# Patient Record
Sex: MALE | Race: WHITE | NOT HISPANIC OR LATINO | Employment: OTHER | URBAN - METROPOLITAN AREA
[De-identification: names, ages, dates, MRNs, and addresses within clinical notes are randomized per-mention and may not be internally consistent; named-entity substitution may affect disease eponyms.]

---

## 2018-07-12 ENCOUNTER — APPOINTMENT (EMERGENCY)
Dept: RADIOLOGY | Facility: HOSPITAL | Age: 63
End: 2018-07-12
Payer: MEDICAID

## 2018-07-12 ENCOUNTER — HOSPITAL ENCOUNTER (EMERGENCY)
Facility: HOSPITAL | Age: 63
Discharge: HOME | End: 2018-07-12
Attending: EMERGENCY MEDICINE | Admitting: EMERGENCY MEDICINE
Payer: MEDICAID

## 2018-07-12 VITALS
HEIGHT: 67 IN | BODY MASS INDEX: 28.37 KG/M2 | DIASTOLIC BLOOD PRESSURE: 71 MMHG | WEIGHT: 180.78 LBS | HEART RATE: 123 BPM | TEMPERATURE: 98.4 F | SYSTOLIC BLOOD PRESSURE: 113 MMHG | OXYGEN SATURATION: 92 % | RESPIRATION RATE: 18 BRPM

## 2018-07-12 DIAGNOSIS — J44.9 COPD (CHRONIC OBSTRUCTIVE PULMONARY DISEASE) (HCC): ICD-10-CM

## 2018-07-12 DIAGNOSIS — J45.901 ASTHMA EXACERBATION: Primary | ICD-10-CM

## 2018-07-12 LAB
ALBUMIN SERPL BCP-MCNC: 3.3 G/DL (ref 3.5–5)
ALP SERPL-CCNC: 111 U/L (ref 46–116)
ALT SERPL W P-5'-P-CCNC: 26 U/L (ref 12–78)
ANION GAP SERPL CALCULATED.3IONS-SCNC: 6 MMOL/L (ref 4–13)
AST SERPL W P-5'-P-CCNC: 21 U/L (ref 5–45)
ATRIAL RATE: 94 BPM
BASOPHILS # BLD AUTO: 0.05 THOUSANDS/ΜL (ref 0–0.1)
BASOPHILS NFR BLD AUTO: 1 % (ref 0–1)
BILIRUB SERPL-MCNC: 0.4 MG/DL (ref 0.2–1)
BUN SERPL-MCNC: 23 MG/DL (ref 5–25)
CALCIUM SERPL-MCNC: 9 MG/DL (ref 8.3–10.1)
CHLORIDE SERPL-SCNC: 103 MMOL/L (ref 100–108)
CO2 SERPL-SCNC: 27 MMOL/L (ref 21–32)
CREAT SERPL-MCNC: 1.17 MG/DL (ref 0.6–1.3)
EOSINOPHIL # BLD AUTO: 0.43 THOUSAND/ΜL (ref 0–0.61)
EOSINOPHIL NFR BLD AUTO: 4 % (ref 0–6)
ERYTHROCYTE [DISTWIDTH] IN BLOOD BY AUTOMATED COUNT: 15 % (ref 11.6–15.1)
GFR SERPL CREATININE-BSD FRML MDRD: 67 ML/MIN/1.73SQ M
GLUCOSE SERPL-MCNC: 103 MG/DL (ref 65–140)
HCT VFR BLD AUTO: 42.1 % (ref 36.5–49.3)
HGB BLD-MCNC: 13.4 G/DL (ref 12–17)
IMM GRANULOCYTES # BLD AUTO: 0.03 THOUSAND/UL (ref 0–0.2)
IMM GRANULOCYTES NFR BLD AUTO: 0 % (ref 0–2)
LYMPHOCYTES # BLD AUTO: 1.62 THOUSANDS/ΜL (ref 0.6–4.47)
LYMPHOCYTES NFR BLD AUTO: 16 % (ref 14–44)
MCH RBC QN AUTO: 23.3 PG (ref 26.8–34.3)
MCHC RBC AUTO-ENTMCNC: 31.8 G/DL (ref 31.4–37.4)
MCV RBC AUTO: 73 FL (ref 82–98)
MONOCYTES # BLD AUTO: 0.89 THOUSAND/ΜL (ref 0.17–1.22)
MONOCYTES NFR BLD AUTO: 9 % (ref 4–12)
NEUTROPHILS # BLD AUTO: 7.01 THOUSANDS/ΜL (ref 1.85–7.62)
NEUTS SEG NFR BLD AUTO: 70 % (ref 43–75)
NRBC BLD AUTO-RTO: 0 /100 WBCS
P AXIS: 61 DEGREES
PLATELET # BLD AUTO: 354 THOUSANDS/UL (ref 149–390)
PMV BLD AUTO: 10.9 FL (ref 8.9–12.7)
POTASSIUM SERPL-SCNC: 3.9 MMOL/L (ref 3.5–5.3)
PR INTERVAL: 160 MS
PROT SERPL-MCNC: 7.9 G/DL (ref 6.4–8.2)
QRS AXIS: -40 DEGREES
QRSD INTERVAL: 90 MS
QT INTERVAL: 338 MS
QTC INTERVAL: 422 MS
RBC # BLD AUTO: 5.74 MILLION/UL (ref 3.88–5.62)
SODIUM SERPL-SCNC: 136 MMOL/L (ref 136–145)
T WAVE AXIS: 30 DEGREES
TROPONIN I SERPL-MCNC: <0.02 NG/ML
VENTRICULAR RATE: 94 BPM
WBC # BLD AUTO: 10.03 THOUSAND/UL (ref 4.31–10.16)

## 2018-07-12 PROCEDURE — 85025 COMPLETE CBC W/AUTO DIFF WBC: CPT | Performed by: EMERGENCY MEDICINE

## 2018-07-12 PROCEDURE — 80053 COMPREHEN METABOLIC PANEL: CPT | Performed by: EMERGENCY MEDICINE

## 2018-07-12 PROCEDURE — 99285 EMERGENCY DEPT VISIT HI MDM: CPT

## 2018-07-12 PROCEDURE — 96374 THER/PROPH/DIAG INJ IV PUSH: CPT

## 2018-07-12 PROCEDURE — 94760 N-INVAS EAR/PLS OXIMETRY 1: CPT

## 2018-07-12 PROCEDURE — 93010 ELECTROCARDIOGRAM REPORT: CPT | Performed by: INTERNAL MEDICINE

## 2018-07-12 PROCEDURE — 36415 COLL VENOUS BLD VENIPUNCTURE: CPT | Performed by: EMERGENCY MEDICINE

## 2018-07-12 PROCEDURE — 93005 ELECTROCARDIOGRAM TRACING: CPT

## 2018-07-12 PROCEDURE — 84484 ASSAY OF TROPONIN QUANT: CPT | Performed by: EMERGENCY MEDICINE

## 2018-07-12 PROCEDURE — 94644 CONT INHLJ TX 1ST HOUR: CPT

## 2018-07-12 PROCEDURE — 94640 AIRWAY INHALATION TREATMENT: CPT

## 2018-07-12 PROCEDURE — 71046 X-RAY EXAM CHEST 2 VIEWS: CPT

## 2018-07-12 RX ORDER — ALBUTEROL SULFATE 90 UG/1
2 AEROSOL, METERED RESPIRATORY (INHALATION) ONCE
Status: COMPLETED | OUTPATIENT
Start: 2018-07-12 | End: 2018-07-12

## 2018-07-12 RX ORDER — ALBUTEROL SULFATE 90 UG/1
2 AEROSOL, METERED RESPIRATORY (INHALATION) EVERY 4 HOURS PRN
Qty: 1 INHALER | Refills: 0 | Status: SHIPPED | OUTPATIENT
Start: 2018-07-12

## 2018-07-12 RX ORDER — OXYCODONE HYDROCHLORIDE AND ACETAMINOPHEN 5; 325 MG/1; MG/1
1 TABLET ORAL EVERY 8 HOURS PRN
COMMUNITY

## 2018-07-12 RX ORDER — IPRATROPIUM BROMIDE AND ALBUTEROL SULFATE 2.5; .5 MG/3ML; MG/3ML
3 SOLUTION RESPIRATORY (INHALATION)
Status: DISCONTINUED | OUTPATIENT
Start: 2018-07-12 | End: 2018-07-12 | Stop reason: HOSPADM

## 2018-07-12 RX ORDER — PREDNISONE 20 MG/1
60 TABLET ORAL DAILY
Qty: 15 TABLET | Refills: 0 | Status: SHIPPED | OUTPATIENT
Start: 2018-07-12 | End: 2018-07-17

## 2018-07-12 RX ORDER — ALBUTEROL SULFATE 2.5 MG/3ML
10 SOLUTION RESPIRATORY (INHALATION) ONCE
Status: COMPLETED | OUTPATIENT
Start: 2018-07-12 | End: 2018-07-12

## 2018-07-12 RX ORDER — BENZONATATE 100 MG/1
100 CAPSULE ORAL EVERY 8 HOURS
Qty: 21 CAPSULE | Refills: 0 | Status: SHIPPED | OUTPATIENT
Start: 2018-07-12

## 2018-07-12 RX ORDER — METHYLPREDNISOLONE SODIUM SUCCINATE 125 MG/2ML
125 INJECTION, POWDER, LYOPHILIZED, FOR SOLUTION INTRAMUSCULAR; INTRAVENOUS ONCE
Status: COMPLETED | OUTPATIENT
Start: 2018-07-12 | End: 2018-07-12

## 2018-07-12 RX ADMIN — IPRATROPIUM BROMIDE 0.5 MG: 0.5 SOLUTION RESPIRATORY (INHALATION) at 09:57

## 2018-07-12 RX ADMIN — ALBUTEROL SULFATE 2 PUFF: 90 AEROSOL, METERED RESPIRATORY (INHALATION) at 12:59

## 2018-07-12 RX ADMIN — IPRATROPIUM BROMIDE AND ALBUTEROL SULFATE 3 ML: .5; 3 SOLUTION RESPIRATORY (INHALATION) at 09:07

## 2018-07-12 RX ADMIN — ALBUTEROL SULFATE 10 MG: 2.5 SOLUTION RESPIRATORY (INHALATION) at 09:57

## 2018-07-12 RX ADMIN — METHYLPREDNISOLONE SODIUM SUCCINATE 125 MG: 125 INJECTION, POWDER, FOR SOLUTION INTRAMUSCULAR; INTRAVENOUS at 09:51

## 2018-07-12 NOTE — ED PROVIDER NOTES
History  Chief Complaint   Patient presents with    Shortness of Breath     onset yesterday afternoon progressively worse w cough n wheezing     37-year-old male patient presents emergency department exacerbation of the COPD and asthma  The patient states very clearly from the beginning of his time in the emergency department there be no way he would be willing to stay  Patient is a amenable to treatment in the emergency department  Examination patient is wheezing bilaterally, he is not moving a great deal of air in the peripheral aspect of his lungs  The patient be given an hour long nebulizer treatment reassessed  After a long nebulizer the patient's jittery but otherwise feeling better  Patient had good breath sounds in his lungs with some wheezing still  I did offer the patient further treatment, I offered the patient inpatient admission  The patient did not want this and wanted to be discharged home  The patient be discharged follow-up  The emergency department should he need to while here on vacation  History provided by:  Spouse   used: No    Shortness of Breath   Severity:  Mild  Onset quality:  Sudden  Timing:  Constant  Progression:  Worsening  Chronicity:  Recurrent  Context: not activity, not animal exposure, not emotional upset and not occupational exposure    Relieved by:  Nothing  Worsened by:  Nothing  Ineffective treatments:  None tried  Associated symptoms: no chest pain, no diaphoresis, no ear pain, no hemoptysis, no PND and no sputum production        Prior to Admission Medications   Prescriptions Last Dose Informant Patient Reported?  Taking?   oxyCODONE-acetaminophen (PERCOCET) 5-325 mg per tablet   Yes Yes   Sig: Take 1 tablet by mouth every 8 (eight) hours as needed for moderate pain (not daily use)      Facility-Administered Medications: None       Past Medical History:   Diagnosis Date    Asthma     Chronic pain     cervic and lumbar    Hyperlipidemia        Past Surgical History:   Procedure Laterality Date    APPENDECTOMY      HERNIA REPAIR      4 times    NEPHRECTOMY LIVING DONOR Left 10/2009       History reviewed  No pertinent family history  I have reviewed and agree with the history as documented  Social History   Substance Use Topics    Smoking status: Former Smoker    Smokeless tobacco: Never Used    Alcohol use No        Review of Systems   Constitutional: Negative for diaphoresis  HENT: Negative for ear pain  Respiratory: Positive for shortness of breath  Negative for hemoptysis and sputum production  Cardiovascular: Negative for chest pain and PND  All other systems reviewed and are negative  Physical Exam  Physical Exam   Constitutional: He is oriented to person, place, and time  He appears well-developed and well-nourished  No distress  HENT:   Head: Normocephalic and atraumatic  Right Ear: External ear normal    Left Ear: External ear normal    Eyes: Conjunctivae and EOM are normal  Right eye exhibits no discharge  Left eye exhibits no discharge  No scleral icterus  Neck: Normal range of motion  Neck supple  No JVD present  No tracheal deviation present  No thyromegaly present  Cardiovascular: Normal rate and regular rhythm  Pulmonary/Chest: No stridor  He is in respiratory distress  He has wheezes  Abdominal: Soft  Bowel sounds are normal  He exhibits no distension  There is no tenderness  Musculoskeletal: Normal range of motion  He exhibits no edema, tenderness or deformity  Neurological: He is alert and oriented to person, place, and time  No cranial nerve deficit  Coordination normal    Skin: Skin is warm and dry  He is not diaphoretic  Psychiatric: He has a normal mood and affect  His behavior is normal    Nursing note and vitals reviewed        Vital Signs  ED Triage Vitals [07/12/18 0845]   Temperature Pulse Respirations Blood Pressure SpO2   98 4 °F (36 9 °C) 99 22 131/83 92 % Temp Source Heart Rate Source Patient Position - Orthostatic VS BP Location FiO2 (%)   Oral Monitor Lying Right arm --      Pain Score       --           Vitals:    07/12/18 0900 07/12/18 1000 07/12/18 1030 07/12/18 1301   BP: 136/98 144/79 129/90 113/71   Pulse: 102 96 (!) 112 (!) 123   Patient Position - Orthostatic VS:    Lying       Visual Acuity      ED Medications  Medications   ipratropium-albuterol (DUO-NEB) 0 5-2 5 mg/3 mL inhalation solution 3 mL (3 mL Nebulization Given 7/12/18 0907)   methylPREDNISolone sodium succinate (Solu-MEDROL) injection 125 mg (125 mg Intravenous Given 7/12/18 0951)   albuterol inhalation solution 10 mg (10 mg Nebulization Given 7/12/18 0957)   ipratropium (ATROVENT) 0 02 % inhalation solution 0 5 mg (0 5 mg Nebulization Given 7/12/18 0957)   albuterol (PROVENTIL HFA,VENTOLIN HFA) inhaler 2 puff (2 puffs Inhalation Given 7/12/18 1259)       Diagnostic Studies  Results Reviewed     Procedure Component Value Units Date/Time    Comprehensive metabolic panel [[id number]]  (Abnormal) Collected:  07/12/18 0949    Lab Status:  Final result Specimen:  Blood from Arm, Left Updated:  07/12/18 1044     Sodium 136 mmol/L      Potassium 3 9 mmol/L      Chloride 103 mmol/L      CO2 27 mmol/L      Anion Gap 6 mmol/L      BUN 23 mg/dL      Creatinine 1 17 mg/dL      Glucose 103 mg/dL      Calcium 9 0 mg/dL      AST 21 U/L      ALT 26 U/L      Alkaline Phosphatase 111 U/L      Total Protein 7 9 g/dL      Albumin 3 3 (L) g/dL      Total Bilirubin 0 40 mg/dL      eGFR 67 ml/min/1 73sq m     Narrative:         National Kidney Disease Education Program recommendations are as follows:  GFR calculation is accurate only with a steady state creatinine  Chronic Kidney disease less than 60 ml/min/1 73 sq  meters  Kidney failure less than 15 ml/min/1 73 sq  meters      Troponin I [[id number]]  (Normal) Collected:  07/12/18 0949    Lab Status:  Final result Specimen:  Blood from Arm, Left Updated:  07/12/18 1043 Troponin I <0 02 ng/mL     CBC and differential [[id number]]  (Abnormal) Collected:  07/12/18 0949    Lab Status:  Final result Specimen:  Blood from Arm, Left Updated:  07/12/18 0958     WBC 10 03 Thousand/uL      RBC 5 74 (H) Million/uL      Hemoglobin 13 4 g/dL      Hematocrit 42 1 %      MCV 73 (L) fL      MCH 23 3 (L) pg      MCHC 31 8 g/dL      RDW 15 0 %      MPV 10 9 fL      Platelets 618 Thousands/uL      nRBC 0 /100 WBCs      Neutrophils Relative 70 %      Immat GRANS % 0 %      Lymphocytes Relative 16 %      Monocytes Relative 9 %      Eosinophils Relative 4 %      Basophils Relative 1 %      Neutrophils Absolute 7 01 Thousands/µL      Immature Grans Absolute 0 03 Thousand/uL      Lymphocytes Absolute 1 62 Thousands/µL      Monocytes Absolute 0 89 Thousand/µL      Eosinophils Absolute 0 43 Thousand/µL      Basophils Absolute 0 05 Thousands/µL                  X-ray chest 2 views   Final Result by Jenna Vieira MD (07/12 1103)      No active pulmonary disease on examination which is somewhat limited secondary to low lung volumes  Workstation performed: [id number]                    Procedures  Procedures       Phone Contacts  ED Phone Contact    ED Course                               MDM  Number of Diagnoses or Management Options  Asthma exacerbation: new and requires workup  COPD (chronic obstructive pulmonary disease) ([facility] [address] ): new and requires workup  Diagnosis management comments: Patient's EKG was done at 0954 hr, ventricular rate was 94    Patient had a normal sinus rhythm with some left axis deviation       Amount and/or Complexity of Data Reviewed  Clinical lab tests: ordered and reviewed  Tests in the radiology section of CPT®: reviewed and ordered  Decide to obtain previous medical records or to obtain history from someone other than the patient: yes  Review and summarize past medical records: yes  Independent visualization of images, tracings, or specimens: yes    Patient Progress  Patient progress: stable    CritCare Time    Disposition  Final diagnoses:   Asthma exacerbation   COPD (chronic obstructive pulmonary disease) ([address] )     Time reflects when diagnosis was documented in both MDM as applicable and the Disposition within this note     Time User Action Codes Description Comment    7/12/2018 12:32 PM April [facility] Add [G39 582] Asthma exacerbation     7/12/2018 12:32 PM April Lab Add [J44 9] COPD (chronic obstructive pulmonary disease) [facility])       ED Disposition     ED Disposition Condition Comment    Discharge  Waldo Tuttle discharge to home/self care  Condition at discharge: Good        Follow-up Information     Follow up With Specialties Details Why Contact Info Additional Information    [address] [facility] Emergency Department Emergency Medicine  As needed [address] [facility] [address] [phone] [place] ED, [address] [place] [address], [facility]          Discharge Medication List as of 7/12/2018 12:33 PM      START taking these medications    Details   albuterol (PROVENTIL HFA,VENTOLIN HFA) 90 mcg/act inhaler Inhale 2 puffs every 4 (four) hours as needed for wheezing, Starting Thu 7/12/2018, Print      predniSONE 20 mg tablet Take 3 tablets (60 mg total) by mouth daily for 5 days, Starting Thu 7/12/2018, Until Tue 7/17/2018, Print         CONTINUE these medications which have NOT CHANGED    Details   oxyCODONE-acetaminophen (PERCOCET) 5-325 mg per tablet Take 1 tablet by mouth every 8 (eight) hours as needed for moderate pain (not daily use), Historical Med           No discharge procedures on file      ED Provider  Electronically Signed by           Verner Breen, DO  07/12/18 6855

## 2018-07-12 NOTE — ED NOTES
Discharge instructions and medications reviewed with patient  Patient verbalized understanding with no further questions at this time       Nelida Rashid RN  07/12/18 3391

## 2018-07-12 NOTE — DISCHARGE INSTRUCTIONS
Asthma, Ambulatory Care   GENERAL INFORMATION:   Asthma  is a lung disease that makes breathing difficult  Chronic inflammation and reactions to triggers narrow the airways in your lungs  Asthma can become life-threatening if it is not managed  Common symptoms include the following:   · Coughing     · Wheezing     · Shortness of breath     · Chest tightness  Seek immediate care for the following symptoms:   · Severe shortness of breath    · Blue or gray lips or nails    · Skin around your neck and ribs pulls in with each breath    · Shortness of breath, even after you take your short-term medicine as directed     · Peak flow numbers in the red zone of your asthma action plan  Treatment for asthma  will depend on how severe it is  Medicine may decrease inflammation, open airways, and make it easier to breathe  Medicines may be inhaled, taken as a pill, or injected  Short-term medicines relieve your symptoms quickly  Long-term medicines are used to prevent future attacks  You may also need medicine to help control your allergies  Manage and prevent future asthma attacks:   · Follow your asthma action pan  This is a written plan that you and your healthcare provider create  It explains which medicine you need and when to change doses if necessary  It also explains how you can monitor symptoms and use a peak flow meter  The meter measures how well your lungs are working  · Manage other health conditions , such as allergies, acid reflux, and sleep apnea  · Identify and avoid triggers  These may include pets, dust mites, mold, and cockroaches  · Do not smoke and avoid others who smoke  If you smoke, it is never too late to quit  Ask your healthcare provider if you need help quitting  · Ask about a flu vaccine  The flu can make your asthma worse  You may need a yearly flu shot  Follow up with your healthcare provider as directed:   You will need to return to make sure your medicine is working and your symptoms are controlled  You may be referred to an asthma or allergy specialist  Beltran Cannon may be asked to keep a record of your peak flow values and bring it with you to your appointments  Write down your questions so you remember to ask them during your visits  CARE AGREEMENT:   You have the right to help plan your care  Learn about your health condition and how it may be treated  Discuss treatment options with your caregivers to decide what care you want to receive  You always have the right to refuse treatment  The above information is an  only  It is not intended as medical advice for individual conditions or treatments  Talk to your doctor, nurse or pharmacist before following any medical regimen to see if it is safe and effective for you  © 2014 [address] is for End User's use only and may not be sold, redistributed or otherwise used for commercial purposes  All illustrations and images included in CareNotes® are the copyrighted property of A D A M [employer]  or Jorge Luis Archer

## 2018-08-27 ENCOUNTER — TRANSCRIPTION ENCOUNTER (OUTPATIENT)
Age: 63
End: 2018-08-27

## 2019-11-08 ENCOUNTER — HEALTH MAINTENANCE LETTER (OUTPATIENT)
Age: 64
End: 2019-11-08

## 2020-02-01 ENCOUNTER — TRANSCRIPTION ENCOUNTER (OUTPATIENT)
Age: 65
End: 2020-02-01

## 2020-03-12 PROBLEM — Z00.00 ENCOUNTER FOR PREVENTIVE HEALTH EXAMINATION: Noted: 2020-03-12

## 2020-03-23 ENCOUNTER — APPOINTMENT (OUTPATIENT)
Dept: PULMONOLOGY | Facility: CLINIC | Age: 65
End: 2020-03-23
Payer: MEDICAID

## 2020-03-23 VITALS
DIASTOLIC BLOOD PRESSURE: 70 MMHG | OXYGEN SATURATION: 98 % | HEART RATE: 76 BPM | WEIGHT: 194 LBS | RESPIRATION RATE: 16 BRPM | HEIGHT: 67 IN | SYSTOLIC BLOOD PRESSURE: 120 MMHG | BODY MASS INDEX: 30.45 KG/M2

## 2020-03-23 PROCEDURE — 99203 OFFICE O/P NEW LOW 30 MIN: CPT

## 2020-03-23 NOTE — REVIEW OF SYSTEMS
[Cough] : cough [Wheezing] : wheezing [SOB on Exertion] : sob on exertion [Negative] : Endocrine [Sputum] : no sputum

## 2020-03-23 NOTE — DISCUSSION/SUMMARY
[FreeTextEntry1] : 64 yo male with complaints consistent with OAD in patient with significant past smoking history. Anoro once daily prescribed in place of trelegy. PFT will be performed in the future with adjustment in treatment as clinically needed. A low dose screening chest CT will be performed. He is to follow up with his PMD as before.

## 2020-03-23 NOTE — HISTORY OF PRESENT ILLNESS
[TextBox_4] : 64 yo male presents for evaluation of chronic dry cough with ALCOCER. He denies fever, chest pain or hemoptysis. He was given a sample of trelegy 10 days ago, with improvement in symptoms.The patient has a 43 pack year hx of smoking, having stopped 12 years ago.

## 2020-06-29 ENCOUNTER — APPOINTMENT (OUTPATIENT)
Dept: PULMONOLOGY | Facility: CLINIC | Age: 65
End: 2020-06-29
Payer: MEDICAID

## 2020-06-29 VITALS
WEIGHT: 195 LBS | RESPIRATION RATE: 16 BRPM | SYSTOLIC BLOOD PRESSURE: 100 MMHG | BODY MASS INDEX: 30.61 KG/M2 | HEART RATE: 90 BPM | OXYGEN SATURATION: 96 % | HEIGHT: 67 IN | DIASTOLIC BLOOD PRESSURE: 70 MMHG

## 2020-06-29 PROCEDURE — 99214 OFFICE O/P EST MOD 30 MIN: CPT

## 2020-06-29 RX ORDER — UMECLIDINIUM BROMIDE AND VILANTEROL TRIFENATATE 62.5; 25 UG/1; UG/1
62.5-25 POWDER RESPIRATORY (INHALATION)
Qty: 1 | Refills: 3 | Status: DISCONTINUED | COMMUNITY
Start: 2020-03-23 | End: 2020-06-29

## 2020-06-29 RX ORDER — ALBUTEROL SULFATE 90 UG/1
108 (90 BASE) AEROSOL, METERED RESPIRATORY (INHALATION)
Qty: 1 | Refills: 1 | Status: ACTIVE | COMMUNITY
Start: 2020-05-28 | End: 1900-01-01

## 2020-06-29 NOTE — HISTORY OF PRESENT ILLNESS
[>= 30 pack years] : >= 30 pack years [Former] : former [TextBox_11] : 1.5 [TextBox_4] : 64 yo male with hx of OAD presents for follow up. The patient complains of recent increase of ALCOCER with productive cough and wheezing. He denies fever, chest pain or hemoptysis. He uses incruse daily (insurance will not cover anoro) with albuterol MDI. He did not go for chest CT as ordered last visit. 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 [YearQuit] : 2007 [TextBox_13] : 30

## 2020-06-29 NOTE — REVIEW OF SYSTEMS
[Sputum] : sputum [Cough] : cough [SOB on Exertion] : sob on exertion [Wheezing] : wheezing [Negative] : Endocrine

## 2020-06-29 NOTE — PHYSICAL EXAM
[No Acute Distress] : no acute distress [Normal Oropharynx] : normal oropharynx [No Neck Mass] : no neck mass [Normal Appearance] : normal appearance [Normal Rate/Rhythm] : normal rate/rhythm [Normal S1, S2] : normal s1, s2 [No Resp Distress] : no resp distress [No Murmurs] : no murmurs [No Abnormalities] : no abnormalities [Wheeze] : wheeze [Normal Gait] : normal gait [Benign] : benign [No Cyanosis] : no cyanosis [No Clubbing] : no clubbing [FROM] : FROM [No Edema] : no edema [No Focal Deficits] : no focal deficits [Normal Color/ Pigmentation] : normal color/ pigmentation [Oriented x3] : oriented x3 [Normal Affect] : normal affect

## 2020-06-29 NOTE — DISCUSSION/SUMMARY
[FreeTextEntry1] : 66 yo male with OAD exacerbation> Prednisone 40 mg daily for five days prescribed. He was again given a sample of trelegy, which will be ordered, hoping the insurance company will pay. PRN albuterol MDI use recommended. PFT in the future. LDCT of the chest for screening again ordered ([facility]) as per neighbor's request. He is to follow up with his PMD as before.

## 2020-09-18 ENCOUNTER — TRANSCRIPTION ENCOUNTER (OUTPATIENT)
Age: 65
End: 2020-09-18

## 2020-09-22 ENCOUNTER — TRANSCRIPTION ENCOUNTER (OUTPATIENT)
Age: 65
End: 2020-09-22

## 2020-10-05 ENCOUNTER — APPOINTMENT (OUTPATIENT)
Dept: PULMONOLOGY | Facility: CLINIC | Age: 65
End: 2020-10-05
Payer: MEDICAID

## 2020-10-05 VITALS
HEART RATE: 82 BPM | WEIGHT: 195 LBS | HEIGHT: 65 IN | OXYGEN SATURATION: 97 % | BODY MASS INDEX: 32.49 KG/M2 | RESPIRATION RATE: 16 BRPM | SYSTOLIC BLOOD PRESSURE: 142 MMHG | DIASTOLIC BLOOD PRESSURE: 84 MMHG | TEMPERATURE: 97 F

## 2020-10-05 PROCEDURE — 94729 DIFFUSING CAPACITY: CPT

## 2020-10-05 PROCEDURE — 94060 EVALUATION OF WHEEZING: CPT

## 2020-10-05 PROCEDURE — 99214 OFFICE O/P EST MOD 30 MIN: CPT | Mod: 25

## 2020-10-05 PROCEDURE — 94727 GAS DIL/WSHOT DETER LNG VOL: CPT

## 2020-10-05 RX ORDER — FLUTICASONE FUROATE, UMECLIDINIUM BROMIDE AND VILANTEROL TRIFENATATE 100; 62.5; 25 UG/1; UG/1; UG/1
100-62.5-25 POWDER RESPIRATORY (INHALATION)
Qty: 3 | Refills: 3 | Status: COMPLETED | COMMUNITY
Start: 2020-06-29 | End: 2020-10-05

## 2020-10-05 RX ORDER — FLUTICASONE FUROATE, UMECLIDINIUM BROMIDE AND VILANTEROL TRIFENATATE 100; 62.5; 25 UG/1; UG/1; UG/1
100-62.5-25 POWDER RESPIRATORY (INHALATION)
Refills: 0 | Status: COMPLETED | COMMUNITY
Start: 2020-03-23 | End: 2020-10-05

## 2020-10-05 RX ORDER — PREDNISONE 20 MG/1
20 TABLET ORAL
Qty: 10 | Refills: 0 | Status: COMPLETED | COMMUNITY
Start: 2020-06-29 | End: 2020-10-05

## 2020-10-12 NOTE — HISTORY OF PRESENT ILLNESS
[Former] : former [Current] : current [TextBox_4] : 64 yo male with hx of OAD presents for follow up. The patient complains of PRN cough with SOB. He denies fever, chest pain or hemoptysis. He uses incruse daily with albuterol MDI PRN and continues to vape. He did not go for screening chest CT due to covid. He is antibody positive and recently received his influenza vaccine.He continues to vape. [TextBox_11] : 1.5 [TextBox_13] : 30 [YearQuit] : 2007 [TextBox_29] : Denies snoring, daytime somnolence, apneic episodes, AM headaches

## 2020-10-12 NOTE — REVIEW OF SYSTEMS
[Cough] : cough [Sputum] : sputum [Wheezing] : wheezing [SOB on Exertion] : sob on exertion [Negative] : Endocrine

## 2020-10-12 NOTE — DISCUSSION/SUMMARY
[FreeTextEntry1] : 64 yo male with OAD. He is to continue use of incruse daily with PRN albuterol MDI. He was warned against further vaping. He was again reminded to have the low dose screening chest CT done. He claims that he had the influenza vaccine. He is to follow up with his PMD as before.

## 2020-12-06 ENCOUNTER — HEALTH MAINTENANCE LETTER (OUTPATIENT)
Age: 65
End: 2020-12-06

## 2020-12-14 ENCOUNTER — APPOINTMENT (OUTPATIENT)
Dept: PULMONOLOGY | Facility: CLINIC | Age: 65
End: 2020-12-14
Payer: MEDICAID

## 2020-12-14 VITALS
DIASTOLIC BLOOD PRESSURE: 84 MMHG | HEIGHT: 65 IN | RESPIRATION RATE: 16 BRPM | OXYGEN SATURATION: 97 % | HEART RATE: 85 BPM | TEMPERATURE: 98 F | SYSTOLIC BLOOD PRESSURE: 140 MMHG

## 2020-12-14 PROCEDURE — 99072 ADDL SUPL MATRL&STAF TM PHE: CPT

## 2020-12-14 PROCEDURE — 99214 OFFICE O/P EST MOD 30 MIN: CPT

## 2020-12-27 NOTE — DISCUSSION/SUMMARY
[FreeTextEntry1] : 64 yo male with OAD exacerbation. Zpack and medrol pack prescribed. He is to continue with incruse and albuterol as before. Treatment adjustment will depend on symptomatic needs. PFT will be performed in the future.Low dose screening chest CT again recommended. He is to stop use of E cigarettes. He has received the influenza vaccine. Follow up with his PMD as before.

## 2020-12-27 NOTE — HISTORY OF PRESENT ILLNESS
[Former] : former [>= 30 pack years] : >= 30 pack years [Current] : current [TextBox_4] : 66 yo male with hx of OAD presents for follow up. The patient complains of productive cough for three weeks. He denies fever, chest pain or hemoptysis.He uses incruse daily with PRN albuterol MDI. The patient had covid 19 like symptoms in March, noted to have antibodies a few months later.He continues to vape. [TextBox_11] : 1.5 [TextBox_13] : 30 [YearQuit] : 2007 [TextBox_29] : Denies snoring, daytime somnolence, apneic episodes, AM headaches

## 2020-12-27 NOTE — REVIEW OF SYSTEMS
[Cough] : cough [Sputum] : sputum [Wheezing] : wheezing [Negative] : Endocrine [SOB on Exertion] : no sob on exertion

## 2021-04-05 ENCOUNTER — APPOINTMENT (OUTPATIENT)
Dept: PULMONOLOGY | Facility: CLINIC | Age: 66
End: 2021-04-05

## 2021-06-02 ENCOUNTER — APPOINTMENT (OUTPATIENT)
Dept: PULMONOLOGY | Facility: CLINIC | Age: 66
End: 2021-06-02
Payer: MEDICAID

## 2021-06-02 VITALS
BODY MASS INDEX: 32.49 KG/M2 | WEIGHT: 195 LBS | HEART RATE: 93 BPM | HEIGHT: 65 IN | SYSTOLIC BLOOD PRESSURE: 138 MMHG | OXYGEN SATURATION: 95 % | DIASTOLIC BLOOD PRESSURE: 79 MMHG

## 2021-06-02 PROCEDURE — 99213 OFFICE O/P EST LOW 20 MIN: CPT

## 2021-06-02 RX ORDER — UMECLIDINIUM 62.5 UG/1
62.5 AEROSOL, POWDER ORAL DAILY
Qty: 1 | Refills: 3 | Status: DISCONTINUED | COMMUNITY
Start: 2020-05-01 | End: 2021-06-02

## 2021-06-02 RX ORDER — UMECLIDINIUM 62.5 UG/1
62.5 AEROSOL, POWDER ORAL DAILY
Qty: 1 | Refills: 3 | Status: DISCONTINUED | COMMUNITY
Start: 2020-12-03 | End: 2021-06-02

## 2021-06-02 RX ORDER — AZITHROMYCIN 250 MG/1
250 TABLET, FILM COATED ORAL
Qty: 1 | Refills: 3 | Status: DISCONTINUED | COMMUNITY
Start: 2020-12-14 | End: 2021-06-02

## 2021-06-02 RX ORDER — METHYLPREDNISOLONE 4 MG/1
4 TABLET ORAL
Qty: 1 | Refills: 0 | Status: DISCONTINUED | COMMUNITY
Start: 2020-12-14 | End: 2021-06-02

## 2021-06-02 NOTE — DISCUSSION/SUMMARY
[FreeTextEntry1] : 65 yo male with OAD related complaints. He was given a two week sample of breztri after which he is to continue with symbicort 160/4.5 BID and PRN albuterol MDI. He again was warned against further vaping. Low dose screening chest CT will again be ordered. He is to follow up with his PMD as before.

## 2021-06-02 NOTE — HISTORY OF PRESENT ILLNESS
[Former] : former [>= 30 pack years] : >= 30 pack years [Current] : current [TextBox_4] : 67 yo male with hx of OAD presents for follow up. The patient complains of SOB with wheezing and productive cough at night. He denies fever, chest pain or hemoptysis. He uses albuterol MDI PRN and recently started using his mother in law's symbicort.He stopped use of incruse. He continues to vape but less. The patient did not go for screening chest CT as ordered last visit. [TextBox_11] : 1.5 [TextBox_13] : 30 [YearQuit] : 2007 [TextBox_29] : Denies snoring, daytime somnolence, apneic episodes, AM headaches

## 2021-06-02 NOTE — REVIEW OF SYSTEMS
[Cough] : cough [Sputum] : sputum [Dyspnea] : dyspnea [Wheezing] : wheezing [SOB on Exertion] : no sob on exertion [Negative] : Endocrine

## 2021-06-14 ENCOUNTER — TRANSCRIPTION ENCOUNTER (OUTPATIENT)
Age: 66
End: 2021-06-14

## 2021-07-07 ENCOUNTER — TRANSCRIPTION ENCOUNTER (OUTPATIENT)
Age: 66
End: 2021-07-07

## 2021-07-14 ENCOUNTER — HOSPITAL ENCOUNTER (EMERGENCY)
Facility: HOSPITAL | Age: 66
Discharge: HOME | End: 2021-07-14
Attending: EMERGENCY MEDICINE | Admitting: EMERGENCY MEDICINE
Payer: MEDICAID

## 2021-07-14 ENCOUNTER — APPOINTMENT (EMERGENCY)
Dept: RADIOLOGY | Facility: HOSPITAL | Age: 66
End: 2021-07-14
Payer: MEDICAID

## 2021-07-14 VITALS
WEIGHT: 203.2 LBS | TEMPERATURE: 98.5 F | BODY MASS INDEX: 31.83 KG/M2 | SYSTOLIC BLOOD PRESSURE: 122 MMHG | DIASTOLIC BLOOD PRESSURE: 79 MMHG | RESPIRATION RATE: 22 BRPM | OXYGEN SATURATION: 97 % | HEART RATE: 79 BPM

## 2021-07-14 DIAGNOSIS — R07.89 LEFT-SIDED CHEST WALL PAIN: Primary | ICD-10-CM

## 2021-07-14 DIAGNOSIS — J98.01 BRONCHOSPASM: ICD-10-CM

## 2021-07-14 LAB
ALBUMIN SERPL BCP-MCNC: 3.6 G/DL (ref 3.5–5)
ALP SERPL-CCNC: 128 U/L (ref 46–116)
ALT SERPL W P-5'-P-CCNC: 33 U/L (ref 12–78)
ANION GAP SERPL CALCULATED.3IONS-SCNC: 8 MMOL/L (ref 4–13)
AST SERPL W P-5'-P-CCNC: 17 U/L (ref 5–45)
BASOPHILS # BLD AUTO: 0.04 THOUSANDS/ΜL (ref 0–0.1)
BASOPHILS NFR BLD AUTO: 1 % (ref 0–1)
BILIRUB SERPL-MCNC: 0.26 MG/DL (ref 0.2–1)
BUN SERPL-MCNC: 23 MG/DL (ref 5–25)
CALCIUM SERPL-MCNC: 9.1 MG/DL (ref 8.3–10.1)
CHLORIDE SERPL-SCNC: 103 MMOL/L (ref 100–108)
CO2 SERPL-SCNC: 27 MMOL/L (ref 21–32)
CREAT SERPL-MCNC: 1.14 MG/DL (ref 0.6–1.3)
EOSINOPHIL # BLD AUTO: 0.36 THOUSAND/ΜL (ref 0–0.61)
EOSINOPHIL NFR BLD AUTO: 5 % (ref 0–6)
ERYTHROCYTE [DISTWIDTH] IN BLOOD BY AUTOMATED COUNT: 15.4 % (ref 11.6–15.1)
GFR SERPL CREATININE-BSD FRML MDRD: 68 ML/MIN/1.73SQ M
GLUCOSE SERPL-MCNC: 97 MG/DL (ref 65–140)
HCT VFR BLD AUTO: 44.8 % (ref 36.5–49.3)
HGB BLD-MCNC: 13.8 G/DL (ref 12–17)
IMM GRANULOCYTES # BLD AUTO: 0.02 THOUSAND/UL (ref 0–0.2)
IMM GRANULOCYTES NFR BLD AUTO: 0 % (ref 0–2)
LYMPHOCYTES # BLD AUTO: 1.91 THOUSANDS/ΜL (ref 0.6–4.47)
LYMPHOCYTES NFR BLD AUTO: 24 % (ref 14–44)
MCH RBC QN AUTO: 23.4 PG (ref 26.8–34.3)
MCHC RBC AUTO-ENTMCNC: 30.8 G/DL (ref 31.4–37.4)
MCV RBC AUTO: 76 FL (ref 82–98)
MONOCYTES # BLD AUTO: 0.64 THOUSAND/ΜL (ref 0.17–1.22)
MONOCYTES NFR BLD AUTO: 8 % (ref 4–12)
NEUTROPHILS # BLD AUTO: 4.88 THOUSANDS/ΜL (ref 1.85–7.62)
NEUTS SEG NFR BLD AUTO: 62 % (ref 43–75)
NRBC BLD AUTO-RTO: 0 /100 WBCS
PLATELET # BLD AUTO: 358 THOUSANDS/UL (ref 149–390)
PMV BLD AUTO: 10.4 FL (ref 8.9–12.7)
POTASSIUM SERPL-SCNC: 4.4 MMOL/L (ref 3.5–5.3)
PROT SERPL-MCNC: 8.1 G/DL (ref 6.4–8.2)
RBC # BLD AUTO: 5.91 MILLION/UL (ref 3.88–5.62)
SODIUM SERPL-SCNC: 138 MMOL/L (ref 136–145)
TROPONIN I SERPL-MCNC: <0.02 NG/ML
WBC # BLD AUTO: 7.85 THOUSAND/UL (ref 4.31–10.16)

## 2021-07-14 PROCEDURE — 36415 COLL VENOUS BLD VENIPUNCTURE: CPT

## 2021-07-14 PROCEDURE — 99285 EMERGENCY DEPT VISIT HI MDM: CPT

## 2021-07-14 PROCEDURE — 80053 COMPREHEN METABOLIC PANEL: CPT

## 2021-07-14 PROCEDURE — 84484 ASSAY OF TROPONIN QUANT: CPT

## 2021-07-14 PROCEDURE — 93005 ELECTROCARDIOGRAM TRACING: CPT

## 2021-07-14 PROCEDURE — 71101 X-RAY EXAM UNILAT RIBS/CHEST: CPT

## 2021-07-14 PROCEDURE — 94640 AIRWAY INHALATION TREATMENT: CPT

## 2021-07-14 PROCEDURE — 85025 COMPLETE CBC W/AUTO DIFF WBC: CPT

## 2021-07-14 PROCEDURE — 99285 EMERGENCY DEPT VISIT HI MDM: CPT | Performed by: PHYSICIAN ASSISTANT

## 2021-07-14 RX ORDER — PREDNISONE 50 MG/1
50 TABLET ORAL DAILY
Qty: 4 TABLET | Refills: 0 | Status: SHIPPED | OUTPATIENT
Start: 2021-07-15 | End: 2021-07-19

## 2021-07-14 RX ORDER — IPRATROPIUM BROMIDE AND ALBUTEROL SULFATE 2.5; .5 MG/3ML; MG/3ML
3 SOLUTION RESPIRATORY (INHALATION) ONCE
Status: COMPLETED | OUTPATIENT
Start: 2021-07-14 | End: 2021-07-14

## 2021-07-14 RX ORDER — ALBUTEROL SULFATE 2.5 MG/3ML
2.5 SOLUTION RESPIRATORY (INHALATION) EVERY 6 HOURS PRN
Qty: 75 ML | Refills: 0 | Status: SHIPPED | OUTPATIENT
Start: 2021-07-14

## 2021-07-14 RX ADMIN — PREDNISONE 50 MG: 20 TABLET ORAL at 14:00

## 2021-07-14 RX ADMIN — IPRATROPIUM BROMIDE AND ALBUTEROL SULFATE 3 ML: 2.5; .5 SOLUTION RESPIRATORY (INHALATION) at 12:56

## 2021-07-14 NOTE — ED PROVIDER NOTES
History  Chief Complaint   Patient presents with    Chest Pain     x2 weeks, pt has hx of copd and asthma, pt points to L sided chest c/o aching pain     60yo male with a history of COPD and chronic back pain presenting for evaluation of left-sided rib pain x 2 weeks  Patient reports having a coughing/sneezing fit two weeks ago and felt a sharp pain in his left lower rib cage at that time  Pain initially subsided but again has been worsening over the past several days  His pain is worse with coughing, sneezing, and movement  The pain is non-radiating  Patient denies any exertional component to his pain  He is presenting today to "make sure everything is okay " Patient is otherwise asymptomatic and denies any fevers, chills, shortness of breath, dizziness, syncope, diaphoresis, nausea, vomiting, abdominal pain  No prior history of heart disease or VTE  History provided by:  Patient   used: No    Chest Pain  Pain location:  L chest (L lower ribcage)  Pain quality: sharp    Pain radiates to:  Does not radiate  Pain radiates to the back: no    Pain severity:  Moderate  Onset quality:  Gradual  Duration:  2 weeks  Timing:  Constant  Progression:  Unchanged  Chronicity:  New  Relieved by:  Rest  Worsened by:  Coughing, certain positions and movement  Associated symptoms: back pain (Chronic) and cough    Associated symptoms: no abdominal pain, no altered mental status, no anorexia, no anxiety, no diaphoresis, no dizziness, no fatigue, no fever, no headache, no heartburn, no lower extremity edema, no nausea, no near-syncope, no numbness, no palpitations, no shortness of breath, no syncope, not vomiting and no weakness    Risk factors: obesity        Prior to Admission Medications   Prescriptions Last Dose Informant Patient Reported? Taking?    albuterol (PROVENTIL HFA,VENTOLIN HFA) 90 mcg/act inhaler   No No   Sig: Inhale 2 puffs every 4 (four) hours as needed for wheezing   benzonatate (TESSALON PERLES) 100 mg capsule   No No   Sig: Take 1 capsule (100 mg total) by mouth every 8 (eight) hours   oxyCODONE-acetaminophen (PERCOCET) 5-325 mg per tablet   Yes No   Sig: Take 1 tablet by mouth every 8 (eight) hours as needed for moderate pain (not daily use)      Facility-Administered Medications: None       Past Medical History:   Diagnosis Date    Asthma     Chronic pain     cervic and lumbar    Hyperlipidemia        Past Surgical History:   Procedure Laterality Date    APPENDECTOMY      HERNIA REPAIR      4 times    NEPHRECTOMY LIVING DONOR Left 10/2009       History reviewed  No pertinent family history  I have reviewed and agree with the history as documented  E-Cigarette/Vaping     E-Cigarette/Vaping Substances     Social History     Tobacco Use    Smoking status: Former Smoker    Smokeless tobacco: Never Used   Substance Use Topics    Alcohol use: No    Drug use: No       Review of Systems   Constitutional: Negative for chills, diaphoresis, fatigue and fever  HENT: Negative for drooling and voice change  Eyes: Negative for discharge and redness  Respiratory: Positive for cough and wheezing  Negative for shortness of breath and stridor  Cardiovascular: Positive for chest pain  Negative for palpitations, leg swelling, syncope and near-syncope  Gastrointestinal: Negative for abdominal pain, anorexia, heartburn, nausea and vomiting  Musculoskeletal: Positive for back pain (Chronic)  Negative for neck pain and neck stiffness  Skin: Negative for color change and rash  Neurological: Negative for dizziness, seizures, syncope, weakness, numbness and headaches  Psychiatric/Behavioral: Negative for confusion  The patient is not nervous/anxious  All other systems reviewed and are negative  Physical Exam  Physical Exam  Vitals and nursing note reviewed  Constitutional:       General: He is not in acute distress  Appearance: He is well-developed  He is not diaphoretic  Comments: Non-toxic appearing   HENT:      Head: Normocephalic and atraumatic  Right Ear: External ear normal       Left Ear: External ear normal    Eyes:      General: No scleral icterus  Right eye: No discharge  Left eye: No discharge  Conjunctiva/sclera: Conjunctivae normal    Cardiovascular:      Rate and Rhythm: Normal rate and regular rhythm  Heart sounds: Normal heart sounds  No murmur heard  Pulmonary:      Effort: Pulmonary effort is normal  No respiratory distress  Breath sounds: No stridor  Wheezing present  No rales  Chest:      Chest wall: Tenderness present  Comments: +Left chest wall tenderness  No step-offs or crepitus  Abdominal:      General: Bowel sounds are normal  There is no distension  Palpations: Abdomen is soft  Tenderness: There is no abdominal tenderness  There is no guarding  Musculoskeletal:         General: No deformity  Normal range of motion  Cervical back: Normal range of motion and neck supple  Skin:     General: Skin is warm and dry  Neurological:      Mental Status: He is alert  He is not disoriented  GCS: GCS eye subscore is 4  GCS verbal subscore is 5  GCS motor subscore is 6     Psychiatric:         Behavior: Behavior normal          Vital Signs  ED Triage Vitals [07/14/21 1124]   Temperature Pulse Respirations Blood Pressure SpO2   98 5 °F (36 9 °C) 71 18 119/78 97 %      Temp Source Heart Rate Source Patient Position - Orthostatic VS BP Location FiO2 (%)   Oral Monitor Sitting Left arm --      Pain Score       --           Vitals:    07/14/21 1124 07/14/21 1215 07/14/21 1230 07/14/21 1400   BP: 119/78 123/87 127/82 122/79   Pulse: 71 62 68 79   Patient Position - Orthostatic VS: Sitting Lying           Visual Acuity      ED Medications  Medications   ipratropium-albuterol (DUO-NEB) 0 5-2 5 mg/3 mL inhalation solution 3 mL (3 mL Nebulization Given 7/14/21 1256)   predniSONE tablet 50 mg (50 mg Oral Given 7/14/21 1400)       Diagnostic Studies  Results Reviewed     Procedure Component Value Units Date/Time    Troponin I [[id number]]  (Normal) Collected: 07/14/21 1227    Lab Status: Final result Specimen: Blood from Arm, Left Updated: 07/14/21 1300     Troponin I <0 02 ng/mL     Comprehensive metabolic panel [[id number]]  (Abnormal) Collected: 07/14/21 1227    Lab Status: Final result Specimen: Blood from Arm, Left Updated: 07/14/21 1259     Sodium 138 mmol/L      Potassium 4 4 mmol/L      Chloride 103 mmol/L      CO2 27 mmol/L      ANION GAP 8 mmol/L      BUN 23 mg/dL      Creatinine 1 14 mg/dL      Glucose 97 mg/dL      Calcium 9 1 mg/dL      AST 17 U/L      ALT 33 U/L      Alkaline Phosphatase 128 U/L      Total Protein 8 1 g/dL      Albumin 3 6 g/dL      Total Bilirubin 0 26 mg/dL      eGFR 68 ml/min/1 73sq m     Narrative:      [facility] guidelines for Chronic Kidney Disease (CKD):     Stage 1 with normal or high GFR (GFR > 90 mL/min/1 73 square meters)    Stage 2 Mild CKD (GFR = 60-89 mL/min/1 73 square meters)    Stage 3A Moderate CKD (GFR = 45-59 mL/min/1 73 square meters)    Stage 3B Moderate CKD (GFR = 30-44 mL/min/1 73 square meters)    Stage 4 Severe CKD (GFR = 15-29 mL/min/1 73 square meters)    Stage 5 End Stage CKD (GFR <15 mL/min/1 73 square meters)  Note: GFR calculation is accurate only with a steady state creatinine    CBC and differential [[id number]]  (Abnormal) Collected: 07/14/21 1227    Lab Status: Final result Specimen: Blood from Arm, Left Updated: 07/14/21 1242     WBC 7 85 Thousand/uL      RBC 5 91 Million/uL      Hemoglobin 13 8 g/dL      Hematocrit 44 8 %      MCV 76 fL      MCH 23 4 pg      MCHC 30 8 g/dL      RDW 15 4 %      MPV 10 4 fL      Platelets 672 Thousands/uL      nRBC 0 /100 WBCs      Neutrophils Relative 62 %      Immat GRANS % 0 %      Lymphocytes Relative 24 %      Monocytes Relative 8 %      Eosinophils Relative 5 %      Basophils Relative 1 %      Neutrophils Absolute 4 88 Thousands/µL      Immature Grans Absolute 0 02 Thousand/uL      Lymphocytes Absolute 1 91 Thousands/µL      Monocytes Absolute 0 64 Thousand/µL      Eosinophils Absolute 0 36 Thousand/µL      Basophils Absolute 0 04 Thousands/µL                  XR ribs with pa chest min 3 views LEFT   Final Result by Willy Soto MD (07/14 1313)      No active cardiopulmonary disease  No evidence of rib fractures  Workstation performed: [id number]                    Procedures  ECG 12 Lead Documentation Only    Date/Time: 7/14/2021 6:04 PM  Performed by: Óscar Hernandez PA-C  Authorized by: Óscar Hernandez PA-C     Indications / Diagnosis:  CP  ECG reviewed by me, the ED Provider: yes    Patient location:  ED  Previous ECG:     Previous ECG:  Compared to current    Comparison ECG info:  7/12/18    Similarity:  No change  Interpretation:     Interpretation: abnormal    Rate:     ECG rate:  63    ECG rate assessment: normal    Rhythm:     Rhythm: sinus rhythm    Ectopy:     Ectopy: none    QRS:     QRS axis:  Normal  Conduction:     Conduction: abnormal      Abnormal conduction: incomplete RBBB    ST segments:     ST segments:  Normal  T waves:     T waves: normal               ED Course                         MDM  Number of Diagnoses or Management Options  Bronchospasm: new and requires workup  Left-sided chest wall pain: new and requires workup  Diagnosis management comments: 58yo male presenting for evaluation of left sided rib pain x 2 weeks  Pain initially started after a coughing/sneezing fit  No diaphoresis, n/v, syncope  Not related to exertion  Patient is afebrile and hemodynamically stable  Pain is reproducible on exam  He also has some wheezing   Differential diagnosis includes but is not limited to: rib contusion, rib fracture, ACS, pneumonia, pneumothorax, pericarditis, GERD, gastritis, musculoskeletal, anxiety, PE, aortic dissection, pleuritis, esophagitis, referred pain, nonspecific chest pain  Initial ED plan: Check cardiac labs and EKG  Patient reports having a CT chest 2 weeks ago for lung cancer screening  Will check rib series x-ray  DuoNeb for wheezing  Final assessment: Labs unremarkable  Blood counts, renal function, LFTs normal  Troponin normal and EKG reveals NSR without ischemic changes  CXR is normal  No rib fractures or pneumothorax seen  HEART score is 2  No indication for admission at this time  Advised Tylenol, heat, and lidocaine patches for pain  He declines script for muscle relaxer at this time  Will start on a course of prednisone given wheezing on exam  He was also provided with a nebulizer machine and a script for albuterol solution  Advised close PCP follow-up  Strict ED return precautions discussed  Patient expressed understanding and is agreeable to plan  Patient discharged in stable condition             Amount and/or Complexity of Data Reviewed  Clinical lab tests: ordered and reviewed  Tests in the radiology section of CPT®: ordered and reviewed  Tests in the medicine section of CPT®: ordered and reviewed  Review and summarize past medical records: yes  Independent visualization of images, tracings, or specimens: yes    Risk of Complications, Morbidity, and/or Mortality  Presenting problems: moderate  Diagnostic procedures: moderate  Management options: moderate    Patient Progress  Patient progress: stable      Disposition  Final diagnoses:   Left-sided chest wall pain   Bronchospasm     Time reflects when diagnosis was documented in both MDM as applicable and the Disposition within this note     Time User Action Codes Description Comment    7/14/2021  1:48  [place] [facility] [place] [[id number]] Left-sided chest wall pain     7/14/2021  1:48 PM [address] [place], [place] [address] [[id number]] Bronchospasm       ED Disposition     ED Disposition Condition Date/Time Comment    Discharge Stable Wed Jul 14, 2021  1:48 PM Petra Harris discharge to home/self care             Follow-up Information     Follow up With Specialties Details Why Contact Info Additional [address], [address] [facility], Nurse Practitioner  Call to establish a family doctor for follow-up [address] [facility] [address], [address] Trevor Huggins 16 [facility] 130 Emergency Department Emergency Medicine  If symptoms worsen 34 [facility] 109 [facility] Emergency Department, [address] [place] [address], [place], [place], [address]          Discharge Medication List as of 7/14/2021  1:50 PM      START taking these medications    Details   albuterol (2 5 mg/3 mL) 0 083 % nebulizer solution Take 3 mL (2 5 mg total) by nebulization every 6 (six) hours as needed for wheezing or shortness of breath, Starting Wed 7/14/2021, Normal      predniSONE 50 mg tablet Take 1 tablet (50 mg total) by mouth daily for 4 days, Starting Thu 7/15/2021, Until Mon 7/19/2021, Normal         CONTINUE these medications which have NOT CHANGED    Details   albuterol (PROVENTIL HFA,VENTOLIN HFA) 90 mcg/act inhaler Inhale 2 puffs every 4 (four) hours as needed for wheezing, Starting Thu 7/12/2018, Print      benzonatate (TESSALON PERLES) 100 mg capsule Take 1 capsule (100 mg total) by mouth every 8 (eight) hours, Starting Thu 7/12/2018, Print      oxyCODONE-acetaminophen (PERCOCET) 5-325 mg per tablet Take 1 tablet by mouth every 8 (eight) hours as needed for moderate pain (not daily use), Historical Med           No discharge procedures on file      PDMP Review     None          ED Provider  Electronically Signed by           Kai Johnston PA-C  07/14/21 1005

## 2021-07-14 NOTE — DISCHARGE INSTRUCTIONS
Take prednisone as prescribed  Take Tylenol 650mg every 6 hours as needed for pain  Use lidocaine patches daily (12 hours on, 12 hours off)  Use nebulizer if wheezing not improved with your inhaler  Please follow-up with your family doctor in 2-3 days  Return to the ER with any worsening symptoms

## 2021-07-14 NOTE — ED NOTES
ECG delayed due to incorrect registration complaint being entered        Sofya Leone RN  07/14/21 7555

## 2021-07-15 LAB
ATRIAL RATE: 63 BPM
P AXIS: 57 DEGREES
PR INTERVAL: 152 MS
QRS AXIS: -13 DEGREES
QRSD INTERVAL: 92 MS
QT INTERVAL: 398 MS
QTC INTERVAL: 407 MS
T WAVE AXIS: 24 DEGREES
VENTRICULAR RATE: 63 BPM

## 2021-07-15 PROCEDURE — 93010 ELECTROCARDIOGRAM REPORT: CPT | Performed by: INTERNAL MEDICINE

## 2021-09-25 ENCOUNTER — HEALTH MAINTENANCE LETTER (OUTPATIENT)
Age: 66
End: 2021-09-25

## 2021-12-15 ENCOUNTER — APPOINTMENT (OUTPATIENT)
Dept: PULMONOLOGY | Facility: CLINIC | Age: 66
End: 2021-12-15
Payer: MEDICAID

## 2021-12-15 VITALS
HEIGHT: 65 IN | BODY MASS INDEX: 33.32 KG/M2 | TEMPERATURE: 97.8 F | DIASTOLIC BLOOD PRESSURE: 77 MMHG | SYSTOLIC BLOOD PRESSURE: 115 MMHG | WEIGHT: 200 LBS | OXYGEN SATURATION: 97 % | HEART RATE: 90 BPM

## 2021-12-15 DIAGNOSIS — Z92.29 PERSONAL HISTORY OF OTHER DRUG THERAPY: ICD-10-CM

## 2021-12-15 PROCEDURE — 99213 OFFICE O/P EST LOW 20 MIN: CPT

## 2021-12-15 NOTE — DISCUSSION/SUMMARY
[FreeTextEntry1] : 65 yo male with OAD related complaints in part. Prednisone 40 mg daily for five days prescribed. He is to continue ICS/LABA/LAMA.Repeat screening chest CT next year. Diet, weight loss and exercise stressed as well as cardiology follow up. He is to follow up with his PMD as before. His wife was present.

## 2021-12-15 NOTE — HISTORY OF PRESENT ILLNESS
[Former] : former [>= 30 pack years] : >= 30 pack years [TextBox_4] : 65 yo male with hx of OAD presents for follow up. The patient complains of increasing ALCOCER and PRN productive cough for three months. He denies fever, chest pain or hemoptysis. He uses incruse, symbicort and albuterol, but "likes trelegy". He is covid 19 and influenza vaccinated. [TextBox_11] : 1.5 [TextBox_13] : 30 [YearQuit] : 45 [TextBox_29] : Denies snoring, daytime somnolence, apneic episodes, AM headaches

## 2021-12-27 RX ORDER — PREDNISONE 20 MG/1
20 TABLET ORAL
Qty: 10 | Refills: 0 | Status: ACTIVE | COMMUNITY
Start: 2021-12-27 | End: 1900-01-01

## 2022-01-15 ENCOUNTER — HEALTH MAINTENANCE LETTER (OUTPATIENT)
Age: 67
End: 2022-01-15

## 2022-01-31 RX ORDER — ALBUTEROL SULFATE 90 UG/1
108 (90 BASE) INHALANT RESPIRATORY (INHALATION)
Qty: 3 | Refills: 3 | Status: ACTIVE | COMMUNITY
Start: 2022-01-31 | End: 1900-01-01

## 2022-02-14 ENCOUNTER — HOSPITAL ENCOUNTER (EMERGENCY)
Facility: HOSPITAL | Age: 67
Discharge: HOME | End: 2022-02-14
Attending: EMERGENCY MEDICINE | Admitting: EMERGENCY MEDICINE
Payer: MEDICAID

## 2022-02-14 ENCOUNTER — APPOINTMENT (EMERGENCY)
Dept: CT IMAGING | Facility: HOSPITAL | Age: 67
End: 2022-02-14
Payer: MEDICAID

## 2022-02-14 ENCOUNTER — APPOINTMENT (EMERGENCY)
Dept: RADIOLOGY | Facility: HOSPITAL | Age: 67
End: 2022-02-14
Payer: MEDICAID

## 2022-02-14 VITALS
TEMPERATURE: 98.2 F | DIASTOLIC BLOOD PRESSURE: 91 MMHG | RESPIRATION RATE: 18 BRPM | SYSTOLIC BLOOD PRESSURE: 140 MMHG | OXYGEN SATURATION: 99 % | HEART RATE: 76 BPM

## 2022-02-14 DIAGNOSIS — R07.0 THROAT PAIN: ICD-10-CM

## 2022-02-14 DIAGNOSIS — M79.645 THUMB PAIN, LEFT: Primary | ICD-10-CM

## 2022-02-14 LAB
ALBUMIN SERPL BCP-MCNC: 3.3 G/DL (ref 3.5–5)
ALP SERPL-CCNC: 122 U/L (ref 46–116)
ALT SERPL W P-5'-P-CCNC: 26 U/L (ref 12–78)
ANION GAP SERPL CALCULATED.3IONS-SCNC: 7 MMOL/L (ref 4–13)
AST SERPL W P-5'-P-CCNC: 14 U/L (ref 5–45)
BASOPHILS # BLD AUTO: 0.05 THOUSANDS/ΜL (ref 0–0.1)
BASOPHILS NFR BLD AUTO: 1 % (ref 0–1)
BILIRUB SERPL-MCNC: 0.34 MG/DL (ref 0.2–1)
BUN SERPL-MCNC: 20 MG/DL (ref 5–25)
CALCIUM ALBUM COR SERPL-MCNC: 9.6 MG/DL (ref 8.3–10.1)
CALCIUM SERPL-MCNC: 9 MG/DL (ref 8.3–10.1)
CHLORIDE SERPL-SCNC: 103 MMOL/L (ref 100–108)
CO2 SERPL-SCNC: 27 MMOL/L (ref 21–32)
CREAT SERPL-MCNC: 0.98 MG/DL (ref 0.6–1.3)
EOSINOPHIL # BLD AUTO: 0.46 THOUSAND/ΜL (ref 0–0.61)
EOSINOPHIL NFR BLD AUTO: 6 % (ref 0–6)
ERYTHROCYTE [DISTWIDTH] IN BLOOD BY AUTOMATED COUNT: 15.3 % (ref 11.6–15.1)
GFR SERPL CREATININE-BSD FRML MDRD: 81 ML/MIN/1.73SQ M
GLUCOSE SERPL-MCNC: 111 MG/DL (ref 65–140)
HCT VFR BLD AUTO: 46.5 % (ref 36.5–49.3)
HGB BLD-MCNC: 14 G/DL (ref 12–17)
IMM GRANULOCYTES # BLD AUTO: 0.02 THOUSAND/UL (ref 0–0.2)
IMM GRANULOCYTES NFR BLD AUTO: 0 % (ref 0–2)
LYMPHOCYTES # BLD AUTO: 1.6 THOUSANDS/ΜL (ref 0.6–4.47)
LYMPHOCYTES NFR BLD AUTO: 20 % (ref 14–44)
MCH RBC QN AUTO: 22.8 PG (ref 26.8–34.3)
MCHC RBC AUTO-ENTMCNC: 30.1 G/DL (ref 31.4–37.4)
MCV RBC AUTO: 76 FL (ref 82–98)
MONOCYTES # BLD AUTO: 0.65 THOUSAND/ΜL (ref 0.17–1.22)
MONOCYTES NFR BLD AUTO: 8 % (ref 4–12)
NEUTROPHILS # BLD AUTO: 5.2 THOUSANDS/ΜL (ref 1.85–7.62)
NEUTS SEG NFR BLD AUTO: 65 % (ref 43–75)
NRBC BLD AUTO-RTO: 0 /100 WBCS
PLATELET # BLD AUTO: 353 THOUSANDS/UL (ref 149–390)
PMV BLD AUTO: 10.2 FL (ref 8.9–12.7)
POTASSIUM SERPL-SCNC: 4.4 MMOL/L (ref 3.5–5.3)
PROT SERPL-MCNC: 7.7 G/DL (ref 6.4–8.2)
RBC # BLD AUTO: 6.13 MILLION/UL (ref 3.88–5.62)
SODIUM SERPL-SCNC: 137 MMOL/L (ref 136–145)
WBC # BLD AUTO: 7.98 THOUSAND/UL (ref 4.31–10.16)

## 2022-02-14 PROCEDURE — 80053 COMPREHEN METABOLIC PANEL: CPT

## 2022-02-14 PROCEDURE — 99284 EMERGENCY DEPT VISIT MOD MDM: CPT

## 2022-02-14 PROCEDURE — 96361 HYDRATE IV INFUSION ADD-ON: CPT

## 2022-02-14 PROCEDURE — 85025 COMPLETE CBC W/AUTO DIFF WBC: CPT

## 2022-02-14 PROCEDURE — 36415 COLL VENOUS BLD VENIPUNCTURE: CPT

## 2022-02-14 PROCEDURE — 96360 HYDRATION IV INFUSION INIT: CPT

## 2022-02-14 PROCEDURE — G1004 CDSM NDSC: HCPCS

## 2022-02-14 PROCEDURE — 70491 CT SOFT TISSUE NECK W/DYE: CPT

## 2022-02-14 PROCEDURE — 73140 X-RAY EXAM OF FINGER(S): CPT

## 2022-02-14 RX ADMIN — SODIUM CHLORIDE 1000 ML: 0.9 INJECTION, SOLUTION INTRAVENOUS at 08:37

## 2022-02-14 RX ADMIN — IOHEXOL 85 ML: 350 INJECTION, SOLUTION INTRAVENOUS at 09:13

## 2022-02-14 NOTE — ED PROVIDER NOTES
History  Chief Complaint   Patient presents with    Thumb Pain     pt c/o intermittant L thumb pain x 1 month  pt states pain returned yesterday worse than ever  pt also c/o L sided dental pain after cracked tooth dentist shaved down  pt states L side neck and throat pain after 2 days abx    Dental Pain     Pt is a 60 yo male with COPD coming from home for dental pain and left thumb pain  Pt reports one week ago he bit into a bread stick breaking a "decaying tooth " Pt states that after the tooth broke, the point was poking into the side of his gum and striking his tongue  Pt states he went to the dentist on 2/10/22 and had xrays and "filed the point down " Pt states that pain increased after procedure at dentist, and called dentist on Saturday morning, who called in an prescription for amoxicillin  Pt states no relief to pain since the initiation of antibiotics  Pt states he feels as though the left side of his neck is swollen and is painful to swallowing  Pt denies fever/chills  Pt managing own secretions at this time, and reports still able to eat and drink  Pt moving tongue without pain  No changes to voice  No stridor  Patient reports that due to poor dentition he plans on having his teeth pulled and implants placed in the future as soon as he has access to insurance that approves procedure  Pt reports left thumb pain started a month ago  Pt states pain is worse in the morning  Pt denies known injury  Pt has no pain with ulnar deviation  Pt report pain with movement in [facility] joint and "cracks" at [facility] joint  Pt has no thenar swelling  No wound/redenss appreciated  Pt reports he is a /  Pt reports right hand dominant  Prior to Admission Medications   Prescriptions Last Dose Informant Patient Reported? Taking?    albuterol (2 5 mg/3 mL) 0 083 % nebulizer solution   No No   Sig: Take 3 mL (2 5 mg total) by nebulization every 6 (six) hours as needed for wheezing or shortness of breath albuterol (PROVENTIL HFA,VENTOLIN HFA) 90 mcg/act inhaler   No No   Sig: Inhale 2 puffs every 4 (four) hours as needed for wheezing   benzonatate (TESSALON PERLES) 100 mg capsule   No No   Sig: Take 1 capsule (100 mg total) by mouth every 8 (eight) hours   oxyCODONE-acetaminophen (PERCOCET) 5-325 mg per tablet   Yes No   Sig: Take 1 tablet by mouth every 8 (eight) hours as needed for moderate pain (not daily use)      Facility-Administered Medications: None       Past Medical History:   Diagnosis Date    Asthma     Chronic pain     cervic and lumbar    Hyperlipidemia        Past Surgical History:   Procedure Laterality Date    APPENDECTOMY      HERNIA REPAIR      4 times    NEPHRECTOMY LIVING DONOR Left 10/2009       History reviewed  No pertinent family history  I have reviewed and agree with the history as documented  E-Cigarette/Vaping     E-Cigarette/Vaping Substances     Social History     Tobacco Use    Smoking status: Former Smoker    Smokeless tobacco: Never Used   Substance Use Topics    Alcohol use: No    Drug use: No       Review of Systems   Constitutional: Negative for activity change, appetite change, chills, fatigue and fever  HENT: Positive for dental problem, facial swelling and sore throat  Negative for drooling, hearing loss, mouth sores, rhinorrhea, sinus pain, tinnitus, trouble swallowing and voice change  No swelling appreciated on exam, however, pt states feels swollen to him  Eyes: Negative for discharge  Respiratory: Negative for cough and shortness of breath  Cardiovascular: Negative for chest pain  Gastrointestinal: Negative for diarrhea, nausea and vomiting  Genitourinary: Negative  Musculoskeletal: Negative  Skin: Negative  Allergic/Immunologic: Negative  Neurological: Negative  Negative for dizziness, syncope, facial asymmetry, speech difficulty, light-headedness and headaches  Hematological: Negative      Psychiatric/Behavioral: Negative  Physical Exam  Physical Exam  Constitutional:       General: He is not in acute distress  Appearance: Normal appearance  He is normal weight  He is not ill-appearing or toxic-appearing  HENT:      Head: Normocephalic and atraumatic  Jaw: Tenderness present  No trismus, swelling or pain on movement  Salivary Glands: Right salivary gland is not diffusely enlarged  Left salivary gland is not diffusely enlarged  Right Ear: External ear normal       Left Ear: External ear normal       Mouth/Throat:      Lips: Pink  Mouth: Mucous membranes are moist  No injury  Dentition: Abnormal dentition  Dental caries present  No dental abscesses  Palate: No mass  Pharynx: Oropharynx is clear  Uvula midline  No pharyngeal swelling, oropharyngeal exudate or uvula swelling  Tonsils: No tonsillar exudate or tonsillar abscesses  0 on the right  0 on the left  Comments: Grossly poor dentition throughout entire mouth  Eyes:      General:         Right eye: No discharge  Left eye: No discharge  Neck:      Trachea: Trachea and phonation normal  No tracheal tenderness  Comments: Left anterior neck tenderness  Cardiovascular:      Rate and Rhythm: Normal rate and regular rhythm  Pulses: Normal pulses  Heart sounds: Normal heart sounds  Pulmonary:      Effort: Pulmonary effort is normal  No respiratory distress  Breath sounds: Normal breath sounds  Abdominal:      General: Abdomen is flat  Palpations: Abdomen is soft  Musculoskeletal:         General: Tenderness present  Cervical back: Normal range of motion and neck supple  Tenderness present  No rigidity or crepitus  No pain with movement  Skin:     General: Skin is warm and dry  Capillary Refill: Capillary refill takes less than 2 seconds  Neurological:      General: No focal deficit present  Mental Status: He is alert and oriented to person, place, and time  Psychiatric:         Mood and Affect: Mood normal          Vital Signs  ED Triage Vitals [02/14/22 0746]   Temperature Pulse Respirations Blood Pressure SpO2   98 2 °F (36 8 °C) 67 16 136/80 98 %      Temp Source Heart Rate Source Patient Position - Orthostatic VS BP Location FiO2 (%)   Oral Monitor Sitting Right arm --      Pain Score       8           Vitals:    02/14/22 0746 02/14/22 0845 02/14/22 0900 02/14/22 0930   BP: 136/80 119/75 126/77 140/91   Pulse: 67 66 68 76   Patient Position - Orthostatic VS: Sitting            Visual Acuity      ED Medications  Medications   sodium chloride 0 9 % bolus 1,000 mL (0 mL Intravenous Stopped 2/14/22 1043)   iohexol (OMNIPAQUE) 350 MG/ML injection (SINGLE-DOSE) 85 mL (85 mL Intravenous Given 2/14/22 0913)       Diagnostic Studies  Results Reviewed     Procedure Component Value Units Date/Time    Comprehensive metabolic panel [[id number]]  (Abnormal) Collected: 02/14/22 0836    Lab Status: Final result Specimen: Blood from Arm, Left Updated: 02/14/22 0857     Sodium 137 mmol/L      Potassium 4 4 mmol/L      Chloride 103 mmol/L      CO2 27 mmol/L      ANION GAP 7 mmol/L      BUN 20 mg/dL      Creatinine 0 98 mg/dL      Glucose 111 mg/dL      Calcium 9 0 mg/dL      Corrected Calcium 9 6 mg/dL      AST 14 U/L      ALT 26 U/L      Alkaline Phosphatase 122 U/L      Total Protein 7 7 g/dL      Albumin 3 3 g/dL      Total Bilirubin 0 34 mg/dL      eGFR 81 ml/min/1 73sq m     Narrative:      Sully guidelines for Chronic Kidney Disease (CKD):     Stage 1 with normal or high GFR (GFR > 90 mL/min/1 73 square meters)    Stage 2 Mild CKD (GFR = 60-89 mL/min/1 73 square meters)    Stage 3A Moderate CKD (GFR = 45-59 mL/min/1 73 square meters)    Stage 3B Moderate CKD (GFR = 30-44 mL/min/1 73 square meters)    Stage 4 Severe CKD (GFR = 15-29 mL/min/1 73 square meters)    Stage 5 End Stage CKD (GFR <15 mL/min/1 73 square meters)  Note: GFR calculation is accurate only with a steady state creatinine    CBC and differential [[id number]]  (Abnormal) Collected: 02/14/22 0836    Lab Status: Final result Specimen: Blood from Arm, Left Updated: 02/14/22 0842     WBC 7 98 Thousand/uL      RBC 6 13 Million/uL      Hemoglobin 14 0 g/dL      Hematocrit 46 5 %      MCV 76 fL      MCH 22 8 pg      MCHC 30 1 g/dL      RDW 15 3 %      MPV 10 2 fL      Platelets 302 Thousands/uL      nRBC 0 /100 WBCs      Neutrophils Relative 65 %      Immat GRANS % 0 %      Lymphocytes Relative 20 %      Monocytes Relative 8 %      Eosinophils Relative 6 %      Basophils Relative 1 %      Neutrophils Absolute 5 20 Thousands/µL      Immature Grans Absolute 0 02 Thousand/uL      Lymphocytes Absolute 1 60 Thousands/µL      Monocytes Absolute 0 65 Thousand/µL      Eosinophils Absolute 0 46 Thousand/µL      Basophils Absolute 0 05 Thousands/µL                  CT soft tissue neck   Final Result by Benjamin Scruggs MD (02/14 9497)      Poor dentition  No abscess formation identified  Consider dental consultation for further evaluation  Abnormal appearance of laryngeal vestibule with mucosal apposition of aryepiglottic folds, effacement of bilateral piriform sinuses, and slight irregularity of bilateral true vocal cords  Consider direct visualization by ENT for further evaluation  Question partially imaged small right frontotemporal dural-based enhancing lesion  Consider MRI of the brain with and without contrast for further evaluation  1 8 cm anterior nasal septal perforation  Differential includes postsurgical change, prior trauma, cocaine abuse, vasculitis, among other differentials  Asymmetric inward buckling of left thyroid cartilage, suggestive of remote laryngeal injury  The study was marked in [facility] for immediate notification              Workstation performed: [id number]         XR thumb first digit-min 2 views LEFT   Final Result by Jamie Berger MD (02/14 0910)      No acute osseous abnormality  Workstation performed: [id number]                    Procedures  Procedures         ED Course                               SBIRT 22yo+      Most Recent Value   SBIRT (24 yo +)    In order to provide better care to our patients, we are screening all of our patients for alcohol and drug use  Would it be okay to ask you these screening questions? Yes Filed at: 02/14/2022 0750   Initial Alcohol Screen: US AUDIT-C     1  How often do you have a drink containing alcohol? 0 Filed at: 02/14/2022 0750   2  How many drinks containing alcohol do you have on a typical day you are drinking? 0 Filed at: 02/14/2022 0750   3a  Male UNDER 65: How often do you have five or more drinks on one occasion? 0 Filed at: 02/14/2022 0750   3b  FEMALE Any Age, or MALE 65+: How often do you have 4 or more drinks on one occassion? 0 Filed at: 02/14/2022 0750   Audit-C Score 0 Filed at: 02/14/2022 9027   JARON: How many times in the past year have you    Used an illegal drug or used a prescription medication for non-medical reasons? Never Filed at: 02/14/2022 0750                    MDM  Number of Diagnoses or Management Options  Throat pain  Thumb pain, left  Diagnosis management comments: DDx: Dental abscess, neck abscess, CMC arthritis, thumb fracture  Due to patient's recent manipulation and he feels lack of response to abx,  will check CBC and CMP in addition to CT scan of neck soft tissue  Thumb to be evaluated with thumb xray  Amount and/or Complexity of Data Reviewed  Clinical lab tests: ordered and reviewed  Tests in the radiology section of CPT®: ordered and reviewed    Risk of Complications, Morbidity, and/or Mortality  General comments: Discussed the follow findings and necessary follow up with according specialist with pt  Copy of CT scan results provided to pt to bring with him to follow up  Patient reports that he primarily resides in [place]     CT soft tissue neck:   Dentist/Oral surgeon FOLLOW UP: Poor dentition   No abscess formation identified   Consider dental consultation for further evaluation  Pt made aware to continue taking prescribed antibiotics as directed by dentist   Oral surgeon phone number provided as patient has poor dentition and may need more specialized treatment  Patient reports that he intends on having his teeth pulled and implants placed in the future  ENT FOLLOW UP: Abnormal appearance of laryngeal vestibule with mucosal apposition of aryepiglottic folds, effacement of bilateral piriform sinuses, and slight irregularity of bilateral true vocal cords   Consider direct visualization by ENT for further evaluation  ,      1 8 cm anterior nasal septal perforation   Differential includes postsurgical change, prior trauma, cocaine abuse, vasculitis, among other differentials  ,    Asymmetric inward buckling of left thyroid cartilage, suggestive of remote laryngeal injury  PCP Follow up: Question partially imaged small right frontotemporal dural-based enhancing lesion   Consider MRI of the brain with and without contrast for further evaluation  Patient aware that for more definitive diagnosis an MRI is required  Aware that CT cannot exclude diagnosis including cancer  Discussed no fractures were seen on some x-ray  Patient aware follow-up with sports medicine  Provided with sports medicine follow-up in this area  Patient states he will follow up in [place]  Premade splint provided for comfort  Reviewed reasons to return to ed  Patient verbalized understanding of diagnosis and agreement with discharge plan of care as well as understanding of reasons to return to ed       Patient Progress  Patient progress: stable      Disposition  Final diagnoses:   Thumb pain, left   Throat pain     Time reflects when diagnosis was documented in both MDM as applicable and the Disposition within this note     Time User Action Codes Description Comment 2/14/2022 10:30 AM Yolanda Godfreys Add [M79 645] Thumb pain, left     2/14/2022 10:31 AM Yolanda Nickels Add [R07 0] Throat pain       ED Disposition     ED Disposition Condition Date/Time Comment    Discharge Stable Mon Feb 14, 2022 10:30 AM Klever Chavez discharge to home/self care  Follow-up Information     Follow up With Specialties Details Why Contact Info Additional Information    Maribel Mendosa  Schedule an appointment as soon as possible for a visit  For further evaluation of symptoms, abnormal CT findings--Question partially imaged small right frontotemporal dural-based enhancing lesion  Consider MRI of the brain with and without contrast for further evaluation   [address] [facility] [address] Emergency Department Emergency Medicine Schedule an appointment as soon as possible for a visit  If symptoms worsen 34 [facility] [address] [facility], [address] [place], [place], [address] [place] [address], 53 [facility] Medicine Schedule an appointment as soon as possible for a visit in 2 days For further evaluation of symptoms [address] [facility]  [address]  [facility] [phone]         [facility]-- ENT   For further evaluation of symptoms, abnormal CT findings 65-36-5     Dentist  In 2 days For further evaluation of symptoms      [facility]  Call  For Continued Evaluation [phone]    [address] [place] [address], [address]     [address]  [place], [address] [facility]           Discharge Medication List as of 2/14/2022 10:44 AM      CONTINUE these medications which have NOT CHANGED    Details   albuterol (2 5 mg/3 mL) 0 083 % nebulizer solution Take 3 mL (2 5 mg total) by nebulization every 6 (six) hours as needed for wheezing or shortness of breath, Starting Wed 7/14/2021, Normal      albuterol (PROVENTIL HFA,VENTOLIN HFA) 90 mcg/act inhaler Inhale 2 puffs every 4 (four) hours as needed for wheezing, Starting u 7/12/2018, Print      benzonatate (TESSALON PERLES) 100 mg capsule Take 1 capsule (100 mg total) by mouth every 8 (eight) hours, Starting u 7/12/2018, Print      oxyCODONE-acetaminophen (PERCOCET) 5-325 mg per tablet Take 1 tablet by mouth every 8 (eight) hours as needed for moderate pain (not daily use), Historical Med             No discharge procedures on file      PDMP Review     None          ED Provider  Electronically Signed by           DEEPTI Flores  02/14/22 5992

## 2022-02-14 NOTE — DISCHARGE INSTRUCTIONS
CT soft tissue neck:   Dentist/Oral surgeon FOLLOW UP: Poor dentition  No abscess formation identified  Consider dental consultation for further evaluation  Continue taking prescribed antibiotics as directed by dentist      ENT FOLLOW UP: Abnormal appearance of laryngeal vestibule with mucosal apposition of aryepiglottic folds, effacement of bilateral piriform sinuses, and slight irregularity of bilateral true vocal cords  Consider direct visualization by ENT for further evaluation  ,     1 8 cm anterior nasal septal perforation  Differential includes postsurgical change, prior trauma, cocaine abuse, vasculitis, among other differentials  ,   Asymmetric inward buckling of left thyroid cartilage, suggestive of remote laryngeal injury  PCP Follow up: Question partially imaged small right frontotemporal dural-based enhancing lesion  Consider MRI of the brain with and without contrast for further evaluation

## 2022-05-02 ENCOUNTER — APPOINTMENT (OUTPATIENT)
Dept: PULMONOLOGY | Facility: CLINIC | Age: 67
End: 2022-05-02
Payer: MEDICARE

## 2022-05-02 VITALS
HEART RATE: 92 BPM | BODY MASS INDEX: 33.15 KG/M2 | OXYGEN SATURATION: 96 % | SYSTOLIC BLOOD PRESSURE: 125 MMHG | RESPIRATION RATE: 16 BRPM | WEIGHT: 199 LBS | HEIGHT: 65 IN | DIASTOLIC BLOOD PRESSURE: 76 MMHG

## 2022-05-02 DIAGNOSIS — J20.9 ACUTE BRONCHITIS, UNSPECIFIED: ICD-10-CM

## 2022-05-02 PROCEDURE — 99214 OFFICE O/P EST MOD 30 MIN: CPT

## 2022-05-02 NOTE — DISCUSSION/SUMMARY
[FreeTextEntry1] : 66 yo male with OAD exacerbation. He was prescribed medrol pack. He is to complete zpack as prescribed in walk in clinic. Continued ICS/LABA/LAMA use as before (trelegy prescribed given new insurance). OTC cough suppressant also recommended. He is to sop all types of smoking. Follow up with his PMD as before.

## 2022-05-02 NOTE — REVIEW OF SYSTEMS
[Cough] : cough [Sputum] : no sputum [Dyspnea] : dyspnea [Wheezing] : wheezing [SOB on Exertion] : no sob on exertion [Negative] : Endocrine

## 2022-05-02 NOTE — HISTORY OF PRESENT ILLNESS
[Former] : former [>= 20 pack years] : >= 20 pack years [Current] : current [TextBox_4] : 66 yo male with hx of OAD presents complaining of five day history of dry cough with wheezing and SOB. He denies fever, chest pain or hemoptysis. He was seen in a walk in clinic three days ago, started on zpack. He uses symbicort BID, daily incruse with PRN albuterol MDI and nebs. [TextBox_11] : 1.5 [TextBox_13] : 30 [YearQuit] : 2007 [TextBox_29] : Denies snoring, daytime somnolence, apneic episodes, AM headaches

## 2022-05-07 ENCOUNTER — HEALTH MAINTENANCE LETTER (OUTPATIENT)
Age: 67
End: 2022-05-07

## 2022-09-26 ENCOUNTER — APPOINTMENT (OUTPATIENT)
Dept: PULMONOLOGY | Facility: CLINIC | Age: 67
End: 2022-09-26

## 2022-09-26 VITALS
HEIGHT: 65 IN | DIASTOLIC BLOOD PRESSURE: 83 MMHG | SYSTOLIC BLOOD PRESSURE: 148 MMHG | RESPIRATION RATE: 24 BRPM | HEART RATE: 102 BPM | BODY MASS INDEX: 33.66 KG/M2 | WEIGHT: 202 LBS | OXYGEN SATURATION: 96 %

## 2022-09-26 DIAGNOSIS — K21.9 GASTRO-ESOPHAGEAL REFLUX DISEASE W/OUT ESOPHAGITIS: ICD-10-CM

## 2022-09-26 PROCEDURE — 99214 OFFICE O/P EST MOD 30 MIN: CPT

## 2022-09-26 RX ORDER — UMECLIDINIUM 62.5 UG/1
62.5 AEROSOL, POWDER ORAL DAILY
Qty: 3 | Refills: 3 | Status: COMPLETED | COMMUNITY
Start: 2022-01-31 | End: 2022-09-26

## 2022-09-26 RX ORDER — OMEPRAZOLE 40 MG/1
40 CAPSULE, DELAYED RELEASE ORAL TWICE DAILY
Qty: 60 | Refills: 3 | Status: ACTIVE | COMMUNITY
Start: 2022-09-26 | End: 1900-01-01

## 2022-09-26 RX ORDER — BUDESONIDE AND FORMOTEROL FUMARATE DIHYDRATE 160; 4.5 UG/1; UG/1
160-4.5 AEROSOL RESPIRATORY (INHALATION) TWICE DAILY
Qty: 3 | Refills: 3 | Status: COMPLETED | COMMUNITY
Start: 2021-06-02 | End: 2022-09-26

## 2022-09-26 RX ORDER — PREDNISONE 20 MG/1
20 TABLET ORAL
Refills: 0 | Status: ACTIVE | COMMUNITY

## 2022-09-26 RX ORDER — FLUTICASONE FUROATE, UMECLIDINIUM BROMIDE AND VILANTEROL TRIFENATATE 100; 62.5; 25 UG/1; UG/1; UG/1
100-62.5-25 POWDER RESPIRATORY (INHALATION)
Qty: 3 | Refills: 3 | Status: ACTIVE | COMMUNITY
Start: 2022-05-02 | End: 1900-01-01

## 2022-09-26 RX ORDER — METHYLPREDNISOLONE 4 MG/1
4 TABLET ORAL
Qty: 1 | Refills: 0 | Status: COMPLETED | COMMUNITY
Start: 2022-05-02 | End: 2022-09-26

## 2022-11-19 PROBLEM — K21.9 GERD (GASTROESOPHAGEAL REFLUX DISEASE): Status: ACTIVE | Noted: 2022-09-26

## 2022-11-19 NOTE — HISTORY OF PRESENT ILLNESS
[Former] : former [>= 20 pack years] : >= 20 pack years [Never] : never [TextBox_4] : 68 yo male with hx of OAD presents for follow up. The patient complains of one week history of dry cough with SOB, chest and left arm pain, subsequently evaluated in the ER at [facility] yesterday. He was discharged on oral steroids. Chest xray was negative. Presently he continues to cough without fever or chest pain. He has GERD like symptoms. [TextBox_11] : 1.5 [TextBox_13] : 30 [YearQuit] : 2007 [TextBox_29] : Denies snoring, daytime somnolence, apneic episodes, AM headaches

## 2022-11-19 NOTE — DISCUSSION/SUMMARY
[FreeTextEntry1] : 68 yo male with OAD related complaints. He is to complete oral steroids as prescribed with trelegy daily and PRN albuterol MDI. He is to stop vaping. . GERD treatment also discussed. He is to follow up with his PMD as before and for the influenza vaccine.

## 2022-11-19 NOTE — REVIEW OF SYSTEMS
[Cough] : cough [Chest Tightness] : chest tightness [Sputum] : no sputum [Dyspnea] : dyspnea [Wheezing] : wheezing [SOB on Exertion] : no sob on exertion [Negative] : Endocrine

## 2023-01-30 ENCOUNTER — APPOINTMENT (OUTPATIENT)
Dept: PULMONOLOGY | Facility: CLINIC | Age: 68
End: 2023-01-30
Payer: MEDICARE

## 2023-01-30 VITALS
BODY MASS INDEX: 34.16 KG/M2 | HEIGHT: 65 IN | SYSTOLIC BLOOD PRESSURE: 139 MMHG | RESPIRATION RATE: 18 BRPM | TEMPERATURE: 98 F | HEART RATE: 90 BPM | DIASTOLIC BLOOD PRESSURE: 89 MMHG | OXYGEN SATURATION: 96 % | WEIGHT: 205 LBS

## 2023-01-30 DIAGNOSIS — R05.9 COUGH, UNSPECIFIED: ICD-10-CM

## 2023-01-30 PROCEDURE — 99214 OFFICE O/P EST MOD 30 MIN: CPT

## 2023-01-30 RX ORDER — PREDNISONE 5 MG/1
5 TABLET, DELAYED RELEASE ORAL
Qty: 60 | Refills: 0 | Status: ACTIVE | COMMUNITY
Start: 2023-01-30 | End: 1900-01-01

## 2023-02-01 ENCOUNTER — APPOINTMENT (OUTPATIENT)
Dept: PULMONOLOGY | Facility: CLINIC | Age: 68
End: 2023-02-01
Payer: MEDICARE

## 2023-02-01 PROBLEM — R05.9 COUGH: Status: ACTIVE | Noted: 2020-03-23

## 2023-02-01 PROCEDURE — 94729 DIFFUSING CAPACITY: CPT

## 2023-02-01 PROCEDURE — 94727 GAS DIL/WSHOT DETER LNG VOL: CPT

## 2023-02-01 PROCEDURE — 94060 EVALUATION OF WHEEZING: CPT

## 2023-02-01 RX ORDER — PREDNISONE 20 MG/1
20 TABLET ORAL
Qty: 10 | Refills: 0 | Status: ACTIVE | COMMUNITY
Start: 2023-02-01 | End: 1900-01-01

## 2023-02-01 NOTE — HISTORY OF PRESENT ILLNESS
[TextBox_4] : 68 yo male with hx of OAD presents for follow up. The patient was treated in an ER two weeks ago for SOB with oral steroids (Flavio). He continues to complain of ALCOCER without chest pain or cough. He uses trelegy 100 daily with PRN albuterol MDI. He denies fever, chest pain or hemoptysis. He stopped vaping six months ago. The patient is "concerned" over asbestos exposure.

## 2023-02-01 NOTE — DISCUSSION/SUMMARY
[FreeTextEntry1] : 68 yo male with OAD related complaints. He is to continue trelegy daily with PRN albuterol MDI> Treatment adjustment will depend on symptomatic needs. PFT will be performed. He is to follow up with his PMD as before.

## 2023-03-10 RX ORDER — ALBUTEROL SULFATE 2.5 MG/3ML
(2.5 MG/3ML) SOLUTION RESPIRATORY (INHALATION)
Qty: 120 | Refills: 3 | Status: ACTIVE | COMMUNITY
Start: 2022-05-02 | End: 1900-01-01

## 2023-03-29 ENCOUNTER — APPOINTMENT (EMERGENCY)
Dept: RADIOLOGY | Facility: HOSPITAL | Age: 68
End: 2023-03-29

## 2023-03-29 ENCOUNTER — HOSPITAL ENCOUNTER (EMERGENCY)
Facility: HOSPITAL | Age: 68
Discharge: HOME | End: 2023-03-29
Attending: EMERGENCY MEDICINE

## 2023-03-29 VITALS
SYSTOLIC BLOOD PRESSURE: 126 MMHG | DIASTOLIC BLOOD PRESSURE: 70 MMHG | HEART RATE: 88 BPM | RESPIRATION RATE: 18 BRPM | TEMPERATURE: 97.8 F | OXYGEN SATURATION: 93 %

## 2023-03-29 DIAGNOSIS — J44.1 COPD EXACERBATION (HCC): Primary | ICD-10-CM

## 2023-03-29 LAB
2HR DELTA HS TROPONIN: 0 NG/L
ALBUMIN SERPL BCP-MCNC: 4.1 G/DL (ref 3.5–5)
ALP SERPL-CCNC: 128 U/L (ref 34–104)
ALT SERPL W P-5'-P-CCNC: 21 U/L (ref 7–52)
ANION GAP SERPL CALCULATED.3IONS-SCNC: 6 MMOL/L (ref 4–13)
APTT PPP: 25 SECONDS (ref 23–37)
AST SERPL W P-5'-P-CCNC: 15 U/L (ref 13–39)
ATRIAL RATE: 88 BPM
BASOPHILS # BLD AUTO: 0.05 THOUSANDS/ÂΜL (ref 0–0.1)
BASOPHILS NFR BLD AUTO: 1 % (ref 0–1)
BILIRUB SERPL-MCNC: 0.33 MG/DL (ref 0.2–1)
BNP SERPL-MCNC: 23 PG/ML (ref 0–100)
BUN SERPL-MCNC: 20 MG/DL (ref 5–25)
CALCIUM SERPL-MCNC: 9.1 MG/DL (ref 8.4–10.2)
CARDIAC TROPONIN I PNL SERPL HS: 2 NG/L
CARDIAC TROPONIN I PNL SERPL HS: 2 NG/L
CHLORIDE SERPL-SCNC: 107 MMOL/L (ref 96–108)
CO2 SERPL-SCNC: 27 MMOL/L (ref 21–32)
CREAT SERPL-MCNC: 0.91 MG/DL (ref 0.6–1.3)
EOSINOPHIL # BLD AUTO: 0.3 THOUSAND/ÂΜL (ref 0–0.61)
EOSINOPHIL NFR BLD AUTO: 5 % (ref 0–6)
ERYTHROCYTE [DISTWIDTH] IN BLOOD BY AUTOMATED COUNT: 14.9 % (ref 11.6–15.1)
FLUAV RNA RESP QL NAA+PROBE: NEGATIVE
FLUBV RNA RESP QL NAA+PROBE: NEGATIVE
GFR SERPL CREATININE-BSD FRML MDRD: 87 ML/MIN/1.73SQ M
GLUCOSE SERPL-MCNC: 125 MG/DL (ref 65–140)
HCT VFR BLD AUTO: 47.2 % (ref 36.5–49.3)
HGB BLD-MCNC: 14.5 G/DL (ref 12–17)
IMM GRANULOCYTES # BLD AUTO: 0.01 THOUSAND/UL (ref 0–0.2)
IMM GRANULOCYTES NFR BLD AUTO: 0 % (ref 0–2)
INR PPP: 1.01 (ref 0.84–1.19)
LYMPHOCYTES # BLD AUTO: 1.59 THOUSANDS/ÂΜL (ref 0.6–4.47)
LYMPHOCYTES NFR BLD AUTO: 24 % (ref 14–44)
MCH RBC QN AUTO: 23.2 PG (ref 26.8–34.3)
MCHC RBC AUTO-ENTMCNC: 30.7 G/DL (ref 31.4–37.4)
MCV RBC AUTO: 75 FL (ref 82–98)
MONOCYTES # BLD AUTO: 0.61 THOUSAND/ÂΜL (ref 0.17–1.22)
MONOCYTES NFR BLD AUTO: 9 % (ref 4–12)
NEUTROPHILS # BLD AUTO: 4.12 THOUSANDS/ÂΜL (ref 1.85–7.62)
NEUTS SEG NFR BLD AUTO: 61 % (ref 43–75)
NRBC BLD AUTO-RTO: 0 /100 WBCS
P AXIS: 62 DEGREES
PLATELET # BLD AUTO: 319 THOUSANDS/UL (ref 149–390)
PMV BLD AUTO: 9.9 FL (ref 8.9–12.7)
POTASSIUM SERPL-SCNC: 4 MMOL/L (ref 3.5–5.3)
PR INTERVAL: 166 MS
PROT SERPL-MCNC: 7.4 G/DL (ref 6.4–8.4)
PROTHROMBIN TIME: 13.1 SECONDS (ref 11.6–14.5)
QRS AXIS: -51 DEGREES
QRSD INTERVAL: 94 MS
QT INTERVAL: 376 MS
QTC INTERVAL: 454 MS
RBC # BLD AUTO: 6.26 MILLION/UL (ref 3.88–5.62)
RSV RNA RESP QL NAA+PROBE: NEGATIVE
SARS-COV-2 RNA RESP QL NAA+PROBE: NEGATIVE
SODIUM SERPL-SCNC: 140 MMOL/L (ref 135–147)
T WAVE AXIS: 41 DEGREES
VENTRICULAR RATE: 88 BPM
WBC # BLD AUTO: 6.68 THOUSAND/UL (ref 4.31–10.16)

## 2023-03-29 RX ORDER — PREDNISONE 20 MG/1
40 TABLET ORAL DAILY
Qty: 10 TABLET | Refills: 0 | Status: SHIPPED | OUTPATIENT
Start: 2023-03-29

## 2023-03-29 RX ORDER — MAGNESIUM SULFATE 1 G/100ML
1 INJECTION INTRAVENOUS ONCE
Status: COMPLETED | OUTPATIENT
Start: 2023-03-29 | End: 2023-03-29

## 2023-03-29 RX ORDER — METHYLPREDNISOLONE SODIUM SUCCINATE 125 MG/2ML
80 INJECTION, POWDER, LYOPHILIZED, FOR SOLUTION INTRAMUSCULAR; INTRAVENOUS ONCE
Status: COMPLETED | OUTPATIENT
Start: 2023-03-29 | End: 2023-03-29

## 2023-03-29 RX ORDER — ALBUTEROL SULFATE 90 UG/1
2 AEROSOL, METERED RESPIRATORY (INHALATION) EVERY 6 HOURS PRN
Qty: 8.5 G | Refills: 0 | Status: SHIPPED | OUTPATIENT
Start: 2023-03-29

## 2023-03-29 RX ORDER — ALBUTEROL SULFATE 2.5 MG/3ML
2.5 SOLUTION RESPIRATORY (INHALATION) EVERY 6 HOURS PRN
Qty: 75 ML | Refills: 0 | Status: SHIPPED | OUTPATIENT
Start: 2023-03-29

## 2023-03-29 RX ORDER — SODIUM CHLORIDE FOR INHALATION 0.9 %
3 VIAL, NEBULIZER (ML) INHALATION ONCE
Status: COMPLETED | OUTPATIENT
Start: 2023-03-29 | End: 2023-03-29

## 2023-03-29 RX ADMIN — ISODIUM CHLORIDE 3 ML: 0.03 SOLUTION RESPIRATORY (INHALATION) at 08:26

## 2023-03-29 RX ADMIN — MAGNESIUM SULFATE HEPTAHYDRATE 1 G: 1 INJECTION, SOLUTION INTRAVENOUS at 08:40

## 2023-03-29 RX ADMIN — METHYLPREDNISOLONE SODIUM SUCCINATE 80 MG: 125 INJECTION, POWDER, FOR SOLUTION INTRAMUSCULAR; INTRAVENOUS at 08:38

## 2023-03-29 RX ADMIN — IPRATROPIUM BROMIDE 1 MG: 0.5 SOLUTION RESPIRATORY (INHALATION) at 08:27

## 2023-03-29 RX ADMIN — ALBUTEROL SULFATE 10 MG: 2.5 SOLUTION RESPIRATORY (INHALATION) at 08:26

## 2023-03-29 NOTE — ED PROVIDER NOTES
History  Chief Complaint   Patient presents with   • Shortness of Breath     Pt states he has had SOB over the last few days  Hx of COPD      Stanford Soto is a 71-year-old male with past medical history including hyperlipidemia and copd arriving to the emergency department for evaluation with chief complaint of chest tightness, wheezing, and shortness of breath  Patient reports that symptoms have been present for a couple of weeks though started to gradually worsen over the past couple of days  He reports dry cough as well  He denies any known sick contact  He denies fevers, chills, sweats  He notes his chest tightness is not worsened by deep inhalation or exertion  He denies orthopnea, calf pain or leg swelling, recent travel or recent surgical procedures  He states that his nebulizer is broken and he has not been able to administer breathing treatments  He denies having been previously hospitalized for COPD exacerbations  He is a former tobacco user  He offers no other complaints or concerns at this time  Prior to Admission Medications   Prescriptions Last Dose Informant Patient Reported? Taking?    albuterol (2 5 mg/3 mL) 0 083 % nebulizer solution   No No   Sig: Take 3 mL (2 5 mg total) by nebulization every 6 (six) hours as needed for wheezing or shortness of breath   albuterol (PROVENTIL HFA,VENTOLIN HFA) 90 mcg/act inhaler   No No   Sig: Inhale 2 puffs every 4 (four) hours as needed for wheezing   benzonatate (TESSALON PERLES) 100 mg capsule   No No   Sig: Take 1 capsule (100 mg total) by mouth every 8 (eight) hours   oxyCODONE-acetaminophen (PERCOCET) 5-325 mg per tablet   Yes No   Sig: Take 1 tablet by mouth every 8 (eight) hours as needed for moderate pain (not daily use)      Facility-Administered Medications: None       Past Medical History:   Diagnosis Date   • Asthma    • Chronic pain     cervic and lumbar   • Hyperlipidemia        Past Surgical History:   Procedure Laterality Date   • APPENDECTOMY     • HERNIA REPAIR      4 times   • NEPHRECTOMY LIVING DONOR Left 10/2009       History reviewed  No pertinent family history  I have reviewed and agree with the history as documented  E-Cigarette/Vaping     E-Cigarette/Vaping Substances     Social History     Tobacco Use   • Smoking status: Former   • Smokeless tobacco: Never   Substance Use Topics   • Alcohol use: No   • Drug use: No       Review of Systems   Constitutional: Negative for chills and fever  Respiratory: Positive for cough, chest tightness, shortness of breath and wheezing  Cardiovascular: Negative for chest pain  All other systems reviewed and are negative  Physical Exam  Physical Exam  Vitals and nursing note reviewed  Constitutional:       General: He is not in acute distress  Appearance: Normal appearance  He is well-developed  He is obese  He is not ill-appearing, toxic-appearing or diaphoretic  HENT:      Head: Normocephalic and atraumatic  Right Ear: External ear normal       Left Ear: External ear normal    Eyes:      Conjunctiva/sclera: Conjunctivae normal    Cardiovascular:      Rate and Rhythm: Normal rate and regular rhythm  Pulses: Normal pulses  Pulmonary:      Effort: Pulmonary effort is normal  No tachypnea or respiratory distress  Breath sounds: Wheezing present  No rhonchi or rales  Comments: Patient speaking in full sentences without conversational dyspnea  Dry cough with deep inhalation  Diffuse expiratory wheeze on auscultation of the posterior lung fields  O2 saturations stable on room air  Chest:      Chest wall: No tenderness  Abdominal:      General: There is no distension  Palpations: Abdomen is soft  Tenderness: There is no abdominal tenderness  Musculoskeletal:         General: Normal range of motion  Cervical back: Normal range of motion and neck supple  Right lower leg: No edema  Left lower leg: No edema     Skin:     General: Skin is warm and dry  Capillary Refill: Capillary refill takes less than 2 seconds  Neurological:      Mental Status: He is alert  Motor: Motor function is intact  No weakness  Gait: Gait is intact     Psychiatric:         Mood and Affect: Mood normal          Vital Signs  ED Triage Vitals   Temperature Pulse Respirations Blood Pressure SpO2   03/29/23 1110 03/29/23 0813 03/29/23 0813 03/29/23 0813 03/29/23 0813   97 8 °F (36 6 °C) 91 18 160/85 97 %      Temp Source Heart Rate Source Patient Position - Orthostatic VS BP Location FiO2 (%)   03/29/23 1110 03/29/23 0813 03/29/23 0813 03/29/23 0813 --   Oral Monitor Lying Left arm       Pain Score       --                  Vitals:    03/29/23 1000 03/29/23 1039 03/29/23 1110 03/29/23 1130   BP: 129/80 134/71 129/69 126/70   Pulse: 84 95 83 88   Patient Position - Orthostatic VS: Sitting  Sitting Sitting         Visual Acuity      ED Medications  Medications   albuterol inhalation solution 10 mg (10 mg Nebulization Given 3/29/23 0826)     And   ipratropium (ATROVENT) 0 02 % inhalation solution 1 mg (1 mg Nebulization Given 3/29/23 0827)     And   sodium chloride 0 9 % inhalation solution 3 mL (3 mL Nebulization Given 3/29/23 0826)   methylPREDNISolone sodium succinate (Solu-MEDROL) injection 80 mg (80 mg Intravenous Given 3/29/23 0838)   magnesium sulfate IVPB (premix) SOLN 1 g (0 g Intravenous Stopped 3/29/23 0947)       Diagnostic Studies  Results Reviewed     Procedure Component Value Units Date/Time    HS Troponin I 2hr [[id number]]  (Normal) Collected: 03/29/23 1024    Lab Status: Final result Specimen: Blood from Arm, Left Updated: 03/29/23 1100     hs TnI 2hr 2 ng/L      Delta 2hr hsTnI 0 ng/L     Comprehensive metabolic panel [[id number]]  (Abnormal) Collected: 03/29/23 0829    Lab Status: Final result Specimen: Blood from Arm, Left Updated: 03/29/23 1002     Sodium 140 mmol/L      Potassium 4 0 mmol/L      Chloride 107 mmol/L      CO2 27 mmol/L ANION GAP 6 mmol/L      BUN 20 mg/dL      Creatinine 0 91 mg/dL      Glucose 125 mg/dL      Calcium 9 1 mg/dL      AST 15 U/L      ALT 21 U/L      Alkaline Phosphatase 128 U/L      Total Protein 7 4 g/dL      Albumin 4 1 g/dL      Total Bilirubin 0 33 mg/dL      eGFR 87 ml/min/1 73sq m     Narrative:      [place] guidelines for Chronic Kidney Disease (CKD):   •  Stage 1 with normal or high GFR (GFR > 90 mL/min/1 73 square meters)  •  Stage 2 Mild CKD (GFR = 60-89 mL/min/1 73 square meters)  •  Stage 3A Moderate CKD (GFR = 45-59 mL/min/1 73 square meters)  •  Stage 3B Moderate CKD (GFR = 30-44 mL/min/1 73 square meters)  •  Stage 4 Severe CKD (GFR = 15-29 mL/min/1 73 square meters)  •  Stage 5 End Stage CKD (GFR <15 mL/min/1 73 square meters)  Note: GFR calculation is accurate only with a steady state creatinine    FLU/RSV/COVID - if FLU/RSV clinically relevant [[id number]]  (Normal) Collected: 03/29/23 0829    Lab Status: Final result Specimen: Nares from Nose Updated: 03/29/23 0912     SARS-CoV-2 Negative     INFLUENZA A PCR Negative     INFLUENZA B PCR Negative     RSV PCR Negative    Narrative:      FOR PEDIATRIC PATIENTS - copy/paste COVID Guidelines URL to browser: [email or web address]    SARS-CoV-2 assay is a Nucleic Acid Amplification assay intended for the  qualitative detection of nucleic acid from SARS-CoV-2 in nasopharyngeal  swabs  Results are for the presumptive identification of SARS-CoV-2 RNA  Positive results are indicative of infection with SARS-CoV-2, the virus  causing COVID-19, but do not rule out bacterial infection or co-infection  with other viruses  Laboratories within the [place] and its  territories are required to report all positive results to the appropriate  public health authorities   Negative results do not preclude SARS-CoV-2  infection and should not be used as the sole basis for treatment or other  patient management decisions  Negative results must be combined with  clinical observations, patient history, and epidemiological information  This test has not been FDA cleared or approved  This test has been authorized by FDA under an Emergency Use Authorization  (EUA)  This test is only authorized for the duration of time the  declaration that circumstances exist justifying the authorization of the  emergency use of an in vitro diagnostic tests for detection of SARS-CoV-2  virus and/or diagnosis of COVID-19 infection under section 564(b)(1) of  the Act, 21 U  S C  395JHT-3(J)(7), unless the authorization is terminated  or revoked sooner  The test has been validated but independent review by FDA  and CLIA is pending  Test performed using mLED GeneXpert: This RT-PCR assay targets N2,  a region unique to SARS-CoV-2  A conserved region in the E-gene was chosen  for pan-Sarbecovirus detection which includes SARS-CoV-2  According to CMS-2020-01-R, this platform meets the definition of high-throughput technology      HS Troponin 0hr (reflex protocol) [[id number]]  (Normal) Collected: 03/29/23 0829    Lab Status: Final result Specimen: Blood from Arm, Left Updated: 03/29/23 0907     hs TnI 0hr 2 ng/L     B-Type Natriuretic Peptide(BNP) [[id number]]  (Normal) Collected: 03/29/23 0829    Lab Status: Final result Specimen: Blood from Arm, Left Updated: 03/29/23 0905     BNP 23 pg/mL     Protime-INR [[id number]]  (Normal) Collected: 03/29/23 0829    Lab Status: Final result Specimen: Blood from Arm, Left Updated: 03/29/23 0847     Protime 13 1 seconds      INR 1 01    APTT [[id number]]  (Normal) Collected: 03/29/23 0829    Lab Status: Final result Specimen: Blood from Arm, Left Updated: 03/29/23 0847     PTT 25 seconds     CBC and differential [[id number]]  (Abnormal) Collected: 03/29/23 0829    Lab Status: Final result Specimen: Blood from Arm, Left Updated: 03/29/23 0835     WBC 6 68 Thousand/uL      RBC 6 26 Million/uL      Hemoglobin 14 5 g/dL      Hematocrit 47 2 %      MCV 75 fL      MCH 23 2 pg      MCHC 30 7 g/dL      RDW 14 9 %      MPV 9 9 fL      Platelets 132 Thousands/uL      nRBC 0 /100 WBCs      Neutrophils Relative 61 %      Immat GRANS % 0 %      Lymphocytes Relative 24 %      Monocytes Relative 9 %      Eosinophils Relative 5 %      Basophils Relative 1 %      Neutrophils Absolute 4 12 Thousands/µL      Immature Grans Absolute 0 01 Thousand/uL      Lymphocytes Absolute 1 59 Thousands/µL      Monocytes Absolute 0 61 Thousand/µL      Eosinophils Absolute 0 30 Thousand/µL      Basophils Absolute 0 05 Thousands/µL                  XR chest pa & lateral   Final Result by Reina Spears MD (03/29 1256)      No acute cardiopulmonary disease  Findings are stable            Workstation performed: [id number]                    Procedures  ECG 12 Lead Documentation Only    Date/Time: 3/29/2023 8:15 AM  Performed by: Heather Troncoso PA-C  Authorized by: Heather Troncoso PA-C     Indications / Diagnosis:  Sob  ECG reviewed by me, the ED Provider: yes    Patient location:  ED  Rate:     ECG rate:  88    ECG rate assessment: normal    Rhythm:     Rhythm: sinus rhythm    Ectopy:     Ectopy: none    QRS:     QRS axis:  Left  Conduction:     Conduction: normal    Comments:      No ischemic ST/T wave changes             ED Course                               SBIRT 22yo+    Flowsheet Row Most Recent Value   SBIRT (25 yo +)    In order to provide better care to our patients, we are screening all of our patients for alcohol and drug use  Would it be okay to ask you these screening questions? Yes Filed at: 03/29/2023 0255   Initial Alcohol Screen: US AUDIT-C     1  How often do you have a drink containing alcohol? 0 Filed at: 03/29/2023 0822   2  How many drinks containing alcohol do you have on a typical day you are drinking? 0 Filed at: 03/29/2023 0822   3a  Male UNDER 65:  How often do you have five or more drinks on one occasion? 0 Filed at: 03/29/2023 0822   3b  FEMALE Any Age, or MALE 65+: How often do you have 4 or more drinks on one occassion? 0 Filed at: 03/29/2023 4308   Audit-C Score 0 Filed at: 03/29/2023 1211   JARON: How many times in the past year have you    Used an illegal drug or used a prescription medication for non-medical reasons? Never Filed at: 03/29/2023 4651                    Medical Decision Making  This is a 73yo male presenting with chest tightness and shortness of breath  Symptoms had started a few weeks ago, worsened over the past few days  Notes that his nebulizer machine is broken  Differential diagnosis includes but is not limited to: copd exacerbation, bronchospasm, uri, bronchitis, pneumonia, acs/anginal equivalent    Initial ED plan: ECG, labs, CXR; breathing treatments and reassessment    Final ED Assessment: Vital signs reviewed on ED presentation, examination as above  All labs and imaging independently reviewed with imaging interpreted by the Radiologist   ECG without ischemic changes, troponin within normal limits  Chest x-ray reports no acute cardiopulmonary disease, with stable findings  Patient had reported significant improvement of symptoms in response to treatments given here in the emergency department  On repeat auscultation, the patient continued with expiratory wheeze though improved  His ambulatory pulse ox was 95% on room air and well-tolerated  Due to persistent wheezing I did offer the patient admission for observation and continued management which he had declined, stating that he would prefer discharge with medications and a new nebulizer machine  This is reasonable in setting of stable vital signs and absence of acute respiratory distress in the patient   Patient given a nebulizer machine and prescriptions for albuterol solution, rescue inhaler, and prednisone burst   The patient states that he has recently moved from [place], and will need outpatient PCP follow-up which was included in his discharge paperwork  Patient encouraged to return immediately to the emergency department with any new or worsening symptoms  He had verbalized understanding and was comfortable and agreeable with discharge plan as above  He was discharged in stable condition and ambulated independently from the emergency department today without issue  COPD exacerbation ([facility] [address] ): acute illness or injury  Amount and/or Complexity of Data Reviewed  Labs: ordered  Radiology: ordered  Risk  Prescription drug management  Disposition  Final diagnoses:   COPD exacerbation ([facility] [address] )     Time reflects when diagnosis was documented in both MDM as applicable and the Disposition within this note     Time User Action Codes Description Comment    3/29/2023 11:10 AM Jamar Jones [J44 1] COPD exacerbation [facility])       ED Disposition     ED Disposition   Discharge    Condition   Stable    Date/Time   Wed Mar 29, 2023 11:08 AM    Comment   Dinah Hopkins discharge to home/self care                 Follow-up Information     Follow up With Specialties Details Why Contact Info Additional Information    [facility] Internal Medicine [facility] Internal Medicine   [address] [facility] Internal Medicine [place] [address], [address]    [place] [address] Pulmonology   [facility] [id number]  [address] [facility] Pulmonary [address], [facility] [address], [place] [address], [place], [address] [facility] Emergency Department Emergency Medicine  If symptoms worsen [address] [facility] [id number] [facility] Emergency Department, [address] [facility], [place], [place], [address]          Discharge Medication List as of 3/29/2023 11:12 AM      START taking these medications    Details   !! albuterol (2 5 mg/3 mL) 0 083 % nebulizer solution Take 3 mL (2 5 mg total) by nebulization every 6 (six) hours as needed for wheezing or shortness of breath, Starting Wed 3/29/2023, Normal      !! albuterol (ProAir HFA) 90 mcg/act inhaler Inhale 2 puffs every 6 (six) hours as needed for wheezing, Starting Wed 3/29/2023, Normal      predniSONE 20 mg tablet Take 2 tablets (40 mg total) by mouth daily, Starting Wed 3/29/2023, Normal       !! - Potential duplicate medications found  Please discuss with provider  CONTINUE these medications which have NOT CHANGED    Details   !! albuterol (2 5 mg/3 mL) 0 083 % nebulizer solution Take 3 mL (2 5 mg total) by nebulization every 6 (six) hours as needed for wheezing or shortness of breath, Starting Wed 7/14/2021, Normal      !! albuterol (PROVENTIL HFA,VENTOLIN HFA) 90 mcg/act inhaler Inhale 2 puffs every 4 (four) hours as needed for wheezing, Starting Thu 7/12/2018, Print      benzonatate (TESSALON PERLES) 100 mg capsule Take 1 capsule (100 mg total) by mouth every 8 (eight) hours, Starting Thu 7/12/2018, Print      oxyCODONE-acetaminophen (PERCOCET) 5-325 mg per tablet Take 1 tablet by mouth every 8 (eight) hours as needed for moderate pain (not daily use), Historical Med       !! - Potential duplicate medications found  Please discuss with provider  No discharge procedures on file      PDMP Review     None          ED Provider  Electronically Signed by           Fabio Kothari PA-C  03/31/23 8478

## 2023-03-29 NOTE — DISCHARGE INSTRUCTIONS
Begin oral prednisone course tomorrow  Use albuterol nebulizer treatment every 4-6 hours as needed for the next 48 hours, then every 6 hours as needed  Outpatient internal medicine and pulmonology follow-up as above  Please return immediately to the emergency department if you experience any new or worsening symptoms including but not limited to worsened chest tightness or wheezing, increased shortness of breath or exertional dyspnea, or any other new or worrisome symptoms as discussed

## 2023-03-29 NOTE — ED NOTES
Ambulatory pulse ox completed  Patient maintained O2 sat 96-98 % during ambulation         Myrna Marr  03/29/23 1101

## 2023-04-12 ENCOUNTER — APPOINTMENT (OUTPATIENT)
Dept: PULMONOLOGY | Facility: CLINIC | Age: 68
End: 2023-04-12
Payer: MEDICARE

## 2023-04-12 VITALS
HEART RATE: 76 BPM | RESPIRATION RATE: 16 BRPM | BODY MASS INDEX: 34.82 KG/M2 | HEIGHT: 65 IN | SYSTOLIC BLOOD PRESSURE: 120 MMHG | OXYGEN SATURATION: 97 % | WEIGHT: 209 LBS | DIASTOLIC BLOOD PRESSURE: 70 MMHG | TEMPERATURE: 98 F

## 2023-04-12 DIAGNOSIS — R93.89 ABNORMAL FINDINGS ON DIAGNOSTIC IMAGING OF OTHER SPECIFIED BODY STRUCTURES: ICD-10-CM

## 2023-04-12 DIAGNOSIS — J44.9 CHRONIC OBSTRUCTIVE PULMONARY DISEASE, UNSPECIFIED: ICD-10-CM

## 2023-04-12 DIAGNOSIS — R06.09 OTHER FORMS OF DYSPNEA: ICD-10-CM

## 2023-04-12 PROCEDURE — 99214 OFFICE O/P EST MOD 30 MIN: CPT

## 2023-04-12 RX ORDER — PREDNISONE 1 MG/1
1 TABLET, DELAYED RELEASE ORAL ONCE
Qty: 90 | Refills: 3 | Status: ACTIVE | COMMUNITY
Start: 2023-04-12 | End: 1900-01-01

## 2023-04-12 RX ORDER — ALBUTEROL SULFATE 90 UG/1
108 (90 BASE) INHALANT RESPIRATORY (INHALATION)
Qty: 3 | Refills: 3 | Status: ACTIVE | COMMUNITY
Start: 2023-04-12 | End: 1900-01-01

## 2023-04-12 RX ORDER — FLUTICASONE FUROATE, UMECLIDINIUM BROMIDE AND VILANTEROL TRIFENATATE 100; 62.5; 25 UG/1; UG/1; UG/1
100-62.5-25 POWDER RESPIRATORY (INHALATION)
Qty: 3 | Refills: 3 | Status: ACTIVE | COMMUNITY
Start: 2023-04-12 | End: 1900-01-01

## 2023-04-12 NOTE — REVIEW OF SYSTEMS
[Chest Tightness] : chest tightness [Dyspnea] : dyspnea [Negative] : Endocrine [Cough] : no cough [Sputum] : no sputum [Wheezing] : no wheezing [SOB on Exertion] : no sob on exertion

## 2023-04-12 NOTE — CONSULT LETTER
[Dear  ___] : Dear  [unfilled], [Courtesy Letter:] : I had the pleasure of seeing your patient, [unfilled], in my office today. [Please see my note below.] : Please see my note below. [Consult Closing:] : Thank you very much for allowing me to participate in the care of this patient.  If you have any questions, please do not hesitate to contact me. [Sincerely,] : Sincerely, [Follow-Up] : a follow-up visit [FreeTextEntry1] : chronic kidney disease, hypertension

## 2023-04-12 NOTE — HISTORY OF PRESENT ILLNESS
[Former] : former [>= 20 pack years] : >= 20 pack years [TextBox_4] : 67 yo male with hx of OAD presents for follow up. The patient complains of ALCOCER without cough or chest pain. He is concerned over asbestos exposure. He uses trelegy 100 daily with PRN albuterol and oral long acting steroid. [TextBox_11] : 1.5 [TextBox_13] : 30 [YearQuit] : 2008 [TextBox_29] : Denies snoring, daytime somnolence, apneic episodes, AM headaches

## 2023-04-12 NOTE — DISCUSSION/SUMMARY
[FreeTextEntry1] : 67 yo male with hx of OAD at baseline. He is to continue trelegy daily with PRN albuterol MDI. Use of daily oral steroid was discouraged. I reviewed the chest xray results with his and his wife who was present. He was given a copy of the results at his request. The meaning of asbestos exposure and related diseases was discussed. Follow up screening chest CT will be performed. Diet, weight loss and exercise also stressed. He is to follow up with his PMD as before.

## 2023-04-22 ENCOUNTER — HEALTH MAINTENANCE LETTER (OUTPATIENT)
Age: 68
End: 2023-04-22

## 2023-05-02 ENCOUNTER — APPOINTMENT (EMERGENCY)
Dept: CT IMAGING | Facility: HOSPITAL | Age: 68
End: 2023-05-02

## 2023-05-02 ENCOUNTER — HOSPITAL ENCOUNTER (EMERGENCY)
Facility: HOSPITAL | Age: 68
Discharge: HOME | End: 2023-05-02
Attending: EMERGENCY MEDICINE

## 2023-05-02 VITALS
HEART RATE: 85 BPM | OXYGEN SATURATION: 95 % | TEMPERATURE: 97.8 F | RESPIRATION RATE: 18 BRPM | DIASTOLIC BLOOD PRESSURE: 68 MMHG | SYSTOLIC BLOOD PRESSURE: 128 MMHG

## 2023-05-02 DIAGNOSIS — R10.9 FLANK PAIN: Primary | ICD-10-CM

## 2023-05-02 LAB
ALBUMIN SERPL BCP-MCNC: 4.2 G/DL (ref 3.5–5)
ALP SERPL-CCNC: 116 U/L (ref 34–104)
ALT SERPL W P-5'-P-CCNC: 20 U/L (ref 7–52)
ANION GAP SERPL CALCULATED.3IONS-SCNC: 4 MMOL/L (ref 4–13)
AST SERPL W P-5'-P-CCNC: 16 U/L (ref 13–39)
ATRIAL RATE: 70 BPM
BASOPHILS # BLD AUTO: 0.07 THOUSANDS/ÂΜL (ref 0–0.1)
BASOPHILS NFR BLD AUTO: 1 % (ref 0–1)
BILIRUB SERPL-MCNC: 0.38 MG/DL (ref 0.2–1)
BILIRUB UR QL STRIP: NEGATIVE
BNP SERPL-MCNC: 20 PG/ML (ref 0–100)
BUN SERPL-MCNC: 17 MG/DL (ref 5–25)
CALCIUM SERPL-MCNC: 9.8 MG/DL (ref 8.4–10.2)
CARDIAC TROPONIN I PNL SERPL HS: 2 NG/L
CHLORIDE SERPL-SCNC: 104 MMOL/L (ref 96–108)
CLARITY UR: CLEAR
CO2 SERPL-SCNC: 29 MMOL/L (ref 21–32)
COLOR UR: NORMAL
CREAT SERPL-MCNC: 0.94 MG/DL (ref 0.6–1.3)
EOSINOPHIL # BLD AUTO: 0.44 THOUSAND/ÂΜL (ref 0–0.61)
EOSINOPHIL NFR BLD AUTO: 6 % (ref 0–6)
ERYTHROCYTE [DISTWIDTH] IN BLOOD BY AUTOMATED COUNT: 14.9 % (ref 11.6–15.1)
GFR SERPL CREATININE-BSD FRML MDRD: 84 ML/MIN/1.73SQ M
GLUCOSE SERPL-MCNC: 103 MG/DL (ref 65–140)
GLUCOSE UR STRIP-MCNC: NEGATIVE MG/DL
HCT VFR BLD AUTO: 46.3 % (ref 36.5–49.3)
HGB BLD-MCNC: 14.3 G/DL (ref 12–17)
HGB UR QL STRIP.AUTO: NEGATIVE
IMM GRANULOCYTES # BLD AUTO: 0.03 THOUSAND/UL (ref 0–0.2)
IMM GRANULOCYTES NFR BLD AUTO: 0 % (ref 0–2)
KETONES UR STRIP-MCNC: NEGATIVE MG/DL
LEUKOCYTE ESTERASE UR QL STRIP: NEGATIVE
LYMPHOCYTES # BLD AUTO: 1.79 THOUSANDS/ÂΜL (ref 0.6–4.47)
LYMPHOCYTES NFR BLD AUTO: 23 % (ref 14–44)
MCH RBC QN AUTO: 23.2 PG (ref 26.8–34.3)
MCHC RBC AUTO-ENTMCNC: 30.9 G/DL (ref 31.4–37.4)
MCV RBC AUTO: 75 FL (ref 82–98)
MONOCYTES # BLD AUTO: 0.8 THOUSAND/ÂΜL (ref 0.17–1.22)
MONOCYTES NFR BLD AUTO: 10 % (ref 4–12)
NEUTROPHILS # BLD AUTO: 4.6 THOUSANDS/ÂΜL (ref 1.85–7.62)
NEUTS SEG NFR BLD AUTO: 60 % (ref 43–75)
NITRITE UR QL STRIP: NEGATIVE
NRBC BLD AUTO-RTO: 0 /100 WBCS
P AXIS: 63 DEGREES
PH UR STRIP.AUTO: 6 [PH]
PLATELET # BLD AUTO: 357 THOUSANDS/UL (ref 149–390)
PMV BLD AUTO: 10.2 FL (ref 8.9–12.7)
POTASSIUM SERPL-SCNC: 4.6 MMOL/L (ref 3.5–5.3)
PR INTERVAL: 174 MS
PROT SERPL-MCNC: 7.7 G/DL (ref 6.4–8.4)
PROT UR STRIP-MCNC: NEGATIVE MG/DL
QRS AXIS: -37 DEGREES
QRSD INTERVAL: 92 MS
QT INTERVAL: 402 MS
QTC INTERVAL: 434 MS
RBC # BLD AUTO: 6.16 MILLION/UL (ref 3.88–5.62)
SODIUM SERPL-SCNC: 137 MMOL/L (ref 135–147)
SP GR UR STRIP.AUTO: 1.02 (ref 1–1.03)
T WAVE AXIS: 39 DEGREES
UROBILINOGEN UR STRIP-ACNC: <2 MG/DL
VENTRICULAR RATE: 70 BPM
WBC # BLD AUTO: 7.73 THOUSAND/UL (ref 4.31–10.16)

## 2023-05-02 RX ORDER — KETOROLAC TROMETHAMINE 10 MG/1
10 TABLET, FILM COATED ORAL EVERY 6 HOURS PRN
Qty: 12 TABLET | Refills: 0 | Status: SHIPPED | OUTPATIENT
Start: 2023-05-02

## 2023-05-02 RX ORDER — LIDOCAINE 50 MG/G
1 PATCH TOPICAL ONCE
Status: DISCONTINUED | OUTPATIENT
Start: 2023-05-02 | End: 2023-05-02 | Stop reason: HOSPADM

## 2023-05-02 RX ORDER — CYCLOBENZAPRINE HCL 10 MG
10 TABLET ORAL ONCE
Status: COMPLETED | OUTPATIENT
Start: 2023-05-02 | End: 2023-05-02

## 2023-05-02 RX ORDER — LIDOCAINE 50 MG/G
1 PATCH TOPICAL DAILY
Qty: 12 PATCH | Refills: 0 | Status: SHIPPED | OUTPATIENT
Start: 2023-05-02

## 2023-05-02 RX ORDER — KETOROLAC TROMETHAMINE 30 MG/ML
15 INJECTION, SOLUTION INTRAMUSCULAR; INTRAVENOUS ONCE
Status: COMPLETED | OUTPATIENT
Start: 2023-05-02 | End: 2023-05-02

## 2023-05-02 RX ORDER — CYCLOBENZAPRINE HCL 10 MG
10 TABLET ORAL 2 TIMES DAILY PRN
Qty: 20 TABLET | Refills: 0 | Status: SHIPPED | OUTPATIENT
Start: 2023-05-02

## 2023-05-02 RX ADMIN — KETOROLAC TROMETHAMINE 15 MG: 30 INJECTION, SOLUTION INTRAMUSCULAR; INTRAVENOUS at 15:28

## 2023-05-02 RX ADMIN — CYCLOBENZAPRINE HYDROCHLORIDE 10 MG: 10 TABLET, FILM COATED ORAL at 15:28

## 2023-05-02 RX ADMIN — LIDOCAINE 1 PATCH: 50 PATCH TOPICAL at 15:34

## 2023-05-02 NOTE — DISCHARGE INSTRUCTIONS
As we discussed the radiologist is recommending a 3-month follow-up CT scan for the pulmonary nodule seen on your CAT scan

## 2023-05-02 NOTE — ED PROVIDER NOTES
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, he has 1 single kidney and was concerned and came in for evaluation  Other than tenderness there is no physical exam abnormalities  The patient does have a history of issues with his central adiposity which he states is getting worse, he is looking into gastric bypass as an option for weight loss  He has issues with exercising because of multiple herniated disks  We did discuss at length some lifestyle alterations that he might be able to make that can assist him with these issues as well      History provided by:  Patient   used: No    Flank Pain  Pain location:  R flank  Pain quality: aching    Pain radiates to:  Does not radiate  Pain severity:  Mild  Onset quality:  Gradual  Timing:  Constant  Progression:  Worsening  Chronicity:  New  Context: not alcohol use, not awakening from sleep, not laxative use, not recent sexual activity and not suspicious food intake    Relieved by:  Nothing  Worsened by:  Nothing  Ineffective treatments:  None tried  Associated symptoms: chest pain (Pain is costophrenic on right side  Concerned for rib fracture )    Associated symptoms: no constipation and no sore throat    Risk factors: no alcohol abuse        Prior to Admission Medications   Prescriptions Last Dose Informant Patient Reported? Taking?    albuterol (2 5 mg/3 mL) 0 083 % nebulizer solution   No No   Sig: Take 3 mL (2 5 mg total) by nebulization every 6 (six) hours as needed for wheezing or shortness of breath   albuterol (2 5 mg/3 mL) 0 083 % nebulizer solution   No No   Sig: Take 3 mL (2 5 mg total) by nebulization every 6 (six) hours as needed for wheezing or shortness of breath   albuterol (PROVENTIL HFA,VENTOLIN HFA) 90 mcg/act inhaler   No No   Sig: Inhale 2 puffs every 4 (four) hours as needed for wheezing   albuterol (ProAir HFA) 90 mcg/act inhaler   No No   Sig: Inhale 2 puffs every 6 (six) hours as needed for wheezing   benzonatate (TESSALON PERLES) 100 mg capsule   No No   Sig: Take 1 capsule (100 mg total) by mouth every 8 (eight) hours   oxyCODONE-acetaminophen (PERCOCET) 5-325 mg per tablet   Yes No   Sig: Take 1 tablet by mouth every 8 (eight) hours as needed for moderate pain (not daily use)   predniSONE 20 mg tablet   No No   Sig: Take 2 tablets (40 mg total) by mouth daily      Facility-Administered Medications: None       Past Medical History:   Diagnosis Date    Asthma     Chronic pain     cervic and lumbar    Hyperlipidemia        Past Surgical History:   Procedure Laterality Date    APPENDECTOMY      HERNIA REPAIR      4 times    NEPHRECTOMY LIVING DONOR Left 10/2009       History reviewed  No pertinent family history  I have reviewed and agree with the history as documented  E-Cigarette/Vaping     E-Cigarette/Vaping Substances     Social History     Tobacco Use    Smoking status: Former    Smokeless tobacco: Never   Substance Use Topics    Alcohol use: No    Drug use: No       Review of Systems   HENT: Negative for sore throat  Cardiovascular: Positive for chest pain (Pain is costophrenic on right side  Concerned for rib fracture  )  Gastrointestinal: Negative for constipation  Genitourinary: Positive for flank pain  All other systems reviewed and are negative  Physical Exam  Physical Exam  Vitals and nursing note reviewed  Constitutional:       General: He is not in acute distress  Appearance: He is well-developed  He is not diaphoretic  HENT:      Head: Normocephalic and atraumatic        Right Ear: External ear normal       Left Ear: External ear normal    Eyes:      General: No scleral icterus  Right eye: No discharge  Left eye: No discharge  Conjunctiva/sclera: Conjunctivae normal    Neck:      Thyroid: No thyromegaly  Vascular: No JVD  Trachea: No tracheal deviation  Cardiovascular:      Rate and Rhythm: Normal rate and regular rhythm  Pulmonary:      Effort: Pulmonary effort is normal  No respiratory distress  Breath sounds: Normal breath sounds  No stridor  No wheezing or rales  Abdominal:      General: Bowel sounds are normal  There is no distension  Palpations: Abdomen is soft  Tenderness: There is no abdominal tenderness  Musculoskeletal:         General: No tenderness or deformity  Normal range of motion  Cervical back: Normal range of motion and neck supple  Skin:     General: Skin is warm and dry  Neurological:      Mental Status: He is alert and oriented to person, place, and time  Cranial Nerves: No cranial nerve deficit        Coordination: Coordination normal    Psychiatric:         Behavior: Behavior normal          Vital Signs  ED Triage Vitals   Temperature Pulse Respirations Blood Pressure SpO2   05/02/23 1249 05/02/23 1249 05/02/23 1249 05/02/23 1250 05/02/23 1249   97 8 °F (36 6 °C) 85 18 128/68 95 %      Temp src Heart Rate Source Patient Position - Orthostatic VS BP Location FiO2 (%)   -- 05/02/23 1249 -- -- --    Monitor         Pain Score       --                  Vitals:    05/02/23 1249 05/02/23 1250   BP:  128/68   Pulse: 85          Visual Acuity      ED Medications  Medications - No data to display    Diagnostic Studies  Results Reviewed     None                 No orders to display              Procedures  Procedures         ED Course               Identification of Seniors at [address] [place] [address] Most Recent Value   (ISAR) Identification of Seniors at Risk    Before the illness or injury that brought you to the Emergency, did you need someone to help you on a regular basis? 0 Filed at: 05/02/2023 1250   In the last 24 hours, have you needed more help than usual? 0 Filed at: 05/02/2023 1250   Have you been hospitalized for one or more nights during the past 6 months? 0 Filed at: 05/02/2023 1250   In general, do you see well? 0 Filed at: 05/02/2023 1250   In general, do you have serious problems with your memory? 0 Filed at: 05/02/2023 1250   Do you take more than three different medications every day? 1 Filed at: 05/02/2023 1250   ISAR Score 1 Filed at: 05/02/2023 1250                      SBIRT 22yo+    Flowsheet Row Most Recent Value   Initial Alcohol Screen: US AUDIT-C     1  How often do you have a drink containing alcohol? 0 Filed at: 05/02/2023 1250   2  How many drinks containing alcohol do you have on a typical day you are drinking? 0 Filed at: 05/02/2023 1250   3a  Male UNDER 65: How often do you have five or more drinks on one occasion? 0 Filed at: 05/02/2023 1250   3b  FEMALE Any Age, or MALE 65+: How often do you have 4 or more drinks on one occassion? 0 Filed at: 05/02/2023 1250   Audit-C Score 0 Filed at: 05/02/2023 1250   JARON: How many times in the past year have you    Used an illegal drug or used a prescription medication for non-medical reasons? Never Filed at: 05/02/2023 1250                    MDM    Disposition  Final diagnoses:   None     ED Disposition     None      Follow-up Information    None         Patient's Medications   Discharge Prescriptions    No medications on file       No discharge procedures on file      PDMP Review     None          ED Provider  Electronically Signed by Blood from Arm, Left Updated: 05/02/23 1354     WBC 7 73 Thousand/uL      RBC 6 16 Million/uL      Hemoglobin 14 3 g/dL      Hematocrit 46 3 %      MCV 75 fL      MCH 23 2 pg      MCHC 30 9 g/dL      RDW 14 9 %      MPV 10 2 fL      Platelets 358 Thousands/uL      nRBC 0 /100 WBCs      Neutrophils Relative 60 %      Immat GRANS % 0 %      Lymphocytes Relative 23 %      Monocytes Relative 10 %      Eosinophils Relative 6 %      Basophils Relative 1 %      Neutrophils Absolute 4 60 Thousands/µL      Immature Grans Absolute 0 03 Thousand/uL      Lymphocytes Absolute 1 79 Thousands/µL      Monocytes Absolute 0 80 Thousand/µL      Eosinophils Absolute 0 44 Thousand/µL      Basophils Absolute 0 07 Thousands/µL                  CT chest abdomen pelvis wo contrast   Final Result by Asher Guerrero MD (05/02 1349)      There is a new 7 mm heterogeneous nodular density right upper lobe, indeterminate may be inflammatory/ infectious or neoplastic   Short interval follow-up at 3 months is suggested  Emphysema   No acute inflammatory stranding   No bowel obstruction   No hydronephrosis   Right inguinal hernia containing a small segment of the right lateral bladder wall   S/p left nephrectomy with no mass in the nephrectomy bed         I personally discussed this study with Steven Valverde on 5/2/2023 3:07 PM       A follow-up notification has been created in the epic  Workstation performed: [id number]                    Procedures  Procedures         ED Course               [facility] Most Recent Value   (ISAR) Identification of Seniors at Risk    Before the illness or injury that brought you to the Emergency, did you need someone to help you on a regular basis? 0 Filed at: 05/02/2023 1250   In the last 24 hours, have you needed more help than usual? 0 Filed at: 05/02/2023 1250   Have you been hospitalized for one or more nights during the past 6 months?  0 Filed at: 05/02/2023 1250   In general, do you see well? 0 Filed at: 05/02/2023 1250   In general, do you have serious problems with your memory? 0 Filed at: 05/02/2023 1250   Do you take more than three different medications every day? 1 Filed at: 05/02/2023 1250   ISAR Score 1 Filed at: 05/02/2023 1250                      SBIRT 22yo+    Flowsheet Row Most Recent Value   Initial Alcohol Screen: US AUDIT-C     1  How often do you have a drink containing alcohol? 0 Filed at: 05/02/2023 1250   2  How many drinks containing alcohol do you have on a typical day you are drinking? 0 Filed at: 05/02/2023 1250   3a  Male UNDER 65: How often do you have five or more drinks on one occasion? 0 Filed at: 05/02/2023 1250   3b  FEMALE Any Age, or MALE 65+: How often do you have 4 or more drinks on one occassion? 0 Filed at: 05/02/2023 1250   Audit-C Score 0 Filed at: 05/02/2023 1250   JARON: How many times in the past year have you    Used an illegal drug or used a prescription medication for non-medical reasons? Never Filed at: 05/02/2023 1250                    MDM    Disposition  Final diagnoses:   Flank pain     Time reflects when diagnosis was documented in both MDM as applicable and the Disposition within this note     Time User Action Codes Description Comment    5/2/2023  3:28 PM Jonathan Morrison Add [R10 9] Flank pain       ED Disposition     ED Disposition   Discharge    Condition   Stable    Date/Time   Tue May 2, 2023  3:30 PM    Comment   Attila Arevalo discharge to home/self care                 Follow-up Information    None         Discharge Medication List as of 5/2/2023  3:30 PM      START taking these medications    Details   cyclobenzaprine (FLEXERIL) 10 mg tablet Take 1 tablet (10 mg total) by mouth 2 (two) times a day as needed for muscle spasms, Starting Tue 5/2/2023, Normal      ketorolac (TORADOL) 10 mg tablet Take 1 tablet (10 mg total) by mouth every 6 (six) hours as needed for moderate pain, Starting Tue 5/2/2023, Normal      lidocaine (Lidoderm) 5 % Apply 1 patch topically over 12 hours daily Remove & Discard patch within 12 hours or as directed by MD, Starting Tue 5/2/2023, Normal         CONTINUE these medications which have NOT CHANGED    Details   !! albuterol (2 5 mg/3 mL) 0 083 % nebulizer solution Take 3 mL (2 5 mg total) by nebulization every 6 (six) hours as needed for wheezing or shortness of breath, Starting Wed 7/14/2021, Normal      !! albuterol (2 5 mg/3 mL) 0 083 % nebulizer solution Take 3 mL (2 5 mg total) by nebulization every 6 (six) hours as needed for wheezing or shortness of breath, Starting Wed 3/29/2023, Normal      !! albuterol (ProAir HFA) 90 mcg/act inhaler Inhale 2 puffs every 6 (six) hours as needed for wheezing, Starting Wed 3/29/2023, Normal      !! albuterol (PROVENTIL HFA,VENTOLIN HFA) 90 mcg/act inhaler Inhale 2 puffs every 4 (four) hours as needed for wheezing, Starting Thu 7/12/2018, Print      benzonatate (TESSALON PERLES) 100 mg capsule Take 1 capsule (100 mg total) by mouth every 8 (eight) hours, Starting Thu 7/12/2018, Print      oxyCODONE-acetaminophen (PERCOCET) 5-325 mg per tablet Take 1 tablet by mouth every 8 (eight) hours as needed for moderate pain (not daily use), Historical Med      predniSONE 20 mg tablet Take 2 tablets (40 mg total) by mouth daily, Starting Wed 3/29/2023, Normal       !! - Potential duplicate medications found  Please discuss with provider  No discharge procedures on file      PDMP Review     None          ED Provider  Electronically Signed by           Ashley Negron DO  05/09/23 1936

## 2023-05-08 DIAGNOSIS — R93.89 ABNORMAL FINDINGS ON DIAGNOSTIC IMAGING OF OTHER SPECIFIED BODY STRUCTURES: ICD-10-CM

## 2023-06-02 ENCOUNTER — OFFICE VISIT (OUTPATIENT)
Age: 68
End: 2023-06-02

## 2023-06-02 ENCOUNTER — HEALTH MAINTENANCE LETTER (OUTPATIENT)
Age: 68
End: 2023-06-02

## 2023-06-02 VITALS
HEART RATE: 74 BPM | SYSTOLIC BLOOD PRESSURE: 133 MMHG | TEMPERATURE: 97.2 F | RESPIRATION RATE: 20 BRPM | OXYGEN SATURATION: 96 % | DIASTOLIC BLOOD PRESSURE: 89 MMHG

## 2023-06-02 DIAGNOSIS — J44.1 COPD EXACERBATION (HCC): Primary | ICD-10-CM

## 2023-06-02 RX ORDER — ALENDRONATE SODIUM 70 MG/1
TABLET ORAL
COMMUNITY
Start: 2023-04-27

## 2023-06-02 RX ORDER — IPRATROPIUM BROMIDE AND ALBUTEROL SULFATE 2.5; .5 MG/3ML; MG/3ML
3 SOLUTION RESPIRATORY (INHALATION) ONCE
Status: COMPLETED | OUTPATIENT
Start: 2023-06-02 | End: 2023-06-02

## 2023-06-02 RX ORDER — UBROGEPANT 100 MG/1
TABLET ORAL
COMMUNITY
Start: 2023-05-15

## 2023-06-02 RX ORDER — METHYLPREDNISOLONE SODIUM SUCCINATE 125 MG/2ML
125 INJECTION, POWDER, LYOPHILIZED, FOR SOLUTION INTRAMUSCULAR; INTRAVENOUS ONCE
Status: COMPLETED | OUTPATIENT
Start: 2023-06-02 | End: 2023-06-02

## 2023-06-02 RX ORDER — ONDANSETRON 4 MG/1
4 TABLET, ORALLY DISINTEGRATING ORAL ONCE
Status: COMPLETED | OUTPATIENT
Start: 2023-06-02 | End: 2023-06-02

## 2023-06-02 RX ORDER — FLUTICASONE FUROATE, UMECLIDINIUM BROMIDE AND VILANTEROL TRIFENATATE 100; 62.5; 25 UG/1; UG/1; UG/1
POWDER RESPIRATORY (INHALATION)
COMMUNITY
Start: 2023-05-10

## 2023-06-02 RX ORDER — IBUPROFEN 600 MG/1
TABLET ORAL
COMMUNITY
Start: 2023-03-10

## 2023-06-02 RX ORDER — BENZONATATE 100 MG/1
100 CAPSULE ORAL 3 TIMES DAILY PRN
Qty: 20 CAPSULE | Refills: 0 | Status: SHIPPED | OUTPATIENT
Start: 2023-06-02

## 2023-06-02 RX ORDER — KETOCONAZOLE 20 MG/G
CREAM TOPICAL
COMMUNITY
Start: 2023-02-28

## 2023-06-02 RX ORDER — PREDNISONE 20 MG/1
40 TABLET ORAL DAILY
Qty: 10 TABLET | Refills: 0 | Status: SHIPPED | OUTPATIENT
Start: 2023-06-02 | End: 2023-06-07

## 2023-06-02 RX ADMIN — ONDANSETRON 4 MG: 4 TABLET, ORALLY DISINTEGRATING ORAL at 10:00

## 2023-06-02 RX ADMIN — IPRATROPIUM BROMIDE AND ALBUTEROL SULFATE 3 ML: 2.5; .5 SOLUTION RESPIRATORY (INHALATION) at 09:11

## 2023-06-02 RX ADMIN — METHYLPREDNISOLONE SODIUM SUCCINATE 125 MG: 125 INJECTION, POWDER, LYOPHILIZED, FOR SOLUTION INTRAMUSCULAR; INTRAVENOUS at 09:35

## 2023-06-02 NOTE — PROGRESS NOTES
"  [facility]        NAME: Peter Guerrero is a 79 y o  male  : 1955    MRN: [id number]  DATE: 2023  TIME: 9:45 AM    Assessment and Plan   [facility] [J44 1]  1  COPD exacerbation (HCC)  ipratropium-albuterol (DUO-NEB) 0 5-2 5 mg/3 mL inhalation solution 3 mL    methylPREDNISolone sodium succinate (Solu-MEDROL) injection 125 mg    predniSONE 20 mg tablet    benzonatate (TESSALON PERLES) 100 mg capsule    ondansetron (ZOFRAN-ODT) dispersible tablet 4 mg        One-time duoneb and solumedrol injection given in clinic  Patient reports breathing improved but became nauseous after receiving solumedrol  One-time dose of Zofran given for nausea with improvement of symptoms  VSS on RA  Ambulatory pulse 96% on RA  Patient feels \"symptoms are no different from baseline  \" Discussed with patient if no improvement or worsening of symptoms within the next 24 hours to report to the ER promptly  Wife at bedside and patient verbalize understanding and agree with plan  Patient Instructions     Take medications as directed for next 5 days  May start prednisone tomorrow  Follow-up with PCP in 3-5 days for further management of symptoms  Report to the ER sooner if symptoms worsen  Chief Complaint     Chief Complaint   Patient presents with   • Shortness of Breath     Pt  States he has been having shortness of breath for about 5 days  Pt  Is using nebulizer's and oxygen concentrators  Pt  States 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 and COPD  Pt  Is light headed when he coughs and feels like he is going to pass out  History of Present Illness       79year old male presents with wife for evaluation of cough and shortness of breath for the past 5 days  Patient denies any known sick contacts but reports chronic COPD and history of smoking  Last exacerbation was in March when he was prescribed prednisone \"with significant improvement of symptoms  \" Patient denies associated fevers, " chills, or productive cough  He reports the chest tightness and cough is worsened with exertion and lying down  He has been taking nebulizers and inhalers as prescribed with minimal improvement of symptoms  Last treatment was 10 minutes prior to arrival      Shortness of Breath  The current episode started in the past 7 days  The problem occurs constantly  The problem is unchanged  The problem is moderate  Associated symptoms include coughing, dizziness, fatigue, orthopnea and wheezing  Pertinent negatives include no chest pain, chest pressure, hoarseness of voice, leg swelling, palpitations, rhinorrhea, sore throat, stridor or sweats  The symptoms are aggravated by activity and a supine position  There was no intake of a foreign body  He is currently using steroids  Past treatments include beta-agonist inhalers, rest and cold air  The treatment provided no relief  His past medical history is significant for asthma and tobacco use (former)  There is no history of allergies, anxiety/panic attacks, bronchiolitis, congenital heart disease, DVT, GERD, PE or spontaneous pneumothorax  He has been behaving normally  Urine output has been normal  The last void occurred less than 6 hours ago  Review of Systems   Review of Systems   Constitutional: Positive for activity change and fatigue  Negative for appetite change, chills and fever  HENT: Negative for congestion, hoarse voice, postnasal drip, rhinorrhea, sore throat and trouble swallowing  Eyes: Negative for visual disturbance  Respiratory: Positive for cough, chest tightness, shortness of breath and wheezing  Negative for stridor  Cardiovascular: Positive for orthopnea  Negative for chest pain, palpitations and leg swelling  Gastrointestinal: Negative for abdominal pain, constipation, diarrhea, nausea and vomiting  Allergic/Immunologic: Negative for environmental allergies and food allergies     Neurological: Positive for dizziness and light-headedness  Negative for headaches           Current Medications       Current Outpatient Medications:   •  albuterol (2 5 mg/3 mL) 0 083 % nebulizer solution, Take 3 mL (2 5 mg total) by nebulization every 6 (six) hours as needed for wheezing or shortness of breath, Disp: 75 mL, Rfl: 0  •  albuterol (2 5 mg/3 mL) 0 083 % nebulizer solution, Take 3 mL (2 5 mg total) by nebulization every 6 (six) hours as needed for wheezing or shortness of breath, Disp: 75 mL, Rfl: 0  •  albuterol (ProAir HFA) 90 mcg/act inhaler, Inhale 2 puffs every 6 (six) hours as needed for wheezing, Disp: 8 5 g, Rfl: 0  •  albuterol (PROVENTIL HFA,VENTOLIN HFA) 90 mcg/act inhaler, Inhale 2 puffs every 4 (four) hours as needed for wheezing, Disp: 1 Inhaler, Rfl: 0  •  benzonatate (TESSALON PERLES) 100 mg capsule, Take 1 capsule (100 mg total) by mouth 3 (three) times a day as needed for cough, Disp: 20 capsule, Rfl: 0  •  ketoconazole (NIZORAL) 2 % cream, , Disp: , Rfl:   •  predniSONE 20 mg tablet, Take 2 tablets (40 mg total) by mouth daily for 5 days, Disp: 10 tablet, Rfl: 0  •  Trelegy Ellipta 100-62 5-25 MCG/ACT inhaler, , Disp: , Rfl:   •  triamcinolone (KENALOG) 0 1 % ointment, , Disp: , Rfl:   •  alendronate (FOSAMAX) 70 mg tablet, , Disp: , Rfl:   •  cyclobenzaprine (FLEXERIL) 10 mg tablet, Take 1 tablet (10 mg total) by mouth 2 (two) times a day as needed for muscle spasms (Patient not taking: Reported on 6/2/2023), Disp: 20 tablet, Rfl: 0  •  hydrocortisone 2 5 % ointment, , Disp: , Rfl:   •   MG tablet, , Disp: , Rfl:   •  ketorolac (TORADOL) 10 mg tablet, Take 1 tablet (10 mg total) by mouth every 6 (six) hours as needed for moderate pain (Patient not taking: Reported on 6/2/2023), Disp: 12 tablet, Rfl: 0  •  lidocaine (Lidoderm) 5 %, Apply 1 patch topically over 12 hours daily Remove & Discard patch within 12 hours or as directed by MD (Patient not taking: Reported on 6/2/2023), Disp: 12 patch, Rfl: 0  • oxyCODONE-acetaminophen (PERCOCET) 5-325 mg per tablet, Take 1 tablet by mouth every 8 (eight) hours as needed for moderate pain (not daily use) (Patient not taking: Reported on 6/2/2023), Disp: , Rfl:   •  Ubrelvy 100 MG tablet, , Disp: , Rfl:     Current Facility-Administered Medications:   •  ondansetron (ZOFRAN-ODT) dispersible tablet 4 mg, 4 mg, Oral, Once, DEEPTI Latif    Current Allergies     Allergies as of 06/02/2023   • (No Known Allergies)            The following portions of the patient's history were reviewed and updated as appropriate: allergies, current medications, past family history, past medical history, past social history, past surgical history and problem list      Past Medical History:   Diagnosis Date   • Asthma    • Chronic pain     cervic and lumbar   • Hyperlipidemia        Past Surgical History:   Procedure Laterality Date   • APPENDECTOMY     • HERNIA REPAIR      4 times   • NEPHRECTOMY LIVING DONOR Left 10/2009       History reviewed  No pertinent family history  Medications have been verified  Objective   /89   Pulse 74   Temp (!) 97 2 °F (36 2 °C)   Resp 20   SpO2 96%        Physical Exam     Physical Exam  Vitals and nursing note reviewed  Constitutional:       General: He is awake  He is in acute distress (resolved with breathing treatment)  Appearance: Normal appearance  He is well-developed and overweight  HENT:      Head: Normocephalic and atraumatic  Right Ear: Hearing normal       Left Ear: Hearing normal       Nose: No congestion or rhinorrhea  Right Turbinates: Not enlarged, swollen or pale  Left Turbinates: Not enlarged, swollen or pale  Mouth/Throat:      Lips: Pink  Mouth: Mucous membranes are moist       Pharynx: Oropharynx is clear  Uvula midline  Tonsils: No tonsillar exudate or tonsillar abscesses  2+ on the right  2+ on the left  Eyes:      Extraocular Movements: Extraocular movements intact  Conjunctiva/sclera: Conjunctivae normal       Pupils: Pupils are equal, round, and reactive to light  Cardiovascular:      Rate and Rhythm: Normal rate and regular rhythm  Pulses: Normal pulses  Heart sounds: Normal heart sounds  Pulmonary:      Effort: No tachypnea or respiratory distress  Breath sounds: Examination of the right-upper field reveals wheezing  Examination of the left-upper field reveals wheezing  Examination of the right-middle field reveals wheezing  Examination of the left-middle field reveals wheezing  Examination of the right-lower field reveals wheezing  Examination of the left-lower field reveals wheezing  Wheezing present  Musculoskeletal:      Cervical back: Full passive range of motion without pain and normal range of motion  Right lower leg: No tenderness  No edema  Left lower leg: No tenderness  No edema  Lymphadenopathy:      Cervical: No cervical adenopathy  Skin:     General: Skin is warm and dry  Neurological:      General: No focal deficit present  Mental Status: He is alert and oriented to person, place, and time  GCS: GCS eye subscore is 4  GCS verbal subscore is 5  GCS motor subscore is 6  Psychiatric:         Mood and Affect: Mood normal          Behavior: Behavior normal  Behavior is cooperative  Thought Content:  Thought content normal          Judgment: Judgment normal

## 2023-06-02 NOTE — PATIENT INSTRUCTIONS
Take medications as directed for next 5 days  May start prednisone tomorrow  Follow-up with PCP in 3-5 days for further management of symptoms  Report to the ER sooner if symptoms worsen

## 2023-07-19 ENCOUNTER — APPOINTMENT (OUTPATIENT)
Dept: PULMONOLOGY | Facility: CLINIC | Age: 68
End: 2023-07-19
Payer: MEDICARE

## 2023-07-19 VITALS
WEIGHT: 208 LBS | TEMPERATURE: 98 F | OXYGEN SATURATION: 97 % | SYSTOLIC BLOOD PRESSURE: 131 MMHG | HEART RATE: 90 BPM | HEIGHT: 65 IN | BODY MASS INDEX: 34.66 KG/M2 | DIASTOLIC BLOOD PRESSURE: 84 MMHG

## 2023-07-19 PROCEDURE — 99214 OFFICE O/P EST MOD 30 MIN: CPT

## 2023-07-19 NOTE — HISTORY OF PRESENT ILLNESS
[Former] : former [>= 20 pack years] : >= 20 pack years [TextBox_4] : 68-year-old male with history of OAD presents for follow-up.  Patient continues to do well on daily Trelegy with oral steroids.  Complains of occasional dyspnea on exertion without cough chest pain or hemoptysis.  He believes symptoms is due to increasing weight. [TextBox_11] : 1.5 [TextBox_13] : 30 [YearQuit] : 2008 [TextBox_29] : Denies snoring, daytime somnolence, apneic episodes, AM headaches

## 2023-08-07 RX ORDER — ALBUTEROL SULFATE 90 UG/1
108 (90 BASE) INHALANT RESPIRATORY (INHALATION)
Qty: 1 | Refills: 0 | Status: ACTIVE | COMMUNITY
Start: 2020-12-03 | End: 1900-01-01

## 2023-08-15 RX ORDER — ALBUTEROL SULFATE 2.5 MG/3ML
(2.5 MG/3ML) SOLUTION RESPIRATORY (INHALATION)
Qty: 3 | Refills: 3 | Status: ACTIVE | COMMUNITY
Start: 2023-08-15 | End: 1900-01-01

## 2023-08-24 ENCOUNTER — OFFICE VISIT (OUTPATIENT)
Dept: FAMILY MEDICINE CLINIC | Facility: CLINIC | Age: 68
End: 2023-08-24
Payer: COMMERCIAL

## 2023-08-24 VITALS
TEMPERATURE: 98.1 F | WEIGHT: 211 LBS | HEART RATE: 75 BPM | DIASTOLIC BLOOD PRESSURE: 78 MMHG | HEIGHT: 66 IN | BODY MASS INDEX: 33.91 KG/M2 | SYSTOLIC BLOOD PRESSURE: 128 MMHG | OXYGEN SATURATION: 97 %

## 2023-08-24 DIAGNOSIS — L40.9 PSORIASIS: ICD-10-CM

## 2023-08-24 DIAGNOSIS — Z13.6 SCREENING FOR CARDIOVASCULAR CONDITION: ICD-10-CM

## 2023-08-24 DIAGNOSIS — J44.9 CHRONIC OBSTRUCTIVE PULMONARY DISEASE, UNSPECIFIED COPD TYPE (HCC): ICD-10-CM

## 2023-08-24 DIAGNOSIS — Z00.00 MEDICARE ANNUAL WELLNESS VISIT, SUBSEQUENT: ICD-10-CM

## 2023-08-24 DIAGNOSIS — K63.5 POLYP OF COLON, UNSPECIFIED PART OF COLON, UNSPECIFIED TYPE: ICD-10-CM

## 2023-08-24 DIAGNOSIS — Z52.4 KIDNEY DONOR: ICD-10-CM

## 2023-08-24 DIAGNOSIS — Z13.1 SCREENING FOR DIABETES MELLITUS: ICD-10-CM

## 2023-08-24 DIAGNOSIS — N50.89 TESTICULAR SWELLING, LEFT: ICD-10-CM

## 2023-08-24 DIAGNOSIS — Z12.5 SCREENING FOR MALIGNANT NEOPLASM OF PROSTATE: ICD-10-CM

## 2023-08-24 DIAGNOSIS — Z12.11 COLON CANCER SCREENING: Primary | ICD-10-CM

## 2023-08-24 DIAGNOSIS — M51.9 LUMBAR DISC DISEASE: ICD-10-CM

## 2023-08-24 DIAGNOSIS — M81.6 LOCALIZED OSTEOPOROSIS WITHOUT CURRENT PATHOLOGICAL FRACTURE: ICD-10-CM

## 2023-08-24 DIAGNOSIS — G43.919 INTRACTABLE MIGRAINE WITHOUT STATUS MIGRAINOSUS, UNSPECIFIED MIGRAINE TYPE: ICD-10-CM

## 2023-08-24 PROCEDURE — 99204 OFFICE O/P NEW MOD 45 MIN: CPT | Performed by: STUDENT IN AN ORGANIZED HEALTH CARE EDUCATION/TRAINING PROGRAM

## 2023-08-24 PROCEDURE — G0439 PPPS, SUBSEQ VISIT: HCPCS | Performed by: STUDENT IN AN ORGANIZED HEALTH CARE EDUCATION/TRAINING PROGRAM

## 2023-08-24 RX ORDER — PREDNISONE 1 MG/1
TABLET, DELAYED RELEASE ORAL
COMMUNITY
Start: 2023-08-12

## 2023-08-24 RX ORDER — UBROGEPANT 100 MG/1
100 TABLET ORAL DAILY
Qty: 90 TABLET | Refills: 1 | Status: SHIPPED | OUTPATIENT
Start: 2023-08-24 | End: 2023-08-24 | Stop reason: SDUPTHER

## 2023-08-24 RX ORDER — UBROGEPANT 100 MG/1
100 TABLET ORAL DAILY PRN
Qty: 15 TABLET | Refills: 5 | Status: SHIPPED | OUTPATIENT
Start: 2023-08-24

## 2023-08-24 NOTE — ASSESSMENT & PLAN NOTE
Last colonoscopy was 14 years ago while being worked up for kidney donation. Reports several polyps.   We will get him establish with local GI

## 2023-08-24 NOTE — PATIENT INSTRUCTIONS
Medicare Preventive Visit Patient Instructions  Thank you for completing your Welcome to Medicare Visit or Medicare Annual Wellness Visit today. Your next wellness visit will be due in one year (8/24/2024). The screening/preventive services that you may require over the next 5-10 years are detailed below. Some tests may not apply to you based off risk factors and/or age. Screening tests ordered at today's visit but not completed yet may show as past due. Also, please note that scanned in results may not display below. Preventive Screenings:  Service Recommendations Previous Testing/Comments   Colorectal Cancer Screening  · Colonoscopy    · Fecal Occult Blood Test (FOBT)/Fecal Immunochemical Test (FIT)  · Fecal DNA/Cologuard Test  · Flexible Sigmoidoscopy Age: 43-73 years old   Colonoscopy: every 10 years (May be performed more frequently if at higher risk)  OR  FOBT/FIT: every 1 year  OR  Cologuard: every 3 years  OR  Sigmoidoscopy: every 5 years  Screening may be recommended earlier than age 39 if at higher risk for colorectal cancer. Also, an individualized decision between you and your healthcare provider will decide whether screening between the ages of 77-80 would be appropriate.  Colonoscopy: Not on file  FOBT/FIT: Not on file  Cologuard: Not on file  Sigmoidoscopy: Not on file          Prostate Cancer Screening Individualized decision between patient and health care provider in men between ages of 53-66   Medicare will cover every 12 months beginning on the day after your 50th birthday PSA: No results in last 5 years           Hepatitis C Screening Once for adults born between [address] [place] [address]  More frequently in patients at high risk for Hepatitis C Hep C Antibody: Not on file        Diabetes Screening 1-2 times per year if you're at risk for diabetes or have pre-diabetes Fasting glucose: No results in last 5 years (No results in last 5 years)  A1C: No results in last 5 years (No results in last 5 years)  Screening Current   Cholesterol Screening Once every 5 years if you don't have a lipid disorder. May order more often based on risk factors. Lipid panel: Not on file         Other Preventive Screenings Covered by Medicare:  1. Abdominal Aortic Aneurysm (AAA) Screening: covered once if your at risk. You're considered to be at risk if you have a family history of AAA or a male between the age of 70-76 who smoking at least 100 cigarettes in your lifetime. 2. Lung Cancer Screening: covers low dose CT scan once per year if you meet all of the following conditions: (1) Age 48-67; (2) No signs or symptoms of lung cancer; (3) Current smoker or have quit smoking within the last 15 years; (4) You have a tobacco smoking history of at least 20 pack years (packs per day x number of years you smoked); (5) You get a written order from a healthcare provider. 3. Glaucoma Screening: covered annually if you're considered high risk: (1) You have diabetes OR (2) Family history of glaucoma OR (3)  aged 48 and older OR (3)  American aged 72 and older  3. Osteoporosis Screening: covered every 2 years if you meet one of the following conditions: (1) Have a vertebral abnormality; (2) On glucocorticoid therapy for more than 3 months; (3) Have primary hyperparathyroidism; (4) On osteoporosis medications and need to assess response to drug therapy. 5. HIV Screening: covered annually if you're between the age of 14-79. Also covered annually if you are younger than 13 and older than 72 with risk factors for HIV infection. For pregnant patients, it is covered up to 3 times per pregnancy.     Immunizations:  Immunization Recommendations   Influenza Vaccine Annual influenza vaccination during flu season is recommended for all persons aged >= 6 months who do not have contraindications   Pneumococcal Vaccine   * Pneumococcal conjugate vaccine = PCV13 (Prevnar 13), PCV15 (Vaxneuvance), PCV20 (Prevnar 20)  * Pneumococcal polysaccharide vaccine = PPSV23 (Pneumovax) Adults 2364 years old: 1-3 doses may be recommended based on certain risk factors  Adults 72 years old: 1-2 doses may be recommended based off what pneumonia vaccine you previously received   Hepatitis B Vaccine 3 dose series if at intermediate or high risk (ex: diabetes, end stage renal disease, liver disease)   Tetanus (Td) Vaccine - COST NOT COVERED BY MEDICARE PART B Following completion of primary series, a booster dose should be given every 10 years to maintain immunity against tetanus. Td may also be given as tetanus wound prophylaxis. Tdap Vaccine - COST NOT COVERED BY MEDICARE PART B Recommended at least once for all adults. For pregnant patients, recommended with each pregnancy. Shingles Vaccine (Shingrix) - COST NOT COVERED BY MEDICARE PART B  2 shot series recommended in those aged 48 and above     Health Maintenance Due:      Topic Date Due   • Hepatitis C Screening  Never done   • Colorectal Cancer Screening  Never done     Immunizations Due:      Topic Date Due   • COVID-19 Vaccine (1) Never done   • Pneumococcal Vaccine: 65+ Years (1 - PCV) Never done   • Influenza Vaccine (1) 09/01/2023     Advance Directives   What are advance directives? Advance directives are legal documents that state your wishes and plans for medical care. These plans are made ahead of time in case you lose your ability to make decisions for yourself. Advance directives can apply to any medical decision, such as the treatments you want, and if you want to donate organs. What are the types of advance directives? There are many types of advance directives, and each state has rules about how to use them. You may choose a combination of any of the following:  · Living will: This is a written record of the treatment you want. You can also choose which treatments you do not want, which to limit, and which to stop at a certain time.  This includes surgery, medicine, IV fluid, and tube feedings. · Durable power of  for healthcare [facility]): This is a written record that states who you want to make healthcare choices for you when you are unable to make them for yourself. This person, called a proxy, is usually a family member or a friend. You may choose more than 1 proxy. · Do not resuscitate (DNR) order:  A DNR order is used in case your heart stops beating or you stop breathing. It is a request not to have certain forms of treatment, such as CPR. A DNR order may be included in other types of advance directives. · Medical directive: This covers the care that you want if you are in a coma, near death, or unable to make decisions for yourself. You can list the treatments you want for each condition. Treatment may include pain medicine, surgery, blood transfusions, dialysis, IV or tube feedings, and a ventilator (breathing machine). · Values history: This document has questions about your views, beliefs, and how you feel and think about life. This information can help others choose the care that you would choose. Why are advance directives important? An advance directive helps you control your care. Although spoken wishes may be used, it is better to have your wishes written down. Spoken wishes can be misunderstood, or not followed. Treatments may be given even if you do not want them. An advance directive may make it easier for your family to make difficult choices about your care. Weight Management   Why it is important to manage your weight:  Being overweight increases your risk of health conditions such as heart disease, high blood pressure, type 2 diabetes, and certain types of cancer. It can also increase your risk for osteoarthritis, sleep apnea, and other respiratory problems. Aim for a slow, steady weight loss. Even a small amount of weight loss can lower your risk of health problems.   How to lose weight safely:  A safe and healthy way to lose weight is to eat fewer calories and get regular exercise. You can lose up about 1 pound a week by decreasing the number of calories you eat by 500 calories each day. Healthy meal plan for weight management:  A healthy meal plan includes a variety of foods, contains fewer calories, and helps you stay healthy. A healthy meal plan includes the following:  · Eat whole-grain foods more often. A healthy meal plan should contain fiber. Fiber is the part of grains, fruits, and vegetables that is not broken down by your body. Whole-grain foods are healthy and provide extra fiber in your diet. Some examples of whole-grain foods are whole-wheat breads and pastas, oatmeal, brown rice, and bulgur. · Eat a variety of vegetables every day. Include dark, leafy greens such as spinach, kale, anh greens, and mustard greens. Eat yellow and orange vegetables such as carrots, sweet potatoes, and winter squash. · Eat a variety of fruits every day. Choose fresh or canned fruit (canned in its own juice or light syrup) instead of juice. Fruit juice has very little or no fiber. · Eat low-fat dairy foods. Drink fat-free (skim) milk or 1% milk. Eat fat-free yogurt and low-fat cottage cheese. Try low-fat cheeses such as mozzarella and other reduced-fat cheeses. · Choose meat and other protein foods that are low in fat. Choose beans or other legumes such as split peas or lentils. Choose fish, skinless poultry (chicken or turkey), or lean cuts of red meat (beef or pork). Before you cook meat or poultry, cut off any visible fat. · Use less fat and oil. Try baking foods instead of frying them. Add less fat, such as margarine, sour cream, regular salad dressing and mayonnaise to foods. Eat fewer high-fat foods. Some examples of high-fat foods include french fries, doughnuts, ice cream, and cakes. · Eat fewer sweets. Limit foods and drinks that are high in sugar. This includes candy, cookies, regular soda, and sweetened drinks.   Exercise: Exercise at least 30 minutes per day on most days of the week. Some examples of exercise include walking, biking, dancing, and swimming. You can also fit in more physical activity by taking the stairs instead of the elevator or parking farther away from stores. Ask your healthcare provider about the best exercise plan for you. © Copyright [employer] 2018 Information is for End User's use only and may not be sold, redistributed or otherwise used for commercial purposes.  All illustrations and images included in CareNotes® are the copyrighted property of [employer]., Inc. or  [employer]

## 2023-08-24 NOTE — ASSESSMENT & PLAN NOTE
May be related to engorgement from venous stasis given the left nephrectomy.   We will follow-up ultrasound

## 2023-08-24 NOTE — ASSESSMENT & PLAN NOTE
On Trelegy and Georgina. We will have him establish with local pulmonology as he is still quite symptomatic but not needing oxygen quite yet.   May benefit from additional pulmonary rehab versus new or pulmonology procedures

## 2023-08-24 NOTE — PROGRESS NOTES
Assessment and Plan:     Problem List Items Addressed This Visit        Digestive    Polyp of colon     Last colonoscopy was 14 years ago while being worked up for kidney donation. Reports several polyps. We will get him establish with local GI            Respiratory    Chronic obstructive pulmonary disease ([address])     On Trelegy and Georgina. We will have him establish with local pulmonology as he is still quite symptomatic but not needing oxygen quite yet. May benefit from additional pulmonary rehab versus new or pulmonology procedures         Relevant Medications    Georgina 1 MG TBEC    Other Relevant Orders    Ambulatory Referral to Pulmonology       Cardiovascular and Mediastinum    Intractable migraine without status migrainosus     ubrelvy as needd          Relevant Medications    Ubrelvy 100 MG tablet       Musculoskeletal and Integument    Localized osteoporosis without current pathological fracture     Was on Fosamax for years follow-up DEXA scan         Relevant Orders    Comprehensive metabolic panel    DXA bone density spine hip and pelvis    Lumbar disc disease    Psoriasis     tapinarof sent in         Relevant Medications    Georgina 1 MG TBEC    Tapinarof 1 % CREA       Other    Testicular swelling, left     May be related to engorgement from venous stasis given the left nephrectomy.   We will follow-up ultrasound         Relevant Orders    US scrotum and groin area    Kidney donor    Relevant Orders    Comprehensive metabolic panel    Albumin / creatinine urine ratio   Other Visit Diagnoses     Colon cancer screening    -  Primary    Relevant Orders    Ambulatory Referral to Gastroenterology    Medicare annual wellness visit, subsequent        Screening for malignant neoplasm of prostate        Relevant Orders    PSA, Total Screen    Screening for diabetes mellitus        Relevant Orders    HEMOGLOBIN A1C W/ EAG ESTIMATION    Comprehensive metabolic panel    Screening for cardiovascular condition Relevant Orders    CBC and differential    Comprehensive metabolic panel    BMI 05.1-40.9,EVMPD        Relevant Orders    TSH, 3rd generation with Free T4 reflex    Ambulatory Referral to Bariatric Surgery        BMI Counseling: Body mass index is 34.06 kg/m². The BMI is above normal. Nutrition recommendations include decreasing portion sizes, encouraging healthy choices of fruits and vegetables, decreasing fast food intake, consuming healthier snacks, limiting drinks that contain sugar and moderation in carbohydrate intake. Exercise recommendations include moderate physical activity 150 minutes/week, exercising 3-5 times per week and strength training exercises. No pharmacotherapy was ordered. Rationale for BMI follow-up plan is due to patient being overweight or obese. Depression Screening and Follow-up Plan: Patient was screened for depression during today's encounter. They screened negative with a PHQ-2 score of 0. Preventive health issues were discussed with patient, and age appropriate screening tests were ordered as noted in patient's After Visit Summary. Personalized health advice and appropriate referrals for health education or preventive services given if needed, as noted in patient's After Visit Summary. History of Present Illness:     Patient presents for a Medicare Wellness Visit    Patient coming to establish care and discuss his chronic issues. History of COPD not on oxygen but can be better controlled. History of osteoporosis from steroid use and injections from chronic back pain and lumbar disc disease  Has been having left testicular swelling the last several months. Has had multiple hernia surgeries and has had his left kidney donated     Patient Care Team:  Clary Medina MD as PCP - General (Family Medicine)     Review of Systems:     Review of Systems   Constitutional: Negative for chills, fatigue and fever. HENT: Negative for rhinorrhea and sore throat.     Eyes: Negative for visual disturbance. Respiratory: Positive for chest tightness and wheezing. Negative for cough and shortness of breath. Cardiovascular: Negative for chest pain and palpitations. Gastrointestinal: Negative for abdominal pain, constipation, diarrhea, nausea and vomiting. Genitourinary: Positive for scrotal swelling. Negative for difficulty urinating, dysuria and frequency. Musculoskeletal: Positive for back pain. Negative for arthralgias and myalgias. Skin: Negative for color change and rash. Neurological: Negative for weakness and headaches. Problem List:     Patient Active Problem List   Diagnosis   • Chronic obstructive pulmonary disease ([address])   • Testicular swelling, left   • Localized osteoporosis without current pathological fracture   • Lumbar disc disease   • Intractable migraine without status migrainosus   • Kidney donor   • Psoriasis   • Polyp of colon      Past Medical and Surgical History:     Past Medical History:   Diagnosis Date   • Asthma    • Chronic pain     cervic and lumbar   • Hyperlipidemia      Past Surgical History:   Procedure Laterality Date   • APPENDECTOMY     • HERNIA REPAIR      4 times   • NEPHRECTOMY LIVING DONOR Left 10/2009      Family History:     History reviewed. No pertinent family history.    Social History:     Social History     Socioeconomic History   • Marital status: /Civil Union     Spouse name: None   • Number of children: None   • Years of education: None   • Highest education level: None   Occupational History   • None   Tobacco Use   • Smoking status: Former   • Smokeless tobacco: Never   Substance and Sexual Activity   • Alcohol use: No   • Drug use: No   • Sexual activity: Yes   Other Topics Concern   • None   Social History Narrative   • None     Social Determinants of Health     Financial Resource Strain: Low Risk  (8/24/2023)    Overall Financial Resource Strain (CARDIA)    • Difficulty of Paying Living Expenses: Not hard at all Food Insecurity: Not on file   Transportation Needs: No Transportation Needs (8/24/2023)    PRAPARE - Transportation    • Lack of Transportation (Medical): No    • Lack of Transportation (Non-Medical): No   Physical Activity: Not on file   Stress: Not on file   Social Connections: Not on file   Intimate Partner Violence: Not on file   Housing Stability: Not on file      Medications and Allergies:     Current Outpatient Medications   Medication Sig Dispense Refill   • albuterol (2.5 mg/3 mL) 0.083 % nebulizer solution Take 3 mL (2.5 mg total) by nebulization every 6 (six) hours as needed for wheezing or shortness of breath 75 mL 0   • albuterol (ProAir HFA) 90 mcg/act inhaler Inhale 2 puffs every 6 (six) hours as needed for wheezing 8.5 g 0   • Georgina 1 MG TBEC      • Tapinarof 1 % CREA Apply 1 Application topically in the morning 60 g 0   • Trelegy Ellipta 100-62.5-25 MCG/ACT inhaler      • Ubrelvy 100 MG tablet Take 1 tablet (100 mg total) by mouth daily as needed (migraine) 15 tablet 5   • albuterol (PROVENTIL HFA,VENTOLIN HFA) 90 mcg/act inhaler Inhale 2 puffs every 4 (four) hours as needed for wheezing 1 Inhaler 0     No current facility-administered medications for this visit. No Known Allergies   Immunizations: There is no immunization history on file for this patient. Health Maintenance:         Topic Date Due   • Hepatitis C Screening  Never done   • Colorectal Cancer Screening  Never done         Topic Date Due   • COVID-19 Vaccine (1) Never done   • Pneumococcal Vaccine: 65+ Years (1 - PCV) Never done   • Influenza Vaccine (1) 09/01/2023      Medicare Screening Tests and Risk Assessments:     Jewell Jeong is here for his Subsequent Wellness visit. Health Risk Assessment:   Patient rates overall health as fair. Patient feels that their physical health rating is same. Patient is very satisfied with their life. Eyesight was rated as same. Hearing was rated as same.  Patient feels that their emotional and mental health rating is much better. Patients states they are never, rarely angry. Patient states they are always unusually tired/fatigued. Pain experienced in the last 7 days has been some. Patient's pain rating has been 7/10. Patient states that he has experienced no weight loss or gain in last 6 months. Depression Screening:   PHQ-2 Score: 0      Fall Risk Screening: In the past year, patient has experienced: no history of falling in past year      Home Safety:  Patient does not have trouble with stairs inside or outside of their home. Patient has working smoke alarms and has working carbon monoxide detector. Home safety hazards include: none. Nutrition:   Current diet is Regular. Medications:   Patient is currently taking over-the-counter supplements. OTC medications include: see medication list. Patient is able to manage medications. Activities of Daily Living (ADLs)/Instrumental Activities of Daily Living (IADLs):   Walk and transfer into and out of bed and chair?: Yes  Dress and groom yourself?: Yes    Bathe or shower yourself?: Yes    Feed yourself?  Yes  Do your laundry/housekeeping?: Yes  Manage your money, pay your bills and track your expenses?: Yes  Make your own meals?: Yes    Do your own shopping?: Yes    Previous Hospitalizations:   Any hospitalizations or ED visits within the last 12 months?: Yes    How many hospitalizations have you had in the last year?: 3-4    Advance Care Planning:   Living will: No    Durable POA for healthcare: No      PREVENTIVE SCREENINGS        Diabetes Screening:     General: Screening Current      Abdominal Aortic Aneurysm (AAA) Screening:    Risk factors include: age between 70-75 yo and tobacco use        Lung Cancer Screening:     General: Screening Not Indicated    Screening, Brief Intervention, and Referral to Treatment (SBIRT)    Screening  Typical number of drinks in a day: 0  Typical number of drinks in a week: 0  Interpretation: Low risk drinking behavior. Single Item Drug Screening:  How often have you used an illegal drug (including marijuana) or a prescription medication for non-medical reasons in the past year? never    Single Item Drug Screen Score: 0  Interpretation: Negative screen for possible drug use disorder    Brief Intervention  Alcohol & drug use screenings were reviewed. No concerns regarding substance use disorder identified. Other Counseling Topics:   Car/seat belt/driving safety and sunscreen. No results found. Physical Exam:     /78 (BP Location: Left arm, Patient Position: Sitting, Cuff Size: Adult)   Pulse 75   Temp 98.1 °F (36.7 °C) (Tympanic)   Ht 5' 6" (1.676 m)   Wt 95.7 kg (211 lb)   SpO2 97%   BMI 34.06 kg/m²     Physical Exam  Constitutional:       General: He is not in acute distress. Appearance: Normal appearance. He is not ill-appearing. HENT:      Head: Normocephalic and atraumatic. Right Ear: Tympanic membrane, ear canal and external ear normal.      Left Ear: Tympanic membrane, ear canal and external ear normal.      Nose: Nose normal.      Mouth/Throat:      Mouth: Mucous membranes are moist.      Pharynx: Oropharynx is clear. No oropharyngeal exudate or posterior oropharyngeal erythema. Eyes:      General: No scleral icterus. Right eye: No discharge. Left eye: No discharge. Extraocular Movements: Extraocular movements intact. Conjunctiva/sclera: Conjunctivae normal.      Pupils: Pupils are equal, round, and reactive to light. Cardiovascular:      Rate and Rhythm: Normal rate and regular rhythm. Pulses: Normal pulses. Heart sounds: Normal heart sounds. No murmur heard. Pulmonary:      Effort: Pulmonary effort is normal. No respiratory distress. Breath sounds: Examination of the right-lower field reveals decreased breath sounds. Examination of the left-lower field reveals decreased breath sounds. Decreased breath sounds present. Abdominal:      General: A surgical scar is present. Bowel sounds are normal.      Palpations: Abdomen is soft. Tenderness: There is no abdominal tenderness. Hernia: There is no hernia in the left inguinal area or right inguinal area. Genitourinary:     Epididymis:      Left: Enlarged. No mass or tenderness. Musculoskeletal:         General: Normal range of motion. Cervical back: Normal range of motion and neck supple. Lymphadenopathy:      Cervical: No cervical adenopathy. Lower Body: No right inguinal adenopathy. No left inguinal adenopathy. Skin:     General: Skin is warm and dry. Capillary Refill: Capillary refill takes less than 2 seconds. Neurological:      General: No focal deficit present. Mental Status: He is alert and oriented to person, place, and time. Mental status is at baseline. Cranial Nerves: No cranial nerve deficit.    Psychiatric:         Mood and Affect: Mood normal.          Riccardo Villar MD

## 2023-09-07 ENCOUNTER — CONSULT (OUTPATIENT)
Dept: PULMONOLOGY | Facility: CLINIC | Age: 68
End: 2023-09-07
Payer: COMMERCIAL

## 2023-09-07 VITALS
BODY MASS INDEX: 34.23 KG/M2 | WEIGHT: 213 LBS | HEIGHT: 66 IN | SYSTOLIC BLOOD PRESSURE: 124 MMHG | HEART RATE: 80 BPM | OXYGEN SATURATION: 97 % | DIASTOLIC BLOOD PRESSURE: 80 MMHG

## 2023-09-07 DIAGNOSIS — J44.9 CHRONIC OBSTRUCTIVE PULMONARY DISEASE, UNSPECIFIED COPD TYPE (HCC): Primary | ICD-10-CM

## 2023-09-07 DIAGNOSIS — R06.1 STRIDOR: ICD-10-CM

## 2023-09-07 DIAGNOSIS — R91.1 PULMONARY NODULE: ICD-10-CM

## 2023-09-07 PROCEDURE — 99205 OFFICE O/P NEW HI 60 MIN: CPT | Performed by: INTERNAL MEDICINE

## 2023-09-07 RX ORDER — FLUTICASONE PROPIONATE 110 UG/1
AEROSOL, METERED RESPIRATORY (INHALATION)
COMMUNITY

## 2023-09-07 NOTE — PROGRESS NOTES
Consultation - Pulmonary Medicine   Lul Woods 79 y.o. male MRN: [id number]    Physician Requesting Consult: Dr Paul Cruz  Reason for Consult: COPD    Lul Woods is a 79 y.o. male who presents for evaluation of COPD. COPD  Patient has a chart diagnosis of COPD due to former smoker + dyspnea. Only PFTs in chart from 2020 with no clear obstruction. CT with mild upper lobe emphysema  Highest eos 460, benefit from ICS included in his triple therapy inhaler  Has very frequent exacerbations requiring steroids 5+ times a year when lived in [facility]. Was started on Georgina (prednisone) 1mg by other pulmonologist and reports he has decreased his exacerbations. - continue Trelegy  - albuterol prn  - PFTs w BD  - continue for now Georgina 1mg (brand name prednisone)   - will re-evaluate inhalers and chronic medications after PFts completed    Heterogeneous Pulm Nodule  RUL 7mm nodule/area of GGO/thickening around a cystic air space. In correlated from 2022 CT neck appears to have more thickening, possibly evolving scar but cannot rule out malignancy  - 3 month CT follow up due now    Stridor  Pt with diffuse wheezing that appears to be coming from upper airway. Ddx includes EDAC (excessive dynamic airway collapse) vs TBM (tracheobronchomalacis) vs vocal cord disease. Pt has had many steroid courses putting him at risk for TBM and EDAC CT neck does show "abnormal appearance of laryngeal vestibule"   - ENT referral  - if normal exam, may need awake bronch to evaluate for EDAC    Pleural plaque  Asbestos Exposure  Pt with prior asbestos exposure as . Was told by [facility] pulmonologist he has asbestos in his lung. On review of CT with thin pleural calcification on right? Left, consistent with very thin pleural plaque from asbestos exposure.    No pleural thickening, no evidence of cancer  - reviewed CT with radiologist  - asbestos plaques do not need routine follow up    Vaccines  - Flu due this season  - PNA due now, not available in office and pt preferred ot delay until next visit      There is no immunization history on file for this patient. I have spent a total time of 55 minutes on 09/07/23 in caring for this patient including Diagnostic results, Risks and benefits of tx options, Instructions for management, Risk factor reductions, Impressions, Counseling / Coordination of care, Documenting in the medical record, Reviewing / ordering tests, medicine, procedures  , Obtaining or reviewing history   and Communicating with other healthcare professionals . ______________________________________________________________________    HPI:    Bennie Barton is a 79 y.o. male who presents for evaluation of COPD. 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     Current Medications:    Current Outpatient Medications:   •  albuterol (2.5 mg/3 mL) 0.083 % nebulizer solution, Take 3 mL (2.5 mg total) by nebulization every 6 (six) hours as needed for wheezing or shortness of breath, Disp: 75 mL, Rfl: 0  •  albuterol (ProAir HFA) 90 mcg/act inhaler, Inhale 2 puffs every 6 (six) hours as needed for wheezing, Disp: 8.5 g, Rfl: 0  •  albuterol (PROVENTIL HFA,VENTOLIN HFA) 90 mcg/act inhaler, Inhale 2 puffs every 4 (four) hours as needed for wheezing, Disp: 1 Inhaler, Rfl: 0  •  fluticasone (FLOVENT HFA) 110 MCG/ACT inhaler, , Disp: , Rfl:   •  Georgina 1 MG TBEC, , Disp: , Rfl:   •  Tapinarof 1 % CREA, Apply 1 Application topically in the morning, Disp: 60 g, Rfl: 0  •  Trelegy Ellipta 100-62.5-25 MCG/ACT inhaler, , Disp: , Rfl:   •  Ubrelvy 100 MG tablet, Take 1 tablet (100 mg total) by mouth daily as needed (migraine), Disp: 15 tablet, Rfl: 5    Historical Information   Past Medical History:   Diagnosis Date   • Asthma    • Chronic pain     cervic and lumbar   • Hyperlipidemia      Past Surgical History:   Procedure Laterality Date   • APPENDECTOMY     • HERNIA REPAIR      4 times   • NEPHRECTOMY LIVING DONOR Left 10/2009     Social History   Social History     Tobacco Use   Smoking Status Former   Smokeless Tobacco Never       Family History:   History reviewed. No pertinent family history. PhysicalExamination:  Vitals:   /80   Pulse 80   Ht 5' 6" (1.676 m)   Wt 96.6 kg (213 lb)   SpO2 97%   BMI 34.38 kg/m²     Appearance -- NAD, speaking full sentences  HEENT -- anicteric sclera, clear OP, MMM  Neck -- no JVD, stridor  Heart -- RRR, no murmurs  Lungs -- diffuse end expiratory wheezing that appears to radiate from the neck  Abdomen -- soft, NTND, +bs  Extremities -- WWP, no LE edema  Skin -- no rash  Neuro -- A&Ox3, wnl  Psych -- no obvious depression or hallucination        Diagnostic Data:  Labs: I personally reviewed the most recent laboratory data pertinent to today's visit    Lab Results   Component Value Date    WBC 7.73 05/02/2023    HGB 14.3 05/02/2023    HCT 46.3 05/02/2023    MCV 75 (L) 05/02/2023     05/02/2023     Lab Results   Component Value Date    CALCIUM 9.8 05/02/2023    K 4.6 05/02/2023    CO2 29 05/02/2023     05/02/2023    BUN 17 05/02/2023    CREATININE 0.94 05/02/2023     No results found for: "IGE"  Lab Results   Component Value Date    ALT 20 05/02/2023    AST 16 05/02/2023    ALKPHOS 116 (H) 05/02/2023       PFT results: The most recent pulmonary function tests were reviewed.   [facility] PFTs 2020: No obstruction (FEV1/FVC 71%, FEV1 72%) no BD change, normal DLCO    Imaging:  I personally reviewed the images on the [facility] system pertinent to today's visit  CT Chest 5/2023  There is a new 7 mm heterogeneous nodular density right upper lobe, indeterminate may be inflammatory/ infectious or neoplastic  Short interval follow-up at 3 months is suggested. Emphysema  No acute inflammatory stranding  No bowel obstruction  No hydronephrosis  Right inguinal hernia containing a small segment of the right lateral bladder wall  S/p left nephrectomy with no mass in the nephrectomy bed      CT Neck 2022: (my read) RUL w ill defined GGO around a possible emphesematous cystic area. "Abnormal appearance of laryngeal vestibule with mucosal apposition of aryepiglottic folds, effacement of bilateral piriform sinuses, and slight irregularity of bilateral true vocal cords.   Consider direct visualization by ENT for further evaluation."        Brendon Tijerina MD  [facility]

## 2023-09-13 ENCOUNTER — APPOINTMENT (OUTPATIENT)
Dept: PULMONOLOGY | Facility: CLINIC | Age: 68
End: 2023-09-13
Payer: MEDICARE

## 2023-09-13 VITALS
TEMPERATURE: 98 F | RESPIRATION RATE: 16 BRPM | SYSTOLIC BLOOD PRESSURE: 130 MMHG | OXYGEN SATURATION: 97 % | DIASTOLIC BLOOD PRESSURE: 85 MMHG | WEIGHT: 214 LBS | HEART RATE: 84 BPM | BODY MASS INDEX: 35.65 KG/M2 | HEIGHT: 65 IN

## 2023-09-13 PROCEDURE — 99214 OFFICE O/P EST MOD 30 MIN: CPT

## 2023-09-21 ENCOUNTER — TELEPHONE (OUTPATIENT)
Dept: BARIATRICS | Facility: CLINIC | Age: 68
End: 2023-09-21

## 2023-09-21 NOTE — TELEPHONE ENCOUNTER
Patient received a referral. Patient interested in surgery but due to BMI being under 35, patient did not qualify.  Patient was upset about this and was referred to Practice Admin

## 2023-09-21 NOTE — TELEPHONE ENCOUNTER
Attempted to contact patient via mobile/ home phone, received busy dial tone 3:10PM 9/21/2023 will make another attempt for outreach. 9/22/2023. Advised staff to contact Admin if patient calls [facility] again.

## 2023-09-22 ENCOUNTER — TELEPHONE (OUTPATIENT)
Dept: BARIATRICS | Facility: CLINIC | Age: 68
End: 2023-09-22

## 2023-09-22 NOTE — TELEPHONE ENCOUNTER
Admin contacted patient after being notified by [facility] [address] [facility] office staff of patient concern pertaining to eligibility for surgical pathway. Patient informed by admin that due to BMI (Below 35) he does not qualify for bariatric process, offered patient medical pathway and patient declined. Patient expressed displeasure with process and decision tree. Admin advised patient that we will schedule for surgical eval but if his BMI is below 35 that he would not be eligible due to program requirements and due to his ins requirements. Patient did wish to be scheduled for Eval with understanding that if BMI below 35 medical pathway would be option.

## 2023-09-23 ENCOUNTER — APPOINTMENT (OUTPATIENT)
Age: 68
End: 2023-09-23
Payer: COMMERCIAL

## 2023-09-23 DIAGNOSIS — Z13.6 SCREENING FOR CARDIOVASCULAR CONDITION: ICD-10-CM

## 2023-09-23 DIAGNOSIS — Z13.1 SCREENING FOR DIABETES MELLITUS: ICD-10-CM

## 2023-09-23 DIAGNOSIS — Z12.5 SCREENING FOR MALIGNANT NEOPLASM OF PROSTATE: ICD-10-CM

## 2023-09-23 DIAGNOSIS — Z52.4 KIDNEY DONOR: ICD-10-CM

## 2023-09-23 DIAGNOSIS — M81.6 LOCALIZED OSTEOPOROSIS WITHOUT CURRENT PATHOLOGICAL FRACTURE: ICD-10-CM

## 2023-09-23 LAB
ALBUMIN SERPL BCP-MCNC: 3.9 G/DL (ref 3.5–5)
ALP SERPL-CCNC: 113 U/L (ref 34–104)
ALT SERPL W P-5'-P-CCNC: 25 U/L (ref 7–52)
ANION GAP SERPL CALCULATED.3IONS-SCNC: 7 MMOL/L
AST SERPL W P-5'-P-CCNC: 17 U/L (ref 13–39)
BASOPHILS # BLD AUTO: 0.06 THOUSANDS/ÂΜL (ref 0–0.1)
BASOPHILS NFR BLD AUTO: 1 % (ref 0–1)
BILIRUB SERPL-MCNC: 0.37 MG/DL (ref 0.2–1)
BUN SERPL-MCNC: 14 MG/DL (ref 5–25)
CALCIUM SERPL-MCNC: 9.3 MG/DL (ref 8.4–10.2)
CHLORIDE SERPL-SCNC: 104 MMOL/L (ref 96–108)
CO2 SERPL-SCNC: 27 MMOL/L (ref 21–32)
CREAT SERPL-MCNC: 0.87 MG/DL (ref 0.6–1.3)
CREAT UR-MCNC: 138.8 MG/DL
EOSINOPHIL # BLD AUTO: 0.52 THOUSAND/ÂΜL (ref 0–0.61)
EOSINOPHIL NFR BLD AUTO: 7 % (ref 0–6)
ERYTHROCYTE [DISTWIDTH] IN BLOOD BY AUTOMATED COUNT: 15.6 % (ref 11.6–15.1)
EST. AVERAGE GLUCOSE BLD GHB EST-MCNC: 126 MG/DL
GFR SERPL CREATININE-BSD FRML MDRD: 89 ML/MIN/1.73SQ M
GLUCOSE P FAST SERPL-MCNC: 93 MG/DL (ref 65–99)
HBA1C MFR BLD: 6 %
HCT VFR BLD AUTO: 46 % (ref 36.5–49.3)
HGB BLD-MCNC: 13.7 G/DL (ref 12–17)
IMM GRANULOCYTES # BLD AUTO: 0.03 THOUSAND/UL (ref 0–0.2)
IMM GRANULOCYTES NFR BLD AUTO: 0 % (ref 0–2)
LYMPHOCYTES # BLD AUTO: 1.61 THOUSANDS/ÂΜL (ref 0.6–4.47)
LYMPHOCYTES NFR BLD AUTO: 23 % (ref 14–44)
MCH RBC QN AUTO: 22.7 PG (ref 26.8–34.3)
MCHC RBC AUTO-ENTMCNC: 29.8 G/DL (ref 31.4–37.4)
MCV RBC AUTO: 76 FL (ref 82–98)
MICROALBUMIN UR-MCNC: 10 MG/L
MICROALBUMIN/CREAT 24H UR: 7 MG/G CREATININE (ref 0–30)
MONOCYTES # BLD AUTO: 0.72 THOUSAND/ÂΜL (ref 0.17–1.22)
MONOCYTES NFR BLD AUTO: 10 % (ref 4–12)
NEUTROPHILS # BLD AUTO: 4.19 THOUSANDS/ÂΜL (ref 1.85–7.62)
NEUTS SEG NFR BLD AUTO: 59 % (ref 43–75)
NRBC BLD AUTO-RTO: 0 /100 WBCS
PLATELET # BLD AUTO: 355 THOUSANDS/UL (ref 149–390)
PMV BLD AUTO: 11.2 FL (ref 8.9–12.7)
POTASSIUM SERPL-SCNC: 4.5 MMOL/L (ref 3.5–5.3)
PROT SERPL-MCNC: 7.3 G/DL (ref 6.4–8.4)
PSA SERPL-MCNC: 1.5 NG/ML (ref 0–4)
RBC # BLD AUTO: 6.04 MILLION/UL (ref 3.88–5.62)
SODIUM SERPL-SCNC: 138 MMOL/L (ref 135–147)
TSH SERPL DL<=0.05 MIU/L-ACNC: 1.54 UIU/ML (ref 0.45–4.5)
WBC # BLD AUTO: 7.13 THOUSAND/UL (ref 4.31–10.16)

## 2023-09-23 PROCEDURE — 82570 ASSAY OF URINE CREATININE: CPT

## 2023-09-23 PROCEDURE — 85025 COMPLETE CBC W/AUTO DIFF WBC: CPT

## 2023-09-23 PROCEDURE — 82043 UR ALBUMIN QUANTITATIVE: CPT

## 2023-09-23 PROCEDURE — 80053 COMPREHEN METABOLIC PANEL: CPT

## 2023-09-23 PROCEDURE — 83036 HEMOGLOBIN GLYCOSYLATED A1C: CPT

## 2023-09-23 PROCEDURE — 84443 ASSAY THYROID STIM HORMONE: CPT

## 2023-09-23 PROCEDURE — G0103 PSA SCREENING: HCPCS

## 2023-09-23 PROCEDURE — 36415 COLL VENOUS BLD VENIPUNCTURE: CPT

## 2023-09-25 ENCOUNTER — TELEPHONE (OUTPATIENT)
Dept: FAMILY MEDICINE CLINIC | Facility: CLINIC | Age: 68
End: 2023-09-25

## 2023-09-25 NOTE — TELEPHONE ENCOUNTER
Pt called and left message on Clinical line stating that he was returning phone call from [address] [place] [address] on lab work results.

## 2023-09-26 ENCOUNTER — APPOINTMENT (EMERGENCY)
Dept: RADIOLOGY | Facility: HOSPITAL | Age: 68
End: 2023-09-26
Payer: COMMERCIAL

## 2023-09-26 ENCOUNTER — HOSPITAL ENCOUNTER (EMERGENCY)
Facility: HOSPITAL | Age: 68
Discharge: HOME | End: 2023-09-26
Attending: EMERGENCY MEDICINE | Admitting: EMERGENCY MEDICINE
Payer: COMMERCIAL

## 2023-09-26 VITALS
SYSTOLIC BLOOD PRESSURE: 124 MMHG | HEART RATE: 81 BPM | OXYGEN SATURATION: 93 % | RESPIRATION RATE: 19 BRPM | DIASTOLIC BLOOD PRESSURE: 86 MMHG | TEMPERATURE: 98 F

## 2023-09-26 DIAGNOSIS — J44.1 COPD EXACERBATION (HCC): Primary | ICD-10-CM

## 2023-09-26 LAB
ALBUMIN SERPL BCP-MCNC: 4 G/DL (ref 3.5–5)
ALP SERPL-CCNC: 103 U/L (ref 34–104)
ALT SERPL W P-5'-P-CCNC: 27 U/L (ref 7–52)
ANION GAP SERPL CALCULATED.3IONS-SCNC: 5 MMOL/L
AST SERPL W P-5'-P-CCNC: 20 U/L (ref 13–39)
BASOPHILS # BLD AUTO: 0.04 THOUSANDS/ÂΜL (ref 0–0.1)
BASOPHILS NFR BLD AUTO: 1 % (ref 0–1)
BILIRUB SERPL-MCNC: 0.38 MG/DL (ref 0.2–1)
BUN SERPL-MCNC: 13 MG/DL (ref 5–25)
CALCIUM SERPL-MCNC: 9.2 MG/DL (ref 8.4–10.2)
CARDIAC TROPONIN I PNL SERPL HS: 2 NG/L
CHLORIDE SERPL-SCNC: 105 MMOL/L (ref 96–108)
CO2 SERPL-SCNC: 26 MMOL/L (ref 21–32)
CREAT SERPL-MCNC: 0.91 MG/DL (ref 0.6–1.3)
D DIMER PPP FEU-MCNC: 0.5 UG/ML FEU
EOSINOPHIL # BLD AUTO: 0.25 THOUSAND/ÂΜL (ref 0–0.61)
EOSINOPHIL NFR BLD AUTO: 5 % (ref 0–6)
ERYTHROCYTE [DISTWIDTH] IN BLOOD BY AUTOMATED COUNT: 15.4 % (ref 11.6–15.1)
GFR SERPL CREATININE-BSD FRML MDRD: 87 ML/MIN/1.73SQ M
GLUCOSE SERPL-MCNC: 100 MG/DL (ref 65–140)
HCT VFR BLD AUTO: 42.5 % (ref 36.5–49.3)
HGB BLD-MCNC: 13.3 G/DL (ref 12–17)
IMM GRANULOCYTES # BLD AUTO: 0.02 THOUSAND/UL (ref 0–0.2)
IMM GRANULOCYTES NFR BLD AUTO: 0 % (ref 0–2)
LYMPHOCYTES # BLD AUTO: 1.2 THOUSANDS/ÂΜL (ref 0.6–4.47)
LYMPHOCYTES NFR BLD AUTO: 23 % (ref 14–44)
MCH RBC QN AUTO: 23 PG (ref 26.8–34.3)
MCHC RBC AUTO-ENTMCNC: 31.3 G/DL (ref 31.4–37.4)
MCV RBC AUTO: 74 FL (ref 82–98)
MONOCYTES # BLD AUTO: 0.86 THOUSAND/ÂΜL (ref 0.17–1.22)
MONOCYTES NFR BLD AUTO: 17 % (ref 4–12)
NEUTROPHILS # BLD AUTO: 2.75 THOUSANDS/ÂΜL (ref 1.85–7.62)
NEUTS SEG NFR BLD AUTO: 54 % (ref 43–75)
NRBC BLD AUTO-RTO: 0 /100 WBCS
PLATELET # BLD AUTO: 294 THOUSANDS/UL (ref 149–390)
PMV BLD AUTO: 10.3 FL (ref 8.9–12.7)
POTASSIUM SERPL-SCNC: 4.2 MMOL/L (ref 3.5–5.3)
PROT SERPL-MCNC: 7.7 G/DL (ref 6.4–8.4)
RBC # BLD AUTO: 5.78 MILLION/UL (ref 3.88–5.62)
SODIUM SERPL-SCNC: 136 MMOL/L (ref 135–147)
WBC # BLD AUTO: 5.12 THOUSAND/UL (ref 4.31–10.16)

## 2023-09-26 PROCEDURE — 71046 X-RAY EXAM CHEST 2 VIEWS: CPT

## 2023-09-26 PROCEDURE — 94640 AIRWAY INHALATION TREATMENT: CPT

## 2023-09-26 PROCEDURE — 99285 EMERGENCY DEPT VISIT HI MDM: CPT

## 2023-09-26 PROCEDURE — 96374 THER/PROPH/DIAG INJ IV PUSH: CPT

## 2023-09-26 PROCEDURE — 85025 COMPLETE CBC W/AUTO DIFF WBC: CPT

## 2023-09-26 PROCEDURE — 80053 COMPREHEN METABOLIC PANEL: CPT

## 2023-09-26 PROCEDURE — 36415 COLL VENOUS BLD VENIPUNCTURE: CPT

## 2023-09-26 PROCEDURE — 85379 FIBRIN DEGRADATION QUANT: CPT

## 2023-09-26 PROCEDURE — 93005 ELECTROCARDIOGRAM TRACING: CPT

## 2023-09-26 PROCEDURE — 84484 ASSAY OF TROPONIN QUANT: CPT

## 2023-09-26 RX ORDER — AZITHROMYCIN 250 MG/1
TABLET, FILM COATED ORAL
Qty: 6 TABLET | Refills: 0 | Status: SHIPPED | OUTPATIENT
Start: 2023-09-26 | End: 2023-09-30

## 2023-09-26 RX ORDER — ALBUTEROL SULFATE 2.5 MG/3ML
5 SOLUTION RESPIRATORY (INHALATION) ONCE
Status: COMPLETED | OUTPATIENT
Start: 2023-09-26 | End: 2023-09-26

## 2023-09-26 RX ORDER — METHYLPREDNISOLONE SODIUM SUCCINATE 125 MG/2ML
80 INJECTION, POWDER, LYOPHILIZED, FOR SOLUTION INTRAMUSCULAR; INTRAVENOUS ONCE
Status: COMPLETED | OUTPATIENT
Start: 2023-09-26 | End: 2023-09-26

## 2023-09-26 RX ORDER — PREDNISONE 20 MG/1
60 TABLET ORAL DAILY
Qty: 15 TABLET | Refills: 0 | Status: SHIPPED | OUTPATIENT
Start: 2023-09-26 | End: 2023-10-01

## 2023-09-26 RX ADMIN — IPRATROPIUM BROMIDE 0.5 MG: 0.5 SOLUTION RESPIRATORY (INHALATION) at 09:11

## 2023-09-26 RX ADMIN — METHYLPREDNISOLONE SODIUM SUCCINATE 80 MG: 125 INJECTION, POWDER, FOR SOLUTION INTRAMUSCULAR; INTRAVENOUS at 09:11

## 2023-09-26 RX ADMIN — ALBUTEROL SULFATE 5 MG: 0.83 SOLUTION RESPIRATORY (INHALATION) at 09:11

## 2023-09-26 NOTE — ED PROVIDER NOTES
History  Chief Complaint   Patient presents with   • Shortness of Breath     Exertional sob, states that hes had worsening sob and aches and pains since getting the flu and covid shot      Patient is a 60-year-old male with a past medical history of asthma, COPD, hyperlipidemia and chronic pain of his cervical and lumbar spine presented emergency department for evaluation of shortness of breath. Patient states on Sunday he got his COVID and flu vaccine and has noticed shortness of breath since. Patient states he has been coughing but states it is a dry cough nonproductive. Patient states he has a harder time exhaling than inhaling. Patient states he does have slight chest pain but states it is likely from the coughing. Patient denies any radiation of the pain. Patient states he has been using his albuterol, Trelegy and nebulizer at home without much alleviation. Patient denies any pain with inspiration. Denies fevers, chills, rash, headache, weakness, dizziness, visual changes, abdominal pain, nausea, vomiting, diarrhea, constipation or difficulty breathing. Does not offer any other concerns or complaints. Prior to Admission Medications   Prescriptions Last Dose Informant Patient Reported? Taking?    Georgina 1 MG TBEC   Yes No   Tapinarof 1 % CREA   No No   Sig: Apply 1 Application topically in the morning   Trelegy Ellipta 100-62.5-25 MCG/ACT inhaler   Yes No   Ubrelvy 100 MG tablet   No No   Sig: Take 1 tablet (100 mg total) by mouth daily as needed (migraine)   albuterol (2.5 mg/3 mL) 0.083 % nebulizer solution   No No   Sig: Take 3 mL (2.5 mg total) by nebulization every 6 (six) hours as needed for wheezing or shortness of breath   albuterol (PROVENTIL HFA,VENTOLIN HFA) 90 mcg/act inhaler   No No   Sig: Inhale 2 puffs every 4 (four) hours as needed for wheezing   albuterol (ProAir HFA) 90 mcg/act inhaler   No No   Sig: Inhale 2 puffs every 6 (six) hours as needed for wheezing   fluticasone (FLOVENT HFA) 110 MCG/ACT inhaler   Yes No      Facility-Administered Medications: None       Past Medical History:   Diagnosis Date   • Asthma    • Chronic pain     cervic and lumbar   • COPD (chronic obstructive pulmonary disease) ([address])    • Hyperlipidemia        Past Surgical History:   Procedure Laterality Date   • APPENDECTOMY     • HERNIA REPAIR      4 times   • NEPHRECTOMY LIVING DONOR Left 10/2009       No family history on file. I have reviewed and agree with the history as documented. E-Cigarette/Vaping     E-Cigarette/Vaping Substances     Social History     Tobacco Use   • Smoking status: Former   • Smokeless tobacco: Never   Substance Use Topics   • Alcohol use: No   • Drug use: No       Review of Systems   Constitutional: Negative for chills and fever. HENT: Negative for ear pain and sore throat. Eyes: Negative for pain and visual disturbance. Respiratory: Positive for cough, shortness of breath and wheezing. Cardiovascular: Positive for chest pain. Negative for palpitations. Gastrointestinal: Negative for abdominal pain and vomiting. Genitourinary: Negative for dysuria and hematuria. Musculoskeletal: Negative for arthralgias and back pain. Skin: Negative for color change and rash. Neurological: Negative for seizures and syncope. All other systems reviewed and are negative. Physical Exam  Physical Exam  Vitals and nursing note reviewed. Constitutional:       General: He is not in acute distress. Appearance: Normal appearance. He is well-developed. He is not toxic-appearing or diaphoretic. HENT:      Head: Normocephalic and atraumatic. Right Ear: External ear normal.      Left Ear: External ear normal.      Nose: Nose normal.      Mouth/Throat:      Mouth: Mucous membranes are moist.   Eyes:      General: No scleral icterus. Right eye: No discharge. Left eye: No discharge.       Conjunctiva/sclera: Conjunctivae normal.   Cardiovascular:      Rate and Rhythm: Normal rate and regular rhythm. Heart sounds: No murmur heard. Pulmonary:      Effort: Pulmonary effort is normal. No respiratory distress. Breath sounds: Wheezing (expiratory) present. Chest:      Chest wall: No tenderness. Abdominal:      Palpations: Abdomen is soft. Tenderness: There is no abdominal tenderness. Musculoskeletal:         General: No swelling, deformity or signs of injury. Normal range of motion. Cervical back: Normal range of motion and neck supple. No rigidity. Skin:     General: Skin is warm and dry. Capillary Refill: Capillary refill takes less than 2 seconds. Coloration: Skin is not jaundiced. Findings: No erythema or rash. Neurological:      General: No focal deficit present. Mental Status: He is alert and oriented to person, place, and time. Mental status is at baseline. Cranial Nerves: No cranial nerve deficit. Gait: Gait normal.   Psychiatric:         Mood and Affect: Mood normal.         Behavior: Behavior normal.         Thought Content:  Thought content normal.         Judgment: Judgment normal.         Vital Signs  ED Triage Vitals [09/26/23 0814]   Temperature Pulse Respirations Blood Pressure SpO2   97.9 °F (36.6 °C) 82 (!) 24 137/70 96 %      Temp Source Heart Rate Source Patient Position - Orthostatic VS BP Location FiO2 (%)   Temporal Monitor Sitting Left arm --      Pain Score       --           Vitals:    09/26/23 0814 09/26/23 0815 09/26/23 1015 09/26/23 1030   BP: 137/70 137/70 151/74 124/86   Pulse: 82 99 85 81   Patient Position - Orthostatic VS: Sitting            Visual Acuity      ED Medications  Medications   albuterol inhalation solution 5 mg (5 mg Nebulization Given 9/26/23 0911)   ipratropium (ATROVENT) 0.02 % inhalation solution 0.5 mg (0.5 mg Nebulization Given 9/26/23 0911)   methylPREDNISolone sodium succinate (Solu-MEDROL) injection 80 mg (80 mg Intravenous Given 9/26/23 0911)       Diagnostic Studies  Results Reviewed     Procedure Component Value Units Date/Time    HS Troponin 0hr (reflex protocol) [[id number]]  (Normal) Collected: 09/26/23 0910    Lab Status: Final result Specimen: Blood from Arm, Right Updated: 09/26/23 0950     hs TnI 0hr 2 ng/L     Comprehensive metabolic panel [[id number]] Collected: 09/26/23 0910    Lab Status: Final result Specimen: Blood from Arm, Right Updated: 09/26/23 0947     Sodium 136 mmol/L      Potassium 4.2 mmol/L      Chloride 105 mmol/L      CO2 26 mmol/L      ANION GAP 5 mmol/L      BUN 13 mg/dL      Creatinine 0.91 mg/dL      Glucose 100 mg/dL      Calcium 9.2 mg/dL      AST 20 U/L      ALT 27 U/L      Alkaline Phosphatase 103 U/L      Total Protein 7.7 g/dL      Albumin 4.0 g/dL      Total Bilirubin 0.38 mg/dL      eGFR 87 ml/min/1.73sq m     Narrative:      [facility] guidelines for Chronic Kidney Disease (CKD):   •  Stage 1 with normal or high GFR (GFR > 90 mL/min/1.73 square meters)  •  Stage 2 Mild CKD (GFR = 60-89 mL/min/1.73 square meters)  •  Stage 3A Moderate CKD (GFR = 45-59 mL/min/1.73 square meters)  •  Stage 3B Moderate CKD (GFR = 30-44 mL/min/1.73 square meters)  •  Stage 4 Severe CKD (GFR = 15-29 mL/min/1.73 square meters)  •  Stage 5 End Stage CKD (GFR <15 mL/min/1.73 square meters)  Note: GFR calculation is accurate only with a steady state creatinine    D-Dimer [[id number]]  (Abnormal) Collected: 09/26/23 0910    Lab Status: Final result Specimen: Blood from Arm, Right Updated: 09/26/23 0945     D-Dimer, Quant 0.50 ug/ml FEU     Narrative: In the evaluation for possible pulmonary embolism, in the appropriate (Well's Score of 4 or less) patient, the age adjusted d-dimer cutoff for this patient can be calculated as:    Age x 0.01 (in ug/mL) for Age-adjusted D-dimer exclusion threshold for a patient over 50 years.     CBC and differential [[id number]]  (Abnormal) Collected: 09/26/23 0910    Lab Status: Final result Specimen: Blood from Arm, Right Updated: 09/26/23 0925     WBC 5.12 Thousand/uL      RBC 5.78 Million/uL      Hemoglobin 13.3 g/dL      Hematocrit 42.5 %      MCV 74 fL      MCH 23.0 pg      MCHC 31.3 g/dL      RDW 15.4 %      MPV 10.3 fL      Platelets 295 Thousands/uL      nRBC 0 /100 WBCs      Neutrophils Relative 54 %      Immat GRANS % 0 %      Lymphocytes Relative 23 %      Monocytes Relative 17 %      Eosinophils Relative 5 %      Basophils Relative 1 %      Neutrophils Absolute 2.75 Thousands/µL      Immature Grans Absolute 0.02 Thousand/uL      Lymphocytes Absolute 1.20 Thousands/µL      Monocytes Absolute 0.86 Thousand/µL      Eosinophils Absolute 0.25 Thousand/µL      Basophils Absolute 0.04 Thousands/µL                  XR chest 2 views   Final Result by Omer Simmons MD (09/26 1008)      Stable pulmonary emphysema and mild chronic interstitial thickening. Resident: Oren Salcedo, the attending radiologist, have reviewed the images and agree with the final report above.       Workstation performed: [id number]                    Procedures  ECG 12 Lead Documentation Only    Date/Time: 9/26/2023 8:30 AM    Performed by: Mc Qiu PA-C  Authorized by: Mc Qiu PA-C    Indications / Diagnosis:  Chest Pain, SOB  ECG reviewed by me, the ED Provider: yes    Patient location:  ED  Interpretation:     Interpretation: abnormal    Rate:     ECG rate:  93    ECG rate assessment: normal    Rhythm:     Rhythm: sinus rhythm    Ectopy:     Ectopy: none    QRS:     QRS axis:  Left    QRS intervals:  Normal  Conduction:     Conduction: normal    ST segments:     ST segments:  Normal  T waves:     T waves: normal    Comments:      Incomplete RBBB             ED Course             HEART Risk Score    Flowsheet Row Most Recent Value   Heart Score Risk Calculator    History 1 Filed at: 09/26/2023 1427   ECG 1 Filed at: 09/26/2023 1427   Age 2 Filed at: 09/26/2023 1427   Risk Factors 1 Filed at: 09/26/2023 1427   Troponin 0 Filed at: 09/26/2023 1427   HEART Score 5 Filed at: 09/26/2023 1427                        SBIRT 20yo+    Flowsheet Row Most Recent Value   Initial Alcohol Screen: US AUDIT-C     1. How often do you have a drink containing alcohol? 0 Filed at: 09/26/2023 0814   2. How many drinks containing alcohol do you have on a typical day you are drinking? 0 Filed at: 09/26/2023 0814   3a. Male UNDER 65: How often do you have five or more drinks on one occasion? 0 Filed at: 09/26/2023 0814   3b. FEMALE Any Age, or MALE 65+: How often do you have 4 or more drinks on one occassion? 0 Filed at: 09/26/2023 0814   Audit-C Score 0 Filed at: 09/26/2023 5812   JARON: How many times in the past year have you. .. Used an illegal drug or used a prescription medication for non-medical reasons? Never Filed at: 09/26/2023 9076                    Medical Decision Making    This is a 61-year-old male present to the emergency department for evaluation of shortness of breath. Patient states he got his COVID and flu shot on Sunday and has had shortness of breath since. Patient does state he has asthma and COPD. Patient states he has been using his at home inhalers and nebulizers without much alleviation. Patient states he has a harder time exhaling than inhaling. Patient states he feels as though he cannot fully exhale. Patient does have audible expiratory wheezing. Patient is in no acute distress, sitting comfortably on initial examination, stable vital signs. Differential diagnosis to include but is not limited to: COPD, asthma, ACS, STEMI, pneumonia, PE    Initial ED Plan: imaging, labs, ekg    ED results:  Stable pulmonary emphysema and mild chronic interstitial thickening. Patient reports improvement of symptoms after albuterol treatment.  0 hour troponin: 2  Heart score: 5    Final ED assessment: Patient is stable and well appearing.  Discussed radiologic studies and laboratory results. Discussed follow-up with PCP. discussed prednisone and azithromycin course. Discussed use of albuterol and treatments at home as needed. I did offer the patient admission for continued expiratory wheezing but patient declined admission stating he would like to go home and continue treatments as needed at home. Patient states he does have great improvement from prior symptoms on presentation. Strict return precautions were discussed including but not limited to chest pain, shortness of breath, difficulty breathing, wheezing. Patient verbalized understanding and is agreeable with the plan for discharge. Amount and/or Complexity of Data Reviewed  Labs: ordered. Radiology: ordered. Risk  Prescription drug management. Disposition  Final diagnoses:   COPD exacerbation ([address])     Time reflects when diagnosis was documented in both MDM as applicable and the Disposition within this note     Time User Action Codes Description Comment    9/26/2023 11:18 AM Chet Moy Add [J44.1] COPD exacerbation [facility])       ED Disposition     ED Disposition   Discharge    Condition   Stable    Date/Time   Tue Sep 26, 2023 11:18 AM    Comment   Gregorio Nava discharge to home/self care.                Follow-up Information     Follow up With Specialties Details Why Contact Info Additional Information    Andrews Edmondson MD Family Medicine Call in 3 days For follow up [address]       [address] [place] [address] Emergency Department Emergency Medicine Go to  If symptoms worsen [address] [facility] Emergency Department, [place], [place], [address]          Discharge Medication List as of 9/26/2023 11:19 AM      START taking these medications    Details   azithromycin (Zithromax Z-Leonard) 250 mg tablet Take 2 tablets today then 1 tablet daily x 4 days, Normal predniSONE 20 mg tablet Take 3 tablets (60 mg total) by mouth daily for 5 days, Starting Tue 9/26/2023, Until Sun 10/1/2023, Normal         CONTINUE these medications which have NOT CHANGED    Details   albuterol (2.5 mg/3 mL) 0.083 % nebulizer solution Take 3 mL (2.5 mg total) by nebulization every 6 (six) hours as needed for wheezing or shortness of breath, Starting Wed 3/29/2023, Normal      !! albuterol (ProAir HFA) 90 mcg/act inhaler Inhale 2 puffs every 6 (six) hours as needed for wheezing, Starting Wed 3/29/2023, Normal      !! albuterol (PROVENTIL HFA,VENTOLIN HFA) 90 mcg/act inhaler Inhale 2 puffs every 4 (four) hours as needed for wheezing, Starting Thu 7/12/2018, Print      fluticasone (FLOVENT HFA) 110 MCG/ACT inhaler Historical Med      Georgina 1 MG TBEC Starting Sat 8/12/2023, Historical Med      Tapinarof 1 % CREA Apply 1 Application topically in the morning, Starting Thu 8/24/2023, Normal      Trelegy Ellipta 100-62.5-25 MCG/ACT inhaler Starting Wed 5/10/2023, Historical Med      Ubrelvy 100 MG tablet Take 1 tablet (100 mg total) by mouth daily as needed (migraine), Starting Thu 8/24/2023, Normal       !! - Potential duplicate medications found. Please discuss with provider. No discharge procedures on file.     PDMP Review       Value Time User    PDMP Reviewed  Yes 8/24/2023  9:28 AM Daria Marquez MD          ED Provider  Electronically Signed by           Pablo Davidson PA-C  09/26/23 7231

## 2023-09-26 NOTE — DISCHARGE INSTRUCTIONS
Follow up with PCP  Antibiotic course   Prednisone course  Return to the ED with new or worsening symptoms including but not limited to chest pain, shortness of breath, difficulty breathing none

## 2023-09-27 LAB
ATRIAL RATE: 93 BPM
P AXIS: 65 DEGREES
PR INTERVAL: 160 MS
QRS AXIS: -30 DEGREES
QRSD INTERVAL: 96 MS
QT INTERVAL: 354 MS
QTC INTERVAL: 440 MS
T WAVE AXIS: 48 DEGREES
VENTRICULAR RATE: 93 BPM

## 2023-09-27 PROCEDURE — 93010 ELECTROCARDIOGRAM REPORT: CPT | Performed by: INTERNAL MEDICINE

## 2023-09-28 ENCOUNTER — HOSPITAL ENCOUNTER (OUTPATIENT)
Dept: ULTRASOUND IMAGING | Facility: CLINIC | Age: 68
Discharge: HOME | End: 2023-09-28
Payer: COMMERCIAL

## 2023-09-28 DIAGNOSIS — N50.89 TESTICULAR SWELLING, LEFT: ICD-10-CM

## 2023-09-28 DIAGNOSIS — L40.9 PSORIASIS: ICD-10-CM

## 2023-09-28 PROCEDURE — 76870 US EXAM SCROTUM: CPT

## 2023-09-29 ENCOUNTER — HOSPITAL ENCOUNTER (OUTPATIENT)
Dept: CT IMAGING | Facility: HOSPITAL | Age: 68
End: 2023-09-29
Attending: INTERNAL MEDICINE
Payer: COMMERCIAL

## 2023-09-29 ENCOUNTER — HOSPITAL ENCOUNTER (OUTPATIENT)
Dept: PULMONOLOGY | Facility: HOSPITAL | Age: 68
End: 2023-09-29
Attending: INTERNAL MEDICINE
Payer: COMMERCIAL

## 2023-09-29 DIAGNOSIS — R91.1 PULMONARY NODULE: ICD-10-CM

## 2023-09-29 DIAGNOSIS — J44.9 CHRONIC OBSTRUCTIVE PULMONARY DISEASE, UNSPECIFIED COPD TYPE (HCC): ICD-10-CM

## 2023-09-29 PROCEDURE — 94060 EVALUATION OF WHEEZING: CPT | Performed by: INTERNAL MEDICINE

## 2023-09-29 PROCEDURE — 94760 N-INVAS EAR/PLS OXIMETRY 1: CPT

## 2023-09-29 PROCEDURE — 94060 EVALUATION OF WHEEZING: CPT

## 2023-09-29 PROCEDURE — 94729 DIFFUSING CAPACITY: CPT | Performed by: INTERNAL MEDICINE

## 2023-09-29 PROCEDURE — 94726 PLETHYSMOGRAPHY LUNG VOLUMES: CPT | Performed by: INTERNAL MEDICINE

## 2023-09-29 PROCEDURE — 71250 CT THORAX DX C-: CPT

## 2023-09-29 PROCEDURE — G1004 CDSM NDSC: HCPCS

## 2023-09-29 PROCEDURE — 94726 PLETHYSMOGRAPHY LUNG VOLUMES: CPT

## 2023-09-29 PROCEDURE — 94729 DIFFUSING CAPACITY: CPT

## 2023-09-29 RX ORDER — ALBUTEROL SULFATE 2.5 MG/3ML
2.5 SOLUTION RESPIRATORY (INHALATION) ONCE
Status: COMPLETED | OUTPATIENT
Start: 2023-09-29 | End: 2023-09-29

## 2023-09-29 RX ORDER — TAPINAROF 10 MG/1000MG
CREAM TOPICAL
Qty: 60 G | Refills: 0 | Status: SHIPPED | OUTPATIENT
Start: 2023-09-29

## 2023-09-29 RX ADMIN — ALBUTEROL SULFATE 2.5 MG: 2.5 SOLUTION RESPIRATORY (INHALATION) at 07:50

## 2023-10-03 ENCOUNTER — OFFICE VISIT (OUTPATIENT)
Age: 68
End: 2023-10-03
Payer: COMMERCIAL

## 2023-10-03 VITALS
SYSTOLIC BLOOD PRESSURE: 128 MMHG | OXYGEN SATURATION: 95 % | HEIGHT: 66 IN | WEIGHT: 213 LBS | HEART RATE: 88 BPM | BODY MASS INDEX: 34.23 KG/M2 | DIASTOLIC BLOOD PRESSURE: 70 MMHG

## 2023-10-03 DIAGNOSIS — Z86.010 HISTORY OF COLON POLYPS: Primary | ICD-10-CM

## 2023-10-03 DIAGNOSIS — Z12.11 COLON CANCER SCREENING: ICD-10-CM

## 2023-10-03 PROCEDURE — 99203 OFFICE O/P NEW LOW 30 MIN: CPT | Performed by: INTERNAL MEDICINE

## 2023-10-03 RX ORDER — ALENDRONATE SODIUM 70 MG/1
TABLET ORAL
COMMUNITY
Start: 2023-09-28

## 2023-10-03 NOTE — PATIENT INSTRUCTIONS
Scheduled date of colonoscopy (as of today): 11/2/23  Physician performing colonoscopy: Tavon  Location of colonoscopy: [facility]  Bowel prep reviewed with patient: Miralax  Instructions reviewed with patient by: Nigel BELL  Clearances:

## 2023-10-03 NOTE — PROGRESS NOTES
[facility]    Dear Dr. Angela Rios,    I had the pleasure of seeing your patient Carlos Bah in the office today and I thank you for this kind referral.       Chief Complaint: History of polyps      HPI:  Carlos Bah is a 79 y.o. male who presents with history of colon polyps with his last colonoscopy 15 years ago prior to his donating a kidney. He has not had follow-up since then. He denies any abdominal pain, change in bowel habits, change in stool caliber, melena, hematochezia, rectal bleeding, tenesmus, or weight loss. He has no chest pain but has chronic shortness of breath from COPD. Dayan[facility] Review of Systems:   Constitutional: No fever or chills, feels well, no tiredness, no recent weight gain or weight loss. HENT: No complaints of earache, no hearing loss, no nosebleeds, no nasal discharge, no sore throat, no hoarseness. Eyes: No complaints of eye pain, no red eyes, no discharge from eyes, no itchy eyes. Cardiovascular: No complaints of slow heart rate, no fast heart rate, no chest pain, no palpitations, no leg claudication, no lower extremity edema. Respiratory: As per HPI  Gastrointestinal: As noted in HPI  Genitourinary: No complaints of dysuria, no incontinence, no hesitancy, no nocturia. Musculoskeletal: Chronic back pain  Neurological: No complaints of headache, no confusion, no convulsions, no numbness or tingling, no dizziness or fainting, no limb weakness, no difficulty walking. Skin: No complaints of skin rash or skin lesions, no itching, no skin wound, no dry skin. Hematological/Lymphatic: No complaints of swollen glands, does not bleed easy. Allergic/Immunologic: No immunocompromised state. Endocrine:  No complaints of polyuria, no polydipsia. Psychiatric/Behavioral: is not suicidal, no sleep disturbances, no anxiety or depression, no change in personality, no emotional problems.        Historical Information   Past Medical History: Diagnosis Date   • Asthma    • Chronic pain     cervic and lumbar   • COPD (chronic obstructive pulmonary disease) (HCC)    • Hyperlipidemia      Past Surgical History:   Procedure Laterality Date   • APPENDECTOMY     • HERNIA REPAIR      4 times   • NEPHRECTOMY LIVING DONOR Left 10/2009     Social History   Social History     Substance and Sexual Activity   Alcohol Use Yes    Comment: Occasional     Social History     Substance and Sexual Activity   Drug Use No     Social History     Tobacco Use   Smoking Status Former   Smokeless Tobacco Never     Family History   Problem Relation Age of Onset   • Breast cancer Mother    • Heart disease Father    • Breast cancer Maternal Grandmother    • Heart disease Paternal Grandmother          Current Medications: has a current medication list which includes the following prescription(s): albuterol, albuterol, albuterol, alendronate, fluticasone, jackson, trelegy ellipta, ubrelvy, and vtama. Vital Signs: /70   Pulse 88   Ht 5' 6" (1.676 m)   Wt 96.6 kg (213 lb)   SpO2 95%   BMI 34.38 kg/m²     Physical Exam:   Constitutional  General Appearance: No acute distress, well appearing and well nourished  Head  Normocephalic  Eyes  Conjunctivae and lids: No swelling, erythema, or discharge. Pupils and irises: Equal, round and reactive to light. Ears, Nose, Mouth, and Throat  External inspection of ears and nose: Normal  Nasal mucosa, septum and turbinates: Normal without edema or erythema/   Oropharynx: Normal with no erythema, edema, exudate or lesions. Neck  Normal range of motion. Neck supple. Cardiovascular  Auscultation of the heart: Normal rate and rhythm, normal S1 and S2 without murmurs. Examination of the extremities for edema and/or varicosities: Normal  Pulmonary/Chest  Respiratory effort: No increased work of breathing or signs of respiratory distress.    Auscultation of lungs: Clear to auscultation, equal breath sounds bilaterally, no wheezes, rales, no rhonchi. Abdomen  Abdomen: Non-tender, no masses. Liver and spleen: No hepatomegaly or splenomegaly. Musculoskeletal  Gait and station: normal.  Digits and Nails: normal without clubbing or cyanosis. Inspection/palpation of joints, bones, and muscles: Normal  Neurological  No nystagmus or asterixis. Skin  Skin and subcutaneous tissue: Normal without rashes or lesions. Lymphatic  Palpation of the lymph nodes in neck: No lymphadenopathy. Psychiatric  Orientation to person, place and time: Normal.  Mood and affect: Normal.         Labs:   Lab Results   Component Value Date    ALT 27 09/26/2023    AST 20 09/26/2023    BUN 13 09/26/2023    CALCIUM 9.2 09/26/2023     09/26/2023    CO2 26 09/26/2023    CREATININE 0.91 09/26/2023    HCT 42.5 09/26/2023    HGB 13.3 09/26/2023    HGBA1C 6.0 (H) 09/23/2023     09/26/2023    K 4.2 09/26/2023    PSA 1.50 09/23/2023    WBC 5.12 09/26/2023         X-Rays & Procedures:   No orders to display         ______________________________________________________________________      Assessment & Plan:      Diagnoses and all orders for this visit:    History of colon polyps  -     Colonoscopy; Future    Colon cancer screening  -     Ambulatory Referral to Gastroenterology          I have taken the liberty of scheduling the patient for colonoscopy. I will be happy to inform you of his results and further recommendations. I would like to thank you for allowing me to participate in his care.           With warmest regards,    Aris Peraza MD, [facility]

## 2023-10-03 NOTE — LETTER
October 3, 2023     [place], [id number] [facility]    Patient: Sabrina Roberto   YOB: 1955   Date of Visit: 10/3/2023       Dear Dr. Elijah Morris: Thank you for referring Tawanna Morris to me for evaluation. Below are my notes for this consultation. If you have questions, please do not hesitate to call me. I look forward to following your patient along with you. Sincerely,        Champ Pham MD        CC: No Recipients    Champ Pham MD  10/3/2023  3:10 PM  Incomplete  [facility]    Dear Dr. Elijah Morris,    I had the pleasure of seeing your patient Sabrina Roberto in the office today and I thank you for this kind referral.       Chief Complaint: History of polyps      HPI:  Sabrina Roberto is a 79 y.o. male who presents with history of colon polyps with his last colonoscopy 15 years ago prior to his donating a kidney. He has not had follow-up since then. He denies any abdominal pain, change in bowel habits, change in stool caliber, melena, hematochezia, rectal bleeding, tenesmus, or weight loss. He has no chest pain but has chronic shortness of breath from COPD. [facility] Review [facility] Systems:   Constitutional: No fever or chills, feels well, no tiredness, no recent weight gain or weight loss. HENT: No complaints of earache, no hearing loss, no nosebleeds, no nasal discharge, no sore throat, no hoarseness. Eyes: No complaints of eye pain, no red eyes, no discharge from eyes, no itchy eyes. Cardiovascular: No complaints of slow heart rate, no fast heart rate, no chest pain, no palpitations, no leg claudication, no lower extremity edema. Respiratory: As per HPI  Gastrointestinal: As noted in HPI  Genitourinary: No complaints of dysuria, no incontinence, no hesitancy, no nocturia.    Musculoskeletal: Chronic back pain  Neurological: No complaints of headache, no confusion, no convulsions, no numbness or tingling, no dizziness or fainting, no limb weakness, no difficulty walking. Skin: No complaints of skin rash or skin lesions, no itching, no skin wound, no dry skin. Hematological/Lymphatic: No complaints of swollen glands, does not bleed easy. Allergic/Immunologic: No immunocompromised state. Endocrine:  No complaints of polyuria, no polydipsia. Psychiatric/Behavioral: is not suicidal, no sleep disturbances, no anxiety or depression, no change in personality, no emotional problems. Historical Information   Past Medical History:   Diagnosis Date   • Asthma    • Chronic pain     cervic and lumbar   • COPD (chronic obstructive pulmonary disease) (HCC)    • Hyperlipidemia      Past Surgical History:   Procedure Laterality Date   • APPENDECTOMY     • HERNIA REPAIR      4 times   • NEPHRECTOMY LIVING DONOR Left 10/2009     Social History   Social History     Substance and Sexual Activity   Alcohol Use Yes    Comment: Occasional     Social History     Substance and Sexual Activity   Drug Use No     Social History     Tobacco Use   Smoking Status Former   Smokeless Tobacco Never     Family History   Problem Relation Age of Onset   • Breast cancer Mother    • Heart disease Father    • Breast cancer Maternal Grandmother    • Heart disease Paternal Grandmother          Current Medications: has a current medication list which includes the following prescription(s): albuterol, albuterol, albuterol, alendronate, fluticasone, jackson, trelegy ellipta, ubrelvy, and vtama. Vital Signs: /70   Pulse 88   Ht 5' 6" (1.676 m)   Wt 96.6 kg (213 lb)   SpO2 95%   BMI 34.38 kg/m²     Physical Exam:   Constitutional  General Appearance: No acute distress, well appearing and well nourished  Head  Normocephalic  Eyes  Conjunctivae and lids: No swelling, erythema, or discharge. Pupils and irises: Equal, round and reactive to light.    Ears, Nose, Mouth, and Throat  External inspection of ears and nose: Normal  Nasal mucosa, septum and turbinates: Normal without edema or erythema/   Oropharynx: Normal with no erythema, edema, exudate or lesions. Neck  Normal range of motion. Neck supple. Cardiovascular  Auscultation of the heart: Normal rate and rhythm, normal S1 and S2 without murmurs. Examination of the extremities for edema and/or varicosities: Normal  Pulmonary/Chest  Respiratory effort: No increased work of breathing or signs of respiratory distress. Auscultation of lungs: Clear to auscultation, equal breath sounds bilaterally, no wheezes, rales, no rhonchi. Abdomen  Abdomen: Non-tender, no masses. Liver and spleen: No hepatomegaly or splenomegaly. Musculoskeletal  Gait and station: normal.  Digits and Nails: normal without clubbing or cyanosis. Inspection/palpation of joints, bones, and muscles: Normal  Neurological  No nystagmus or asterixis. Skin  Skin and subcutaneous tissue: Normal without rashes or lesions. Lymphatic  Palpation of the lymph nodes in neck: No lymphadenopathy. Psychiatric  Orientation to person, place and time: Normal.  Mood and affect: Normal.         Labs:   Lab Results   Component Value Date    ALT 27 09/26/2023    AST 20 09/26/2023    BUN 13 09/26/2023    CALCIUM 9.2 09/26/2023     09/26/2023    CO2 26 09/26/2023    CREATININE 0.91 09/26/2023    HCT 42.5 09/26/2023    HGB 13.3 09/26/2023    HGBA1C 6.0 (H) 09/23/2023     09/26/2023    K 4.2 09/26/2023    PSA 1.50 09/23/2023    WBC 5.12 09/26/2023         X-Rays & Procedures:   No orders to display         ______________________________________________________________________      Assessment & Plan:      Diagnoses and all orders for this visit:    History of colon polyps  -     Colonoscopy; Future    Colon cancer screening  -     Ambulatory Referral to Gastroenterology          I have taken the liberty of scheduling the patient for colonoscopy. I will be happy to inform you of his results and further recommendations. I would like to thank you for allowing me to participate in his care.           With warmest regards,    Ravinder Alvarez MD, [facility]

## 2023-10-10 ENCOUNTER — HOSPITAL ENCOUNTER (OUTPATIENT)
Age: 68
Discharge: HOME | End: 2023-10-10
Payer: COMMERCIAL

## 2023-10-10 VITALS — HEIGHT: 64 IN | BODY MASS INDEX: 36.56 KG/M2

## 2023-10-10 DIAGNOSIS — M81.6 LOCALIZED OSTEOPOROSIS WITHOUT CURRENT PATHOLOGICAL FRACTURE: ICD-10-CM

## 2023-10-10 PROCEDURE — 77080 DXA BONE DENSITY AXIAL: CPT

## 2023-10-18 ENCOUNTER — OFFICE VISIT (OUTPATIENT)
Dept: FAMILY MEDICINE CLINIC | Facility: CLINIC | Age: 68
End: 2023-10-18
Payer: COMMERCIAL

## 2023-10-18 VITALS
BODY MASS INDEX: 36.32 KG/M2 | OXYGEN SATURATION: 98 % | TEMPERATURE: 98.6 F | SYSTOLIC BLOOD PRESSURE: 142 MMHG | HEART RATE: 85 BPM | DIASTOLIC BLOOD PRESSURE: 80 MMHG | HEIGHT: 65 IN | WEIGHT: 218 LBS

## 2023-10-18 DIAGNOSIS — J44.9 CHRONIC OBSTRUCTIVE PULMONARY DISEASE, UNSPECIFIED COPD TYPE (HCC): ICD-10-CM

## 2023-10-18 DIAGNOSIS — N50.89 TESTICULAR SWELLING, LEFT: ICD-10-CM

## 2023-10-18 DIAGNOSIS — N43.3 HYDROCELE IN ADULT: Primary | ICD-10-CM

## 2023-10-18 PROCEDURE — 99214 OFFICE O/P EST MOD 30 MIN: CPT | Performed by: STUDENT IN AN ORGANIZED HEALTH CARE EDUCATION/TRAINING PROGRAM

## 2023-10-18 NOTE — PROGRESS NOTES
Assessment/Plan:         Problem List Items Addressed This Visit        Respiratory    Chronic obstructive pulmonary disease ([address])     Will need to be need             Other    Testicular swelling, left    Relevant Orders    Ambulatory Referral to Urology   Other Visit Diagnoses     Hydrocele in adult    -  Primary    Relevant Orders    Ambulatory Referral to Urology        Reviewed US with patient, consistent with hydrocele. Subjective:      Patient ID: Leonila Borges is a 79 y.o. male. HPI    Still with L testicle pain and swelling, wearing supportive underwear but not healful.wants to pursue other options    The following portions of the patient's history were reviewed and updated as appropriate:   Past Medical History:  He has a past medical history of Asthma, Chronic pain, COPD (chronic obstructive pulmonary disease) ([address]), and Hyperlipidemia.,  _______________________________________________________________________  Medical Problems:  does not have any pertinent problems on file.,  _______________________________________________________________________  Past Surgical History:   has a past surgical history that includes Appendectomy; Nephrectomy living donor (Left, 10/2009); and Hernia repair. ,  _______________________________________________________________________  Family History:  family history includes Breast cancer in his maternal grandmother and mother; Heart disease in his father and paternal grandmother.,  _______________________________________________________________________  Social History:   reports that he has quit smoking. He has never used smokeless tobacco. He reports current alcohol use. He reports that he does not use drugs. ,  _______________________________________________________________________  Allergies:  has No Known Allergies. .  _______________________________________________________________________  Current Outpatient Medications   Medication Sig Dispense Refill   • albuterol (2.5 mg/3 mL) 0.083 % nebulizer solution Take 3 mL (2.5 mg total) by nebulization every 6 (six) hours as needed for wheezing or shortness of breath 75 mL 0   • albuterol (ProAir HFA) 90 mcg/act inhaler Inhale 2 puffs every 6 (six) hours as needed for wheezing 8.5 g 0   • albuterol (PROVENTIL HFA,VENTOLIN HFA) 90 mcg/act inhaler Inhale 2 puffs every 4 (four) hours as needed for wheezing 1 Inhaler 0   • Georgina 1 MG TBEC      • Trelegy Ellipta 100-62.5-25 MCG/ACT inhaler      • Ubrelvy 100 MG tablet Take 1 tablet (100 mg total) by mouth daily as needed (migraine) 15 tablet 5   • Vtama 1 % CREA APPLY 1 APPLICATION TOPICALLY IN THE MORNING 60 g 0     No current facility-administered medications for this visit.     _______________________________________________________________________  Review of Systems   Constitutional:  Negative for chills, fatigue and fever. HENT:  Negative for rhinorrhea and sore throat. Eyes:  Negative for visual disturbance. Respiratory:  Negative for cough, chest tightness, shortness of breath and wheezing. Cardiovascular:  Negative for chest pain and palpitations. Gastrointestinal:  Negative for abdominal pain, constipation, diarrhea, nausea and vomiting. Genitourinary:  Positive for scrotal swelling. Negative for difficulty urinating, dysuria and frequency. Musculoskeletal:  Positive for back pain. Negative for arthralgias and myalgias. Skin:  Negative for color change and rash. Neurological:  Negative for weakness and headaches. Objective:  Vitals:    10/18/23 0908   BP: 142/80   BP Location: Left arm   Patient Position: Sitting   Cuff Size: Standard   Pulse: 85   Temp: 98.6 °F (37 °C)   SpO2: 98%   Weight: 98.9 kg (218 lb)   Height: 5' 5" (1.651 m)     Body mass index is 36.28 kg/m². Physical Exam  Constitutional:       General: He is not in acute distress. Appearance: Normal appearance. HENT:      Head: Normocephalic and atraumatic.    Pulmonary: Effort: Pulmonary effort is normal. No respiratory distress. Neurological:      Mental Status: He is alert and oriented to person, place, and time. Mental status is at baseline.    Psychiatric:         Mood and Affect: Mood normal.         Behavior: Behavior normal.

## 2023-10-19 NOTE — PROGRESS NOTES
Bariatric Nutrition Assessment Note- Evaluation    Insurance: 6 required monthly weight checks    Type of surgery    Interested in sleeve  Surgery Date: TBD  Surgeon: Consult with Dr. Danii Martinez  pending       Nutrition Kelton Mosque  79 y.o.  male     /80 (BP Location: Left arm, Patient Position: Sitting, Cuff Size: Large)   Pulse 82   Resp 12   Ht 5' 4.5" (1.638 m)   Wt 98.9 kg (218 lb)   BMI 36.84 kg/m²     Height: 5'4.5"  Eval Weight: 218#   BMI: 36.8  Wt with BMI of 25: 148#  Pre-Op Excess Wt: 70#  BMI to Qualify at 40 = 236.5#  BMI to Qualify at 35 = 207#  PMH includes: SB= 6/8   Asthma (meds) COPD., Migraines,  One kidney - donor    Pt advised not to gain weight during preop process. Pt encouraged to lose weight via healthy eating and exercise. Pt may follow Liver Shrinking diet 2 weeks prior to DOS depending on BMI at time. This diet will promote weight loss.     [address] [facility], [place], operated by [facility] Equation:  Estimated calories to maintain weightt: 2025   Estimated calories for weight loss 1836-1978 ( 1-2# per wk wt loss - sedentary )  Estimated protein needs  Grams (1.0-1.5 gms/kg BMI at 25 )   Estimated fluid needs 70-82 oz (30-35 ml/kg BMI at 25)      NAFLD Fibrosis Score is: -1.499    NAFLD Score Correlated Fibrosis Severity   <0.12 F0-F2   0.12-0.676 Indeterminate Score   >0.676 F3-F4   **Fibrosis Severity Scale: F0 = no fibrosis; F1= mild fibrosis; F2 = moderate fibrosis; F3 = severe fibrosis; F4 = cirrhosis    NAFLD Score Component Values:  Component Value Date   Age: 79 y.o.     BMI: 36.84 kg/m²    IFG or DM: No    AST: 20 U/L 9/26/2023   ALT: 27 U/L 9/26/2023   Platelet: 388 Thousands/uL 9/26/2023   Albumin: 4.0 g/dL 9/26/2023       Weight History Reason for WLS: COPD and trouble breathing - can't lose on his own  Onset of Obesity: Adult - past 6 years  Family history of obesity: Yes  Wt Loss Attempts: Commercial Programs ( Nutrisystem.)  High Protein/Low CHO diets (Keto, Ellie, [place] Jose Allen, etc.)  Meal Replacements (Medifast, Slim Fast, etc.)  Self Created Diets (Portion Control, Healthy Food Choices, etc.)  Patient has tried the above for 6 months or more with insufficient weight loss or weight regain, which is why patient has requested to be evaluated for weight loss surgery today  Maximum Wt Lost: 5#None       Review of History and Medications   OTC: None  Past Medical History:   Diagnosis Date    Asthma     Chronic pain     cervic and lumbar    COPD (chronic obstructive pulmonary disease) ([address])     Hyperlipidemia      Past Surgical History:   Procedure Laterality Date    APPENDECTOMY      HERNIA REPAIR      4 times    NEPHRECTOMY LIVING DONOR Left 10/2009     Social History     Socioeconomic History    Marital status: /Civil Union     Spouse name: None    Number of children: None    Years of education: None    Highest education level: None   Occupational History    None   Tobacco Use    Smoking status: Former    Smokeless tobacco: Never   Vaping Use    Vaping Use: Never used   Substance and Sexual Activity    Alcohol use: Yes     Comment: Occasional    Drug use: No    Sexual activity: Yes   Other Topics Concern    None   Social History Narrative    None     Social Determinants of Health     Financial Resource Strain: Low Risk  (8/24/2023)    Overall Financial Resource Strain (CARDIA)     Difficulty of Paying Living Expenses: Not hard at all   Food Insecurity: Not on file   Transportation Needs: No Transportation Needs (8/24/2023)    PRAPARE - Transportation     Lack of Transportation (Medical): No     Lack of Transportation (Non-Medical):  No   Physical Activity: Not on file   Stress: Not on file   Social Connections: Not on file   Intimate Partner Violence: Not on file   Housing Stability: Not on file       Current Outpatient Medications:     albuterol (2.5 mg/3 mL) 0.083 % nebulizer solution, Take 3 mL (2.5 mg total) by nebulization every 6 (six) hours as needed for wheezing or shortness of breath, Disp: 75 mL, Rfl: 0    albuterol (ProAir HFA) 90 mcg/act inhaler, Inhale 2 puffs every 6 (six) hours as needed for wheezing, Disp: 8.5 g, Rfl: 0    albuterol (PROVENTIL HFA,VENTOLIN HFA) 90 mcg/act inhaler, Inhale 2 puffs every 4 (four) hours as needed for wheezing, Disp: 1 Inhaler, Rfl: 0    Georgina 1 MG TBEC, , Disp: , Rfl:     Trelegy Ellipta 100-62.5-25 MCG/ACT inhaler, , Disp: , Rfl:     Ubrelvy 100 MG tablet, Take 1 tablet (100 mg total) by mouth daily as needed (migraine), Disp: 15 tablet, Rfl: 5    Vtama 1 % CREA, APPLY 1 APPLICATION TOPICALLY IN THE MORNING, Disp: 60 g, Rfl: 0  Food Intake and Lifestyle Assessment   Food Intake Assessment completed via usual diet recall  Breakfast: 4 pancakes, butter, Syrup - Real  or oatmeal 0 or Eng Muffin   Lunch: Frozen meal - Michaelina  Snack: Chocolate, cupcakes  Dinner: 1/2 crab Dungeoness -  Snack: Wakes - Corn muffin - half  8 oz Chocolate milk  Beverage intake: water, Chocolate whole milk, juice, coffee 1 cup , and (alcohol and soda - very rare)  Portions:  6 oz Protein  1 c Starch   1/2 c Vegetable  or less   Protein supplement: None   Estimated protein intake per day: 75-80  Estimated fluid intake per day: 2 bottles water 16 oz juice, 1 cup milk  Meals eaten away from home: 2X month  Typical meal pattern: 3 meals per day and 1-2 snacks per day  Eating Behaviors: Consumption of high calorie/ high fat foods, Consumption of high calorie beverages, Large portion sizes, and Craves sweet foods  Food allergies or intolerances: No Known Allergies - intolerances to peppers  Cultural or Latter-day considerations: none    Physical Assessment  Physical Activity Retired - household jobs -  and   Types of exercise: None  Current physical limitations: Breathing    Psychosocial Assessment   Support systems: spouse  Friends who had surgery  Socioeconomic factors: Work as  and  -  - 3 grown children     Nutrition Diagnosis  Diagnosis: Overweight / Obesity (NC-3.3)  Related to: Physical inactivity and Excessive energy intake  As Evidenced by: BMI >25     Nutrition Prescription: Recommend the following diet  Regular    Interventions and Teaching   Discussed pre-op and post-op nutrition guidelines. Patient educated and handouts provided. Surgical changes to stomach / GI  Capacity of post-surgery stomach  Diet progression  Adequate hydration  Sugar and fat restriction to decrease "dumping syndrome"  Fat restriction to decrease steatorrhea  Expected weight loss  Weight loss plateaus/ possibility of weight regain  Exercise  Suggestions for pre-op diet  Nutrition considerations after surgery  Protein supplements  Meal planning and preparation  Appropriate carbohydrate, protein, and fat intake, and food/fluid choices to maximize safe weight loss, nutrient intake, and tolerance   Dietary and lifestyle changes  Possible problems with poor eating habits  Intuitive eating  Techniques for self monitoring and keeping daily food journal  Potential for food intolerance after surgery, and ways to deal with them including: lactose intolerance, nausea, reflux, vomiting, diarrhea, food intolerance, appetite changes, gas  Vitamin / Mineral supplementation of Multivitamin with minerals, Calcium, Vitamin B12, Iron, Fat Soluble vitamins, and Vitamin D    Patient is not currently pregnant and doesn't desire to become pregnant a minimum of one year post-op    Education provided to: patient and wife    Barriers to learning: No barriers identified    Readiness to change: preparation    Prior research on procedure: internet and friends or family    Comprehension: verbalizes understanding     Expected Compliance: good  Recommendations  Pt is an appropriate candidate for surgery.  Yes    Evaluation / Monitoring  Dietitian to Monitor: Eating pattern as discussed Tolerance of nutrition prescription Body weight Lab values Physical activity Bowel pattern    Goals  Eliminate sugar sweetened beverages, Food journal, Exercise 30 minutes 5 times per week, Complete lession plans 1-6, Eat 3 meals per day, and Eliminate mindless snacking  Follow Pre-Surgery guidelines  > Trial Baritastic for food logging  > Maintain  regular meal pattern - include fruits, vegetables and whole grains  > Avoid skipping meals- Can use protein drink as meal replacement  > Decrease portions  > Focus on protein - include lean protein at each meal and snack - Learn to eat protein first  > Limit processed foods, fast foods and dining away from home  > Include meal prepping  > Limit snacks - healthier choices and portion; avoid grazing  > Slow pace of eating and sip fluids  - practice 30/60 minute rule  > Reduce caffeine/ eliminate by day of surgery  > Reduce/eliminate carbonation by pre-op diet  > Increase water intake - 64 oz  > Increase physical activity/establish exercise regimen as able  > Start multi vitamin and additional Vitamin D 2000IU  Work on skills to cope with emotional eating/mindfull eating  Pre-op weight loss not required but advised not to gain weight  F/U next month with bariatric provider      Time Spent:   1 Hour 15 Minutes

## 2023-10-23 ENCOUNTER — CLINICAL SUPPORT (OUTPATIENT)
Dept: BARIATRICS | Facility: CLINIC | Age: 68
End: 2023-10-23

## 2023-10-23 ENCOUNTER — PREP FOR PROCEDURE (OUTPATIENT)
Dept: BARIATRICS | Facility: CLINIC | Age: 68
End: 2023-10-23

## 2023-10-23 VITALS
BODY MASS INDEX: 36.32 KG/M2 | RESPIRATION RATE: 12 BRPM | SYSTOLIC BLOOD PRESSURE: 120 MMHG | HEIGHT: 65 IN | WEIGHT: 218 LBS | DIASTOLIC BLOOD PRESSURE: 80 MMHG | HEART RATE: 82 BPM

## 2023-10-23 DIAGNOSIS — E66.9 OBESITY, CLASS II, BMI 35-39.9: Primary | ICD-10-CM

## 2023-10-23 DIAGNOSIS — Z98.84 BARIATRIC SURGERY STATUS: Primary | ICD-10-CM

## 2023-10-23 DIAGNOSIS — Z71.89 ENCOUNTER FOR PRE-BARIATRIC SURGERY COUNSELING AND EDUCATION: ICD-10-CM

## 2023-10-23 DIAGNOSIS — Z01.818 PREOP TESTING: ICD-10-CM

## 2023-10-23 DIAGNOSIS — E66.01 MORBID OBESITY (HCC): Primary | ICD-10-CM

## 2023-10-23 PROCEDURE — RECHECK

## 2023-10-23 NOTE — PROGRESS NOTES
Bariatric Behavioral Health Evaluation    Presenting Problem: 79year old male ( 1955) here for behavioral health evaluation. Patient is needing to schedule an initial consult with Dr. Guicho Chambers. Patient is wanting to improve his health and be able to improve his breathing. Is the patient seeking Bariatric Surgery Eval? Yes  If yes how long have you researched this surgery option. Patient recently started looking into bariatric surgery, has talked to his niece and 2 friends that have had bariatric surgery about their experience. Realizes Post- Op Requirements? Yes, but would benefit from more education. Pre-morbid level of function and history of present illness: Diagnosis of migraines, COPD; patient reports struggling with his weight since he had a very case of COVID 3 years ago. Psychiatric/Psychological Treatment Diagnosis: Patient denies any current mental health diagnosis or treatment. Patient educated on the benefits of outpatient therapy to develop positive coping skills and habits for success long term, patient will reach out for support if needed in the future. Outpatient Counselor No     Psychiatrist No     Have you had Inpatient Treatment? No    Family Constellation: Patient currently lives with his wife. Has 1 adult daughter and 3 step children. Trauma/Abuse History:  adult trauma loss of dad    Additional comments/stressors related to family/relationships/peer support: Patient identifies his wife and his mom as his support. Patient identifies his weight and pain as current stressors.     Physical/Psychological Assessment:     Appearance: appropriate  Sociability: friendly  Affect: appropriate  Mood: calm  Thought Process: coherent  Speech: normal  Content: no impairment  Orientation: person  Yes   Insight: emotional  good    Risk Assessment:     none    Recommendations: Recommended for surgery  yes    Risk of Harm to Self or Others: Patient denies SI or HI     Observation: Interviews: This interview only. Based on the previous information, the client presents the following risk of harm to self or others: low     Note: Patient here for behavioral health evaluation. Patient denies any current mental health diagnosis or treatment. Patient educated on the benefits of outpatient therapy to develop positive coping skills and habits for success long term, patient will reach out for support if needed in the future. Patient denies any current substance abuse. Former smoker, quit 14 years ago. Denies current tobacco use. Alcohol use 1x per month. Patient educated on the impact of nicotine and alcohol on the post bariatric patient. Patient agrees to abstain from all substances prior to as well as after surgery. Patient meets criteria for surgery at this program and will follow up with Dr. Malik Coreas. Patient has 6 months of weight checks required by insurance. Patient will also need to schedule an EGD following the visit today. Patient currently lives with his wife. Has 1 adult daughter and 3 step children. Patient is   Goals discussed:   Sleep consult (SB 6/8)- needs LEATHA dx to qualify  2. Increase water/decrease juice  3.  Increase activity  Linda lewis LCSW

## 2023-10-24 ENCOUNTER — TELEPHONE (OUTPATIENT)
Dept: FAMILY MEDICINE CLINIC | Facility: CLINIC | Age: 68
End: 2023-10-24

## 2023-10-24 DIAGNOSIS — Z98.84 BARIATRIC SURGERY STATUS: Primary | ICD-10-CM

## 2023-10-24 NOTE — TELEPHONE ENCOUNTER
T/c from [facility] at [address] -- pt has appt on 10/26 and needs ambulatory referral using dx code Z98.84.     Please advise

## 2023-10-26 ENCOUNTER — OFFICE VISIT (OUTPATIENT)
Dept: NEUROLOGY | Facility: CLINIC | Age: 68
End: 2023-10-26
Payer: COMMERCIAL

## 2023-10-26 ENCOUNTER — CONSULT (OUTPATIENT)
Dept: BARIATRICS | Facility: CLINIC | Age: 68
End: 2023-10-26
Payer: COMMERCIAL

## 2023-10-26 VITALS
WEIGHT: 221.6 LBS | BODY MASS INDEX: 36.92 KG/M2 | HEIGHT: 65 IN | SYSTOLIC BLOOD PRESSURE: 120 MMHG | HEART RATE: 88 BPM | DIASTOLIC BLOOD PRESSURE: 70 MMHG

## 2023-10-26 VITALS
BODY MASS INDEX: 36.15 KG/M2 | HEIGHT: 65 IN | SYSTOLIC BLOOD PRESSURE: 120 MMHG | HEART RATE: 72 BPM | DIASTOLIC BLOOD PRESSURE: 78 MMHG | WEIGHT: 217 LBS | RESPIRATION RATE: 16 BRPM

## 2023-10-26 DIAGNOSIS — Z01.818 ENCOUNTER FOR OTHER PREPROCEDURAL EXAMINATION: Primary | ICD-10-CM

## 2023-10-26 DIAGNOSIS — J44.9 STAGE 3 SEVERE COPD BY GOLD CLASSIFICATION (HCC): ICD-10-CM

## 2023-10-26 DIAGNOSIS — E66.9 OBESITY, CLASS II, BMI 35-39.9: ICD-10-CM

## 2023-10-26 DIAGNOSIS — R06.1 STRIDOR: Primary | ICD-10-CM

## 2023-10-26 DIAGNOSIS — R73.03 PREDIABETES: ICD-10-CM

## 2023-10-26 DIAGNOSIS — E66.09 CLASS 2 OBESITY DUE TO EXCESS CALORIES WITHOUT SERIOUS COMORBIDITY WITH BODY MASS INDEX (BMI) OF 36.0 TO 36.9 IN ADULT: ICD-10-CM

## 2023-10-26 DIAGNOSIS — G47.19 EXCESSIVE DAYTIME SLEEPINESS: ICD-10-CM

## 2023-10-26 DIAGNOSIS — R06.83 SNORING: ICD-10-CM

## 2023-10-26 DIAGNOSIS — J44.9 CHRONIC OBSTRUCTIVE PULMONARY DISEASE, UNSPECIFIED COPD TYPE (HCC): ICD-10-CM

## 2023-10-26 DIAGNOSIS — E66.01 MORBID (SEVERE) OBESITY DUE TO EXCESS CALORIES (HCC): ICD-10-CM

## 2023-10-26 DIAGNOSIS — Z98.84 BARIATRIC SURGERY STATUS: ICD-10-CM

## 2023-10-26 PROBLEM — E66.812 OBESITY, CLASS II, BMI 35-39.9: Status: ACTIVE | Noted: 2023-10-26

## 2023-10-26 PROCEDURE — 99204 OFFICE O/P NEW MOD 45 MIN: CPT | Performed by: SURGERY

## 2023-10-26 PROCEDURE — 99204 OFFICE O/P NEW MOD 45 MIN: CPT | Performed by: INTERNAL MEDICINE

## 2023-10-26 NOTE — PROGRESS NOTES
BARIATRIC INITIAL CONSULT - BARIATRIC SURGERY    Matheus Rosales 79 y.o. male MRN: [id number]  Unit/Bed#:  Encounter: [id number]      HPI:  Matheus Rosales is a 79 y.o. male who presents with a longstanding history of morbid obesity and inability to sustain a meaningful weight loss. He is present with his wife. He is a  and  (retired). He desires to pursue metabolic and bariatric surgery to improve his health and improve his breathing. Denies GERD. Denies DVT/PE. Quit tobacco 14 years ago. Here today to discuss bariatric options. *Left donor nephrectomy, appy, Inguinal hernia repairs*    Visit type: initial visit    Symptoms: dysnea and back pain    Associated Symptoms: none    Associated Conditions: glucose intolerance  Disease Complications:  Pre-diabetes  Weight Loss Interest: high    Exercise Frequency:infrequency  Types of Exercise: walking    Review of Systems   Respiratory:  Positive for shortness of breath ([place]). Musculoskeletal:  Positive for back pain. All other systems reviewed and are negative.       Historical Information   Past Medical History:   Diagnosis Date    Asthma     Chronic pain     cervic and lumbar    COPD (chronic obstructive pulmonary disease) ([facility])     Hyperlipidemia      Past Surgical History:   Procedure Laterality Date    APPENDECTOMY      HERNIA REPAIR      4 times    NEPHRECTOMY LIVING DONOR Left 10/2009     Social History   Social History     Substance and Sexual Activity   Alcohol Use Yes    Comment: Occasional     Social History     Substance and Sexual Activity   Drug Use No     Social History     Tobacco Use   Smoking Status Former   Smokeless Tobacco Never     Family History: non-contributory    Meds/Allergies   all medications and allergies reviewed  No Known Allergies    Objective     Current Vitals:   /78 (BP Location: Left arm, Patient Position: Sitting, Cuff Size: Large)   Pulse 72   Resp 16   Ht 5' 4.5" (1.638 m)   Wt 98.4 kg (217 lb)   BMI 36.67 kg/m²     Invasive Devices       None                   Physical Exam  Constitutional:       Appearance: Normal appearance. HENT:      Head: Atraumatic. Nose: No rhinorrhea. Eyes:      Extraocular Movements: Extraocular movements intact. Cardiovascular:      Rate and Rhythm: Normal rate. Pulmonary:      Effort: Pulmonary effort is normal. No respiratory distress. Abdominal:      General: Abdomen is flat. There is no distension. Musculoskeletal:         General: Normal range of motion. Cervical back: Normal range of motion. Skin:     General: Skin is warm and dry. Neurological:      General: No focal deficit present. Mental Status: He is alert and oriented to person, place, and time. Psychiatric:         Mood and Affect: Mood normal.         Behavior: Behavior normal.         Lab Results: I have personally reviewed pertinent lab results. Imaging: I have personally reviewed pertinent reports. EKG, Pathology, and Other Studies: I have personally reviewed pertinent reports. Assessment/PLAN:    79 y.o. yo male with a long standing h/o of obesity and inability to sustain any meaningful weight loss on his own despite several attempts. He is interested in the Laparoscopic sleeve gastrectomy. Patient has been counseled about the risk of developing gastroesophageal reflux disease (GERD), worsening of current GERD and/or silent reflux. Patient has also been counseled on the risk of developing Khan's esophagus (18%). As a result the patient may require treatment with medications, further interventions and possibly additional surgery. Patient will require routine endoscopic surveillance to monitor for these possible complications. As a part of his pre op evaluation, he will be referred to a cardiologist and for a sleep evaluation.  He successfully completed an evaluation with our pre-certification/, registered dietician and licensed clinical . He needs an EGD to evaluate the anatomy of his GI tract. I have spent over 45 minutes with him face to face in the office today discussing his options and details of the surgery. Over 50% of this was coordinating care. I have discussed and educated the patient with regards to the components of our multidisciplinary program and the importance of compliance and follow up in the post operative period. He was given the opportunity to ask questions and I have answered all of them. The patient was also instructed with regards to the importance of behavior modification, nutritional counseling, support meeting attendance and lifestyle changes that are important to ensure success. Although there is a great statistical chance of improvement or even resolution of most of his associated comorbidities, the results vary from patient to patient and they largely depend on his commitment and compliance.        Hue Plata MD  10/26/2023  9:09 AM

## 2023-10-26 NOTE — LETTER
October 26, 2023     [place], [address] [facility]    Patient: Milla Cleary   YOB: 1955   Date of Visit: 10/26/2023       Dear Dr. Hailey Pendleton: Thank you for referring Marysol Carbone to me for evaluation for metabolic and bariatric surgery. Below are my notes for this consultation. If you have questions, please do not hesitate to call me. I look forward to following your patient along with you. Sincerely,        Jarvis Castleman, MD        CC: No Recipients    Jarvis Castleman, MD  10/26/2023  9:13 AM  Sign when Signing Visit      [address] [place] [address] 79 y.o. male MRN: [id number]  Unit/Bed#:  Encounter: [id number]      HPI:  Milla Cleary is a 79 y.o. male who presents with a longstanding history of morbid obesity and inability to sustain a meaningful weight loss. He is present with his wife. He is a  and  (retired). He desires to pursue metabolic and bariatric surgery to improve his health and improve his breathing. Denies GERD. Denies DVT/PE. Quit tobacco 14 years ago. Here today to discuss bariatric options. *Left donor nephrectomy, appy, Inguinal hernia repairs*    Visit type: initial visit    Symptoms: dysnea and back pain    Associated Symptoms: none    Associated Conditions: glucose intolerance  Disease Complications:  Pre-diabetes  Weight Loss Interest: high    Exercise Frequency:infrequency  Types of Exercise: walking    Review of Systems   Respiratory:  Positive for shortness of breath ([place]). Musculoskeletal:  Positive for back pain. All other systems reviewed and are negative.       Historical Information  Past Medical History:   Diagnosis Date   • Asthma    • Chronic pain     cervic and lumbar   • COPD (chronic obstructive pulmonary disease) ([address])    • Hyperlipidemia      Past Surgical History:   Procedure Laterality Date   • APPENDECTOMY     • HERNIA REPAIR 4 times   • NEPHRECTOMY LIVING DONOR Left 10/2009     Social History  Social History     Substance and Sexual Activity   Alcohol Use Yes    Comment: Occasional     Social History     Substance and Sexual Activity   Drug Use No     Social History     Tobacco Use   Smoking Status Former   Smokeless Tobacco Never     Family History: non-contributory    Meds/Allergies  all medications and allergies reviewed  No Known Allergies    Objective    Current Vitals:   /78 (BP Location: Left arm, Patient Position: Sitting, Cuff Size: Large)   Pulse 72   Resp 16   Ht 5' 4.5" (1.638 m)   Wt 98.4 kg (217 lb)   BMI 36.67 kg/m²     Invasive Devices       None                   Physical Exam  Constitutional:       Appearance: Normal appearance. HENT:      Head: Atraumatic. Nose: No rhinorrhea. Eyes:      Extraocular Movements: Extraocular movements intact. Cardiovascular:      Rate and Rhythm: Normal rate. Pulmonary:      Effort: Pulmonary effort is normal. No respiratory distress. Abdominal:      General: Abdomen is flat. There is no distension. Musculoskeletal:         General: Normal range of motion. Cervical back: Normal range of motion. Skin:     General: Skin is warm and dry. Neurological:      General: No focal deficit present. Mental Status: He is alert and oriented to person, place, and time. Psychiatric:         Mood and Affect: Mood normal.         Behavior: Behavior normal.         Lab Results: I have personally reviewed pertinent lab results. Imaging: I have personally reviewed pertinent reports. EKG, Pathology, and Other Studies: I have personally reviewed pertinent reports. Assessment/PLAN:    79 y.o. yo male with a long standing h/o of obesity and inability to sustain any meaningful weight loss on his own despite several attempts. He is interested in the Laparoscopic sleeve gastrectomy.     Patient has been counseled about the risk of developing gastroesophageal reflux disease (GERD), worsening of current GERD and/or silent reflux. Patient has also been counseled on the risk of developing Khan's esophagus (18%). As a result the patient may require treatment with medications, further interventions and possibly additional surgery. Patient will require routine endoscopic surveillance to monitor for these possible complications. As a part of his pre op evaluation, he will be referred to a cardiologist and for a sleep evaluation. He successfully completed an evaluation with our pre-certification/, registered dietician and licensed clinical . He needs an EGD to evaluate the anatomy of his GI tract. I have spent over 45 minutes with him face to face in the office today discussing his options and details of the surgery. Over 50% of this was coordinating care. I have discussed and educated the patient with regards to the components of our multidisciplinary program and the importance of compliance and follow up in the post operative period. He was given the opportunity to ask questions and I have answered all of them. The patient was also instructed with regards to the importance of behavior modification, nutritional counseling, support meeting attendance and lifestyle changes that are important to ensure success. Although there is a great statistical chance of improvement or even resolution of most of his associated comorbidities, the results vary from patient to patient and they largely depend on his commitment and compliance.        Elizabeth Armas MD  10/26/2023  9:09 AM

## 2023-10-26 NOTE — PROGRESS NOTES
Sleep Consultation   Sameera Hernandez 79 y.o. male MRN: [id number]      Reason for consultation: Suspected sleep apnea    Requesting physician: Thalia Rubin MD    Assessment/Plan    Suspected sleep apnea  Mallampati class 4, Body mass index is 36.88 kg/m².,  . He/she is at risk for obstructive sleep apnea based on STOP BANG survey based on snoring, tiredness, elevated BMI, male gender, age  S/s: Snoring, choking, gagging, snorting, gasping, and witnessed apneas  Orick score: 8  I discussed in depth the diagnostic studies and treatment options involved with obstructive sleep apnea  I also discussed in depth the risk of leaving sleep apnea untreated including hypertension, heart failure, arrhythmia, MI and stroke. The patient is agreeable to undergo testing and treatment of obstructive sleep apnea. He/she understands the pitfalls he/she may encounter along the way and is willing to attempt CPAP treatment. Plan  Ordered in lab diagnostic polysomnogram as patient has severe COPD  Follow-up after results    2. Snoring  As above    3. Excessive daytime sleepiness  As above    4. Obesity  Counseled patient on lifestyle modifications including diet and exercise    5. Severe COPD  He has COPD with frequent exacerbations  PFTs on 9/29/23 showed FEV1 37% of predicted indicating severe COPD Gold classification 3  He will likely need PAP titration study after in lab study  I will order transcutaneous CO2 monitoring as well due to concern for hypoventilation    6. Possible stridor  Provided referral to ENT      History of Present Illness   HPI:  Sameera Hernandez is a 79 y.o. male with PMHx of severe COPD, lumbar disc disease, morbid obesity, migraines who presents for evaluation of obstructive sleep apnea. He was referred by bariatrics as he is considering bariatric surgery. He reports snoring, choking, gagging, snorting, gasping and witnessed apneas. He presents with his wife today.   He has an Orick score of 8.  He reports frequent headaches. He frequently moves his legs at night. He quit smoking 14 years ago. He goes to bed at 8 PM and wakes up at 4 AM.  He is averaging 3 to 5 hours of sleep. He frequently wakes up because of his breathing and back pain. He occasionally takes melatonin 10 mg. He is being seen by pulmonology. He has difficulty breathing with some evidence of possible stridor. He is being referred to ENT.         Review of Systems      Genitourinary none   Cardiology none   Gastrointestinal none   Neurology none   Constitutional none   Integumentary none   Psychiatry none   Musculoskeletal none   Pulmonary none   ENT none   Endocrine none   Hematological none         Historical Information   Past Medical History:   Diagnosis Date    Asthma     Chronic pain     cervic and lumbar    COPD (chronic obstructive pulmonary disease) (HCC)     Hyperlipidemia      Past Surgical History:   Procedure Laterality Date    APPENDECTOMY      HERNIA REPAIR      4 times    NEPHRECTOMY LIVING DONOR Left 10/2009     Family History   Problem Relation Age of Onset    Breast cancer Mother     Heart disease Father     Breast cancer Maternal Grandmother     Heart disease Paternal Grandmother      Social History     Socioeconomic History    Marital status: /Civil Union     Spouse name: Not on file    Number of children: Not on file    Years of education: Not on file    Highest education level: Not on file   Occupational History    Not on file   Tobacco Use    Smoking status: Former    Smokeless tobacco: Never   Vaping Use    Vaping Use: Never used   Substance and Sexual Activity    Alcohol use: Yes     Comment: Occasional    Drug use: No    Sexual activity: Yes   Other Topics Concern    Not on file   Social History Narrative    Not on file     Social Determinants of Health     Financial Resource Strain: Low Risk  (8/24/2023)    Overall Financial Resource Strain (CARDI)     Difficulty of Paying Living Expenses: Not hard at all   Food Insecurity: Not on file   Transportation Needs: No Transportation Needs (8/24/2023)    PRAPARE - Transportation     Lack of Transportation (Medical): No     Lack of Transportation (Non-Medical): No   Physical Activity: Not on file   Stress: Not on file   Social Connections: Not on file   Intimate Partner Violence: Not on file   Housing Stability: Not on file       Occupational History: NA    Meds/Allergies   No Known Allergies    Home medications:  Prior to Admission medications    Medication Sig Start Date End Date Taking? Authorizing Provider   albuterol (2.5 mg/3 mL) 0.083 % nebulizer solution Take 3 mL (2.5 mg total) by nebulization every 6 (six) hours as needed for wheezing or shortness of breath 3/29/23  Yes Rhonda Godoy PA-C   albuterol (ProAir HFA) 90 mcg/act inhaler Inhale 2 puffs every 6 (six) hours as needed for wheezing 3/29/23  Yes Rhonda Godoy PA-C   albuterol (PROVENTIL HFA,VENTOLIN HFA) 90 mcg/act inhaler Inhale 2 puffs every 4 (four) hours as needed for wheezing 7/12/18  Yes Carin Qiu DO   Georgina 1 MG TBEC  8/12/23  Yes Historical Provider, MD Yesy Turner 930-34.8-84 MCG/ACT inhaler  5/10/23  Yes Historical Provider, MD Braga Peasant 100 MG tablet Take 1 tablet (100 mg total) by mouth daily as needed (migraine) 8/24/23  Yes Yarely Taveras MD   Vtama 1 % CREA APPLY 1 APPLICATION TOPICALLY IN THE MORNING 9/29/23  Yes Yarely Taveras MD       Vitals:   Blood pressure 120/70, pulse 88, height 5' 5" (1.651 m), weight 101 kg (221 lb 9.6 oz). ,  Body mass index is 36.88 kg/m².        Physical Exam  General: Awake alert and oriented x 3, conversant without conversational dyspnea, NAD, normal affect  HEENT:  PERRL, Sclera noninjected, nonicteric OU, Nares patent,  no craniofacial abnormalities, Mucous membranes, moist, no oral lesions, normal dentition  NECK:  Trachea midline, no accessory muscle use, no stridor, no cervical or supraclavicular adenopathy, JVP not elevated  CARDIAC: Reg, single s1/S2, no m/r/g  PULM: Bilateral wheezing, possible stridor  EXT: No cyanosis, no clubbing, no edema, normal capillary refill  NEURO: no focal neurologic deficits, AAOx3, moving all extremities appropriately    Labs: I have personally reviewed pertinent lab results.   Lab Results   Component Value Date    WBC 5.12 09/26/2023    HGB 13.3 09/26/2023    HCT 42.5 09/26/2023    MCV 74 (L) 09/26/2023     09/26/2023      Lab Results   Component Value Date    CALCIUM 9.2 09/26/2023    K 4.2 09/26/2023    CO2 26 09/26/2023     09/26/2023    BUN 13 09/26/2023    CREATININE 0.91 09/26/2023     No results found for: "IRON", "TIBC", "FERRITIN"  No results found for: "[id number]"  No results found for: "FOLATE"      Arterial Blood Gas result:  NA    Sleep studies:  NA    Compliance Data:                                     MD Eneida [facility]

## 2023-10-30 ENCOUNTER — OFFICE VISIT (OUTPATIENT)
Age: 68
End: 2023-10-30
Payer: COMMERCIAL

## 2023-10-30 ENCOUNTER — TELEPHONE (OUTPATIENT)
Dept: PULMONOLOGY | Facility: CLINIC | Age: 68
End: 2023-10-30

## 2023-10-30 ENCOUNTER — TELEPHONE (OUTPATIENT)
Age: 68
End: 2023-10-30

## 2023-10-30 VITALS
SYSTOLIC BLOOD PRESSURE: 116 MMHG | BODY MASS INDEX: 36.65 KG/M2 | TEMPERATURE: 98.4 F | DIASTOLIC BLOOD PRESSURE: 80 MMHG | OXYGEN SATURATION: 95 % | HEIGHT: 65 IN | HEART RATE: 95 BPM | WEIGHT: 220 LBS

## 2023-10-30 DIAGNOSIS — J44.9 CHRONIC OBSTRUCTIVE PULMONARY DISEASE, UNSPECIFIED COPD TYPE (HCC): ICD-10-CM

## 2023-10-30 DIAGNOSIS — E66.9 OBESITY, CLASS II, BMI 35-39.9: ICD-10-CM

## 2023-10-30 DIAGNOSIS — R06.1 STRIDOR: Primary | ICD-10-CM

## 2023-10-30 PROCEDURE — 99213 OFFICE O/P EST LOW 20 MIN: CPT | Performed by: PHYSICIAN ASSISTANT

## 2023-10-30 RX ORDER — PREDNISONE 20 MG/1
40 TABLET ORAL DAILY
Qty: 10 TABLET | Refills: 0 | Status: SHIPPED | OUTPATIENT
Start: 2023-10-30 | End: 2023-11-04

## 2023-10-30 NOTE — TELEPHONE ENCOUNTER
Patient coming in today 10/30 for sick visit with [facility]

## 2023-10-30 NOTE — PROGRESS NOTES
Assessment/Plan:   Diagnoses and all orders for this visit:    Stridor  -     predniSONE 20 mg tablet; Take 2 tablets (40 mg total) by mouth daily for 5 days  -     Ambulatory Referral to Otolaryngology; Future    Chronic obstructive pulmonary disease, unspecified COPD type ([address])    Obesity, Class II, BMI 35-39.9    Patient is here today for a sick visit. Again has stridor similar to his previous office visit. He is using his Trelegy daily, albuterol several times per day. He does take 1 mg of prednisone at baseline. He does report improvement on courses of steroids although symptoms do return shortly after stopping. Do think he needs an evaluation with ENT which was recommended to him at his office visit in September. We will again refer him to ENT, he will call today to see if he is able to get in within the next several days for evaluation. I did send 5 days of prednisone as he reports improvement with courses of steroids. He denies any significant reflux, could consider adding PPI. Also discussed with him at prior visit undergoing an awake bronchoscopy if ENT evaluation is normal.  He is scheduled for a colonoscopy on Thursday which I advised he reschedule given the stridor. No follow-ups on file. All questions are answered to the patient's satisfaction and understanding. He verbalizes understanding. He is encouraged to call with any further questions or concerns. Portions of the record may have been created with voice recognition software. Occasional wrong word or "sound a like" substitutions may have occurred due to the inherent limitations of voice recognition software. Read the chart carefully and recognize, using context, where substitutions have occurred.     Electronically Signed by Edgar Johnson PA-C    ______________________________________________________________________    Chief Complaint:   Chief Complaint   Patient presents with    Cough    Wheezing    Shortness of Breath Patient ID: Francisco Javier Gonzalez is a 79 y.o. y.o. male has a past medical history of Asthma, Chronic pain, COPD (chronic obstructive pulmonary disease) ([address]), and Hyperlipidemia. 10/30/2023  Patient presents today for follow-up visit. Patient is a 72-year-old male former smoker with past medical history of "COPD". He is here today for sick visit. He is complaining of again worsened wheezing and feeling that he cannot take air in. He has not yet made an appointment with the ENT. He continues with the Trelegy daily, has been using the nebulizer several times per day. He takes 1 mg of prednisone at baseline prescribed by his physician in [place]. Symptoms today are similar to prior episodes. Review of Systems   Constitutional: Negative. Respiratory:  Positive for wheezing and stridor. Cardiovascular: Negative. Gastrointestinal: Negative. Genitourinary: Negative. Musculoskeletal: Negative. Skin: Negative. Allergic/Immunologic: Negative. Neurological: Negative. Psychiatric/Behavioral: Negative. Smoking history: He reports that he quit smoking about 14 years ago. His smoking use included cigarettes. He started smoking about 53 years ago. He has a 78.00 pack-year smoking history. He has never used smokeless tobacco.    The following portions of the patient's history were reviewed and updated as appropriate: allergies, current medications, past family history, past medical history, past social history, past surgical history, and problem list.      There is no immunization history on file for this patient.   Current Outpatient Medications   Medication Sig Dispense Refill    albuterol (2.5 mg/3 mL) 0.083 % nebulizer solution Take 3 mL (2.5 mg total) by nebulization every 6 (six) hours as needed for wheezing or shortness of breath 75 mL 0    albuterol (ProAir HFA) 90 mcg/act inhaler Inhale 2 puffs every 6 (six) hours as needed for wheezing 8.5 g 0    albuterol (PROVENTIL HFA,VENTOLIN HFA) 90 mcg/act inhaler Inhale 2 puffs every 4 (four) hours as needed for wheezing 1 Inhaler 0    predniSONE 20 mg tablet Take 2 tablets (40 mg total) by mouth daily for 5 days 10 tablet 0    Georgina 1 MG TBEC       Trelegy Ellipta 100-62.5-25 MCG/ACT inhaler       Ubrelvy 100 MG tablet Take 1 tablet (100 mg total) by mouth daily as needed (migraine) 15 tablet 5    Vtama 1 % CREA APPLY 1 APPLICATION TOPICALLY IN THE MORNING 60 g 0     No current facility-administered medications for this visit. Allergies: Patient has no known allergies. Objective:  Vitals:    10/30/23 1311   BP: 116/80   Pulse: 95   Temp: 98.4 °F (36.9 °C)   SpO2: 95%   Weight: 99.8 kg (220 lb)   Height: 5' 5" (1.651 m)   Oxygen Therapy  SpO2: 95 %  . Wt Readings from Last 3 Encounters:   10/30/23 99.8 kg (220 lb)   10/26/23 101 kg (221 lb 9.6 oz)   10/26/23 98.4 kg (217 lb)     Body mass index is 36.61 kg/m². Physical Exam  Constitutional:       General: He is not in acute distress. Appearance: Normal appearance. He is well-developed. He is obese. He is not ill-appearing. HENT:      Head: Normocephalic and atraumatic. Mouth/Throat:      Pharynx: Oropharynx is clear. Eyes:      Pupils: Pupils are equal, round, and reactive to light. Cardiovascular:      Rate and Rhythm: Normal rate and regular rhythm. Pulmonary:      Effort: Pulmonary effort is normal. No respiratory distress. Breath sounds: Stridor present. No decreased breath sounds, wheezing, rhonchi or rales. Comments: Transmitted wheezing from neck  Abdominal:      General: Abdomen is flat. There is no distension. Musculoskeletal:         General: Normal range of motion. Cervical back: Normal range of motion. Right lower leg: No edema. Left lower leg: No edema. Skin:     General: Skin is warm and dry. Findings: No rash. Neurological:      Mental Status: He is alert and oriented to person, place, and time.    Psychiatric: Mood and Affect: Mood normal.         Behavior: Behavior normal.         Lab Review:   Lab Results   Component Value Date    K 4.2 09/26/2023     09/26/2023    CO2 26 09/26/2023    BUN 13 09/26/2023    CREATININE 0.91 09/26/2023    CALCIUM 9.2 09/26/2023     Lab Results   Component Value Date    WBC 5.12 09/26/2023    HGB 13.3 09/26/2023    HCT 42.5 09/26/2023    MCV 74 (L) 09/26/2023     09/26/2023       Diagnostics:  I have personally reviewed pertinent reports. Reviewed CT chest  Office Spirometry Results:     ESS:    DXA bone density spine hip and pelvis    Result Date: 10/12/2023  Narrative: DXA SCAN CLINICAL HISTORY: 80-year-old male. OTHER RISK FACTORS: COPD. PHARMACOLOGIC THERAPY FOR OSTEOPOROSIS: None currently. TECHNIQUE: Bone densitometry was performed using a [employer]'s W bone densitometer. Regions of interest appear properly placed. COMPARISON: There are no prior DXA studies performed on this unit for comparison. RESULTS: LUMBAR SPINE Level: L1-L4 : BMD: 0.728 gm/cm2 T-score: -3.3 LEFT  TOTAL HIP: BMD: 0.919 gm/cm2 T-score: -0.8 LEFT  FEMORAL NECK: BMD: 0.705 gm/cm2 T score: -1.7     Impression: 1. Osteoporosis. Based on the lumbar spine 2. The 10 year risk of hip fracture is 1.0% with the 10 year risk of major osteoporotic fracture being 6.0% as calculated by the [facility] fracture risk assessment tool (FRAX, which is based on data generated by the [facility] Bone Diseases). 3.  The current NOF guidelines recommend treating patients with a T-score of -2.5 or less in the lumbar spine or hips, or in post-menopausal women and men over the age of 48 with low bone mass (osteopenia) and a FRAX 10 year risk score of >3% for hip fracture and/or >20% for major osteoporotic fracture. 4.  The NOF recommends follow-up DXA in 1-2 years after initiating therapy for osteoporosis and every 2 years thereafter.  More frequent evaluation is appropriate for patients with conditions associated with rapid bone loss, such as glucocorticoid therapy. The interval between DXA screenings may be longer for individuals without major risk factors and initial T-score in the normal or upper low bone mass range. The FRAX algorithm has certain limitations: -FRAX has not been validated in patients currently or previously treated with pharmacotherapy for osteoporosis. In such patients, clinical judgment must be exercised in interpreting FRAX scores. -Prior hip, vertebral and humeral fragility fractures appear to confer greater risk of subsequent fracture than fractures at other sites (this is especially true for individuals with severe vertebral fractures), but quantification of this incremental risk is not possible with FRAX. -FRAX underestimates fracture risk in patients with history of multiple fragility fractures. -FRAX may underestimate fracture risk in patients with history of frequent falls. -It is not appropriate to use FRAX to monitor treatment response.  WHO CLASSIFICATION: Normal (a T-score of -1.0 or higher) Low bone mineral density (a T-score of less than -1.0 but higher than -2.5) Osteoporosis (a T-score of -2.5 or less) Severe osteoporosis (a T-score of -2.5 or less with a fragility fracture) Workstation performed: [id number]

## 2023-10-30 NOTE — TELEPHONE ENCOUNTER
Pt cancelled colon on 11/2/23, will reschedule after seeing ENT doctor, dealing with another health issue at this time

## 2023-10-30 NOTE — TELEPHONE ENCOUNTER
Patient called, he is having a very difficult time. He is struggling with his breathing, coughing a lot, wheezing, when he lays down to try and sleep he can't breathe. He is going through his Albuterol neb treatments about 6-8 times a day and still struggling. He doesn't know what to do. He also has procedures coming up this week and feels he should really be seen.  Please advise

## 2023-11-02 NOTE — PROGRESS NOTES
11/3/2023      Chief Complaint   Patient presents with    New Patient Visit     hydrocele         Assessment and Plan    79 y.o. male     1. Left Hydrocele  - Exam today showing large left hydrocele  - Discussed conservative measures with scrotal support, elevation, ibuprofen, warm soaks  - Discussed hydrocele aspiration vs hydrocelectomy. Risks and benefits of both discussed  - Case request placed for hydrocelectomy, return for surgery    2. Prostate cancer screening  - PSA 1.5 (9/23/23)  - DUY at next visit  - Call with any questions or concerns in the meantime  - All questions answered; patient understands and agrees with plan       History of Present Illness  Mart Barrera is a 79 y.o. male new patient here for evaluation of hydrocele. Patient had US scrotum and groin area on 9/28/23 showing large left sided hydrocele. Patient is quite bothered by this as it is uncomfortable and in the way. Denies redness, warmth to the area. Denies right sided swelling. Denies precipitating event. Patient has one kidney, donate the other. Review of Systems   Constitutional:  Negative for activity change, appetite change, chills and fever. HENT:  Negative for congestion and trouble swallowing. Respiratory:  Negative for cough and shortness of breath. Cardiovascular:  Negative for chest pain, palpitations and leg swelling. Gastrointestinal:  Negative for abdominal pain, constipation, diarrhea, nausea and vomiting. Genitourinary:  Positive for scrotal swelling and testicular pain. Negative for difficulty urinating, dysuria, flank pain, frequency, hematuria and urgency. Musculoskeletal:  Negative for back pain and gait problem. Skin:  Negative for wound. Allergic/Immunologic: Negative for immunocompromised state. Neurological:  Negative for dizziness and syncope. Hematological:  Does not bruise/bleed easily. Psychiatric/Behavioral:  Negative for confusion.     All other systems reviewed and are negative. AUA SYMPTOM SCORE      Flowsheet Row Most Recent Value   AUA SYMPTOM SCORE    How often have you had a sensation of not emptying your bladder completely after you finished urinating? 1 (P)     How often have you had to urinate again less than two hours after you finished urinating? 0 (P)     How often have you found you stopped and started again several times when you urinate? 1 (P)     How often have you found it difficult to postpone urination? 0 (P)     How often have you had a weak urinary stream? 2 (P)     How often have you had to push or strain to begin urination? 3 (P)     How many times did you most typically get up to urinate from the time you went to bed at night until the time you got up in the morning? 3 (P)     Quality of Life: If you were to spend the rest of your life with your urinary condition just the way it is now, how would you feel about that? 3 (P)     AUA SYMPTOM SCORE 10 (P)              Vitals  Vitals:    11/03/23 0752   BP: 122/70   Pulse: 84   SpO2: 99%   Weight: 99.3 kg (219 lb)   Height: 5' 5" (1.651 m)       Physical Exam  Constitutional:       General: He is not in acute distress. Appearance: Normal appearance. He is not ill-appearing, toxic-appearing or diaphoretic. HENT:      Head: Normocephalic. Nose: No congestion. Eyes:      General: No scleral icterus. Right eye: No discharge. Left eye: No discharge. Conjunctiva/sclera: Conjunctivae normal.      Pupils: Pupils are equal, round, and reactive to light. Cardiovascular:      Rate and Rhythm: Normal rate and regular rhythm. Pulses: Normal pulses. Heart sounds: Normal heart sounds. Pulmonary:      Effort: Pulmonary effort is normal.      Breath sounds: Wheezing (COPD) present. Genitourinary:     Comments: Large left hydrocele. No crepitus, edema, erythema. Musculoskeletal:      Cervical back: Normal range of motion. Skin:     General: Skin is warm and dry. Coloration: Skin is not jaundiced or pale. Findings: No bruising, erythema, lesion or rash. Neurological:      General: No focal deficit present. Mental Status: He is alert and oriented to person, place, and time. Mental status is at baseline. Gait: Gait normal.   Psychiatric:         Mood and Affect: Mood normal.         Behavior: Behavior normal.         Thought Content: Thought content normal.         Judgment: Judgment normal.           Past History  Past Medical History:   Diagnosis Date    Asthma     Chronic pain     cervic and lumbar    COPD (chronic obstructive pulmonary disease) (HCC)     Hyperlipidemia     Migraine      Social History     Socioeconomic History    Marital status: /Civil Union     Spouse name: None    Number of children: None    Years of education: None    Highest education level: None   Occupational History    None   Tobacco Use    Smoking status: Former     Packs/day: 2.00     Years: 39.00     Total pack years: 78.00     Types: Cigarettes     Start date:      Quit date:      Years since quittin.8    Smokeless tobacco: Never   Vaping Use    Vaping Use: Never used   Substance and Sexual Activity    Alcohol use: Yes     Comment: Occasional    Drug use: No    Sexual activity: Yes   Other Topics Concern    None   Social History Narrative    None     Social Determinants of Health     Financial Resource Strain: Low Risk  (2023)    Overall Financial Resource Strain (CARDIA)     Difficulty of Paying Living Expenses: Not hard at all   Food Insecurity: Not on file   Transportation Needs: No Transportation Needs (2023)    PRAPARE - Transportation     Lack of Transportation (Medical): No     Lack of Transportation (Non-Medical):  No   Physical Activity: Not on file   Stress: Not on file   Social Connections: Not on file   Intimate Partner Violence: Not on file   Housing Stability: Not on file     Social History     Tobacco Use   Smoking Status Former Packs/day: 2.00    Years: 39.00    Total pack years: 78.00    Types: Cigarettes    Start date: 5    Quit date:     Years since quittin.8   Smokeless Tobacco Never     Family History   Problem Relation Age of Onset    Breast cancer Mother     Heart disease Father     Breast cancer Maternal Grandmother     Heart disease Paternal Grandmother        The following portions of the patient's history were reviewed and updated as appropriate: allergies, current medications, past medical history, past social history, past surgical history and problem list.    Results  No results found for this or any previous visit (from the past 1 hour(s)).]  Lab Results   Component Value Date    PSA 1.50 2023     Lab Results   Component Value Date    CALCIUM 9.2 2023    K 4.2 2023    CO2 26 2023     2023    BUN 13 2023    CREATININE 0.91 2023     Lab Results   Component Value Date    WBC 5.12 2023    HGB 13.3 2023    HCT 42.5 2023    MCV 74 (L) 2023     2023       Jaun C Tenorio PA-C

## 2023-11-03 ENCOUNTER — OFFICE VISIT (OUTPATIENT)
Dept: UROLOGY | Facility: CLINIC | Age: 68
End: 2023-11-03
Payer: COMMERCIAL

## 2023-11-03 VITALS
HEIGHT: 65 IN | BODY MASS INDEX: 36.49 KG/M2 | DIASTOLIC BLOOD PRESSURE: 70 MMHG | SYSTOLIC BLOOD PRESSURE: 122 MMHG | HEART RATE: 84 BPM | OXYGEN SATURATION: 99 % | WEIGHT: 219 LBS

## 2023-11-03 DIAGNOSIS — N50.89 TESTICULAR SWELLING, LEFT: ICD-10-CM

## 2023-11-03 DIAGNOSIS — N43.3 HYDROCELE IN ADULT: ICD-10-CM

## 2023-11-03 PROCEDURE — 99204 OFFICE O/P NEW MOD 45 MIN: CPT | Performed by: PHYSICIAN ASSISTANT

## 2023-11-04 DIAGNOSIS — L40.9 PSORIASIS: ICD-10-CM

## 2023-11-05 ENCOUNTER — HOSPITAL ENCOUNTER (EMERGENCY)
Facility: HOSPITAL | Age: 68
Discharge: HOME | End: 2023-11-05
Attending: EMERGENCY MEDICINE | Admitting: EMERGENCY MEDICINE
Payer: COMMERCIAL

## 2023-11-05 VITALS
DIASTOLIC BLOOD PRESSURE: 76 MMHG | RESPIRATION RATE: 19 BRPM | SYSTOLIC BLOOD PRESSURE: 139 MMHG | TEMPERATURE: 97.9 F | OXYGEN SATURATION: 98 % | HEART RATE: 99 BPM

## 2023-11-05 DIAGNOSIS — Z76.0 MEDICATION REFILL: Primary | ICD-10-CM

## 2023-11-05 DIAGNOSIS — J45.909 ASTHMA: ICD-10-CM

## 2023-11-05 DIAGNOSIS — J44.1 COPD EXACERBATION (HCC): ICD-10-CM

## 2023-11-05 PROCEDURE — 94640 AIRWAY INHALATION TREATMENT: CPT

## 2023-11-05 PROCEDURE — 99281 EMR DPT VST MAYX REQ PHY/QHP: CPT

## 2023-11-05 PROCEDURE — 99284 EMERGENCY DEPT VISIT MOD MDM: CPT | Performed by: EMERGENCY MEDICINE

## 2023-11-05 RX ORDER — ALBUTEROL SULFATE 2.5 MG/3ML
2.5 SOLUTION RESPIRATORY (INHALATION) EVERY 6 HOURS PRN
Qty: 75 ML | Refills: 2 | Status: SHIPPED | OUTPATIENT
Start: 2023-11-05 | End: 2023-11-05 | Stop reason: SDUPTHER

## 2023-11-05 RX ORDER — ALBUTEROL SULFATE 2.5 MG/3ML
2.5 SOLUTION RESPIRATORY (INHALATION) EVERY 6 HOURS PRN
Qty: 75 ML | Refills: 0 | Status: SHIPPED | OUTPATIENT
Start: 2023-11-05 | End: 2023-11-10 | Stop reason: SDUPTHER

## 2023-11-05 RX ORDER — IPRATROPIUM BROMIDE AND ALBUTEROL SULFATE 2.5; .5 MG/3ML; MG/3ML
3 SOLUTION RESPIRATORY (INHALATION) ONCE
Status: COMPLETED | OUTPATIENT
Start: 2023-11-05 | End: 2023-11-05

## 2023-11-05 RX ORDER — ALBUTEROL SULFATE 90 UG/1
2 AEROSOL, METERED RESPIRATORY (INHALATION) EVERY 6 HOURS PRN
Qty: 8.5 G | Refills: 2 | Status: SHIPPED | OUTPATIENT
Start: 2023-11-05 | End: 2023-11-14 | Stop reason: SDUPTHER

## 2023-11-05 RX ADMIN — IPRATROPIUM BROMIDE AND ALBUTEROL SULFATE 3 ML: .5; 3 SOLUTION RESPIRATORY (INHALATION) at 12:12

## 2023-11-06 RX ORDER — TAPINAROF 10 MG/1000MG
CREAM TOPICAL
Qty: 60 G | Refills: 0 | Status: SHIPPED | OUTPATIENT
Start: 2023-11-06

## 2023-11-08 ENCOUNTER — TELEPHONE (OUTPATIENT)
Dept: UROLOGY | Facility: CLINIC | Age: 68
End: 2023-11-08

## 2023-11-08 NOTE — TELEPHONE ENCOUNTER
Spoke w/ pt/scheduled pt for 1/18/2024 w/ Dr Caitlyn Bosch @ [place] op labs/urine cult ordered for 12/28/2023/Surgical Packet emailed to pt

## 2023-11-08 NOTE — TELEPHONE ENCOUNTER
Spoke w/ pt/had to reschedule pt from 1/18/2024 to 1/30/2024 due to OR availability @ [facility] w/ Dr Morena Aguayo

## 2023-11-09 NOTE — ED PROVIDER NOTES
History  Chief Complaint   Patient presents with    Medication Refill     Patient requesting albuterol script for neb. Pt states " I don't need a whole work up I'm just looking for a script quick and a quick treatment. "      59-year-old male with asthma presents because he ran out of his albuterol, he is looking for albuterol nebulization solution. In addition patient is currently having some wheezing and would like a breathing treatment here. He has mildly tight chest, no shortness of breath, no chest pain, no fevers or chills, no other signs of upper respiratory infection. Prior to Admission Medications   Prescriptions Last Dose Informant Patient Reported? Taking?    Georgina 1 MG TBEC  Self Yes No   Trelegy Ellipta 100-62.5-25 MCG/ACT inhaler  Self Yes No   Ubrelvy 100 MG tablet  Self No No   Sig: Take 1 tablet (100 mg total) by mouth daily as needed (migraine)   albuterol (2.5 mg/3 mL) 0.083 % nebulizer solution  Self No No   Sig: Take 3 mL (2.5 mg total) by nebulization every 6 (six) hours as needed for wheezing or shortness of breath   albuterol (PROVENTIL HFA,VENTOLIN HFA) 90 mcg/act inhaler  Self No No   Sig: Inhale 2 puffs every 4 (four) hours as needed for wheezing   Patient not taking: Reported on 11/6/2023   albuterol (ProAir HFA) 90 mcg/act inhaler  Self No No   Sig: Inhale 2 puffs every 6 (six) hours as needed for wheezing   albuterol (ProAir HFA) 90 mcg/act inhaler   No Yes   Sig: Inhale 2 puffs every 6 (six) hours as needed for wheezing   predniSONE 20 mg tablet  Self No No   Sig: Take 2 tablets (40 mg total) by mouth daily for 5 days      Facility-Administered Medications: None       Past Medical History:   Diagnosis Date    Asthma     Chronic pain     cervic and lumbar    COPD (chronic obstructive pulmonary disease) (HCC)     Hyperlipidemia     Migraine        Past Surgical History:   Procedure Laterality Date    APPENDECTOMY      HERNIA REPAIR      4 times    NEPHRECTOMY LIVING DONOR Left 10/2009       Family History   Problem Relation Age of Onset    Breast cancer Mother     Heart disease Father     Breast cancer Maternal Grandmother     Heart disease Paternal Grandmother      I have reviewed and agree with the history as documented. E-Cigarette/Vaping    E-Cigarette Use Never User      E-Cigarette/Vaping Substances    Nicotine No     THC No     CBD No     Flavoring No     Other No     Unknown No      Social History     Tobacco Use    Smoking status: Former     Packs/day: 2.00     Years: 39.00     Total pack years: 78.00     Types: Cigarettes     Start date: 5     Quit date:      Years since quittin.8    Smokeless tobacco: Never   Vaping Use    Vaping Use: Never used   Substance Use Topics    Alcohol use: Yes     Comment: Occasional    Drug use: No       Review of Systems   Constitutional:  Negative for chills and fever. HENT:  Negative for congestion and rhinorrhea. Respiratory:  Positive for chest tightness and wheezing. Negative for cough and shortness of breath. Cardiovascular:  Negative for chest pain and leg swelling. Gastrointestinal:  Negative for abdominal pain, nausea and vomiting. Musculoskeletal:  Negative for back pain and neck pain. Neurological:  Negative for dizziness and headaches. Physical Exam  Physical Exam  Vitals reviewed. Constitutional:       General: He is not in acute distress. Appearance: He is well-developed. He is not ill-appearing, toxic-appearing or diaphoretic. HENT:      Head: Normocephalic and atraumatic. Eyes:      Conjunctiva/sclera: Conjunctivae normal.   Pulmonary:      Effort: Pulmonary effort is normal. No respiratory distress. Breath sounds: Wheezing present. No rhonchi. Comments: Wheezing is resolved after breathing treatment  Chest:      Chest wall: No tenderness. Musculoskeletal:         General: Normal range of motion. Cervical back: Normal range of motion and neck supple.       Right lower leg: No edema. Left lower leg: No edema. Skin:     General: Skin is warm and dry. Coloration: Skin is not pale. Neurological:      Mental Status: He is alert and oriented to person, place, and time. Cranial Nerves: No cranial nerve deficit. Psychiatric:         Behavior: Behavior normal.         Vital Signs  ED Triage Vitals [11/05/23 1152]   Temperature Pulse Respirations Blood Pressure SpO2   97.9 °F (36.6 °C) 99 19 139/76 98 %      Temp Source Heart Rate Source Patient Position - Orthostatic VS BP Location FiO2 (%)   Temporal Monitor -- Left arm --      Pain Score       --           Vitals:    11/05/23 1152   BP: 139/76   Pulse: 99         Visual Acuity      ED Medications  Medications   ipratropium-albuterol (DUO-NEB) 0.5-2.5 mg/3 mL inhalation solution 3 mL (3 mL Nebulization Given 11/5/23 1212)       Diagnostic Studies  Results Reviewed       None                   No orders to display              Procedures  Procedures         ED Course                               SBIRT 22yo+      Flowsheet Row Most Recent Value   Initial Alcohol Screen: US AUDIT-C     1. How often do you have a drink containing alcohol? 0 Filed at: 11/05/2023 1215   2. How many drinks containing alcohol do you have on a typical day you are drinking? 0 Filed at: 11/05/2023 1215   3b. FEMALE Any Age, or MALE 65+: How often do you have 4 or more drinks on one occassion? 0 Filed at: 11/05/2023 1215   Audit-C Score 0 Filed at: 11/05/2023 1215   JARON: How many times in the past year have you. .. Used an illegal drug or used a prescription medication for non-medical reasons? Never Filed at: 11/05/2023 1215                      Medical Decision Making  Asthma exacerbation, improved after breathing treatment provided here  Refilled albuterol for outpatient use. Risk  Prescription drug management.              Disposition  Final diagnoses:   Medication refill   Asthma     Time reflects when diagnosis was documented in both MDM as applicable and the Disposition within this note       Time User Action Codes Description Comment    11/5/2023 11:54 AM Payal Barrera Add [Z76.0] Medication refill     11/5/2023 11:54 AM Payal Barrera Add [J44.1] COPD exacerbation ([address])     11/5/2023 11:56 AM Rhonda Reddy Add [Z25.803] Asthma           ED Disposition       ED Disposition   Discharge    Condition   Stable    Date/Time   [place] Nov 5, 2023 [address] discharge to home/self care. Follow-up Information       Follow up With Specialties Details Why Contact Info Additional Information    Tasha Watt MD Family Medicine Schedule an appointment as soon as possible for a visit  For follow up to ensure improvement, and for further testing and treatment as needed [address] [place] [address]  [address]  [address] Emergency Department Emergency Medicine  If symptoms worsen [address] [facility] Emergency Department, [place], [place], [address]            Discharge Medication List as of 11/5/2023 11:56 AM        CONTINUE these medications which have CHANGED    Details   !! albuterol (ProAir HFA) 90 mcg/act inhaler Inhale 2 puffs every 6 (six) hours as needed for wheezing, Starting Sun 11/5/2023, Normal      albuterol (2.5 mg/3 mL) 0.083 % nebulizer solution Take 3 mL (2.5 mg total) by nebulization every 6 (six) hours as needed for wheezing or shortness of breath, Starting Sun 11/5/2023, Normal       !! - Potential duplicate medications found. Please discuss with provider.         CONTINUE these medications which have NOT CHANGED    Details   !! albuterol (PROVENTIL HFA,VENTOLIN HFA) 90 mcg/act inhaler Inhale 2 puffs every 4 (four) hours as needed for wheezing, Starting Thu 7/12/2018, Print      Georgina 1 MG TBEC Starting Sat 8/12/2023, Historical Med      Trelegy Ellipta 548-89.0-32 MCG/ACT inhaler Starting Wed 5/10/2023, Historical Med      Ubrelvy 100 MG tablet Take 1 tablet (100 mg total) by mouth daily as needed (migraine), Starting Thu 8/24/2023, Normal      Vtama 1 % CREA APPLY 1 APPLICATION TOPICALLY IN THE MORNING, Normal       !! - Potential duplicate medications found. Please discuss with provider. STOP taking these medications       predniSONE 20 mg tablet Comments:   Reason for Stopping:               No discharge procedures on file.     PDMP Review         Value Time User    PDMP Reviewed  Yes 8/24/2023  9:28 AM Geraldine Pagan MD            ED Provider  Electronically Signed by             Harpreet Morel DO  11/09/23 9749

## 2023-11-10 ENCOUNTER — ANESTHESIA (OUTPATIENT)
Dept: GASTROENTEROLOGY | Facility: HOSPITAL | Age: 68
End: 2023-11-10

## 2023-11-10 ENCOUNTER — HOSPITAL ENCOUNTER (OUTPATIENT)
Dept: GASTROENTEROLOGY | Facility: HOSPITAL | Age: 68
Setting detail: OUTPATIENT SURGERY
End: 2023-11-10
Attending: SURGERY
Payer: COMMERCIAL

## 2023-11-10 ENCOUNTER — ANESTHESIA EVENT (OUTPATIENT)
Dept: GASTROENTEROLOGY | Facility: HOSPITAL | Age: 68
End: 2023-11-10

## 2023-11-10 VITALS
DIASTOLIC BLOOD PRESSURE: 58 MMHG | HEART RATE: 77 BPM | RESPIRATION RATE: 13 BRPM | BODY MASS INDEX: 37.6 KG/M2 | SYSTOLIC BLOOD PRESSURE: 131 MMHG | HEIGHT: 64 IN | OXYGEN SATURATION: 99 % | TEMPERATURE: 97.3 F | WEIGHT: 220.24 LBS

## 2023-11-10 DIAGNOSIS — E66.01 MORBID OBESITY (HCC): ICD-10-CM

## 2023-11-10 DIAGNOSIS — J44.1 COPD EXACERBATION (HCC): ICD-10-CM

## 2023-11-10 PROCEDURE — 88342 IMHCHEM/IMCYTCHM 1ST ANTB: CPT | Performed by: PATHOLOGY

## 2023-11-10 PROCEDURE — 88305 TISSUE EXAM BY PATHOLOGIST: CPT | Performed by: PATHOLOGY

## 2023-11-10 PROCEDURE — 43239 EGD BIOPSY SINGLE/MULTIPLE: CPT | Performed by: SURGERY

## 2023-11-10 RX ORDER — SODIUM CHLORIDE, SODIUM LACTATE, POTASSIUM CHLORIDE, CALCIUM CHLORIDE 600; 310; 30; 20 MG/100ML; MG/100ML; MG/100ML; MG/100ML
INJECTION, SOLUTION INTRAVENOUS CONTINUOUS PRN
Status: DISCONTINUED | OUTPATIENT
Start: 2023-11-10 | End: 2023-11-10

## 2023-11-10 RX ORDER — PROPOFOL 10 MG/ML
INJECTION, EMULSION INTRAVENOUS AS NEEDED
Status: DISCONTINUED | OUTPATIENT
Start: 2023-11-10 | End: 2023-11-10

## 2023-11-10 RX ORDER — LIDOCAINE HYDROCHLORIDE 10 MG/ML
INJECTION, SOLUTION EPIDURAL; INFILTRATION; INTRACAUDAL; PERINEURAL AS NEEDED
Status: DISCONTINUED | OUTPATIENT
Start: 2023-11-10 | End: 2023-11-10

## 2023-11-10 RX ORDER — DEXAMETHASONE SODIUM PHOSPHATE 10 MG/ML
INJECTION, SOLUTION INTRAMUSCULAR; INTRAVENOUS AS NEEDED
Status: DISCONTINUED | OUTPATIENT
Start: 2023-11-10 | End: 2023-11-10

## 2023-11-10 RX ORDER — IPRATROPIUM BROMIDE AND ALBUTEROL SULFATE 2.5; .5 MG/3ML; MG/3ML
3 SOLUTION RESPIRATORY (INHALATION) ONCE
Status: DISCONTINUED | OUTPATIENT
Start: 2023-11-10 | End: 2023-11-10

## 2023-11-10 RX ORDER — ALBUTEROL SULFATE 2.5 MG/3ML
2.5 SOLUTION RESPIRATORY (INHALATION) EVERY 6 HOURS PRN
Qty: 75 ML | Refills: 0 | Status: SHIPPED | OUTPATIENT
Start: 2023-11-10 | End: 2023-12-10

## 2023-11-10 RX ADMIN — SODIUM CHLORIDE, SODIUM LACTATE, POTASSIUM CHLORIDE, AND CALCIUM CHLORIDE: .6; .31; .03; .02 INJECTION, SOLUTION INTRAVENOUS at 08:41

## 2023-11-10 RX ADMIN — PROPOFOL 50 MG: 10 INJECTION, EMULSION INTRAVENOUS at 08:59

## 2023-11-10 RX ADMIN — PROPOFOL 100 MG: 10 INJECTION, EMULSION INTRAVENOUS at 08:56

## 2023-11-10 RX ADMIN — PROPOFOL 50 MG: 10 INJECTION, EMULSION INTRAVENOUS at 09:01

## 2023-11-10 RX ADMIN — DEXAMETHASONE SODIUM PHOSPHATE 10 MG: 10 INJECTION, SOLUTION INTRAMUSCULAR; INTRAVENOUS at 08:56

## 2023-11-10 RX ADMIN — IPRATROPIUM BROMIDE 0.5 MG: 0.5 SOLUTION RESPIRATORY (INHALATION) at 09:18

## 2023-11-10 RX ADMIN — LIDOCAINE HYDROCHLORIDE 50 MG: 10 INJECTION, SOLUTION EPIDURAL; INFILTRATION; INTRACAUDAL; PERINEURAL at 08:56

## 2023-11-10 RX ADMIN — PROPOFOL 50 MG: 10 INJECTION, EMULSION INTRAVENOUS at 08:58

## 2023-11-10 NOTE — ANESTHESIA POSTPROCEDURE EVALUATION
Post-Op Assessment Note    CV Status:  Stable  Pain Score: 0    Pain management: adequate     Mental Status:  Sleepy and arousable   Hydration Status:  Euvolemic   PONV Controlled:  Controlled   Airway Patency:  Patent      Post Op Vitals Reviewed: Yes      Staff: CRNA         There were no known notable events for this encounter.     /84 (11/10/23 0909)    Temp (!) 97.3 °F (36.3 °C) (11/10/23 0909)    Pulse 88 (11/10/23 0909)   Resp 13 (11/10/23 0909)    SpO2 94 % (11/10/23 0909)

## 2023-11-10 NOTE — ANESTHESIA PREPROCEDURE EVALUATION
Procedure:  EGD    Increasing hoarseness over last several weeks prompting ENT evaluation - improved with daily PO prednisone. Currently on 1 mg daily. Denies dysphagia, choking sensations. Relevant Problems   ANESTHESIA (within normal limits)      CARDIO (within normal limits)   (+) Intractable migraine without status migrainosus      ENDO (within normal limits)      GI/HEPATIC (within normal limits)      /RENAL (within normal limits)      HEMATOLOGY (within normal limits)      MUSCULOSKELETAL (within normal limits)      NEURO/PSYCH   (+) Intractable migraine without status migrainosus      PULMONARY   (+) Chronic obstructive pulmonary disease (HCC) (Uses Trelegy and albuterol daily. 2 puffs in holding. Ran out of nebulizer therapy)      Other   (+) Obesity, Class II, BMI 35-39.9      ENT 11/6/23:   Congenitally asymmetrical larynx, but both vocal folds are fully mobile and there are no visible lesions per se. His left false vocal fold is particularly prominent, and obscures the view of the left true vocal fold. His CT of the chest shows some of the larynx, but not enough for a complete view. Glottic Closure:    complete  Vocal Process Height: normal  Post-Cricoid Edema: severe    Physical Exam    Airway    Mallampati score: III  TM Distance: >3 FB  Neck ROM: full     Dental    upper dentures and lower dentures,     Cardiovascular  Rhythm: regular, Rate: normal, Cardiovascular exam normal    Pulmonary   Decreased breath sounds    Other Findings        Anesthesia Plan  ASA Score- 3     Anesthesia Type- IV sedation with anesthesia with ASA Monitors. Additional Monitors:     Airway Plan:     Comment: Discussed increased risk of inflammation with laryngeal manipulation, will give IV Decadron and nebulizer therapy. .       Plan Factors-Exercise tolerance (METS): <4 METS. Chart reviewed. EKG reviewed. Imaging results reviewed. Existing labs reviewed. Patient summary reviewed.     Patient is not a current smoker. Obstructive sleep apnea risk education given perioperatively. Induction- intravenous. Postoperative Plan-     Informed Consent- Anesthetic plan and risks discussed with patient. I personally reviewed this patient with the CRNA. Discussed and agreed on the Anesthesia Plan with the CRNA. Norberto Lu

## 2023-11-10 NOTE — H&P
This is a 79 y.o. male with a history of morbid obesity and Body mass index is 37.8 kg/m². Here for an EGD to evaluate the anatomy of the GI tract and to rule out the presence of H. pylori. Physical Exam    /77   Pulse 71   Temp (!) 96.8 °F (36 °C) (Temporal)   Resp 16   Ht 5' 4" (1.626 m)   Wt 99.9 kg (220 lb 3.8 oz)   SpO2 94%   BMI 37.80 kg/m²    AAOx3  RRR  CTA B  Abdomen obese. Benign. A/P:    This is a 79 y.o. male with a history of morbid obesity and Body mass index is 37.8 kg/m². .    Will proceed with the EGD and biopsies.       Kathy Ordonez MD  11/10/2023  8:46 AM

## 2023-11-14 ENCOUNTER — OFFICE VISIT (OUTPATIENT)
Age: 68
End: 2023-11-14
Payer: COMMERCIAL

## 2023-11-14 VITALS
HEIGHT: 64 IN | DIASTOLIC BLOOD PRESSURE: 79 MMHG | WEIGHT: 221 LBS | SYSTOLIC BLOOD PRESSURE: 132 MMHG | BODY MASS INDEX: 37.73 KG/M2 | OXYGEN SATURATION: 95 % | HEART RATE: 89 BPM | RESPIRATION RATE: 18 BRPM | TEMPERATURE: 99 F

## 2023-11-14 DIAGNOSIS — J44.1 COPD EXACERBATION (HCC): ICD-10-CM

## 2023-11-14 DIAGNOSIS — J44.9 COPD (CHRONIC OBSTRUCTIVE PULMONARY DISEASE) (HCC): Primary | ICD-10-CM

## 2023-11-14 PROCEDURE — 99215 OFFICE O/P EST HI 40 MIN: CPT | Performed by: INTERNAL MEDICINE

## 2023-11-14 PROCEDURE — 88305 TISSUE EXAM BY PATHOLOGIST: CPT | Performed by: PATHOLOGY

## 2023-11-14 PROCEDURE — 88342 IMHCHEM/IMCYTCHM 1ST ANTB: CPT | Performed by: PATHOLOGY

## 2023-11-14 RX ORDER — ALBUTEROL SULFATE 90 UG/1
2 AEROSOL, METERED RESPIRATORY (INHALATION) EVERY 6 HOURS PRN
Qty: 8.5 G | Refills: 2 | Status: SHIPPED | OUTPATIENT
Start: 2023-11-14

## 2023-11-14 RX ORDER — FLUTICASONE FUROATE, UMECLIDINIUM BROMIDE AND VILANTEROL TRIFENATATE 100; 62.5; 25 UG/1; UG/1; UG/1
1 POWDER RESPIRATORY (INHALATION) DAILY
Qty: 60 BLISTER | Refills: 5 | Status: SHIPPED | OUTPATIENT
Start: 2023-11-14

## 2023-11-14 RX ORDER — PREDNISONE 1 MG/1
1 TABLET, DELAYED RELEASE ORAL DAILY
Qty: 90 TABLET | Refills: 2 | Status: SHIPPED | OUTPATIENT
Start: 2023-11-14

## 2023-11-14 NOTE — PROGRESS NOTES
Consultation - Pulmonary Medicine   Nona Gustafson 79 y.o. male MRN: [id number]    Physician Requesting Consult: Dr Omar Steiner  Reason for Consult: COPD    Nona Gustafson is a 79 y.o. male who presents for evaluation of COPD. COPD, severe  Mod Emphysema  Patient has a chart diagnosis of COPD due to former smoker + dyspnea. PFTs 2023 with severe obstruction. CT with mod upper lobe emphysema  Highest eos 460, benefit from ICS included in his triple therapy inhaler  Has very frequent exacerbations requiring steroids 5+ times a year when lived in [facility]. Was started on Georgina (prednisone) 1mg by other pulmonologist and reports he has decreased his exacerbations. Had one exacerbation since last visit but this was likely more upper airway disease rather than COPD    - continue Trelegy  - albuterol prn  - continue for now Georgina 1mg (brand name prednisone)   - follow up in 3 months    Heterogeneous Pulm Nodule  RUL 7mm nodule/area of GGO/thickening around a cystic air space. In correlated from 2022 CT neck appears to have more thickening, possibly evolving scar but cannot rule out malignancy. Not seen on repeat CT chest in Sept 2023. Stridor, abnormal vocal cord  ZENA/LPR  Pt with diffuse wheezing that appears to be coming from upper airway. Ddx includes EDAC (excessive dynamic airway collapse) vs TBM (tracheobronchomalacis) vs vocal cord disease. Pt has had many steroid courses putting him at risk for TBM and EDAC CT neck does show "abnormal appearance of laryngeal vestibule."   - ENT following: abnormal false vocal cord  - continue PPI per ENT  - CT neck pending    LEATHA  Does have nighttime symptoms, daytime fatigue, snoring, obesity  - Sleep study scheduled    Pleural plaque  Asbestos Exposure  Pt with prior asbestos exposure as . Was told by [facility] pulmonologist he has asbestos in his lung. On review of CT with thin pleural calcification on right?  Left, consistent with very thin pleural plaque from asbestos exposure. No pleural thickening, no evidence of cancer  - reviewed CT with radiologist  - asbestos plaques do not need routine follow up      Vaccines  - Flu due this season  - had covid booster  - PNA due now , pt declined for now    Immunization History   Administered Date(s) Administered    COVID-19 Moderna mRNA Vaccine 12 Yr+ 50 mcg/0.5 mL (Spikevax) 09/24/2023    INFLUENZA 09/24/2023        I have spent a total time of 45 minutes on 11/14/23 in caring for this patient including Diagnostic results, Risks and benefits of tx options, Instructions for management, Risk factor reductions, Impressions, Counseling / Coordination of care, Documenting in the medical record, Reviewing / ordering tests, medicine, procedures  , Obtaining or reviewing history   and Communicating with other healthcare professionals . ______________________________________________________________________    HPI:    Nona Gustafson is a 79 y.o. male who presents for evaluation of COPD. Dyspnea for a few years  Has a pulmonologist in [facility] in 2020 and since then breathing was worse  Trelegy inhaler for years  Previously on Breztri but didn't like it  Uses neb 2-3x a day  Had frequent exacerbations, 5+ exacerbations a year  Georgina (prednisone) 1mg  Former smoker, quit years ago but still was vaping until earlier this year    Interval Hx:  Pt was seen 2 weeks ago for a sick visit. Had some upper airway wheezing but gien improvement with steroids, was given more prednisone. 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    HENT:  Positive for rhinorrhea, sneezing, sore throat and trouble swallowing. Negative for ear pain and postnasal drip. Respiratory:  Positive for cough, shortness of breath and wheezing. Cardiovascular:  Negative for chest pain. Musculoskeletal:  Positive for myalgias. Neurological:  Positive for headaches. Aside from what is mentioned in the HPI, the review of systems otherwise negative.     Current Medications:    Current Outpatient Medications:     albuterol (2.5 mg/3 mL) 0.083 % nebulizer solution, Take 3 mL (2.5 mg total) by nebulization every 6 (six) hours as needed for wheezing or shortness of breath, Disp: 75 mL, Rfl: 0    albuterol (ProAir HFA) 90 mcg/act inhaler, Inhale 2 puffs every 6 (six) hours as needed for wheezing, Disp: 8.5 g, Rfl: 2    famotidine (PEPCID) 20 mg tablet, Take 1 tablet (20 mg total) by mouth daily at bedtime, Disp: 90 tablet, Rfl: 3    omeprazole (PriLOSEC) 20 mg delayed release capsule, Take 1 capsule (20 mg total) by mouth daily, Disp: 90 capsule, Rfl: 3    Georgina 1 MG TBEC, , Disp: , Rfl:     Trelegy Ellipta 100-62.5-25 MCG/ACT inhaler, , Disp: , Rfl:     Ubrelvy 100 MG tablet, Take 1 tablet (100 mg total) by mouth daily as needed (migraine), Disp: 15 tablet, Rfl: 5    Vtama 1 % CREA, APPLY 1 APPLICATION TOPICALLY IN THE MORNING, Disp: 60 g, Rfl: 0    albuterol (PROVENTIL HFA,VENTOLIN HFA) 90 mcg/act inhaler, Inhale 2 puffs every 4 (four) hours as needed for wheezing (Patient not taking: Reported on 11/6/2023), Disp: 1 Inhaler, Rfl: 0    Historical Information   Past Medical History:   Diagnosis Date    Asthma     Chronic pain     cervic and lumbar    COPD (chronic obstructive pulmonary disease) (HCC)     Emphysema of lung (HCC)     Hyperlipidemia     Migraine      Past Surgical History:   Procedure Laterality Date    APPENDECTOMY      HERNIA REPAIR      4 times    NEPHRECTOMY LIVING DONOR Left 10/2009     Social History   Social History     Tobacco Use   Smoking Status Former    Packs/day: 2.00    Years: 39.00 Total pack years: 78.00    Types: Cigarettes    Start date: 1970    Quit date: 2009    Years since quittin.8   Smokeless Tobacco Never       Family History:   Family History   Problem Relation Age of Onset    Breast cancer Mother     Heart disease Father     Breast cancer Maternal Grandmother     Heart disease Paternal Grandmother          PhysicalExamination:  Vitals:   /79 (BP Location: Right arm, Patient Position: Sitting, Cuff Size: Large)   Pulse 89   Temp 99 °F (37.2 °C)   Resp 18   Ht 5' 4" (1.626 m)   Wt 100 kg (221 lb)   SpO2 95%   BMI 37.93 kg/m²     Appearance -- NAD, speaking full sentences  HEENT -- anicteric sclera, clear OP, MMM  Neck -- no JVD, stridor  Heart -- RRR, no murmurs  Lungs -- diffuse end expiratory wheezing that appears to radiate from the neck  Abdomen -- soft, NTND, +bs  Extremities -- WWP, no LE edema  Skin -- no rash  Neuro -- A&Ox3, wnl  Psych -- no obvious depression or hallucination        Diagnostic Data:  Labs: I personally reviewed the most recent laboratory data pertinent to today's visit    Lab Results   Component Value Date    WBC 5.12 2023    HGB 13.3 2023    HCT 42.5 2023    MCV 74 (L) 2023     2023     Lab Results   Component Value Date    CALCIUM 9.2 2023    K 4.2 2023    CO2 26 2023     2023    BUN 13 2023    CREATININE 0.91 2023     No results found for: "IGE"  Lab Results   Component Value Date    ALT 27 2023    AST 20 2023    ALKPHOS 103 2023       PFT results: The most recent pulmonary function tests were reviewed.   [facility] PFTs 2020: No obstruction (FEV1/FVC 71%, FEV1 72%) no BD change, normal DLCO  2023: Severe obstruction + BD response, some air trapping (NO hyperinflation as per report), mild restriction and mod decrease in DLCO    Imaging:  I personally reviewed the images on the [facility] system pertinent to today's visit  CT Chest 2023  There is a new 7 mm heterogeneous nodular density right upper lobe, indeterminate may be inflammatory/ infectious or neoplastic  Short interval follow-up at 3 months is suggested. Emphysema  No acute inflammatory stranding  No bowel obstruction  No hydronephrosis  Right inguinal hernia containing a small segment of the right lateral bladder wall  S/p left nephrectomy with no mass in the nephrectomy bed    CT Chest 9/2023:  1. The previously described nodule in the right upper lobe is not seen on today's exam however there are few ill-defined opacities in this region which may be due to scarring. 2. Moderate centrilobular emphysema. CT Neck 2022: (my read) RUL w ill defined GGO around a possible emphesematous cystic area. "Abnormal appearance of laryngeal vestibule with mucosal apposition of aryepiglottic folds, effacement of bilateral piriform sinuses, and slight irregularity of bilateral true vocal cords.   Consider direct visualization by ENT for further evaluation."        Cynthia Portillo MD  [facility]

## 2023-11-16 ENCOUNTER — HOSPITAL ENCOUNTER (OUTPATIENT)
Dept: RADIOLOGY | Facility: MEDICAL CENTER | Age: 68
Discharge: HOME | End: 2023-11-16
Payer: COMMERCIAL

## 2023-11-16 DIAGNOSIS — R06.1 STRIDOR: ICD-10-CM

## 2023-11-16 PROCEDURE — G1004 CDSM NDSC: HCPCS

## 2023-11-16 PROCEDURE — 70491 CT SOFT TISSUE NECK W/DYE: CPT

## 2023-11-16 RX ADMIN — IOHEXOL 85 ML: 350 INJECTION, SOLUTION INTRAVENOUS at 09:09
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
## 2023-11-22 ENCOUNTER — OFFICE VISIT (OUTPATIENT)
Dept: BARIATRICS | Facility: CLINIC | Age: 68
End: 2023-11-22

## 2023-11-22 VITALS — WEIGHT: 220.2 LBS | BODY MASS INDEX: 37.8 KG/M2

## 2023-11-22 DIAGNOSIS — Z71.89 ENCOUNTER FOR PRE-BARIATRIC SURGERY COUNSELING AND EDUCATION: Primary | ICD-10-CM

## 2023-11-22 PROCEDURE — RECHECK

## 2023-11-30 ENCOUNTER — HOSPITAL ENCOUNTER (EMERGENCY)
Facility: HOSPITAL | Age: 68
Discharge: HOME | End: 2023-11-30
Attending: EMERGENCY MEDICINE
Payer: COMMERCIAL

## 2023-11-30 ENCOUNTER — APPOINTMENT (EMERGENCY)
Dept: RADIOLOGY | Facility: HOSPITAL | Age: 68
End: 2023-11-30
Payer: COMMERCIAL

## 2023-11-30 VITALS
RESPIRATION RATE: 18 BRPM | HEIGHT: 66 IN | DIASTOLIC BLOOD PRESSURE: 77 MMHG | SYSTOLIC BLOOD PRESSURE: 126 MMHG | HEART RATE: 98 BPM | OXYGEN SATURATION: 95 % | BODY MASS INDEX: 35.36 KG/M2 | TEMPERATURE: 98.8 F | WEIGHT: 220 LBS

## 2023-11-30 DIAGNOSIS — J44.1 COPD EXACERBATION (HCC): Primary | ICD-10-CM

## 2023-11-30 LAB
ANION GAP SERPL CALCULATED.3IONS-SCNC: 5 MMOL/L
ATRIAL RATE: 85 BPM
BASOPHILS # BLD AUTO: 0.05 THOUSANDS/ÂΜL (ref 0–0.1)
BASOPHILS NFR BLD AUTO: 1 % (ref 0–1)
BNP SERPL-MCNC: 17 PG/ML (ref 0–100)
BUN SERPL-MCNC: 20 MG/DL (ref 5–25)
CALCIUM SERPL-MCNC: 9.1 MG/DL (ref 8.4–10.2)
CARDIAC TROPONIN I PNL SERPL HS: <2 NG/L
CHLORIDE SERPL-SCNC: 107 MMOL/L (ref 96–108)
CO2 SERPL-SCNC: 24 MMOL/L (ref 21–32)
CREAT SERPL-MCNC: 0.85 MG/DL (ref 0.6–1.3)
EOSINOPHIL # BLD AUTO: 0.58 THOUSAND/ÂΜL (ref 0–0.61)
EOSINOPHIL NFR BLD AUTO: 8 % (ref 0–6)
ERYTHROCYTE [DISTWIDTH] IN BLOOD BY AUTOMATED COUNT: 15.7 % (ref 11.6–15.1)
FLUAV RNA RESP QL NAA+PROBE: NEGATIVE
FLUBV RNA RESP QL NAA+PROBE: NEGATIVE
GFR SERPL CREATININE-BSD FRML MDRD: 90 ML/MIN/1.73SQ M
GLUCOSE SERPL-MCNC: 91 MG/DL (ref 65–140)
HCT VFR BLD AUTO: 43.8 % (ref 36.5–49.3)
HGB BLD-MCNC: 13.4 G/DL (ref 12–17)
IMM GRANULOCYTES # BLD AUTO: 0.02 THOUSAND/UL (ref 0–0.2)
IMM GRANULOCYTES NFR BLD AUTO: 0 % (ref 0–2)
LYMPHOCYTES # BLD AUTO: 1.99 THOUSANDS/ÂΜL (ref 0.6–4.47)
LYMPHOCYTES NFR BLD AUTO: 26 % (ref 14–44)
MAGNESIUM SERPL-MCNC: 2.2 MG/DL (ref 1.9–2.7)
MCH RBC QN AUTO: 23.1 PG (ref 26.8–34.3)
MCHC RBC AUTO-ENTMCNC: 30.6 G/DL (ref 31.4–37.4)
MCV RBC AUTO: 75 FL (ref 82–98)
MONOCYTES # BLD AUTO: 0.65 THOUSAND/ÂΜL (ref 0.17–1.22)
MONOCYTES NFR BLD AUTO: 9 % (ref 4–12)
NEUTROPHILS # BLD AUTO: 4.35 THOUSANDS/ÂΜL (ref 1.85–7.62)
NEUTS SEG NFR BLD AUTO: 56 % (ref 43–75)
NRBC BLD AUTO-RTO: 0 /100 WBCS
P AXIS: 66 DEGREES
PLATELET # BLD AUTO: 393 THOUSANDS/UL (ref 149–390)
PMV BLD AUTO: 10.4 FL (ref 8.9–12.7)
POTASSIUM SERPL-SCNC: 4.3 MMOL/L (ref 3.5–5.3)
PR INTERVAL: 160 MS
QRS AXIS: -28 DEGREES
QRSD INTERVAL: 92 MS
QT INTERVAL: 368 MS
QTC INTERVAL: 437 MS
RBC # BLD AUTO: 5.81 MILLION/UL (ref 3.88–5.62)
RSV RNA RESP QL NAA+PROBE: NEGATIVE
SARS-COV-2 RNA RESP QL NAA+PROBE: NEGATIVE
SODIUM SERPL-SCNC: 136 MMOL/L (ref 135–147)
T WAVE AXIS: 44 DEGREES
VENTRICULAR RATE: 85 BPM
WBC # BLD AUTO: 7.64 THOUSAND/UL (ref 4.31–10.16)

## 2023-11-30 PROCEDURE — 84484 ASSAY OF TROPONIN QUANT: CPT | Performed by: STUDENT IN AN ORGANIZED HEALTH CARE EDUCATION/TRAINING PROGRAM

## 2023-11-30 PROCEDURE — 94640 AIRWAY INHALATION TREATMENT: CPT

## 2023-11-30 PROCEDURE — 36415 COLL VENOUS BLD VENIPUNCTURE: CPT | Performed by: STUDENT IN AN ORGANIZED HEALTH CARE EDUCATION/TRAINING PROGRAM

## 2023-11-30 PROCEDURE — 99285 EMERGENCY DEPT VISIT HI MDM: CPT

## 2023-11-30 PROCEDURE — 83735 ASSAY OF MAGNESIUM: CPT | Performed by: STUDENT IN AN ORGANIZED HEALTH CARE EDUCATION/TRAINING PROGRAM

## 2023-11-30 PROCEDURE — 80048 BASIC METABOLIC PNL TOTAL CA: CPT | Performed by: STUDENT IN AN ORGANIZED HEALTH CARE EDUCATION/TRAINING PROGRAM

## 2023-11-30 PROCEDURE — 83880 ASSAY OF NATRIURETIC PEPTIDE: CPT | Performed by: STUDENT IN AN ORGANIZED HEALTH CARE EDUCATION/TRAINING PROGRAM

## 2023-11-30 PROCEDURE — 93005 ELECTROCARDIOGRAM TRACING: CPT

## 2023-11-30 PROCEDURE — 96375 TX/PRO/DX INJ NEW DRUG ADDON: CPT

## 2023-11-30 PROCEDURE — 0241U HB NFCT DS VIR RESP RNA 4 TRGT: CPT | Performed by: STUDENT IN AN ORGANIZED HEALTH CARE EDUCATION/TRAINING PROGRAM

## 2023-11-30 PROCEDURE — 71045 X-RAY EXAM CHEST 1 VIEW: CPT

## 2023-11-30 PROCEDURE — 96374 THER/PROPH/DIAG INJ IV PUSH: CPT

## 2023-11-30 PROCEDURE — 85025 COMPLETE CBC W/AUTO DIFF WBC: CPT | Performed by: STUDENT IN AN ORGANIZED HEALTH CARE EDUCATION/TRAINING PROGRAM

## 2023-11-30 PROCEDURE — 99285 EMERGENCY DEPT VISIT HI MDM: CPT | Performed by: STUDENT IN AN ORGANIZED HEALTH CARE EDUCATION/TRAINING PROGRAM

## 2023-11-30 RX ORDER — PREDNISONE 50 MG/1
50 TABLET ORAL DAILY
Qty: 5 TABLET | Refills: 0 | Status: SHIPPED | OUTPATIENT
Start: 2023-11-30 | End: 2023-12-05

## 2023-11-30 RX ORDER — AZITHROMYCIN 250 MG/1
TABLET, FILM COATED ORAL
Qty: 6 TABLET | Refills: 0 | Status: SHIPPED | OUTPATIENT
Start: 2023-11-30 | End: 2023-12-04

## 2023-11-30 RX ORDER — IPRATROPIUM BROMIDE AND ALBUTEROL SULFATE 2.5; .5 MG/3ML; MG/3ML
3 SOLUTION RESPIRATORY (INHALATION) ONCE
Status: COMPLETED | OUTPATIENT
Start: 2023-11-30 | End: 2023-11-30

## 2023-11-30 RX ORDER — ONDANSETRON 2 MG/ML
4 INJECTION INTRAMUSCULAR; INTRAVENOUS ONCE
Status: COMPLETED | OUTPATIENT
Start: 2023-11-30 | End: 2023-11-30

## 2023-11-30 RX ORDER — METHYLPREDNISOLONE SODIUM SUCCINATE 125 MG/2ML
125 INJECTION, POWDER, LYOPHILIZED, FOR SOLUTION INTRAMUSCULAR; INTRAVENOUS ONCE
Status: COMPLETED | OUTPATIENT
Start: 2023-11-30 | End: 2023-11-30

## 2023-11-30 RX ORDER — ONDANSETRON 2 MG/ML
INJECTION INTRAMUSCULAR; INTRAVENOUS
Status: COMPLETED
Start: 2023-11-30 | End: 2023-11-30

## 2023-11-30 RX ADMIN — IPRATROPIUM BROMIDE AND ALBUTEROL SULFATE 3 ML: 2.5; .5 SOLUTION RESPIRATORY (INHALATION) at 14:12

## 2023-11-30 RX ADMIN — METHYLPREDNISOLONE SODIUM SUCCINATE 125 MG: 125 INJECTION, POWDER, FOR SOLUTION INTRAMUSCULAR; INTRAVENOUS at 14:16

## 2023-11-30 RX ADMIN — ONDANSETRON 4 MG: 2 INJECTION INTRAMUSCULAR; INTRAVENOUS at 14:27

## 2023-11-30 RX ADMIN — IPRATROPIUM BROMIDE AND ALBUTEROL SULFATE 3 ML: 2.5; .5 SOLUTION RESPIRATORY (INHALATION) at 14:57

## 2023-11-30 NOTE — ED PROVIDER NOTES
History  Chief Complaint   Patient presents with    Shortness of Breath     Pt. Complaints of worsening SOB x 2 days . Pt. Has hx. of COPD and had nebs treatment at home to no relieve. HPI    Prior to Admission Medications   Prescriptions Last Dose Informant Patient Reported? Taking? Georgina 1 MG TBEC 11/30/2023  No Yes   Sig: Take 1 tablet (1 mg total) by mouth in the morning   Trelegy Ellipta 100-62.5-25 MCG/ACT inhaler 11/30/2023  No Yes   Sig: Inhale 1 puff daily   Ubrelvy 100 MG tablet 11/29/2023 Self No Yes   Sig: Take 1 tablet (100 mg total) by mouth daily as needed (migraine)   Vtama 1 % CREA 11/29/2023  No Yes   Sig: APPLY 1 APPLICATION TOPICALLY IN THE MORNING   albuterol (2.5 mg/3 mL) 0.083 % nebulizer solution 11/30/2023  No Yes   Sig: Take 3 mL (2.5 mg total) by nebulization every 6 (six) hours as needed for wheezing or shortness of breath   albuterol (ProAir HFA) 90 mcg/act inhaler 11/30/2023  No Yes   Sig: Inhale 2 puffs every 6 (six) hours as needed for wheezing   famotidine (PEPCID) 20 mg tablet 11/29/2023  No Yes   Sig: Take 1 tablet (20 mg total) by mouth daily at bedtime   omeprazole (PriLOSEC) 20 mg delayed release capsule 11/30/2023  No Yes   Sig: Take 1 capsule (20 mg total) by mouth daily      Facility-Administered Medications: None       Past Medical History:   Diagnosis Date    Asthma     Chronic pain     cervic and lumbar    COPD (chronic obstructive pulmonary disease) (HCC)     Emphysema of lung (HCC)     Hyperlipidemia     Migraine        Past Surgical History:   Procedure Laterality Date    APPENDECTOMY      HERNIA REPAIR      4 times    NEPHRECTOMY LIVING DONOR Left 10/2009       Family History   Problem Relation Age of Onset    Breast cancer Mother     Heart disease Father     Breast cancer Maternal Grandmother     Heart disease Paternal Grandmother      I have reviewed and agree with the history as documented.     E-Cigarette/Vaping    E-Cigarette Use Former User E-Cigarette/Vaping Substances    Nicotine No     THC No     CBD No     Flavoring No     Other No     Unknown No      Social History     Tobacco Use    Smoking status: Former     Packs/day: 2.00     Years: 39.00     Total pack years: 78.00     Types: Cigarettes     Start date: 1970     Quit date: 2009     Years since quittin.9    Smokeless tobacco: Never   Vaping Use    Vaping Use: Former   Substance Use Topics    Alcohol use: Not Currently     Comment: Occasional    Drug use: No       Review of Systems    Physical Exam  Physical Exam    Vital Signs  ED Triage Vitals [23 1336]   Temperature Pulse Respirations Blood Pressure SpO2   98.8 °F (37.1 °C) 100 (!) 24 141/79 95 %      Temp Source Heart Rate Source Patient Position - Orthostatic VS BP Location FiO2 (%)   Oral Monitor Sitting Right arm --      Pain Score       --           Vitals:    23 1336 23 1500 23 1530   BP: 141/79 133/71 113/71   Pulse: 100 88 95   Patient Position - Orthostatic VS: Sitting Sitting Sitting         Visual Acuity      ED Medications  Medications   ipratropium-albuterol (DUO-NEB) 0.5-2.5 mg/3 mL inhalation solution 3 mL (3 mL Nebulization Given 23 1412)   methylPREDNISolone sodium succinate (Solu-MEDROL) injection 125 mg (125 mg Intravenous Given 23 1416)   ondansetron (ZOFRAN) injection 4 mg (4 mg Intravenous Given 23 1427)   ipratropium-albuterol (DUO-NEB) 0.5-2.5 mg/3 mL inhalation solution 3 mL (3 mL Nebulization Given 23 1457)       Diagnostic Studies  Results Reviewed       Procedure Component Value Units Date/Time    FLU/RSV/COVID - if FLU/RSV clinically relevant [[id number]]  (Normal) Collected: 23 1405    Lab Status: Final result Specimen: Nares from Nose Updated: 23 1501     SARS-CoV-2 Negative     INFLUENZA A PCR Negative     INFLUENZA B PCR Negative     RSV PCR Negative    Narrative:      FOR PEDIATRIC PATIENTS - copy/paste COVID Guidelines URL to browser: [email or web address]    SARS-CoV-2 assay is a Nucleic Acid Amplification assay intended for the  qualitative detection of nucleic acid from SARS-CoV-2 in nasopharyngeal  swabs. Results are for the presumptive identification of SARS-CoV-2 RNA. Positive results are indicative of infection with SARS-CoV-2, the virus  causing COVID-19, but do not rule out bacterial infection or co-infection  with other viruses. Laboratories within the [facility] and its  territories are required to report all positive results to the appropriate  public health authorities. Negative results do not preclude SARS-CoV-2  infection and should not be used as the sole basis for treatment or other  patient management decisions. Negative results must be combined with  clinical observations, patient history, and epidemiological information. This test has not been FDA cleared or approved. This test has been authorized by FDA under an Emergency Use Authorization  (EUA). This test is only authorized for the duration of time the  declaration that circumstances exist justifying the authorization of the  emergency use of an in vitro diagnostic tests for detection of SARS-CoV-2  virus and/or diagnosis of COVID-19 infection under section 564(b)(1) of  the Act, 21 U. S.C. 188RFK-4(T)(1), unless the authorization is terminated  or revoked sooner. The test has been validated but independent review by FDA  and CLIA is pending. Test performed using [employer]: This RT-PCR assay targets N2,  a region unique to SARS-CoV-2. A conserved region in the E-gene was chosen  for pan-Sarbecovirus detection which includes SARS-CoV-2. According to CMS-2020-01-R, this platform meets the definition of high-throughput technology.     HS Troponin 0hr (reflex protocol) [[id number]]  (Normal) Collected: 11/30/23 1405    Lab Status: Final result Specimen: Blood from Arm, Left Updated: 11/30/23 1632 hs TnI 0hr <2 ng/L     B-Type Natriuretic Peptide(BNP) [[id number]]  (Normal) Collected: 11/30/23 1405    Lab Status: Final result Specimen: Blood from Arm, Left Updated: 11/30/23 1450     BNP 17 pg/mL     Basic metabolic panel [[id number]] Collected: 11/30/23 1405    Lab Status: Final result Specimen: Blood from Arm, Left Updated: 11/30/23 1444     Sodium 136 mmol/L      Potassium 4.3 mmol/L      Chloride 107 mmol/L      CO2 24 mmol/L      ANION GAP 5 mmol/L      BUN 20 mg/dL      Creatinine 0.85 mg/dL      Glucose 91 mg/dL      Calcium 9.1 mg/dL      eGFR 90 ml/min/1.73sq m     Narrative:      [place] guidelines for Chronic Kidney Disease (CKD):     Stage 1 with normal or high GFR (GFR > 90 mL/min/1.73 square meters)    Stage 2 Mild CKD (GFR = 60-89 mL/min/1.73 square meters)    Stage 3A Moderate CKD (GFR = 45-59 mL/min/1.73 square meters)    Stage 3B Moderate CKD (GFR = 30-44 mL/min/1.73 square meters)    Stage 4 Severe CKD (GFR = 15-29 mL/min/1.73 square meters)    Stage 5 End Stage CKD (GFR <15 mL/min/1.73 square meters)  Note: GFR calculation is accurate only with a steady state creatinine    Magnesium [[id number]]  (Normal) Collected: 11/30/23 1405    Lab Status: Final result Specimen: Blood from Arm, Left Updated: 11/30/23 1444     Magnesium 2.2 mg/dL     CBC and differential [[id number]]  (Abnormal) Collected: 11/30/23 1405    Lab Status: Final result Specimen: Blood from Arm, Left Updated: 11/30/23 1426     WBC 7.64 Thousand/uL      RBC 5.81 Million/uL      Hemoglobin 13.4 g/dL      Hematocrit 43.8 %      MCV 75 fL      MCH 23.1 pg      MCHC 30.6 g/dL      RDW 15.7 %      MPV 10.4 fL      Platelets 007 Thousands/uL      nRBC 0 /100 WBCs      Neutrophils Relative 56 %      Immat GRANS % 0 %      Lymphocytes Relative 26 %      Monocytes Relative 9 %      Eosinophils Relative 8 %      Basophils Relative 1 %      Neutrophils Absolute 4.35 Thousands/µL      Immature Grans Absolute 0.02 Thousand/uL      Lymphocytes Absolute 1.99 Thousands/µL      Monocytes Absolute 0.65 Thousand/µL      Eosinophils Absolute 0.58 Thousand/µL      Basophils Absolute 0.05 Thousands/µL                    XR chest 1 view portable    (Results Pending)              Procedures  Procedures         ED Course                               SBIRT 22yo+      Flowsheet Row Most Recent Value   Initial Alcohol Screen: US AUDIT-C     1. How often do you have a drink containing alcohol? 0 Filed at: 11/30/2023 1437   2. How many drinks containing alcohol do you have on a typical day you are drinking? 0 Filed at: 11/30/2023 1437   3a. Male UNDER 65: How often do you have five or more drinks on one occasion? 0 Filed at: 11/30/2023 1437   Audit-C Score 0 Filed at: 11/30/2023 1437   JARON: How many times in the past year have you. .. Used an illegal drug or used a prescription medication for non-medical reasons? Never Filed at: 11/30/2023 1437                      Medical Decision Making  EKG rate 85, NSR with sinus arrhythmia. No signs of acute ischemic change. Compared to 9/23    Amount and/or Complexity of Data Reviewed  Labs: ordered. Radiology: ordered. Risk  Prescription drug management. Disposition  Final diagnoses:   COPD exacerbation ([address])     Time reflects when diagnosis was documented in both MDM as applicable and the Disposition within this note       Time User Action Codes Description Comment    11/30/2023  3:28 PM Dominguez Nieves Add [J44.1] COPD exacerbation [facility])           ED Disposition       ED Disposition   Discharge    Condition   Stable    Date/Time   Thu Nov 30, 2023 [address] discharge to home/self care.                    Follow-up Information       Follow up With Specialties Details Why Marlyn Vela MD Family Medicine Call  As needed, If symptoms worsen [address] [place] [address] [phone]              Patient's Medications   Discharge Prescriptions    AZITHROMYCIN (ZITHROMAX) 250 MG TABLET    Take 2 tablets today then 1 tablet daily x 4 days       Start Date: 11/30/2023End Date: 12/4/2023       Order Dose: --       Quantity: 6 tablet    Refills: 0    PREDNISONE 50 MG TABLET    Take 1 tablet (50 mg total) by mouth daily for 5 days       Start Date: 11/30/2023End Date: 12/5/2023       Order Dose: 50 mg       Quantity: 5 tablet    Refills: 0       No discharge procedures on file.     PDMP Review         Value Time User    PDMP Reviewed  Yes 8/24/2023  9:28 AM Tasha Watt MD            ED Provider  Electronically Signed by sinus arrhythmia. No signs of acute ischemic change. Compared to 9/23    Amount and/or Complexity of Data Reviewed  Labs: ordered. Radiology: ordered and independent interpretation performed. ECG/medicine tests: ordered and independent interpretation performed. Risk  Prescription drug management. Disposition  Final diagnoses:   COPD exacerbation ([address])     Time reflects when diagnosis was documented in both MDM as applicable and the Disposition within this note       Time User Action Codes Description Comment    11/30/2023  3:28 PM Nam Jones [J44.1] COPD exacerbation [facility])           ED Disposition       ED Disposition   Discharge    Condition   Stable    Date/Time   Thu Nov 30, 2023 [address] discharge to home/self care.                    Follow-up Information       Follow up With Specialties Details Why Contact Katlyn Zapata MD Family Medicine Call  As needed, If symptoms worsen [address]  [phone]              Discharge Medication List as of 11/30/2023  3:56 PM        START taking these medications    Details   azithromycin (ZITHROMAX) 250 mg tablet Take 2 tablets today then 1 tablet daily x 4 days, Normal      predniSONE 50 mg tablet Take 1 tablet (50 mg total) by mouth daily for 5 days, Starting Thu 11/30/2023, Until Tue 12/5/2023, Normal           CONTINUE these medications which have NOT CHANGED    Details   albuterol (2.5 mg/3 mL) 0.083 % nebulizer solution Take 3 mL (2.5 mg total) by nebulization every 6 (six) hours as needed for wheezing or shortness of breath, Starting Fri 11/10/2023, Until Sun 12/10/2023 at 2359, Normal      albuterol (ProAir HFA) 90 mcg/act inhaler Inhale 2 puffs every 6 (six) hours as needed for wheezing, Starting Tue 11/14/2023, Normal      famotidine (PEPCID) 20 mg tablet Take 1 tablet (20 mg total) by mouth daily at bedtime, Starting Mon 11/6/2023, Normal      omeprazole (PriLOSEC) 20 mg delayed release capsule Take 1 capsule (20 mg total) by mouth daily, Starting Mon 11/6/2023, Normal      Georgina 1 MG TBEC Take 1 tablet (1 mg total) by mouth in the morning, Starting Tue 11/14/2023, Normal      Trelegy Ellipta 100-62.5-25 MCG/ACT inhaler Inhale 1 puff daily, Starting Tue 11/14/2023, Normal      Ubrelvy 100 MG tablet Take 1 tablet (100 mg total) by mouth daily as needed (migraine), Starting u 8/24/2023, Normal      Vtama 1 % CREA APPLY 1 APPLICATION TOPICALLY IN THE MORNING, Normal             No discharge procedures on file.     PDMP Review         Value Time User    PDMP Reviewed  Yes 8/24/2023  9:28 AM Elvira Mayo MD            ED Provider  Electronically Signed by             Chana Chua DO  12/15/23 [id number]

## 2023-11-30 NOTE — DISCHARGE INSTRUCTIONS
Continue to take your steroid as prescribed. You have also been giving a short course antibiotic. Continues over-the-counter medication as needed for discomfort. Please continue to utilize your treatment at home treatments. Follow-up with primary care physician for reevaluation. Return if you have new or worsening symptoms such as chest pain, increased work of breathing or difficulty.

## 2023-12-07 ENCOUNTER — HOSPITAL ENCOUNTER (OUTPATIENT)
Dept: SLEEP CENTER | Facility: CLINIC | Age: 68
Discharge: HOME | End: 2023-12-07
Payer: COMMERCIAL

## 2023-12-07 DIAGNOSIS — G47.19 EXCESSIVE DAYTIME SLEEPINESS: ICD-10-CM

## 2023-12-07 PROCEDURE — 95810 POLYSOM 6/> YRS 4/> PARAM: CPT

## 2023-12-07 PROCEDURE — 95810 POLYSOM 6/> YRS 4/> PARAM: CPT | Performed by: INTERNAL MEDICINE

## 2023-12-08 PROBLEM — G47.33 OSA (OBSTRUCTIVE SLEEP APNEA): Status: ACTIVE | Noted: 2023-12-08

## 2023-12-08 NOTE — PROGRESS NOTES
Sleep Study Documentation    Pre-Sleep Study       Sleep testing procedure explained to patient:YES    Patient napped prior to study:NO    Caffeine:Dayshift worker after 12PM.  Caffeine use:NO    Alcohol:Dayshift workers after 5PM: Alcohol use:NO    Typical day for patient:YES       Study Documentation    Sleep Study Indications: Snore, Excessive Daytime Sleepiness, Witnessed apneas, Excessive daytime sleepiness    Sleep Study: Diagnostic   Snore:Severe  Supplemental O2: no    O2 flow rate (L/min) range   O2 flow rate (L/min) final   Minimum SaO2 79%  Baseline SaO2 92%    EKG abnormalities: no     EEG abnormalities: no    Were abnormal behaviors in sleep observed:NO    Is Total Sleep Study Recording Time < 2 hours: N/A    Is Total Sleep Study Recording Time > 2 hours but study is incomplete: N/A    Is Total Sleep Study Recording Time 6 hours or more but sleep was not obtained: NO    Patient classification:       Post-Sleep Study    Medication used at bedtime or during sleep study:YES over the counter sleep aid    Patient reports time it took to fall asleep:20 to 30 minutes    Patient reports waking up during study:3 or more times. Patient reports returning to sleep in 10 to 30 minutes. Patient reports sleeping 2 to 4 hours without dreaming. Does the Patient feel this is a typical night of sleep:typical    Patient rated sleepiness: Very sleepy or tired    PAP treatment:no.

## 2023-12-10 DIAGNOSIS — G47.33 OSA (OBSTRUCTIVE SLEEP APNEA): Primary | ICD-10-CM

## 2023-12-11 ENCOUNTER — TELEPHONE (OUTPATIENT)
Dept: SLEEP CENTER | Facility: CLINIC | Age: 68
End: 2023-12-11

## 2023-12-11 NOTE — TELEPHONE ENCOUNTER
----- Message from Angela Fernandez MD sent at 12/10/2023 10:27 AM EST -----  Diagnostic PSG showed severe LEATHA. He has coexistent severe COPD. Ordered BiPAP titration study. Could we get this patient study expedited please?   Thank you

## 2023-12-11 NOTE — TELEPHONE ENCOUNTER
Per Dr. Prdaip Maza sleep study shows severe LEATHA AHI is 38.5 and BIPAP study is ordered   Offered patient December 29 in [facility] patient declined he does not want to drive that far.  Scheduled him for 5/29/2024 and place on wait list

## 2023-12-12 ENCOUNTER — APPOINTMENT (EMERGENCY)
Dept: CT IMAGING | Facility: HOSPITAL | Age: 68
End: 2023-12-12
Payer: COMMERCIAL

## 2023-12-12 ENCOUNTER — HOSPITAL ENCOUNTER (EMERGENCY)
Facility: HOSPITAL | Age: 68
Discharge: HOME | End: 2023-12-12
Attending: EMERGENCY MEDICINE
Payer: COMMERCIAL

## 2023-12-12 ENCOUNTER — APPOINTMENT (EMERGENCY)
Dept: RADIOLOGY | Facility: HOSPITAL | Age: 68
End: 2023-12-12
Payer: COMMERCIAL

## 2023-12-12 VITALS
SYSTOLIC BLOOD PRESSURE: 137 MMHG | OXYGEN SATURATION: 95 % | DIASTOLIC BLOOD PRESSURE: 83 MMHG | RESPIRATION RATE: 21 BRPM | HEART RATE: 88 BPM | TEMPERATURE: 98 F

## 2023-12-12 DIAGNOSIS — R49.0 HOARSENESS OF VOICE: ICD-10-CM

## 2023-12-12 DIAGNOSIS — R05.9 COUGH: ICD-10-CM

## 2023-12-12 DIAGNOSIS — R09.A2 FOREIGN BODY SENSATION IN THROAT: Primary | ICD-10-CM

## 2023-12-12 LAB
ATRIAL RATE: 73 BPM
FLUAV RNA RESP QL NAA+PROBE: NEGATIVE
FLUBV RNA RESP QL NAA+PROBE: NEGATIVE
P AXIS: 57 DEGREES
PR INTERVAL: 158 MS
QRS AXIS: -20 DEGREES
QRSD INTERVAL: 90 MS
QT INTERVAL: 392 MS
QTC INTERVAL: 431 MS
RSV RNA RESP QL NAA+PROBE: NEGATIVE
S PYO DNA THROAT QL NAA+PROBE: NOT DETECTED
SARS-COV-2 RNA RESP QL NAA+PROBE: NEGATIVE
T WAVE AXIS: 31 DEGREES
VENTRICULAR RATE: 73 BPM

## 2023-12-12 PROCEDURE — 87651 STREP A DNA AMP PROBE: CPT | Performed by: PHYSICIAN ASSISTANT

## 2023-12-12 PROCEDURE — 0241U HB NFCT DS VIR RESP RNA 4 TRGT: CPT | Performed by: PHYSICIAN ASSISTANT

## 2023-12-12 PROCEDURE — 70490 CT SOFT TISSUE NECK W/O DYE: CPT

## 2023-12-12 PROCEDURE — 71046 X-RAY EXAM CHEST 2 VIEWS: CPT

## 2023-12-12 PROCEDURE — 99284 EMERGENCY DEPT VISIT MOD MDM: CPT | Performed by: PHYSICIAN ASSISTANT

## 2023-12-12 PROCEDURE — 93005 ELECTROCARDIOGRAM TRACING: CPT

## 2023-12-12 PROCEDURE — 99284 EMERGENCY DEPT VISIT MOD MDM: CPT

## 2023-12-12 RX ORDER — ALBUTEROL SULFATE 2.5 MG/3ML
5 SOLUTION RESPIRATORY (INHALATION) ONCE
Status: COMPLETED | OUTPATIENT
Start: 2023-12-12 | End: 2023-12-12

## 2023-12-12 RX ORDER — DIPHENHYDRAMINE HYDROCHLORIDE AND LIDOCAINE HYDROCHLORIDE AND ALUMINUM HYDROXIDE AND MAGNESIUM HYDRO
10 KIT ONCE
Status: COMPLETED | OUTPATIENT
Start: 2023-12-12 | End: 2023-12-12

## 2023-12-12 RX ADMIN — ALBUTEROL SULFATE 5 MG: 2.5 SOLUTION RESPIRATORY (INHALATION) at 07:10

## 2023-12-12 RX ADMIN — DIPHENHYDRAMINE HYDROCHLORIDE AND LIDOCAINE HYDROCHLORIDE AND ALUMINUM HYDROXIDE AND MAGNESIUM HYDRO 10 ML: KIT at 06:51

## 2023-12-12 RX ADMIN — IPRATROPIUM BROMIDE 0.5 MG: 0.5 SOLUTION RESPIRATORY (INHALATION) at 07:10

## 2023-12-12 NOTE — ED PROVIDER NOTES
History  Chief Complaint   Patient presents with    Difficulty Swallowing     "It feels like something is stuck" while pointing to throat; states its been going on for days; pt able to handle PO fluids and food with no difficulty, pt does have chronic copd      75yo male with a history of COPD, obesity, and recently diagnosed LEATHA presenting for evaluation of a foreign body sensation in his throat. Symptoms began a few days ago. He states it feels like something "moves back and forth" while breathing. He did not eat anything that he think is related to his symptoms. He also reports a hoarse voice, increasing cough, and shortness of breath. He denies any fevers, chest pain, vomiting, or regurgitation. He has recently been evaluated by ENT for dysphonia. He had a scope in the office which showed "asymmetry of larynx, with prominence of left false vocal fold obscuring view of left true vocal fold." He had a CT soft tissue neck for follow up of this which revealed "Redemonstrated asymmetric inward buckling of the left thyroid cartilage, suggestive of remote injury. Secondary crowding of the aryepiglottic folds with mucosal abutment and partial effacement of the piriform sinuses. There is unchanged mucosal irregularity of the true vocal cords with no suspicious lesion." He also had an endoscopy on 11/10/23 which showed mild erythema in the antrum and a medium sized hiatal hernia. History provided by:  Patient and medical records   used: No        Prior to Admission Medications   Prescriptions Last Dose Informant Patient Reported? Taking?    Georgina 1 MG TBEC   No No   Sig: Take 1 tablet (1 mg total) by mouth in the morning   Trelegy Ellipta 100-62.5-25 MCG/ACT inhaler   No No   Sig: Inhale 1 puff daily   Ubrelvy 100 MG tablet  Self No No   Sig: Take 1 tablet (100 mg total) by mouth daily as needed (migraine)   Vtama 1 % CREA   No No   Sig: APPLY 1 APPLICATION TOPICALLY IN THE MORNING   albuterol (ProAir HFA) 90 mcg/act inhaler   No No   Sig: Inhale 2 puffs every 6 (six) hours as needed for wheezing   famotidine (PEPCID) 20 mg tablet   No No   Sig: Take 1 tablet (20 mg total) by mouth daily at bedtime   omeprazole (PriLOSEC) 20 mg delayed release capsule   No No   Sig: Take 1 capsule (20 mg total) by mouth daily      Facility-Administered Medications: None       Past Medical History:   Diagnosis Date    Asthma     Chronic pain     cervic and lumbar    COPD (chronic obstructive pulmonary disease) (HCC)     Emphysema of lung (HCC)     Hyperlipidemia     Migraine        Past Surgical History:   Procedure Laterality Date    APPENDECTOMY      HERNIA REPAIR      4 times    NEPHRECTOMY LIVING DONOR Left 10/2009       Family History   Problem Relation Age of Onset    Breast cancer Mother     Heart disease Father     Breast cancer Maternal Grandmother     Heart disease Paternal Grandmother      I have reviewed and agree with the history as documented. E-Cigarette/Vaping    E-Cigarette Use Former User      E-Cigarette/Vaping Substances    Nicotine No     THC No     CBD No     Flavoring No     Other No     Unknown No      Social History     Tobacco Use    Smoking status: Former     Packs/day: 2.00     Years: 39.00     Total pack years: 78.00     Types: Cigarettes     Start date: 1970     Quit date: 2009     Years since quittin.9    Smokeless tobacco: Never   Vaping Use    Vaping Use: Former   Substance Use Topics    Alcohol use: Not Currently     Comment: Occasional    Drug use: No       Review of Systems   Constitutional:  Negative for chills and fever. HENT:  Positive for voice change. Negative for drooling. +FB sensation in throat   Eyes:  Negative for discharge and redness. Respiratory:  Positive for cough, shortness of breath and wheezing. Negative for stridor. Cardiovascular:  Negative for chest pain and leg swelling. Gastrointestinal:  Negative for abdominal pain and vomiting. Musculoskeletal:  Negative for neck pain and neck stiffness. Skin:  Negative for color change and rash. Neurological:  Negative for seizures and syncope. Psychiatric/Behavioral:  Negative for confusion. The patient is not nervous/anxious. All other systems reviewed and are negative. Physical Exam  Physical Exam  Vitals and nursing note reviewed. Constitutional:       General: He is not in acute distress. Appearance: He is well-developed. He is obese. He is not diaphoretic. HENT:      Head: Normocephalic and atraumatic. Right Ear: External ear normal.      Left Ear: External ear normal.      Mouth/Throat:      Mouth: Mucous membranes are moist.      Pharynx: Oropharynx is clear. No oropharyngeal exudate. Comments: Hoarse voice. Posterior oropharynx appears normal. Tolerating oral secretions without difficulty. Eyes:      General: No scleral icterus. Right eye: No discharge. Left eye: No discharge. Conjunctiva/sclera: Conjunctivae normal.   Cardiovascular:      Rate and Rhythm: Normal rate and regular rhythm. Heart sounds: Normal heart sounds. No murmur heard. Pulmonary:      Effort: Pulmonary effort is normal. No respiratory distress. Breath sounds: Decreased air movement present. No stridor. Wheezing (expiratory) present. No rales. Musculoskeletal:         General: No deformity. Normal range of motion. Cervical back: Normal range of motion and neck supple. Skin:     General: Skin is warm and dry. Neurological:      Mental Status: He is alert. He is not disoriented. GCS: GCS eye subscore is 4. GCS verbal subscore is 5. GCS motor subscore is 6.    Psychiatric:         Behavior: Behavior normal.         Vital Signs  ED Triage Vitals [12/12/23 0521]   Temperature Pulse Respirations Blood Pressure SpO2   98 °F (36.7 °C) 75 22 113/70 96 %      Temp src Heart Rate Source Patient Position - Orthostatic VS BP Location FiO2 (%)   -- -- -- -- -- Pain Score       --           Vitals:    12/12/23 0521 12/12/23 0715 12/12/23 0730 12/12/23 0800   BP: 113/70 130/72 125/90 137/83   Pulse: 75 64 78 88         Visual Acuity      ED Medications  Medications   albuterol inhalation solution 5 mg (5 mg Nebulization Given 12/12/23 0710)   ipratropium (ATROVENT) 0.02 % inhalation solution 0.5 mg (0.5 mg Nebulization Given 12/12/23 0710)   diphenhydramine, lidocaine, Al/Mg hydroxide, simethicone (Magic Mouthwash) oral solution 10 mL (10 mL Swish & Swallow Given 12/12/23 5784)       Diagnostic Studies  Results Reviewed       Procedure Component Value Units Date/Time    FLU/RSV/COVID - if FLU/RSV clinically relevant [[id number]]  (Normal) Collected: 12/12/23 0709    Lab Status: Final result Specimen: Nares from Nose Updated: 12/12/23 0759     SARS-CoV-2 Negative     INFLUENZA A PCR Negative     INFLUENZA B PCR Negative     RSV PCR Negative    Narrative:      FOR PEDIATRIC PATIENTS - copy/paste COVID Guidelines URL to browser: [email or web address]    SARS-CoV-2 assay is a Nucleic Acid Amplification assay intended for the  qualitative detection of nucleic acid from SARS-CoV-2 in nasopharyngeal  swabs. Results are for the presumptive identification of SARS-CoV-2 RNA. Positive results are indicative of infection with SARS-CoV-2, the virus  causing COVID-19, but do not rule out bacterial infection or co-infection  with other viruses. Laboratories within the [facility] and its  territories are required to report all positive results to the appropriate  public health authorities. Negative results do not preclude SARS-CoV-2  infection and should not be used as the sole basis for treatment or other  patient management decisions. Negative results must be combined with  clinical observations, patient history, and epidemiological information. This test has not been FDA cleared or approved.     This test has been authorized by FDA under an Emergency Use Authorization  (EUA). This test is only authorized for the duration of time the  declaration that circumstances exist justifying the authorization of the  emergency use of an in vitro diagnostic tests for detection of SARS-CoV-2  virus and/or diagnosis of COVID-19 infection under section 564(b)(1) of  the Act, 21 U. S.C. 554TNX-6(H)(4), unless the authorization is terminated  or revoked sooner. The test has been validated but independent review by FDA  and CLIA is pending. Test performed using [employer] GeneXpert: This RT-PCR assay targets N2,  a region unique to SARS-CoV-2. A conserved region in the E-gene was chosen  for pan-Sarbecovirus detection which includes SARS-CoV-2. According to CMS-2020-01-R, this platform meets the definition of high-throughput technology. Strep A PCR [[id number]]  (Normal) Collected: 12/12/23 0709    Lab Status: Final result Specimen: Throat Updated: 12/12/23 0744     STREP A PCR Not Detected                   CT soft tissue neck wo contrast   Final Result by Domingo Eric MD (12/12 3297)      No acute neck abnormality, suspicious radiopaque foreign body in visualized aerodigestive tract, suspicious neck mass, or cervical lymphadenopathy on this noncontrast exam. Consider direct visualization regarding foreign body sensation in throat. 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 Additional chronic/incidental findings as detailed above.                      Workstation performed: [id number]         XR chest 2 views   ED Interpretation by Shawanda Raya PA-C (12/12 0800)   No acute abnormality                 Procedures  ECG 12 Lead Documentation Only    Date/Time: 12/12/2023 8:29 AM    Performed by: Shawanda Raya PA-C  Authorized by: Shawanda Raya PA-C    Indications / Diagnosis:  SOB  ECG reviewed by me, the ED Provider: yes    Patient location: ED  Rate:     ECG rate:  73    ECG rate assessment: normal    Rhythm:     Rhythm: sinus rhythm    Ectopy:     Ectopy: none    QRS:     QRS axis:  Normal  Conduction:     Conduction: abnormal      Abnormal conduction: incomplete RBBB    ST segments:     ST segments:  Normal  T waves:     T waves: normal             ED Course  ED Course as of 12/12/23 0829   Tue Dec 12, 2023   0647 Patient refuses labs stating he just had blood work 2 weeks ago. 8282 Patient feels his breathing is at baseline after neb treatment. Offered course of prednisone which he declines. SBIRT 20yo+      Flowsheet Row Most Recent Value   Initial Alcohol Screen: US AUDIT-C     1. How often do you have a drink containing alcohol? 0 Filed at: 12/12/2023 0521   2. How many drinks containing alcohol do you have on a typical day you are drinking? 0 Filed at: 12/12/2023 0521   3a. Male UNDER 65: How often do you have five or more drinks on one occasion? 0 Filed at: 12/12/2023 0521   3b. FEMALE Any Age, or MALE 65+: How often do you have 4 or more drinks on one occassion? 0 Filed at: 12/12/2023 0521   Audit-C Score 0 Filed at: 12/12/2023 9218   JARON: How many times in the past year have you. .. Used an illegal drug or used a prescription medication for non-medical reasons? Never Filed at: 12/12/2023 [address] [facility]  89BDI presenting for foreign body sensation in throat x several days. He is unsure what caused his symptoms. Feels like something is moving back and forth in his throat. Also c/o cough and dyspnea. Hx of COPD. No CP. He is afebrile and hemodynamically stable. He is well appearing in no distress. Posterior oropharynx appears normal. He is tolerating oral secretions without difficulty. Wheezing and decreased air movement noted on lung exam.    Initial ED plan: Patient refuses lab work. Will check COVID/flu swab, strep PCR, EKG, CXR, and CT soft tissue neck.  Magic Mouthwash and DuoNeb for symptoms. Final assessment: No ischemic changed on EKG. CXR appears clear. No radiopaque foreign body or suspicious mass seen on CT. Imaging unchanged from prior CT last month. Shortness of breath has resolved after neb treatment and he feels back to baseline. No indication for admission. Offered prednisone which patient declines. Advised close PCP and ENT follow-up. ED return precautions discussed. Patient expressed understanding and is agreeable to plan. Patient discharged in stable condition. Problems Addressed:  Cough: acute illness or injury  Foreign body sensation in throat: acute illness or injury  Hoarseness of voice: acute illness or injury    Amount and/or Complexity of Data Reviewed  Labs: ordered. Radiology: ordered and independent interpretation performed. ECG/medicine tests: ordered and independent interpretation performed. Risk  Prescription drug management. Disposition  Final diagnoses:   Foreign body sensation in throat   Cough   Hoarseness of voice     Time reflects when diagnosis was documented in both MDM as applicable and the Disposition within this note       Time User Action Codes Description Comment    12/12/2023  8:08 AM [address] [R09. A2] Foreign body sensation in throat     12/12/2023  8:08 AM Sonja Talamantes Add [R05.9] Cough     12/12/2023  8:09 AM Sonja Talamantes Add [R49.0] Hoarseness of voice           ED Disposition       ED Disposition   Discharge    Condition   Stable    Date/Time   Tue Dec 12, 2023 [address] discharge to home/self care.                    Follow-up Information       Follow up With Specialties Details Why Contact Info Additional Information    Josh Osei MD Family Medicine Schedule an appointment as soon as possible for a visit   [address] [place] [address] [phone]       Melodie Barcenas MD Otolaryngology Schedule an appointment as soon as possible for a visit   [address] [facility]  [address] Emergency Department Emergency Medicine  If symptoms worsen [address] [facility] Emergency Department, [place], [place], [address]            Discharge Medication List as of 12/12/2023  8:09 AM        CONTINUE these medications which have NOT CHANGED    Details   albuterol (ProAir HFA) 90 mcg/act inhaler Inhale 2 puffs every 6 (six) hours as needed for wheezing, Starting Tue 11/14/2023, Normal      famotidine (PEPCID) 20 mg tablet Take 1 tablet (20 mg total) by mouth daily at bedtime, Starting Mon 11/6/2023, Normal      omeprazole (PriLOSEC) 20 mg delayed release capsule Take 1 capsule (20 mg total) by mouth daily, Starting Mon 11/6/2023, Normal      Georgina 1 MG TBEC Take 1 tablet (1 mg total) by mouth in the morning, Starting Tue 11/14/2023, Normal      Trelegy Ellipta 100-62.5-25 MCG/ACT inhaler Inhale 1 puff daily, Starting Tue 11/14/2023, Normal      Ubrelvy 100 MG tablet Take 1 tablet (100 mg total) by mouth daily as needed (migraine), Starting Thu 8/24/2023, Normal      Vtama 1 % CREA APPLY 1 APPLICATION TOPICALLY IN THE MORNING, Normal             No discharge procedures on file.     PDMP Review         Value Time User    PDMP Reviewed  Yes 8/24/2023  9:28 AM Riccardo Villar MD            ED Provider  Electronically Signed by             Emeterio Tipton PA-C  12/12/23 9499

## 2023-12-20 DIAGNOSIS — L40.9 PSORIASIS: ICD-10-CM

## 2023-12-20 RX ORDER — TAPINAROF 10 MG/1000MG
CREAM TOPICAL
Qty: 60 G | Refills: 0 | Status: SHIPPED | OUTPATIENT
Start: 2023-12-20

## 2023-12-26 ENCOUNTER — TELEPHONE (OUTPATIENT)
Age: 68
End: 2023-12-26

## 2023-12-27 ENCOUNTER — OFFICE VISIT (OUTPATIENT)
Dept: BARIATRICS | Facility: CLINIC | Age: 68
End: 2023-12-27

## 2023-12-27 VITALS — BODY MASS INDEX: 36.32 KG/M2 | WEIGHT: 225 LBS

## 2023-12-27 DIAGNOSIS — E66.9 OBESITY, CLASS II, BMI 35-39.9: Primary | ICD-10-CM

## 2023-12-27 PROCEDURE — RECHECK

## 2023-12-27 NOTE — PROGRESS NOTES
" Bariatric Nutrition Assessment Note-     Insurance: 6 required monthly weight checks  3/6 today    Type of surgery    Interested in sleeve  Surgery Date: TBD  Surgeon: Consult with Dr. Chan  pending       Nutrition Assessment   Danie Liao  68 y.o.  male     There were no vitals taken for this visit.    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 Pt encouraged to lose weight via healthy eating and exercise. Pt may follow Liver Shrinking diet 2 weeks prior to DOS depending on BMI at time. This diet will promote weight loss.    [place]- [place] Equation:  Estimated calories to maintain weightt: 2025   Estimated calories for weight loss 1027-1451 ( 1-2# per wk wt loss - sedentary )  Estimated protein needs  Grams (1.0-1.5 gms/kg BMI at 25 )   Estimated fluid needs 70-82 oz (30-35 ml/kg BMI at 25)      NAFLD Fibrosis Score is: -1.781    NAFLD Score Correlated Fibrosis Severity   <0.12 F0-F2   0.12-0.676 Indeterminate Score   >0.676 F3-F4   **Fibrosis Severity Scale: F0 = no fibrosis; F1= mild fibrosis; F2 = moderate fibrosis; F3 = severe fibrosis; F4 = cirrhosis    NAFLD Score Component Values:  Component Value Date   Age: 68 y.o.     BMI: 35.51 kg/m²    IFG or DM: Yes    AST: 20 U/L 9/26/2023   ALT: 27 U/L 9/26/2023   Platelet: 393 Thousands/uL 11/30/2023   Albumin: 4.0 g/dL 9/26/2023       Weight History Reason for WLS: COPD and trouble breathing - can't lose on his own  Onset of Obesity: Adult - past 6 years  Family history of obesity: Yes  Wt Loss Attempts: Commercial Programs ( NutriSarbaristem.)  High Protein/Low CHO diets (Keto, Atkins, [place], etc.)  Meal Replacements (Medifast, Slim Fast, etc.)  Self Created Diets (Portion Control, Healthy Food " Choices, etc.)  Patient has tried the above for 6 months or more with insufficient weight loss or weight regain, which is why patient has requested to be evaluated for weight loss surgery today  Maximum Wt Lost: 5#None       Review of History and Medications   OTC: None  Past Medical History:   Diagnosis Date    Asthma     Chronic pain     cervic and lumbar    COPD (chronic obstructive pulmonary disease) (HCC)     Emphysema of lung (HCC)     Hyperlipidemia     Migraine      Past Surgical History:   Procedure Laterality Date    APPENDECTOMY      HERNIA REPAIR      4 times    NEPHRECTOMY LIVING DONOR Left 10/2009     Social History     Socioeconomic History    Marital status: /Civil Union     Spouse name: Not on file    Number of children: Not on file    Years of education: Not on file    Highest education level: Not on file   Occupational History    Not on file   Tobacco Use    Smoking status: Former     Current packs/day: 0.00     Average packs/day: 2.0 packs/day for 39.0 years (78.0 ttl pk-yrs)     Types: Cigarettes     Start date: 1970     Quit date: 2009     Years since quittin.9    Smokeless tobacco: Never   Vaping Use    Vaping status: Former   Substance and Sexual Activity    Alcohol use: Not Currently     Comment: Occasional    Drug use: No    Sexual activity: Yes     Partners: Female   Other Topics Concern    Not on file   Social History Narrative    Not on file     Social Determinants of Health     Financial Resource Strain: Low Risk  (2023)    Overall Financial Resource Strain (CARDIA)     Difficulty of Paying Living Expenses: Not hard at all   Food Insecurity: Not on file   Transportation Needs: No Transportation Needs (2023)    PRAPARE - Transportation     Lack of Transportation (Medical): No     Lack of Transportation (Non-Medical): No   Physical Activity: Not on file   Stress: Not on file   Social Connections: Not on file   Intimate Partner Violence: Not on file   Housing  Stability: Not on file       Current Outpatient Medications:     albuterol (ProAir HFA) 90 mcg/act inhaler, Inhale 2 puffs every 6 (six) hours as needed for wheezing, Disp: 8.5 g, Rfl: 2    famotidine (PEPCID) 20 mg tablet, Take 1 tablet (20 mg total) by mouth daily at bedtime, Disp: 90 tablet, Rfl: 3    omeprazole (PriLOSEC) 20 mg delayed release capsule, Take 1 capsule (20 mg total) by mouth daily, Disp: 90 capsule, Rfl: 3    Georgina 1 MG TBEC, Take 1 tablet (1 mg total) by mouth in the morning, Disp: 90 tablet, Rfl: 2    Trelegy Ellipta 100-62.5-25 MCG/ACT inhaler, Inhale 1 puff daily, Disp: 60 blister, Rfl: 5    Ubrelvy 100 MG tablet, Take 1 tablet (100 mg total) by mouth daily as needed (migraine), Disp: 15 tablet, Rfl: 5    Vtama 1 % CREA, APPLY 1 APPLICATION TOPICALLY IN THE MORNING, Disp: 60 g, Rfl: 0  Food Intake and Lifestyle Assessment   Food Intake Assessment completed via usual diet recall  Breakfast: 4 pancakes, butter, Syrup - Real  or oatmeal 0 or Eng Muffin   Lunch: Frozen meal - Michaelina  Snack: Chocolate, cupcakes  Dinner: 1/2 crab [place] -  Snack: Wakes - Corn muffin - half  8 oz Chocolate milk  Beverage intake: water, Chocolate whole milk, juice, coffee 1 cup , and (alcohol and soda - very rare)  Portions:  6 oz Protein  1 c Starch   1/2 c Vegetable  or less   Protein supplement: None   Estimated protein intake per day: 75-80  Estimated fluid intake per day: 2 bottles water 16 oz juice, 1 cup milk  Meals eaten away from home: 2X month  Typical meal pattern: 3 meals per day and 1-2 snacks per day  Eating Behaviors: Consumption of high calorie/ high fat foods, Consumption of high calorie beverages, Large portion sizes, and Craves sweet foods  Food allergies or intolerances:   Allergies   Allergen Reactions    Other Other (See Comments)     Green Pepper    - intolerances to peppers  Cultural or Yazidi considerations: none    Physical Assessment  Physical Activity Retired - household jobs -  " and   Types of exercise: None  Current physical limitations: Breathing    Psychosocial Assessment   Support systems: spouse  Friends who had surgery  Socioeconomic factors: Work as  and  -  - 3 grown children     Nutrition Diagnosis  Diagnosis: Overweight / Obesity (NC-3.3)  Related to: Physical inactivity and Excessive energy intake  As Evidenced by: BMI >25     Nutrition Prescription: Recommend the following diet  Regular    Interventions and Teaching   Discussed pre-op and post-op nutrition guidelines.       Patient educated and handouts provided.  Surgical changes to stomach / GI  Capacity of post-surgery stomach  Diet progression  Adequate hydration  Sugar and fat restriction to decrease \"dumping syndrome\"  Fat restriction to decrease steatorrhea  Expected weight loss  Weight loss plateaus/ possibility of weight regain  Exercise  Suggestions for pre-op diet  Nutrition considerations after surgery  Protein supplements  Meal planning and preparation  Appropriate carbohydrate, protein, and fat intake, and food/fluid choices to maximize safe weight loss, nutrient intake, and tolerance   Dietary and lifestyle changes  Possible problems with poor eating habits  Intuitive eating  Techniques for self monitoring and keeping daily food journal  Potential for food intolerance after surgery, and ways to deal with them including: lactose intolerance, nausea, reflux, vomiting, diarrhea, food intolerance, appetite changes, gas  Vitamin / Mineral supplementation of Multivitamin with minerals, Calcium, Vitamin B12, Iron, Fat Soluble vitamins, and Vitamin D    Patient is not currently pregnant and doesn't desire to become pregnant a minimum of one year post-op    Education provided to: patient and wife    Barriers to learning: No barriers identified    Readiness to change: preparation    Prior research on procedure: internet and friends or family    Comprehension: verbalizes " understanding     Expected Compliance: good  Recommendations  Pt is an appropriate candidate for surgery. Yes    Evaluation / Monitoring  Dietitian to Monitor: Eating pattern as discussed Tolerance of nutrition prescription Body weight Lab values Physical activity Bowel pattern    3/6 Weight Check Visit Summary 12/27/2023  Started pre op process. Reading over manual and working on guidelines. Hydration good - 48-64 oz water decreasing juice from 16oz to 8 oz portion.    Has been avoiding fast food. Dines out occasionally but take portion home. Has overall reduced portions - Exercise limited - d/t SOB and back pain from herniated discs. Questions/concerns addressed. Wife attends sessions with pt for support.  Workflow reviewed and updated    Workflow reviewed:   Psych and/or D+A Clearance: N/A  PCP Letter: referral  Support Group: No longer required, strongly encouraged  Surgeon Appt: 10/26/23  EGD: Completed 11/10/23  **Cardiac Risk Assessment: scheduled 1/30/24  **Sleep Studies: sleep study 12/7/2023 - Severe LEATHA - awaiting BiPAP 5/29/24 - on wait list for sooner  **Blood work: Needs to complete- February  Nicotine test: N/A  Required weight checks: 3/6 today  Weight Loss: Not required, encouraged positive lifestyle changes.    Goals  Eliminate sugar sweetened beverages, Food journal, Exercise 30 minutes 5 times per week, Complete lession plans 1-6, Eat 3 meals per day, and Eliminate mindless snacking  Follow Pre-Surgery guidelines  > Trial Baritastic for food logging  > Maintain  regular meal pattern - include fruits, vegetables and whole grains  > Avoid skipping meals- Can use protein drink as meal replacement  > Decrease portions  > Focus on protein - include lean protein at each meal and snack - Learn to eat protein first  > Limit processed foods, fast foods and dining away from home  > Include meal prepping  > Limit snacks - healthier choices and portion; avoid grazing  > Slow pace of eating and sip fluids  -  practice 30/60 minute rule  > Reduce caffeine/ eliminate by day of surgery  > Reduce/eliminate carbonation by pre-op diet  > Increase water intake - 64 oz  > Increase physical activity/establish exercise regimen as able  > Start multi vitamin and additional Vitamin D 2000IU  Work on skills to cope with emotional eating/mindfull eating  Pre-op weight loss not required but advised not to gain weight  F/U next month with bariatric provider      Time Spent:   30 Minutes

## 2024-01-05 ENCOUNTER — TELEPHONE (OUTPATIENT)
Dept: SLEEP CENTER | Facility: CLINIC | Age: 69
End: 2024-01-05

## 2024-01-05 NOTE — TELEPHONE ENCOUNTER
Per Dr. Edwards patient's sleep study shows severe LEATHA with hypoxia.  BIPAP study ordered by Dr. Edwards and patient scheduled the study.     Spoke with the patient answered questions pertaining to his BIPAP study canceled compliance appointment patient aware he is on wait list to move his study up and will be rescheduled with Dr. Edwards after his study

## 2024-01-15 ENCOUNTER — HOSPITAL ENCOUNTER (EMERGENCY)
Facility: HOSPITAL | Age: 69
Discharge: LEFT BEFORE DISCHARGE | End: 2024-01-15
Attending: EMERGENCY MEDICINE
Payer: COMMERCIAL

## 2024-01-15 ENCOUNTER — APPOINTMENT (EMERGENCY)
Dept: RADIOLOGY | Facility: HOSPITAL | Age: 69
End: 2024-01-15
Payer: COMMERCIAL

## 2024-01-15 VITALS
TEMPERATURE: 98 F | DIASTOLIC BLOOD PRESSURE: 76 MMHG | SYSTOLIC BLOOD PRESSURE: 112 MMHG | RESPIRATION RATE: 22 BRPM | HEART RATE: 99 BPM | OXYGEN SATURATION: 94 %

## 2024-01-15 DIAGNOSIS — J44.1 COPD WITH ACUTE EXACERBATION (HCC): Primary | ICD-10-CM

## 2024-01-15 DIAGNOSIS — L40.9 PSORIASIS: ICD-10-CM

## 2024-01-15 DIAGNOSIS — J44.1 COPD EXACERBATION (HCC): ICD-10-CM

## 2024-01-15 DIAGNOSIS — J96.01 ACUTE RESPIRATORY FAILURE WITH HYPOXIA (HCC): ICD-10-CM

## 2024-01-15 LAB
2HR DELTA HS TROPONIN: -2 NG/L
ALBUMIN SERPL BCP-MCNC: 4.1 G/DL (ref 3.5–5)
ALP SERPL-CCNC: 101 U/L (ref 34–104)
ALT SERPL W P-5'-P-CCNC: 34 U/L (ref 7–52)
ANION GAP SERPL CALCULATED.3IONS-SCNC: 5 MMOL/L
AST SERPL W P-5'-P-CCNC: 22 U/L (ref 13–39)
ATRIAL RATE: 71 BPM
BASOPHILS # BLD AUTO: 0.05 THOUSANDS/ÂΜL (ref 0–0.1)
BASOPHILS NFR BLD AUTO: 1 % (ref 0–1)
BILIRUB SERPL-MCNC: 0.4 MG/DL (ref 0.2–1)
BUN SERPL-MCNC: 15 MG/DL (ref 5–25)
CALCIUM SERPL-MCNC: 9.4 MG/DL (ref 8.4–10.2)
CARDIAC TROPONIN I PNL SERPL HS: 2 NG/L
CARDIAC TROPONIN I PNL SERPL HS: 4 NG/L
CHLORIDE SERPL-SCNC: 105 MMOL/L (ref 96–108)
CO2 SERPL-SCNC: 27 MMOL/L (ref 21–32)
CREAT SERPL-MCNC: 0.95 MG/DL (ref 0.6–1.3)
EOSINOPHIL # BLD AUTO: 0.32 THOUSAND/ÂΜL (ref 0–0.61)
EOSINOPHIL NFR BLD AUTO: 4 % (ref 0–6)
ERYTHROCYTE [DISTWIDTH] IN BLOOD BY AUTOMATED COUNT: 15 % (ref 11.6–15.1)
FLUAV RNA RESP QL NAA+PROBE: NEGATIVE
FLUBV RNA RESP QL NAA+PROBE: NEGATIVE
GFR SERPL CREATININE-BSD FRML MDRD: 83 ML/MIN/1.73SQ M
GLUCOSE SERPL-MCNC: 97 MG/DL (ref 65–140)
HCT VFR BLD AUTO: 41.8 % (ref 36.5–49.3)
HGB BLD-MCNC: 12.8 G/DL (ref 12–17)
IMM GRANULOCYTES # BLD AUTO: 0.03 THOUSAND/UL (ref 0–0.2)
IMM GRANULOCYTES NFR BLD AUTO: 0 % (ref 0–2)
LYMPHOCYTES # BLD AUTO: 1.89 THOUSANDS/ÂΜL (ref 0.6–4.47)
LYMPHOCYTES NFR BLD AUTO: 25 % (ref 14–44)
MCH RBC QN AUTO: 23.2 PG (ref 26.8–34.3)
MCHC RBC AUTO-ENTMCNC: 30.6 G/DL (ref 31.4–37.4)
MCV RBC AUTO: 76 FL (ref 82–98)
MONOCYTES # BLD AUTO: 0.65 THOUSAND/ÂΜL (ref 0.17–1.22)
MONOCYTES NFR BLD AUTO: 9 % (ref 4–12)
NEUTROPHILS # BLD AUTO: 4.56 THOUSANDS/ÂΜL (ref 1.85–7.62)
NEUTS SEG NFR BLD AUTO: 61 % (ref 43–75)
NRBC BLD AUTO-RTO: 0 /100 WBCS
P AXIS: 68 DEGREES
PLATELET # BLD AUTO: 329 THOUSANDS/UL (ref 149–390)
PMV BLD AUTO: 9.4 FL (ref 8.9–12.7)
POTASSIUM SERPL-SCNC: 4.3 MMOL/L (ref 3.5–5.3)
PR INTERVAL: 162 MS
PROT SERPL-MCNC: 7.5 G/DL (ref 6.4–8.4)
QRS AXIS: -22 DEGREES
QRSD INTERVAL: 92 MS
QT INTERVAL: 376 MS
QTC INTERVAL: 408 MS
RBC # BLD AUTO: 5.52 MILLION/UL (ref 3.88–5.62)
RSV RNA RESP QL NAA+PROBE: NEGATIVE
SARS-COV-2 RNA RESP QL NAA+PROBE: NEGATIVE
SODIUM SERPL-SCNC: 137 MMOL/L (ref 135–147)
T WAVE AXIS: 43 DEGREES
VENTRICULAR RATE: 71 BPM
WBC # BLD AUTO: 7.5 THOUSAND/UL (ref 4.31–10.16)

## 2024-01-15 PROCEDURE — 71045 X-RAY EXAM CHEST 1 VIEW: CPT

## 2024-01-15 PROCEDURE — 36415 COLL VENOUS BLD VENIPUNCTURE: CPT | Performed by: PHYSICIAN ASSISTANT

## 2024-01-15 PROCEDURE — 85025 COMPLETE CBC W/AUTO DIFF WBC: CPT | Performed by: PHYSICIAN ASSISTANT

## 2024-01-15 PROCEDURE — 80053 COMPREHEN METABOLIC PANEL: CPT | Performed by: PHYSICIAN ASSISTANT

## 2024-01-15 PROCEDURE — 0241U HB NFCT DS VIR RESP RNA 4 TRGT: CPT | Performed by: PHYSICIAN ASSISTANT

## 2024-01-15 PROCEDURE — 99285 EMERGENCY DEPT VISIT HI MDM: CPT | Performed by: PHYSICIAN ASSISTANT

## 2024-01-15 PROCEDURE — 93005 ELECTROCARDIOGRAM TRACING: CPT

## 2024-01-15 PROCEDURE — 96375 TX/PRO/DX INJ NEW DRUG ADDON: CPT

## 2024-01-15 PROCEDURE — 94644 CONT INHLJ TX 1ST HOUR: CPT

## 2024-01-15 PROCEDURE — 96365 THER/PROPH/DIAG IV INF INIT: CPT

## 2024-01-15 PROCEDURE — 99285 EMERGENCY DEPT VISIT HI MDM: CPT

## 2024-01-15 PROCEDURE — 84484 ASSAY OF TROPONIN QUANT: CPT | Performed by: PHYSICIAN ASSISTANT

## 2024-01-15 PROCEDURE — 94760 N-INVAS EAR/PLS OXIMETRY 1: CPT

## 2024-01-15 RX ORDER — METHYLPREDNISOLONE SODIUM SUCCINATE 125 MG/2ML
80 INJECTION, POWDER, LYOPHILIZED, FOR SOLUTION INTRAMUSCULAR; INTRAVENOUS ONCE
Status: COMPLETED | OUTPATIENT
Start: 2024-01-15 | End: 2024-01-15

## 2024-01-15 RX ORDER — TAPINAROF 10 MG/1000MG
CREAM TOPICAL
Qty: 60 G | Refills: 0 | Status: SHIPPED | OUTPATIENT
Start: 2024-01-15

## 2024-01-15 RX ORDER — MAGNESIUM SULFATE 1 G/100ML
1 INJECTION INTRAVENOUS ONCE
Status: COMPLETED | OUTPATIENT
Start: 2024-01-15 | End: 2024-01-15

## 2024-01-15 RX ORDER — ONDANSETRON 2 MG/ML
4 INJECTION INTRAMUSCULAR; INTRAVENOUS ONCE
Status: COMPLETED | OUTPATIENT
Start: 2024-01-15 | End: 2024-01-15

## 2024-01-15 RX ORDER — PREDNISONE 20 MG/1
40 TABLET ORAL DAILY
Qty: 10 TABLET | Refills: 0 | Status: SHIPPED | OUTPATIENT
Start: 2024-01-15

## 2024-01-15 RX ORDER — ALBUTEROL SULFATE 90 UG/1
1-2 AEROSOL, METERED RESPIRATORY (INHALATION)
Qty: 36 G | Refills: 10 | Status: SHIPPED | OUTPATIENT
Start: 2024-01-15

## 2024-01-15 RX ORDER — ALBUTEROL SULFATE 2.5 MG/3ML
SOLUTION RESPIRATORY (INHALATION)
Qty: 540 ML | Refills: 4 | Status: SHIPPED | OUTPATIENT
Start: 2024-01-15 | End: 2024-01-24 | Stop reason: ALTCHOICE

## 2024-01-15 RX ORDER — SODIUM CHLORIDE FOR INHALATION 0.9 %
12 VIAL, NEBULIZER (ML) INHALATION ONCE
Status: COMPLETED | OUTPATIENT
Start: 2024-01-15 | End: 2024-01-15

## 2024-01-15 RX ADMIN — IPRATROPIUM BROMIDE 1 MG: 0.5 SOLUTION RESPIRATORY (INHALATION) at 10:29

## 2024-01-15 RX ADMIN — METHYLPREDNISOLONE SODIUM SUCCINATE 80 MG: 125 INJECTION, POWDER, FOR SOLUTION INTRAMUSCULAR; INTRAVENOUS at 10:16

## 2024-01-15 RX ADMIN — MAGNESIUM SULFATE HEPTAHYDRATE 1 G: 1 INJECTION, SOLUTION INTRAVENOUS at 10:18

## 2024-01-15 RX ADMIN — ONDANSETRON 4 MG: 2 INJECTION INTRAMUSCULAR; INTRAVENOUS at 11:48

## 2024-01-15 RX ADMIN — Medication 12 ML: at 10:29

## 2024-01-15 RX ADMIN — ALBUTEROL SULFATE 10 MG: 2.5 SOLUTION RESPIRATORY (INHALATION) at 10:29

## 2024-01-15 NOTE — Clinical Note
Date: 1/15/2024  Patient: Danie Liao  Admitted: 1/15/2024 10:08 AM  Attending Provider: Kemar Pollack MD    Danie Liao or his authorized caregiver has made the decision for the patient to leave the emergency department against the advice  of his physician assistant. He or his authorized caregiver has been informed and understands the inherent risks, including death, respiratory failure, cardiac arrest, organ failure.  He or his authorized caregiver has decided to accept the responsibi lity for this decision. Danie Liao and all necessary parties have been advised that he may return for further evaluation or treatment. His condition at time of discharge was fair.  Danie Liao had current vital signs as follows:  /74  (BP Location: Right arm)   Pulse 100   Resp (!) 24

## 2024-01-15 NOTE — DISCHARGE INSTRUCTIONS
Please take prednisone course as directed.  Utilize albuterol treatments as directed for chest tightness and wheezing.  Please return immediately to the emergency department if you experience any new or worsening symptoms as discussed.

## 2024-01-15 NOTE — ED PROVIDER NOTES
History  Chief Complaint   Patient presents with    Shortness of Breath     Patient c/o shortness of breath that started last night. Patient reports history of COPD. Patient c/o chest mid center chest pain that started last night along with left arm numbness.     Danie Liao is a 68-year-old male with past medical history including asthma/COPD, hyperlipidemia, obesity arriving to the emergency department today accompanied by spouse for evaluation with complaint of acute shortness of breath and respiratory distress.  Patient felt that his symptoms had acutely worsened last night during which time he had also developed central chest pain and a sensation of tingling throughout the left upper extremity.  Patient reports dry cough.  He denies any fevers, chills, sweats, recent illness or sick contact.  He denies any exertional chest pain, calf pain or leg swelling, recent travel, recent surgical procedures, personal or family history of VTE.  He is a former tobacco user.  He offers no other complaints or concerns at this time.        Prior to Admission Medications   Prescriptions Last Dose Informant Patient Reported? Taking?   Georgina 1 MG TBEC   No No   Sig: Take 1 tablet (1 mg total) by mouth in the morning   Trelegy Ellipta 100-62.5-25 MCG/ACT inhaler   No No   Sig: Inhale 1 puff daily   Ubrelvy 100 MG tablet  Self No No   Sig: Take 1 tablet (100 mg total) by mouth daily as needed (migraine)   famotidine (PEPCID) 20 mg tablet   No No   Sig: Take 1 tablet (20 mg total) by mouth daily at bedtime   omeprazole (PriLOSEC) 20 mg delayed release capsule   No No   Sig: Take 1 capsule (20 mg total) by mouth daily      Facility-Administered Medications: None       Past Medical History:   Diagnosis Date    Asthma     Chronic pain     cervic and lumbar    COPD (chronic obstructive pulmonary disease) (HCC)     Emphysema of lung (HCC)     Hyperlipidemia     Migraine        Past Surgical History:   Procedure Laterality Date     APPENDECTOMY      HERNIA REPAIR      4 times    NEPHRECTOMY LIVING DONOR Left 10/2009       Family History   Problem Relation Age of Onset    Breast cancer Mother     Heart disease Father     Breast cancer Maternal Grandmother     Heart disease Paternal Grandmother      I have reviewed and agree with the history as documented.    E-Cigarette/Vaping    E-Cigarette Use Former User      E-Cigarette/Vaping Substances    Nicotine No     THC No     CBD No     Flavoring No     Other No     Unknown No      Social History     Tobacco Use    Smoking status: Former     Current packs/day: 0.00     Average packs/day: 2.0 packs/day for 39.0 years (78.0 ttl pk-yrs)     Types: Cigarettes     Start date: 1/1/1970     Quit date: 1/1/2009     Years since quitting: 15.0    Smokeless tobacco: Never   Vaping Use    Vaping status: Former   Substance Use Topics    Alcohol use: Yes     Comment: Occasional    Drug use: No       Review of Systems   Constitutional:  Negative for chills and fever.   Respiratory:  Positive for chest tightness, shortness of breath and wheezing.    Cardiovascular:  Positive for chest pain. Negative for palpitations and leg swelling.   Gastrointestinal:  Negative for nausea and vomiting.   Neurological:  Negative for dizziness and syncope.   All other systems reviewed and are negative.      Physical Exam  Physical Exam  Vitals and nursing note reviewed.   Constitutional:       General: He is not in acute distress.     Appearance: Normal appearance. He is well-developed. He is not ill-appearing, toxic-appearing or diaphoretic.   HENT:      Head: Normocephalic and atraumatic.      Right Ear: External ear normal.      Left Ear: External ear normal.   Eyes:      Conjunctiva/sclera: Conjunctivae normal.   Cardiovascular:      Rate and Rhythm: Normal rate and regular rhythm.      Pulses: Normal pulses.   Pulmonary:      Effort: Prolonged expiration and respiratory distress present.      Breath sounds: Wheezing present.  No rhonchi or rales.      Comments: Audible wheezing with exhalation.  Patient speaking only in short sentences.  Diffuse wheezing on auscultation of the lung fields.  O2 sat stable on room air.  Chest:      Chest wall: No tenderness.   Abdominal:      General: There is no distension.      Palpations: Abdomen is soft.      Tenderness: There is no abdominal tenderness.   Musculoskeletal:         General: Normal range of motion.      Cervical back: Normal range of motion and neck supple.   Skin:     General: Skin is warm and dry.      Capillary Refill: Capillary refill takes less than 2 seconds.   Neurological:      Mental Status: He is alert.      Motor: Motor function is intact. No weakness.      Gait: Gait is intact.   Psychiatric:         Mood and Affect: Mood normal.         Vital Signs  ED Triage Vitals   Temperature Pulse Respirations Blood Pressure SpO2   01/15/24 1319 01/15/24 1011 01/15/24 1011 01/15/24 1011 01/15/24 1011   98 °F (36.7 °C) 71 (!) 28 134/75 95 %      Temp Source Heart Rate Source Patient Position - Orthostatic VS BP Location FiO2 (%)   01/15/24 1319 01/15/24 1011 01/15/24 1011 01/15/24 1011 --   Oral Monitor Lying Right arm       Pain Score       --                  Vitals:    01/15/24 1230 01/15/24 1232 01/15/24 1300 01/15/24 1330   BP: 135/74  134/72 112/76   Pulse: 100 100 100 99   Patient Position - Orthostatic VS: Sitting  Sitting Sitting         Visual Acuity      ED Medications  Medications   albuterol inhalation solution 10 mg (10 mg Nebulization Given 1/15/24 1029)   ipratropium (ATROVENT) 0.02 % inhalation solution 1 mg (1 mg Nebulization Given 1/15/24 1029)   sodium chloride 0.9 % inhalation solution 12 mL (12 mL Nebulization Given 1/15/24 1029)   methylPREDNISolone sodium succinate (Solu-MEDROL) injection 80 mg (80 mg Intravenous Given 1/15/24 1016)   magnesium sulfate IVPB (premix) SOLN 1 g (0 g Intravenous Stopped 1/15/24 1132)   ondansetron (ZOFRAN) injection 4 mg (4 mg  Intravenous Given 1/15/24 1148)       Diagnostic Studies  Results Reviewed       Procedure Component Value Units Date/Time    HS Troponin I 2hr [[id number]]  (Normal) Collected: 01/15/24 1222    Lab Status: Final result Specimen: Blood from Arm, Left Updated: 01/15/24 1301     hs TnI 2hr 2 ng/L      Delta 2hr hsTnI -2 ng/L     FLU/RSV/COVID - if FLU/RSV clinically relevant [[id number]]  (Normal) Collected: 01/15/24 1030    Lab Status: Final result Specimen: Nares from Nose Updated: 01/15/24 1114     SARS-CoV-2 Negative     INFLUENZA A PCR Negative     INFLUENZA B PCR Negative     RSV PCR Negative    Narrative:      FOR PEDIATRIC PATIENTS - copy/paste COVID Guidelines URL to browser: [email or web address]    SARS-CoV-2 assay is a Nucleic Acid Amplification assay intended for the  qualitative detection of nucleic acid from SARS-CoV-2 in nasopharyngeal  swabs. Results are for the presumptive identification of SARS-CoV-2 RNA.    Positive results are indicative of infection with SARS-CoV-2, the virus  causing COVID-19, but do not rule out bacterial infection or co-infection  with other viruses. Laboratories within the [place] and its  territories are required to report all positive results to the appropriate  public health authorities. Negative results do not preclude SARS-CoV-2  infection and should not be used as the sole basis for treatment or other  patient management decisions. Negative results must be combined with  clinical observations, patient history, and epidemiological information.  This test has not been FDA cleared or approved.    This test has been authorized by FDA under an Emergency Use Authorization  (EUA). This test is only authorized for the duration of time the  declaration that circumstances exist justifying the authorization of the  emergency use of an in vitro diagnostic tests for detection of SARS-CoV-2  virus and/or diagnosis of COVID-19  infection under section 564(b)(1) of  the Act, 21 U.S.C. 360bbb-3(b)(1), unless the authorization is terminated  or revoked sooner. The test has been validated but independent review by FDA  and CLIA is pending.    Test performed using ARXpert: This RT-PCR assay targets N2,  a region unique to SARS-CoV-2. A conserved region in the E-gene was chosen  for pan-Sarbecovirus detection which includes SARS-CoV-2.    According to CMS-2020-01-R, this platform meets the definition of high-throughput technology.    HS Troponin 0hr (reflex protocol) [[id number]]  (Normal) Collected: 01/15/24 1014    Lab Status: Final result Specimen: Blood from Arm, Right Updated: 01/15/24 1045     hs TnI 0hr 4 ng/L     Comprehensive metabolic panel [[id number]] Collected: 01/15/24 1014    Lab Status: Final result Specimen: Blood from Arm, Right Updated: 01/15/24 1037     Sodium 137 mmol/L      Potassium 4.3 mmol/L      Chloride 105 mmol/L      CO2 27 mmol/L      ANION GAP 5 mmol/L      BUN 15 mg/dL      Creatinine 0.95 mg/dL      Glucose 97 mg/dL      Calcium 9.4 mg/dL      AST 22 U/L      ALT 34 U/L      Alkaline Phosphatase 101 U/L      Total Protein 7.5 g/dL      Albumin 4.1 g/dL      Total Bilirubin 0.40 mg/dL      eGFR 83 ml/min/1.73sq m     Narrative:      National Kidney Disease Foundation guidelines for Chronic Kidney Disease (CKD):     Stage 1 with normal or high GFR (GFR > 90 mL/min/1.73 square meters)    Stage 2 Mild CKD (GFR = 60-89 mL/min/1.73 square meters)    Stage 3A Moderate CKD (GFR = 45-59 mL/min/1.73 square meters)    Stage 3B Moderate CKD (GFR = 30-44 mL/min/1.73 square meters)    Stage 4 Severe CKD (GFR = 15-29 mL/min/1.73 square meters)    Stage 5 End Stage CKD (GFR <15 mL/min/1.73 square meters)  Note: GFR calculation is accurate only with a steady state creatinine    CBC and differential [[id number]]  (Abnormal) Collected: 01/15/24 1014    Lab Status: Final result Specimen: Blood from Arm, Right Updated:  01/15/24 1022     WBC 7.50 Thousand/uL      RBC 5.52 Million/uL      Hemoglobin 12.8 g/dL      Hematocrit 41.8 %      MCV 76 fL      MCH 23.2 pg      MCHC 30.6 g/dL      RDW 15.0 %      MPV 9.4 fL      Platelets 329 Thousands/uL      nRBC 0 /100 WBCs      Neutrophils Relative 61 %      Immat GRANS % 0 %      Lymphocytes Relative 25 %      Monocytes Relative 9 %      Eosinophils Relative 4 %      Basophils Relative 1 %      Neutrophils Absolute 4.56 Thousands/µL      Immature Grans Absolute 0.03 Thousand/uL      Lymphocytes Absolute 1.89 Thousands/µL      Monocytes Absolute 0.65 Thousand/µL      Eosinophils Absolute 0.32 Thousand/µL      Basophils Absolute 0.05 Thousands/µL                    XR chest 1 view portable    (Results Pending)              Procedures  ECG 12 Lead Documentation Only    Date/Time: 1/15/2024 10:11 AM    Performed by: Lexii Kelley PA-C  Authorized by: Lexii Kelley PA-C    Indications / Diagnosis:  Shortness of breath  ECG reviewed by me, the ED Provider: yes    Patient location:  ED  Rate:     ECG rate:  71    ECG rate assessment: normal    Rhythm:     Rhythm: sinus rhythm    Ectopy:     Ectopy: none    QRS:     QRS axis:  Normal  Conduction:     Conduction: abnormal      Abnormal conduction: incomplete RBBB    Comments:      No ischemic ST/T wave changes           ED Course           12:00 PM Made aware by patient's nurse that he desaturated to 86% on room air and he was placed on 2 L nasal cannula oxygen                    SBIRT 20yo+      Flowsheet Row Most Recent Value   Initial Alcohol Screen: US AUDIT-C     1. How often do you have a drink containing alcohol? 0 Filed at: 01/15/2024 1007   2. How many drinks containing alcohol do you have on a typical day you are drinking?  0 Filed at: 01/15/2024 1007   3a. Male UNDER 65: How often do you have five or more drinks on one occasion? 0 Filed at: 01/15/2024 1007   3b. FEMALE Any Age, or MALE 65+: How often do you have 4 or more  drinks on one occassion? 0 Filed at: 01/15/2024 1007   Audit-C Score 0 Filed at: 01/15/2024 1007   JARON: How many times in the past year have you...    Used an illegal drug or used a prescription medication for non-medical reasons? Never Filed at: 01/15/2024 1007                      Medical Decision Making  This is a 68-year-old male arriving in acute respiratory distress in the setting of a COPD exacerbation.  States his symptoms have been progressing over the past couple of days though acutely worsened last night.  He states that last night he also experienced the onset of central chest pain and numbness in the left upper extremity.    Differential diagnosis includes but is not limited to: COPD exacerbation, pneumonia, pleurisy, viral illness, ACS/anginal equivalent, arrhythmia, CHF    Initial ED plan: ECG, labs, imaging, breathing treatments/steroids and reassessment    Final ED Assessment: Vital signs reviewed on ED presentation, examination as above. All labs and imaging independently reviewed with imaging interpreted by the Radiologist.  ECG without ischemic change, troponin within normal limits.  COVID/flu/RSV negative.  There are no significant lab results.  There is no acute cardiopulmonary process on my preliminary review of the patient's chest x-ray.  Following the completion of the perez neb the patient was observed to be hypoxic to 86% on room air and placed on nasal cannula oxygen.  Patient reports that his chest pain had resolved during ED course. Test results discussed with the patient and spouse at bedside.  In the setting of acute respiratory failure with hypoxia I recommended hospital admission and patient adamantly refuses.  We discussed risks of informed refusal including but not limited to respiratory arrest, cardiac arrest, organ failure, chronic disability, all of which may result in death.  Patient verbalized understanding and has signed AMA form.  He was discharged with prescription for  prednisone.  He states that he does not require any refills of his bronchodilators.  Emphasized importance of outpatient follow-up with his pulmonologist as soon as able.  Patient encouraged to return at any time to the emergency department for further evaluation and management.  Patient verbalized understanding of plan as above.    Amount and/or Complexity of Data Reviewed  Labs: ordered.  Radiology: ordered.  ECG/medicine tests: ordered.    Risk  Prescription drug management.             Disposition  Final diagnoses:   COPD with acute exacerbation (HCC)   Acute respiratory failure with hypoxia (HCC)     Time reflects when diagnosis was documented in both MDM as applicable and the Disposition within this note       Time User Action Codes Description Comment    1/15/2024  1:21 PM Lexii Kelley [J44.1] COPD with acute exacerbation (HCC)     1/15/2024  1:21 PM Lexii Kelley [J96.01] Acute respiratory failure with hypoxia (HCC)           ED Disposition       ED Disposition   AMA    Condition   --    Date/Time   Mon Alfredo 15, 2024 1326    Comment   Date: 1/15/2024  Patient: Danie Liao  Admitted: 1/15/2024 10:08 AM  Attending Provider: Kemar Pollack MD    Danie Liao or his authorized caregiver has made the decision for the patient to leave the emergency department against the advice  of his physician assistant. He or his authorized caregiver has been informed and understands the inherent risks, including death, respiratory failure, cardiac arrest, organ failure, chronic disability.  He or his authorized caregiver has decided to a ccept the responsibility for this decision. Danie Liao and all necessary parties have been advised that he may return for further evaluation or treatment. His condition at time of discharge was fair.  Danie Liao had current vital signs as  follows:  /74 (BP Location: Right arm)   Pulse 100   Resp (!) 24                Follow-up Information       Follow  up With Specialties Details Why Contact Info Additional Information    Gosia Mcgill MD Family Medicine   [address] [place] [address]  [address]  [facility] [address]  [phone]       [facility]   [address] [facility] [address] [phone] [facility], [address], [place], [place], [address] [phone]    [facility] Emergency Department Emergency Medicine  If symptoms worsen [address] [facility], [place] [address] [phone] [facility] Emergency Department, [address] [place], [place], [address]            Discharge Medication List as of 1/15/2024  1:52 PM        START taking these medications    Details   predniSONE 20 mg tablet Take 2 tablets (40 mg total) by mouth daily, Starting Mon 1/15/2024, Normal           CONTINUE these medications which have NOT CHANGED    Details   famotidine (PEPCID) 20 mg tablet Take 1 tablet (20 mg total) by mouth daily at bedtime, Starting Mon 11/6/2023, Normal      omeprazole (PriLOSEC) 20 mg delayed release capsule Take 1 capsule (20 mg total) by mouth daily, Starting Mon 11/6/2023, Normal      Georgina 1 MG TBEC Take 1 tablet (1 mg total) by mouth in the morning, Starting Tue 11/14/2023, Normal      Trelegy Ellipta 100-62.5-25 MCG/ACT inhaler Inhale 1 puff daily, Starting Tue 11/14/2023, Normal      Ubrelvy 100 MG tablet Take 1 tablet (100 mg total) by mouth daily as needed (migraine), Starting Thu 8/24/2023, Normal      albuterol (ProAir HFA) 90 mcg/act inhaler Inhale 2 puffs every 6 (six) hours as needed for wheezing, Starting Tue 11/14/2023, Normal      Vtama 1 % CREA APPLY 1 APPLICATION TOPICALLY IN THE MORNING, Normal             No discharge procedures on file.    PDMP Review         Value Time User    PDMP Reviewed  Yes 8/24/2023  9:28 AM Gosia Mcgill MD            ED  Provider  Electronically Signed by             Lexii Kelley PA-C  01/15/24 0960

## 2024-01-22 RX ORDER — ALBUTEROL SULFATE 2.5 MG/3ML
(2.5 MG/3ML) SOLUTION RESPIRATORY (INHALATION) 4 TIMES DAILY
Qty: 3 | Refills: 3 | Status: ACTIVE | COMMUNITY
Start: 2024-01-22 | End: 1900-01-01

## 2024-01-24 ENCOUNTER — ANESTHESIA EVENT (OUTPATIENT)
Dept: PERIOP | Facility: HOSPITAL | Age: 69
End: 2024-01-24
Payer: COMMERCIAL

## 2024-01-24 RX ORDER — IPRATROPIUM BROMIDE AND ALBUTEROL SULFATE 2.5; .5 MG/3ML; MG/3ML
3 SOLUTION RESPIRATORY (INHALATION) 4 TIMES DAILY
COMMUNITY
End: 2024-02-05 | Stop reason: SDUPTHER

## 2024-01-24 NOTE — PRE-PROCEDURE INSTRUCTIONS
Pre-Surgery Instructions:   Medication Instructions    albuterol (PROVENTIL HFA,VENTOLIN HFA) 90 mcg/act inhaler No longer using    DOXYCYCLINE PO Will be completed prior to surgery    ipratropium-albuterol (DUO-NEB) 0.5-2.5 mg/3 mL nebulizer solution May use day of surgery if needed      omeprazole (PriLOSEC) 20 mg delayed release capsule Take this medication day of surgery if normally taken in the morning./    predniSONE 20 mg tablet Take this medication day of surgery if normally taken in the morning.      Georgina 1 MG TBEC Take this medication day of surgery if normally taken in the morning.      Trelegy Ellipta 100-62.5-25 MCG/ACT inhaler Take this medication day of surgery if normally taken in the morning.      Ubrelvy 100 MG tablet Hold day of surgery      [DISCONTINUED] famotidine (PEPCID) 20 mg tablet     Medication instructions for day surgery reviewed. Please use only a sip of water to take your instructed medications. Avoid all over the counter vitamins, supplements and NSAIDS for one week prior to surgery per anesthesia guidelines. Tylenol is ok to take as needed.     You will receive a call one business day prior to surgery with an arrival time and hospital directions. If your surgery is scheduled on a Monday, the hospital will be calling you on the Friday prior to your surgery. If you have not heard from anyone by 8pm, please call the hospital supervisor through the hospital  at [phone]. (Luis [phone]).    Do not eat or drink anything after midnight the night before your surgery, including candy, mints, lifesavers, or chewing gum. Do not drink alcohol 24hrs before your surgery. Try not to smoke at least 24hrs before your surgery.       Follow the pre surgery showering instructions as listed in the “My Surgical Experience Booklet” or otherwise provided by your surgeon's office. Do not use a blade to shave the surgical area 1 week before surgery. It is okay to use a clean electric  clippers up to 24 hours before surgery. Do not apply any lotions, creams, including makeup, cologne, deodorant, or perfumes after showering on the day of your surgery. Do not use dry shampoo, hair spray, hair gel, or any type of hair products.     No contact lenses, eye make-up, or artificial eyelashes. Remove nail polish, including gel polish, and any artificial, gel, or acrylic nails if possible. Remove all jewelry including rings and body piercing jewelry.     Wear causal clothing that is easy to take on and off. Consider your type of surgery.    Keep any valuables, jewelry, piercings at home. Please bring any specially ordered equipment (sling, braces) if indicated.    Arrange for a responsible person to drive you to and from the hospital on the day of your surgery. Visitor Guidelines discussed.     Call the surgeon's office with any new illnesses, exposures, or additional questions prior to surgery.    Please reference your “My Surgical Experience Booklet” for additional information to prepare for your upcoming surgery.

## 2024-01-25 ENCOUNTER — APPOINTMENT (OUTPATIENT)
Age: 69
End: 2024-01-25
Payer: COMMERCIAL

## 2024-01-25 DIAGNOSIS — N43.3 HYDROCELE IN ADULT: ICD-10-CM

## 2024-01-25 DIAGNOSIS — R39.89 SUSPECTED UTI: ICD-10-CM

## 2024-01-25 DIAGNOSIS — Z01.812 PRE-OPERATIVE LABORATORY EXAMINATION: ICD-10-CM

## 2024-01-25 LAB
ANION GAP SERPL CALCULATED.3IONS-SCNC: 8 MMOL/L
BASOPHILS # BLD AUTO: 0.07 THOUSANDS/ÂΜL (ref 0–0.1)
BASOPHILS NFR BLD AUTO: 1 % (ref 0–1)
BUN SERPL-MCNC: 20 MG/DL (ref 5–25)
CALCIUM SERPL-MCNC: 9.5 MG/DL (ref 8.4–10.2)
CHLORIDE SERPL-SCNC: 103 MMOL/L (ref 96–108)
CO2 SERPL-SCNC: 29 MMOL/L (ref 21–32)
CREAT SERPL-MCNC: 1.05 MG/DL (ref 0.6–1.3)
EOSINOPHIL # BLD AUTO: 0.19 THOUSAND/ÂΜL (ref 0–0.61)
EOSINOPHIL NFR BLD AUTO: 2 % (ref 0–6)
ERYTHROCYTE [DISTWIDTH] IN BLOOD BY AUTOMATED COUNT: 15.6 % (ref 11.6–15.1)
GFR SERPL CREATININE-BSD FRML MDRD: 73 ML/MIN/1.73SQ M
GLUCOSE P FAST SERPL-MCNC: 84 MG/DL (ref 65–99)
HCT VFR BLD AUTO: 43.1 % (ref 36.5–49.3)
HGB BLD-MCNC: 12.7 G/DL (ref 12–17)
IMM GRANULOCYTES # BLD AUTO: 0.21 THOUSAND/UL (ref 0–0.2)
IMM GRANULOCYTES NFR BLD AUTO: 2 % (ref 0–2)
LYMPHOCYTES # BLD AUTO: 3.09 THOUSANDS/ÂΜL (ref 0.6–4.47)
LYMPHOCYTES NFR BLD AUTO: 28 % (ref 14–44)
MCH RBC QN AUTO: 23 PG (ref 26.8–34.3)
MCHC RBC AUTO-ENTMCNC: 29.5 G/DL (ref 31.4–37.4)
MCV RBC AUTO: 78 FL (ref 82–98)
MONOCYTES # BLD AUTO: 1.15 THOUSAND/ÂΜL (ref 0.17–1.22)
MONOCYTES NFR BLD AUTO: 10 % (ref 4–12)
NEUTROPHILS # BLD AUTO: 6.43 THOUSANDS/ÂΜL (ref 1.85–7.62)
NEUTS SEG NFR BLD AUTO: 57 % (ref 43–75)
NRBC BLD AUTO-RTO: 0 /100 WBCS
PLATELET # BLD AUTO: 416 THOUSANDS/UL (ref 149–390)
PMV BLD AUTO: 10.8 FL (ref 8.9–12.7)
POTASSIUM SERPL-SCNC: 4.6 MMOL/L (ref 3.5–5.3)
RBC # BLD AUTO: 5.51 MILLION/UL (ref 3.88–5.62)
SODIUM SERPL-SCNC: 140 MMOL/L (ref 135–147)
WBC # BLD AUTO: 11.14 THOUSAND/UL (ref 4.31–10.16)

## 2024-01-25 PROCEDURE — 36415 COLL VENOUS BLD VENIPUNCTURE: CPT

## 2024-01-25 PROCEDURE — 80048 BASIC METABOLIC PNL TOTAL CA: CPT

## 2024-01-25 PROCEDURE — 85025 COMPLETE CBC W/AUTO DIFF WBC: CPT

## 2024-01-25 PROCEDURE — 87086 URINE CULTURE/COLONY COUNT: CPT

## 2024-01-25 NOTE — PROGRESS NOTES
"4/6 weight check. Getting a \"farmbox\" every 3 months with fruits and vegetables. Still was having trouble with his breathing- went to urgent care and gave him medication that has been helping, sleeping better. Has surgery 1/30- hydrocelectomy.  Workflow reviewed:   Psych and/or D+A Clearance: N/A  PCP Letter: referral  Support Group: No longer required, strongly encouraged  Surgeon Appt: 10/26/23  EGD: Completed 11/10/23  **Cardiac Risk Assessment: reports he is on a waiting list  **Sleep Studies: had sleep study 12/7 and dx with severe LEATHA- bipap study ordered- scheduled 5/31/24  **Blood work: Needs to complete- February  Nicotine test: N/A  Required weight checks: 4/6 today  Weight Loss: Not required, encouraged positive lifestyle changes.  Maura Vo LCSW    "

## 2024-01-26 ENCOUNTER — OFFICE VISIT (OUTPATIENT)
Dept: BARIATRICS | Facility: CLINIC | Age: 69
End: 2024-01-26

## 2024-01-26 DIAGNOSIS — Z71.89 ENCOUNTER FOR PRE-BARIATRIC SURGERY COUNSELING AND EDUCATION: Primary | ICD-10-CM

## 2024-01-26 LAB — BACTERIA UR CULT: NORMAL

## 2024-01-26 PROCEDURE — RECHECK

## 2024-01-29 ENCOUNTER — TELEPHONE (OUTPATIENT)
Dept: FAMILY MEDICINE CLINIC | Facility: CLINIC | Age: 69
End: 2024-01-29

## 2024-01-29 NOTE — TELEPHONE ENCOUNTER
Received call from 's insurance company [employer] -- additional information is needed for the prior auth of  Ubrelvy. Please call [phone]       Please advise

## 2024-01-30 ENCOUNTER — TELEPHONE (OUTPATIENT)
Age: 69
End: 2024-01-30

## 2024-01-30 ENCOUNTER — HOSPITAL ENCOUNTER (OUTPATIENT)
Facility: HOSPITAL | Age: 69
Setting detail: OUTPATIENT SURGERY
Discharge: HOME | End: 2024-01-30
Attending: UROLOGY | Admitting: UROLOGY
Payer: COMMERCIAL

## 2024-01-30 ENCOUNTER — TELEPHONE (OUTPATIENT)
Dept: UROLOGY | Facility: CLINIC | Age: 69
End: 2024-01-30

## 2024-01-30 ENCOUNTER — ANESTHESIA (OUTPATIENT)
Dept: PERIOP | Facility: HOSPITAL | Age: 69
End: 2024-01-30
Payer: COMMERCIAL

## 2024-01-30 VITALS
TEMPERATURE: 97.7 F | RESPIRATION RATE: 16 BRPM | DIASTOLIC BLOOD PRESSURE: 75 MMHG | HEIGHT: 66 IN | HEART RATE: 90 BPM | SYSTOLIC BLOOD PRESSURE: 163 MMHG | WEIGHT: 223.77 LBS | BODY MASS INDEX: 35.96 KG/M2 | OXYGEN SATURATION: 95 %

## 2024-01-30 DIAGNOSIS — N43.0 ENCYSTED HYDROCELE: Primary | ICD-10-CM

## 2024-01-30 PROBLEM — K21.9 GERD (GASTROESOPHAGEAL REFLUX DISEASE): Status: ACTIVE | Noted: 2024-01-30

## 2024-01-30 PROBLEM — E78.5 HYPERLIPIDEMIA: Status: ACTIVE | Noted: 2024-01-30

## 2024-01-30 PROBLEM — G89.29 CHRONIC PAIN: Status: ACTIVE | Noted: 2024-01-30

## 2024-01-30 PROCEDURE — NC001 PR NO CHARGE: Performed by: UROLOGY

## 2024-01-30 PROCEDURE — 55040 REMOVAL OF HYDROCELE: CPT | Performed by: UROLOGY

## 2024-01-30 RX ORDER — IPRATROPIUM BROMIDE AND ALBUTEROL SULFATE 2.5; .5 MG/3ML; MG/3ML
3 SOLUTION RESPIRATORY (INHALATION)
Status: DISCONTINUED | OUTPATIENT
Start: 2024-01-30 | End: 2024-01-30 | Stop reason: HOSPADM

## 2024-01-30 RX ORDER — SODIUM CHLORIDE, SODIUM LACTATE, POTASSIUM CHLORIDE, CALCIUM CHLORIDE 600; 310; 30; 20 MG/100ML; MG/100ML; MG/100ML; MG/100ML
75 INJECTION, SOLUTION INTRAVENOUS CONTINUOUS
Status: CANCELLED | OUTPATIENT
Start: 2024-01-30

## 2024-01-30 RX ORDER — OXYCODONE HYDROCHLORIDE 5 MG/1
5 TABLET ORAL EVERY 4 HOURS PRN
Status: CANCELLED | OUTPATIENT
Start: 2024-01-30

## 2024-01-30 RX ORDER — PROPOFOL 10 MG/ML
INJECTION, EMULSION INTRAVENOUS AS NEEDED
Status: DISCONTINUED | OUTPATIENT
Start: 2024-01-30 | End: 2024-01-30

## 2024-01-30 RX ORDER — ACETAMINOPHEN 500 MG
500 TABLET ORAL EVERY 6 HOURS
Qty: 20 TABLET | Refills: 0 | Status: SHIPPED | OUTPATIENT
Start: 2024-01-30 | End: 2024-02-04

## 2024-01-30 RX ORDER — ONDANSETRON 2 MG/ML
INJECTION INTRAMUSCULAR; INTRAVENOUS AS NEEDED
Status: DISCONTINUED | OUTPATIENT
Start: 2024-01-30 | End: 2024-01-30

## 2024-01-30 RX ORDER — OXYCODONE HYDROCHLORIDE 5 MG/1
2.5 TABLET ORAL EVERY 4 HOURS PRN
Status: CANCELLED | OUTPATIENT
Start: 2024-01-30

## 2024-01-30 RX ORDER — ACETAMINOPHEN 325 MG/1
650 TABLET ORAL EVERY 6 HOURS PRN
Status: CANCELLED | OUTPATIENT
Start: 2024-01-30

## 2024-01-30 RX ORDER — CEFAZOLIN SODIUM 2 G/50ML
2000 SOLUTION INTRAVENOUS ONCE
Status: COMPLETED | OUTPATIENT
Start: 2024-01-30 | End: 2024-01-30

## 2024-01-30 RX ORDER — DICLOFENAC POTASSIUM 50 MG/1
50 TABLET, FILM COATED ORAL 2 TIMES DAILY
Qty: 10 TABLET | Refills: 0 | Status: SHIPPED | OUTPATIENT
Start: 2024-01-30 | End: 2024-02-04

## 2024-01-30 RX ORDER — PROMETHAZINE HYDROCHLORIDE 25 MG/ML
6.25 INJECTION, SOLUTION INTRAMUSCULAR; INTRAVENOUS
Status: DISCONTINUED | OUTPATIENT
Start: 2024-01-30 | End: 2024-01-30 | Stop reason: HOSPADM

## 2024-01-30 RX ORDER — ONDANSETRON 2 MG/ML
4 INJECTION INTRAMUSCULAR; INTRAVENOUS EVERY 6 HOURS PRN
Status: CANCELLED | OUTPATIENT
Start: 2024-01-30

## 2024-01-30 RX ORDER — ONDANSETRON 2 MG/ML
4 INJECTION INTRAMUSCULAR; INTRAVENOUS ONCE AS NEEDED
Status: DISCONTINUED | OUTPATIENT
Start: 2024-01-30 | End: 2024-01-30 | Stop reason: HOSPADM

## 2024-01-30 RX ORDER — SODIUM CHLORIDE, SODIUM LACTATE, POTASSIUM CHLORIDE, CALCIUM CHLORIDE 600; 310; 30; 20 MG/100ML; MG/100ML; MG/100ML; MG/100ML
125 INJECTION, SOLUTION INTRAVENOUS CONTINUOUS
Status: CANCELLED | OUTPATIENT
Start: 2024-01-30

## 2024-01-30 RX ORDER — HYDROMORPHONE HCL/PF 1 MG/ML
0.5 SYRINGE (ML) INJECTION
Status: DISCONTINUED | OUTPATIENT
Start: 2024-01-30 | End: 2024-01-30 | Stop reason: HOSPADM

## 2024-01-30 RX ORDER — HYDROMORPHONE HCL/PF 1 MG/ML
SYRINGE (ML) INJECTION AS NEEDED
Status: DISCONTINUED | OUTPATIENT
Start: 2024-01-30 | End: 2024-01-30

## 2024-01-30 RX ORDER — LIDOCAINE HYDROCHLORIDE 10 MG/ML
INJECTION, SOLUTION EPIDURAL; INFILTRATION; INTRACAUDAL; PERINEURAL AS NEEDED
Status: DISCONTINUED | OUTPATIENT
Start: 2024-01-30 | End: 2024-01-30

## 2024-01-30 RX ORDER — FENTANYL CITRATE 50 UG/ML
INJECTION, SOLUTION INTRAMUSCULAR; INTRAVENOUS AS NEEDED
Status: DISCONTINUED | OUTPATIENT
Start: 2024-01-30 | End: 2024-01-30

## 2024-01-30 RX ORDER — MAGNESIUM HYDROXIDE 1200 MG/15ML
LIQUID ORAL AS NEEDED
Status: DISCONTINUED | OUTPATIENT
Start: 2024-01-30 | End: 2024-01-30 | Stop reason: HOSPADM

## 2024-01-30 RX ORDER — SODIUM CHLORIDE, SODIUM LACTATE, POTASSIUM CHLORIDE, CALCIUM CHLORIDE 600; 310; 30; 20 MG/100ML; MG/100ML; MG/100ML; MG/100ML
INJECTION, SOLUTION INTRAVENOUS CONTINUOUS PRN
Status: DISCONTINUED | OUTPATIENT
Start: 2024-01-30 | End: 2024-01-30

## 2024-01-30 RX ORDER — HYDRALAZINE HYDROCHLORIDE 20 MG/ML
5 INJECTION INTRAMUSCULAR; INTRAVENOUS
Status: DISCONTINUED | OUTPATIENT
Start: 2024-01-30 | End: 2024-01-30 | Stop reason: HOSPADM

## 2024-01-30 RX ORDER — DEXAMETHASONE SODIUM PHOSPHATE 10 MG/ML
INJECTION, SOLUTION INTRAMUSCULAR; INTRAVENOUS AS NEEDED
Status: DISCONTINUED | OUTPATIENT
Start: 2024-01-30 | End: 2024-01-30

## 2024-01-30 RX ORDER — BUPIVACAINE HYDROCHLORIDE 5 MG/ML
INJECTION, SOLUTION EPIDURAL; INTRACAUDAL AS NEEDED
Status: DISCONTINUED | OUTPATIENT
Start: 2024-01-30 | End: 2024-01-30 | Stop reason: HOSPADM

## 2024-01-30 RX ORDER — FENTANYL CITRATE/PF 50 MCG/ML
25 SYRINGE (ML) INJECTION
Status: DISCONTINUED | OUTPATIENT
Start: 2024-01-30 | End: 2024-01-30 | Stop reason: HOSPADM

## 2024-01-30 RX ORDER — CEPHALEXIN 500 MG/1
500 CAPSULE ORAL EVERY 6 HOURS SCHEDULED
Qty: 12 CAPSULE | Refills: 0 | Status: SHIPPED | OUTPATIENT
Start: 2024-01-30 | End: 2024-02-02

## 2024-01-30 RX ADMIN — LIDOCAINE HYDROCHLORIDE 100 MG: 10 INJECTION, SOLUTION EPIDURAL; INFILTRATION; INTRACAUDAL at 10:10

## 2024-01-30 RX ADMIN — ONDANSETRON 4 MG: 2 INJECTION INTRAMUSCULAR; INTRAVENOUS at 10:26

## 2024-01-30 RX ADMIN — DEXAMETHASONE SODIUM PHOSPHATE 10 MG: 10 INJECTION, SOLUTION INTRAMUSCULAR; INTRAVENOUS at 09:55

## 2024-01-30 RX ADMIN — HYDROMORPHONE HYDROCHLORIDE 0.5 MG: 1 INJECTION, SOLUTION INTRAMUSCULAR; INTRAVENOUS; SUBCUTANEOUS at 10:30

## 2024-01-30 RX ADMIN — IPRATROPIUM BROMIDE AND ALBUTEROL SULFATE 3 ML: 2.5; .5 SOLUTION RESPIRATORY (INHALATION) at 09:50

## 2024-01-30 RX ADMIN — SODIUM CHLORIDE, SODIUM LACTATE, POTASSIUM CHLORIDE, AND CALCIUM CHLORIDE: .6; .31; .03; .02 INJECTION, SOLUTION INTRAVENOUS at 09:41

## 2024-01-30 RX ADMIN — FENTANYL CITRATE 50 MCG: 0.05 INJECTION, SOLUTION INTRAMUSCULAR; INTRAVENOUS at 10:01

## 2024-01-30 RX ADMIN — PROPOFOL 20 MG: 10 INJECTION, EMULSION INTRAVENOUS at 10:28

## 2024-01-30 RX ADMIN — CEFAZOLIN SODIUM 2000 MG: 2 SOLUTION INTRAVENOUS at 10:14

## 2024-01-30 RX ADMIN — PROPOFOL 160 MG: 10 INJECTION, EMULSION INTRAVENOUS at 10:10

## 2024-01-30 RX ADMIN — FENTANYL CITRATE 50 MCG: 0.05 INJECTION, SOLUTION INTRAMUSCULAR; INTRAVENOUS at 10:10

## 2024-01-30 RX ADMIN — FENTANYL CITRATE 25 MCG: 50 INJECTION INTRAMUSCULAR; INTRAVENOUS at 11:27

## 2024-01-30 RX ADMIN — FENTANYL CITRATE 25 MCG: 50 INJECTION INTRAMUSCULAR; INTRAVENOUS at 11:19

## 2024-01-30 NOTE — H&P
H&P Exam - Urology   Danie Liao 68 y.o. male MRN: [id number]  Unit/Bed#: OR [place] Encounter: [id number]    Assessment/Plan     Assessment:  68 year old man with man a left hydrocele here for left hydrocelectomy    Plan:  Proceed to left hydrocelectomy    History of Present Illness   HPI:  Danie Liao is a 68 y.o. male who presents with a left hydrocele, here for surgery    The following portions of the patient's history were reviewed and updated as appropriate: allergies, current medications, past family history, past medical history, past social history, past surgical history and problem list.  .    Review of Systems   Constitutional: Negative.    HENT: Negative.     Eyes: Negative.    Respiratory: Negative.     Cardiovascular: Negative.    Gastrointestinal: Negative.    Endocrine: Negative.    Genitourinary:  Positive for scrotal swelling (left).   Musculoskeletal: Negative.    Skin: Negative.    Allergic/Immunologic: Negative.    Neurological: Negative.    Hematological: Negative.    Psychiatric/Behavioral: Negative.         Historical Information   Past Medical History:   Diagnosis Date    Asthma     Chronic pain 1/30/2024    cervic and lumbar    COPD (chronic obstructive pulmonary disease) (HCC)     Emphysema of lung (HCC)     GERD (gastroesophageal reflux disease) 1/30/2024    Hyperlipidemia 1/30/2024    Migraine     Sleep apnea      Past Surgical History:   Procedure Laterality Date    APPENDECTOMY      HERNIA REPAIR      4 times    NEPHRECTOMY LIVING DONOR Left 10/2009     Social History   Social History     Substance and Sexual Activity   Alcohol Use Yes    Comment: Occasional     Social History     Substance and Sexual Activity   Drug Use No     Social History     Tobacco Use   Smoking Status Former    Current packs/day: 0.00    Average packs/day: 2.0 packs/day for 39.0 years (78.0 ttl pk-yrs)    Types: Cigarettes    Start date: 1/1/1970    Quit date: 1/1/2009    Years since quitting: 15.0  "  Smokeless Tobacco Never     E-Cigarette/Vaping    E-Cigarette Use Former User      E-Cigarette/Vaping Substances    Nicotine No     THC No     CBD No     Flavoring No     Other No     Unknown No      Family History:   Family History   Problem Relation Age of Onset    Breast cancer Mother     Heart disease Father     Breast cancer Maternal Grandmother     Heart disease Paternal Grandmother        Meds/Allergies   all medications and allergies reviewed  Allergies   Allergen Reactions    Other Other (See Comments)     Green Pepper       Objective   Vitals: Blood pressure 150/99, pulse 88, temperature 97.5 °F (36.4 °C), temperature source Temporal, resp. rate 20, height 5' 6\" (1.676 m), weight 101 kg (223 lb 12.3 oz), SpO2 96%.    No intake/output data recorded.    Invasive Devices       Peripheral Intravenous Line  Duration             Peripheral IV 01/30/24 Dorsal (posterior);Right Hand <1 day                    Physical Exam  Vitals and nursing note reviewed.   Constitutional:       General: He is not in acute distress.     Appearance: Normal appearance. He is well-developed. He is not ill-appearing, toxic-appearing or diaphoretic.   HENT:      Head: Normocephalic and atraumatic.   Eyes:      General: No scleral icterus.  Pulmonary:      Effort: Pulmonary effort is normal. No respiratory distress.   Abdominal:      General: There is no distension.      Palpations: Abdomen is soft.      Tenderness: There is no abdominal tenderness.   Musculoskeletal:         General: No deformity.      Cervical back: Neck supple.   Skin:     Coloration: Skin is not jaundiced or pale.   Neurological:      Mental Status: He is alert and oriented to person, place, and time. Mental status is at baseline.      Cranial Nerves: No cranial nerve deficit.      Motor: No weakness.      Coordination: Coordination normal.   Psychiatric:         Mood and Affect: Mood normal.         Behavior: Behavior normal.         Thought Content: Thought " content normal.         Judgment: Judgment normal.         Lab Results: I have personally reviewed pertinent reports.    Imaging: I have personally reviewed pertinent reports.    EKG, Pathology, and Other Studies: I have personally reviewed pertinent reports.    VTE Prophylaxis: Sequential compression device (Venodyne)     Code Status: No Order  Advance Directive and Living Will:      Power of :    POLST:      Counseling / Coordination of Care  Total floor / unit time spent today 15 minutes.  Greater than 50% of total time was spent with the patient and / or family counseling and / or coordination of care.  A description of the counseling / coordination of care: review of today's case .

## 2024-01-30 NOTE — ANESTHESIA PREPROCEDURE EVALUATION
"Procedure:  HYDROCELECTOMY (Left: Scrotum)    Relevant Problems   CARDIO   (+) Hyperlipidemia   (+) Intractable migraine without status migrainosus      GI/HEPATIC   (+) GERD (gastroesophageal reflux disease)      NEURO/PSYCH   (+) Chronic pain   (+) Intractable migraine without status migrainosus      PULMONARY   (+) Chronic obstructive pulmonary disease (HCC)   (+) LEATHA (obstructive sleep apnea)      Musculoskeletal and Integument   (+) Lumbar disc disease      Other   (+) Morbid (severe) obesity due to excess calories (HCC)   (+) Obesity, Class II, BMI 35-39.9   (+) Prediabetes   (+) Testicular swelling, left        Physical Exam    Airway    Mallampati score: III  TM Distance: >3 FB  Neck ROM: full     Dental    lower dentures and upper dentures    Cardiovascular      Pulmonary   Wheezes    Other Findings    Anesthesia Plan  ASA Score- 3     Anesthesia Type- general with ASA Monitors.         Additional Monitors:     Airway Plan: LMA.    Comment: COPD moderately well controlled  Preprocedure duoneb + decadron.       Plan Factors-    Chart reviewed.        Patient is not a current smoker.  Patient did not smoke on day of surgery.            Induction- intravenous.    Postoperative Plan- Plan for postoperative opioid use. Planned trial extubation    Informed Consent- Anesthetic plan and risks discussed with patient.  I personally reviewed this patient with the CRNA. Discussed and agreed on the Anesthesia Plan with the CRNA..            VITALS  /99   Pulse 88   Temp 97.5 °F (36.4 °C) (Temporal)   Resp 20   Ht 5' 6\" (1.676 m)   Wt 101 kg (223 lb 12.3 oz)   SpO2 96%   BMI 36.12 kg/m²   BP Readings from Last 3 Encounters:   01/30/24 150/99   01/15/24 112/76   12/12/23 137/83     LABS  Results from Last 12 Months   Lab Units 01/25/24  1005 01/15/24  1014 11/30/23  1405 09/26/23  0910 09/23/23  0703   SODIUM mmol/L 140 137 136 136 138   POTASSIUM mmol/L 4.6 4.3 4.3 4.2 4.5   CHLORIDE mmol/L 103 105 107 105 " 104   CO2 mmol/L 29 27 24 26 27   ANION GAP mmol/L 8 5 5 5 7   BUN mg/dL 20 15 20 13 14   CREATININE mg/dL 1.05 0.95 0.85 0.91 0.87   CALCIUM mg/dL 9.5 9.4 9.1 9.2 9.3   GLUCOSE RANDOM mg/dL  --  97 91 100  --    MAGNESIUM mg/dL  --   --  2.2  --   --    AST U/L  --  22  --  20 17   ALT U/L  --  34  --  27 25   ALK PHOS U/L  --  101  --  103 113*   TOTAL BILIRUBIN mg/dL  --  0.40  --  0.38 0.37   ALBUMIN g/dL  --  4.1  --  4.0 3.9   HEMOGLOBIN A1C %  --   --   --   --  6.0*     Results from Last 12 Months   Lab Units 01/25/24  1005 01/15/24  1014   WBC Thousand/uL 11.14* 7.50   HEMOGLOBIN g/dL 12.7 12.8   HEMATOCRIT % 43.1 41.8   PLATELETS Thousands/uL 416* 329     Results from Last 12 Months   Lab Units 03/29/23  0829   INR  1.01   PTT seconds 25       ECG  Encounter Date: 01/15/24   ECG 12 lead   Result Value    Ventricular Rate 71    Atrial Rate 71    FL Interval 162    QRSD Interval 92    QT Interval 376    QTC Interval 408    P Axis 68    QRS Axis -22    T Wave Axis 43    Narrative    Normal sinus rhythm  Incomplete right bundle branch block    Confirmed by Tootie Hutchins ([id number]) on 1/15/2024 8:40:21 PM     No results found for this or any previous visit.    ECHOCARDIOGRAPHY AND OTHER TESTING/IMAGING  n/a    ANESTHESIA RISK-BENEFIT DISCUSSION  BENEFITS INCLUDE (NBK [id number], PMID [id number]):   (1) A specialized anesthesia team reduces mortality and morbidity for major surgeries.  (2) The team provides analgesia/sedation/amnesia/akinesia as safely as possible.  (3) The team strives to reduce discomfort as much as reasonably possible.    RISKS ASSESSMENT (AND PLANS TO MITIGATE RISKS) INCLUDE:    Neurologic system: IntraOp awareness (Risk is ~1:1,000 - 1:14,000; PMID 27546913), Stroke (Risk ~<0.1-2% for most cases; PMID [id number]), nerve injury, vision loss, and POCD.     Airway and Pulmonary system: Dental or mouth injury, throat pain, critical hypoxia, pneumothorax, prolonged intubation, post-op respiratory  compromise.  Airway/Intubation risks and prior data: LEATHA and No prior advanced airway notes in [facility] EMR  Major ARISCAT risk factors for pulmonary complications include: Other factors/Clinical gestalt: 1 pt, yielding a score 0-1= Low risk, 1.6%.  Cardiovascular system: Hypotension/Vasoplegia, arrhythmias, blood clot, michaela-op cardiac injury/MACE, bleeding, infection, or injury to vascular structures.  Signs of active, severe cardiac instability: none  Curtis's RCRI score items: none, yielding an RCRI Score of 0= 0.4% risk of MACE  Are michaela-op or intra-op beta blockers indicated? (PMID 90717879): no  FEN/GI system: Aspiration risk (~0.5% [facility] [id number]) and PONV (10-80% per Apfel score).  ASA NPO guideline compliance?: Yes  Medication risk assessment: Allergic reactions, bleeding due to anticoagulant use, overdoses, drug-drug interactions, injury to a fetus or  in pregnant or breastfeeding patients, sedation while driving/operating heavy machinery.  Recent relevant medications: See MAR or Med Review  Personal or family history of anesthesia complications: no  Pregnancy Status: N/A  Estimate mortality risks associated with anesthesia based on ASA-PS (PMID 03519413): ASA-PS III: 1:3,500

## 2024-01-30 NOTE — DISCHARGE INSTR - AVS FIRST PAGE
Danie,    Today you underwent treatment of your hydrocele.  You have stitches in the scrotum that are going to dissolve on their own without the need for removal.  The plastic tube that you see exiting the scrotum is a drain in the space between the tunica vaginalis and the tunica dartos.  This will be removed in some days in the office.  We did give you an antibiotic to start taking the day prior to this drain removal.    Please note that your scrotum is a potential space and swelling is often worse before it is better.  The swelling can be significant and can last a number of weeks.  Do note that some discomfort and soreness is not uncommon after surgery like this.  Do wear tighter fitting underwear and clothing to decrease your swelling.    You can take a shower starting tomorrow, please do not submerge your scrotum underwater for the next 4 weeks or so.    We will see you back in the office for a postoperative visit in some weeks.    If you have questions or concerns, do let us know.    We wish you a rapid recovery,  Dr. Salmeron

## 2024-01-30 NOTE — OP NOTE
OPERATIVE REPORT  PATIENT NAME: Danie Liao    :  1955  MRN: [id number]  Pt Location: [facility] OR ROOM 04    SURGERY DATE: 2024    Surgeons and Role:     * Evan Salmeron MD - Primary    Preop Diagnosis:  Hydrocele in adult [N43.3]    Post-Op Diagnosis Codes:     * Hydrocele in adult [N43.3]    Procedure(s):  Left - HYDROCELECTOMY    Specimen(s):  * No specimens in log *    Estimated Blood Loss:   Minimal    Drains:  Closed/Suction Drain Left Groin Bulb 19 Fr. (Active)   Number of days: 0       Anesthesia Type:   General    Operative Indications:  Hydrocele in adult [N43.3]      Operative Findings:  Successful left hydrocelectomy, 120 mL straw colored fluid drained, closed suction drain placed.    Complications:   None    Procedure and Technique:      INDICATIONS FOR PROCEDURE:  Danie Liao is an 68 y.o. old male with a symptomatic Left hydrocele.  He has had a scrotal ultrasound which was negative for intratesticular mass or tumor. After discussing the options, the patient elected to undergo a hydrocelectomy.  We discussed the procedure in detail, the alternatives, and the risks, and they signed informed consent to proceed.    PROCEDURE IN DETAIL:   The patient was identified and brought to the OR.  Antibiotic prophylaxis and DVT prophylaxis were administered as per the guidelines.  They were placed in the comfortable supine position with care to pad all pressure points.  The scrotal hair was clipped and they were prepped and draped in the usual sterile fashion using chlorhexidine soap.  A surgical time out was performed with all in the room in agreement with the correct patient, procedure, indications, and laterality.        Next a transverse incision was made on the Left hemiscrotum. This was carried down through the cremasteric fibers using electrocautery and down to the hydrocele sac. The spermatic cord and hydrocele sac was delivered through this incision. The hydrocele sac was then entered  sharply away from the testes, epididymis, and cord. Straw-colored fluid was then drained. Next the sac was excised using electrocautery with care taken to avoid any injury to the testes, epididymis, vas deferens, or vascular pedicle along the spermatic cord. The edges of the hydrocele sac were next oversewn using 3-0 vicryl.  Closed suction drain placed    Hemostasis was achieved using electrocautery.  The testes was delivered back into the base of the scrotum. The dartos layer was closed using running 3-0 Vicryl. The skin was then closed using 4-0 chromic horizontal mattress suture. A sterile dressing and scrotal support was applied.    All needle and instrument counts were correct.  The patient was placed back supine, awakened from general anesthesia and brought to recovery room in stable condition. A post op debrief was performed    PLAN:  The patient will be discharged to home with a scrotal supporter and surgical drain, this will be removed in some days. His stitches will dissolve on their own without removal. He has been advised that his scrotum will be swollen and bruised for 4-6 weeks during healing.        I was present for the entire procedure. and A qualified resident physician was not available.    Patient Disposition:  PACU         SIGNATURE: Evan Salmeron MD  DATE: January 30, 2024  TIME: 10:54 AM

## 2024-01-30 NOTE — ANESTHESIA POSTPROCEDURE EVALUATION
Post-Op Assessment Note    CV Status:  Stable    Pain management: adequate       Mental Status:  Sleepy   Hydration Status:  Euvolemic   PONV Controlled:  Controlled   Airway Patency:  Patent  Two or more mitigation strategies used for obstructive sleep apnea   Post Op Vitals Reviewed: Yes    No anethesia notable event occurred.    Staff: Anesthesiologist               BP   173/101   Temp  97.7    Pulse  88   Resp   21   SpO2   100%

## 2024-01-31 NOTE — TELEPHONE ENCOUNTER
Pt calling back to inform nurse that he is going to be done with the doxycycline will finish on Friday.  Pt also informed me prednisone he will finish on Thursday    Pt just picked up Diclofenac potassium today pt wants to know when he should start that.    Pt can be reached at [phone]

## 2024-01-31 NOTE — TELEPHONE ENCOUNTER
Post Op Note    Danie Liao is a 68 y.o. male s/p HYDROCELECTOMY (Left: Scrotum)  performed 1/30/24.  Danie Liao is a patient of Dr. Dr. Salmeron and is seen at the [place] office.     How would you rate your pain on a scale from 1 to 10, 10 being the worst pain ever? 7 complains of pain associated with the ZINA drain. No swelling in scrotum. Educated on keeping it properly secured so it doesn't pull. Advised on medications to use and to use scrotal support as well as ice.   Have you had a fever? No  Do you have any difficulty urinating? No  If the patient has an incision- do you have any redness around the incision or any drainage from the incision? No  If the patient has a drain- are you having any issues with the drain? No knows how to empty the drain     Do you have any other questions or concerns that I can address at this time?   -Went over potential/expected post op symptoms   -discussed ER precautions  -Confirmed post op appts and locations   -Went over medication  - Patient reports he is on doxycycline for an unrelated reason. Needs to know if he should still take the abx starting Sunday as prescribed. He will call back to let me know when he will be done with the doxy and I will ask the providers

## 2024-01-31 NOTE — TELEPHONE ENCOUNTER
Patient called back and reported the doxycycline he is taking for an unrelated reason will be done on Friday. He should be starting the abx we prescribed for the drain removal on Sunday. Just want to make sure that will be ok to start despite the recent doxycyline use

## 2024-02-01 NOTE — TELEPHONE ENCOUNTER
Patient had a hydrocelectomy on 1/30/2024. He states he is experiencing a pain level of 10 at the incision site where the drain is. He has tried tylenol and ice packs and nothing is helping him. He would like the drain removed before scheduled Monday appointment. Please advise on further recommendations.

## 2024-02-01 NOTE — TELEPHONE ENCOUNTER
PT calling in pain from his tube.  Pt stating he is unable to sleep it is bothering him so much.  Pt wants to have the tube removed today.     Please advise.  Pt can be reached at  [phone]

## 2024-02-01 NOTE — TELEPHONE ENCOUNTER
Patient reports there has been minimal output from ZINA.. he is in a 10/10 pain and would like drain out sooner. Per Dr. Salmeron ok to be pulled tomorrow. Rescheduled to [facility] tomorrow.     Patient is currently on doxycycline for laryngitis. Last dose will be tomorrow. Already took dose today. Please advise if he should start the keflex we prescribed him for 3 days for the drain removal

## 2024-02-01 NOTE — TELEPHONE ENCOUNTER
DAYA left for patient     When patient calls back please relay to him the message from [facility] regarding medication

## 2024-02-02 ENCOUNTER — PROCEDURE VISIT (OUTPATIENT)
Dept: UROLOGY | Facility: CLINIC | Age: 69
End: 2024-02-02

## 2024-02-02 VITALS — HEIGHT: 66 IN | WEIGHT: 223 LBS | HEART RATE: 107 BPM | OXYGEN SATURATION: 95 % | BODY MASS INDEX: 35.84 KG/M2

## 2024-02-02 DIAGNOSIS — N43.3 HYDROCELE IN ADULT: Primary | ICD-10-CM

## 2024-02-02 PROCEDURE — 99024 POSTOP FOLLOW-UP VISIT: CPT

## 2024-02-02 NOTE — TELEPHONE ENCOUNTER
Spoke to pt informing the pt jamila de los santos recommendation   Can start the keflex after completing doxycycline tomorrow   . Pt verbalized understanding

## 2024-02-02 NOTE — PROGRESS NOTES
Patient presents with about 20ml serosanguinous fluid in ZINA drain. Patient lying in supine position. Bulb deflated and suture around drain removed. Drain removed without incident. Covered site with sterile gauze. Incision appearing well without sign of infection. Patient advised to monitor for signs and symptoms of infection and contact office with any questions or concerns. Patient verbalized understanding.

## 2024-02-05 DIAGNOSIS — J44.9 CHRONIC OBSTRUCTIVE PULMONARY DISEASE, UNSPECIFIED COPD TYPE (HCC): Primary | ICD-10-CM

## 2024-02-05 DIAGNOSIS — G43.919 INTRACTABLE MIGRAINE WITHOUT STATUS MIGRAINOSUS, UNSPECIFIED MIGRAINE TYPE: ICD-10-CM

## 2024-02-05 DIAGNOSIS — L40.9 PSORIASIS: ICD-10-CM

## 2024-02-05 RX ORDER — IPRATROPIUM BROMIDE AND ALBUTEROL SULFATE 2.5; .5 MG/3ML; MG/3ML
3 SOLUTION RESPIRATORY (INHALATION) 4 TIMES DAILY
Qty: 120 ML | Refills: 3 | Status: SHIPPED | OUTPATIENT
Start: 2024-02-05

## 2024-02-05 RX ORDER — UBROGEPANT 100 MG/1
100 TABLET ORAL DAILY PRN
Qty: 15 TABLET | Refills: 5 | Status: SHIPPED | OUTPATIENT
Start: 2024-02-05 | End: 2024-02-12 | Stop reason: SDUPTHER

## 2024-02-05 RX ORDER — TAPINAROF 10 MG/1000MG
1 CREAM TOPICAL DAILY
Qty: 60 G | Refills: 3 | Status: SHIPPED | OUTPATIENT
Start: 2024-02-05

## 2024-02-05 NOTE — TELEPHONE ENCOUNTER
Patient stopped in the office - requesting 3 medication  refills:    Ipratropium-albuterol 0.5-2.5mg/3ml neb solution - 120 per month    Vtama 1% cream - he is completely out and would like at least 3 refills     Ubrelvy 100mg - he is almost out and would like at least 3 refills       Yes

## 2024-02-06 NOTE — TELEPHONE ENCOUNTER
Patient called and he states the stitches are pulling on him. He is not having any pain. He is keeping area clean and dry. I gave him care advice to continue to keep area clean and dry. I explained the stitches can take 10 days to dissolve. He was informed to clean with mild soap and warm water, dab area dry, make sure skin is dry before apply a small amount of neosporin and gauze to maintain a barrier between his body and clothing. If he see any swelling or changes in skin color, skin is warm to touch or difficulty sitting,walking or standing he is to call the office. He verbalized understanding. Please advise of any further recommendations.

## 2024-02-06 NOTE — TELEPHONE ENCOUNTER
"keep doing what he's doing. stitches take 2-4 weeks to dissolve all the way. the \"whiskers\" can be annoying, but they're still working inside.  "

## 2024-02-06 NOTE — TELEPHONE ENCOUNTER
Pt calling to ask how long does it take for the stitches to dissolve.  Stated that they are pulling and bothering him.    Pt can be reached at [phone]

## 2024-02-09 ENCOUNTER — APPOINTMENT (EMERGENCY)
Dept: CT IMAGING | Facility: HOSPITAL | Age: 69
End: 2024-02-09
Payer: COMMERCIAL

## 2024-02-09 ENCOUNTER — HOSPITAL ENCOUNTER (EMERGENCY)
Facility: HOSPITAL | Age: 69
Discharge: HOME | End: 2024-02-09
Attending: EMERGENCY MEDICINE
Payer: COMMERCIAL

## 2024-02-09 VITALS
OXYGEN SATURATION: 96 % | HEART RATE: 94 BPM | TEMPERATURE: 97.9 F | RESPIRATION RATE: 16 BRPM | DIASTOLIC BLOOD PRESSURE: 83 MMHG | SYSTOLIC BLOOD PRESSURE: 142 MMHG

## 2024-02-09 DIAGNOSIS — J02.9 VIRAL PHARYNGITIS: Primary | ICD-10-CM

## 2024-02-09 LAB
ANION GAP SERPL CALCULATED.3IONS-SCNC: 4 MMOL/L
BASOPHILS # BLD AUTO: 0.02 THOUSANDS/ÂΜL (ref 0–0.1)
BASOPHILS NFR BLD AUTO: 0 % (ref 0–1)
BUN SERPL-MCNC: 17 MG/DL (ref 5–25)
CALCIUM SERPL-MCNC: 9.1 MG/DL (ref 8.4–10.2)
CHLORIDE SERPL-SCNC: 106 MMOL/L (ref 96–108)
CO2 SERPL-SCNC: 28 MMOL/L (ref 21–32)
CREAT SERPL-MCNC: 0.85 MG/DL (ref 0.6–1.3)
EOSINOPHIL # BLD AUTO: 0.32 THOUSAND/ÂΜL (ref 0–0.61)
EOSINOPHIL NFR BLD AUTO: 4 % (ref 0–6)
ERYTHROCYTE [DISTWIDTH] IN BLOOD BY AUTOMATED COUNT: 14.9 % (ref 11.6–15.1)
FLUAV RNA RESP QL NAA+PROBE: NEGATIVE
FLUBV RNA RESP QL NAA+PROBE: NEGATIVE
GFR SERPL CREATININE-BSD FRML MDRD: 90 ML/MIN/1.73SQ M
GLUCOSE SERPL-MCNC: 113 MG/DL (ref 65–140)
HCT VFR BLD AUTO: 40 % (ref 36.5–49.3)
HGB BLD-MCNC: 12.3 G/DL (ref 12–17)
IMM GRANULOCYTES # BLD AUTO: 0.02 THOUSAND/UL (ref 0–0.2)
IMM GRANULOCYTES NFR BLD AUTO: 0 % (ref 0–2)
LYMPHOCYTES # BLD AUTO: 1.19 THOUSANDS/ÂΜL (ref 0.6–4.47)
LYMPHOCYTES NFR BLD AUTO: 16 % (ref 14–44)
MCH RBC QN AUTO: 23.3 PG (ref 26.8–34.3)
MCHC RBC AUTO-ENTMCNC: 30.8 G/DL (ref 31.4–37.4)
MCV RBC AUTO: 76 FL (ref 82–98)
MONOCYTES # BLD AUTO: 0.64 THOUSAND/ÂΜL (ref 0.17–1.22)
MONOCYTES NFR BLD AUTO: 9 % (ref 4–12)
NEUTROPHILS # BLD AUTO: 5.1 THOUSANDS/ÂΜL (ref 1.85–7.62)
NEUTS SEG NFR BLD AUTO: 71 % (ref 43–75)
NRBC BLD AUTO-RTO: 0 /100 WBCS
PLATELET # BLD AUTO: 320 THOUSANDS/UL (ref 149–390)
PMV BLD AUTO: 10.3 FL (ref 8.9–12.7)
POTASSIUM SERPL-SCNC: 3.9 MMOL/L (ref 3.5–5.3)
RBC # BLD AUTO: 5.29 MILLION/UL (ref 3.88–5.62)
RSV RNA RESP QL NAA+PROBE: NEGATIVE
S PYO DNA THROAT QL NAA+PROBE: NOT DETECTED
SARS-COV-2 RNA RESP QL NAA+PROBE: NEGATIVE
SODIUM SERPL-SCNC: 138 MMOL/L (ref 135–147)
WBC # BLD AUTO: 7.29 THOUSAND/UL (ref 4.31–10.16)

## 2024-02-09 PROCEDURE — 80048 BASIC METABOLIC PNL TOTAL CA: CPT | Performed by: EMERGENCY MEDICINE

## 2024-02-09 PROCEDURE — 99284 EMERGENCY DEPT VISIT MOD MDM: CPT

## 2024-02-09 PROCEDURE — 70491 CT SOFT TISSUE NECK W/DYE: CPT

## 2024-02-09 PROCEDURE — 85025 COMPLETE CBC W/AUTO DIFF WBC: CPT | Performed by: EMERGENCY MEDICINE

## 2024-02-09 PROCEDURE — 87651 STREP A DNA AMP PROBE: CPT | Performed by: EMERGENCY MEDICINE

## 2024-02-09 PROCEDURE — 36415 COLL VENOUS BLD VENIPUNCTURE: CPT | Performed by: EMERGENCY MEDICINE

## 2024-02-09 PROCEDURE — G1004 CDSM NDSC: HCPCS

## 2024-02-09 PROCEDURE — 0241U HB NFCT DS VIR RESP RNA 4 TRGT: CPT | Performed by: EMERGENCY MEDICINE

## 2024-02-09 RX ADMIN — IOHEXOL 100 ML: 350 INJECTION, SOLUTION INTRAVENOUS at 08:58

## 2024-02-09 RX ADMIN — DEXAMETHASONE SODIUM PHOSPHATE 10 MG: 10 INJECTION, SOLUTION INTRAMUSCULAR; INTRAVENOUS at 08:03

## 2024-02-12 DIAGNOSIS — G43.919 INTRACTABLE MIGRAINE WITHOUT STATUS MIGRAINOSUS, UNSPECIFIED MIGRAINE TYPE: ICD-10-CM

## 2024-02-12 RX ORDER — UBROGEPANT 100 MG/1
100 TABLET ORAL DAILY PRN
Qty: 30 TABLET | Refills: 2 | Status: SHIPPED | OUTPATIENT
Start: 2024-02-12

## 2024-02-13 NOTE — ED PROVIDER NOTES
"History  Chief Complaint   Patient presents with    Difficulty Swallowing     States he has \"a lump in my throat on my right side\"      67 y/o male presents to the ED for cough and sore throat x 3-4 days. He states that it feels \"scratchy\" and like theres a \"lump\" on the right side of his throat. He states that it hurts to swallow but he is able to swallow both liquids and solids. He denies any known sick contacts. He denies any fever, cp, sob, abd pain, n/v, or d/c. No other complaints.       History provided by:  Patient      Prior to Admission Medications   Prescriptions Last Dose Informant Patient Reported? Taking?   DOXYCYCLINE PO   Yes No   Sig: Take by mouth   Patient not taking: Reported on 2/2/2024   Georgina 1 MG TBEC   No No   Sig: Take 1 tablet (1 mg total) by mouth in the morning   Tapinarof (Vtama) 1 % CREA   No No   Sig: Apply 1 Application topically in the morning   Trelegy Ellipta 100-62.5-25 MCG/ACT inhaler   No No   Sig: Inhale 1 puff daily   albuterol (PROVENTIL HFA,VENTOLIN HFA) 90 mcg/act inhaler   No No   Sig: INHALE 1-2 PUFFS EVERY 4-6 HOURS AS NEEDED AND AS DIRECTED.   diclofenac potassium (CATAFLAM) 50 mg tablet   No No   Sig: Take 1 tablet (50 mg total) by mouth 2 (two) times a day for 5 days   ipratropium-albuterol (DUO-NEB) 0.5-2.5 mg/3 mL nebulizer solution   No No   Sig: Take 3 mL by nebulization 4 (four) times a day   omeprazole (PriLOSEC) 20 mg delayed release capsule   No No   Sig: Take 1 capsule (20 mg total) by mouth daily   predniSONE 20 mg tablet   No No   Sig: Take 2 tablets (40 mg total) by mouth daily      Facility-Administered Medications: None       Past Medical History:   Diagnosis Date    Asthma     Chronic pain 1/30/2024    cervic and lumbar    COPD (chronic obstructive pulmonary disease) (HCC)     Emphysema of lung (HCC)     GERD (gastroesophageal reflux disease) 1/30/2024    Hyperlipidemia 1/30/2024    Migraine     Sleep apnea        Past Surgical History:   Procedure " Laterality Date    APPENDECTOMY      HERNIA REPAIR      4 times    NEPHRECTOMY LIVING DONOR Left 10/2009    NE EXCISION HYDROCELE UNILATERAL Left 1/30/2024    Procedure: HYDROCELECTOMY;  Surgeon: Evan Salmeron MD;  Location: [facility];  Service: Urology       Family History   Problem Relation Age of Onset    Breast cancer Mother     Heart disease Father     Breast cancer Maternal Grandmother     Heart disease Paternal Grandmother      I have reviewed and agree with the history as documented.    E-Cigarette/Vaping    E-Cigarette Use Former User      E-Cigarette/Vaping Substances    Nicotine No     THC No     CBD No     Flavoring No     Other No     Unknown No      Social History     Tobacco Use    Smoking status: Former     Current packs/day: 0.00     Average packs/day: 2.0 packs/day for 39.0 years (78.0 ttl pk-yrs)     Types: Cigarettes     Start date: 1/1/1970     Quit date: 1/1/2009     Years since quitting: 15.1    Smokeless tobacco: Never   Vaping Use    Vaping status: Former   Substance Use Topics    Alcohol use: Yes     Comment: Occasional    Drug use: No       Review of Systems   Constitutional:  Negative for chills and fever.   HENT:  Positive for sore throat. Negative for congestion and ear pain.    Eyes:  Negative for pain and visual disturbance.   Respiratory:  Positive for cough. Negative for shortness of breath and wheezing.    Cardiovascular:  Negative for chest pain and leg swelling.   Gastrointestinal:  Negative for abdominal pain, diarrhea, nausea and vomiting.   Genitourinary:  Negative for dysuria, frequency, hematuria and urgency.   Musculoskeletal:  Negative for neck pain and neck stiffness.   Skin:  Negative for rash and wound.   Neurological:  Negative for weakness, numbness and headaches.   Psychiatric/Behavioral:  Negative for agitation and confusion.    All other systems reviewed and are negative.      Physical Exam  Physical Exam  Vitals and nursing note reviewed.   Constitutional:        Appearance: He is well-developed.   HENT:      Head: Normocephalic and atraumatic.      Mouth/Throat:      Pharynx: Posterior oropharyngeal erythema present.   Eyes:      Pupils: Pupils are equal, round, and reactive to light.   Cardiovascular:      Rate and Rhythm: Normal rate and regular rhythm.   Pulmonary:      Effort: Pulmonary effort is normal.      Breath sounds: Normal breath sounds.   Abdominal:      General: Bowel sounds are normal.      Palpations: Abdomen is soft.   Musculoskeletal:         General: Normal range of motion.      Cervical back: Normal range of motion and neck supple.   Skin:     General: Skin is warm and dry.   Neurological:      General: No focal deficit present.      Mental Status: He is alert and oriented to person, place, and time.      Comments: No focal deficits         Vital Signs  ED Triage Vitals [02/09/24 0726]   Temperature Pulse Respirations Blood Pressure SpO2   97.9 °F (36.6 °C) 94 16 142/83 96 %      Temp Source Heart Rate Source Patient Position - Orthostatic VS BP Location FiO2 (%)   Temporal Monitor Sitting Left arm --      Pain Score       --           Vitals:    02/09/24 0726   BP: 142/83   Pulse: 94   Patient Position - Orthostatic VS: Sitting         Visual Acuity      ED Medications  Medications   dexamethasone oral liquid 10 mg 1 mL (10 mg Oral Given 2/9/24 0803)   iohexol (OMNIPAQUE) 350 MG/ML injection (MULTI-DOSE) 100 mL (100 mL Intravenous Given 2/9/24 0858)       Diagnostic Studies  Results Reviewed       Procedure Component Value Units Date/Time    FLU/RSV/COVID - if FLU/RSV clinically relevant [[id number]]  (Normal) Collected: 02/09/24 0803    Lab Status: Final result Specimen: Nares from Nose Updated: 02/09/24 0902     SARS-CoV-2 Negative     INFLUENZA A PCR Negative     INFLUENZA B PCR Negative     RSV PCR Negative    Narrative:      FOR PEDIATRIC PATIENTS - copy/paste COVID Guidelines URL to browser:  [email or web address]    SARS-CoV-2 assay is a Nucleic Acid Amplification assay intended for the  qualitative detection of nucleic acid from SARS-CoV-2 in nasopharyngeal  swabs. Results are for the presumptive identification of SARS-CoV-2 RNA.    Positive results are indicative of infection with SARS-CoV-2, the virus  causing COVID-19, but do not rule out bacterial infection or co-infection  with other viruses. Laboratories within the [place] and its  territories are required to report all positive results to the appropriate  public health authorities. Negative results do not preclude SARS-CoV-2  infection and should not be used as the sole basis for treatment or other  patient management decisions. Negative results must be combined with  clinical observations, patient history, and epidemiological information.  This test has not been FDA cleared or approved.    This test has been authorized by FDA under an Emergency Use Authorization  (EUA). This test is only authorized for the duration of time the  declaration that circumstances exist justifying the authorization of the  emergency use of an in vitro diagnostic tests for detection of SARS-CoV-2  virus and/or diagnosis of COVID-19 infection under section 564(b)(1) of  the Act, 21 U.S.C. 360bbb-3(b)(1), unless the authorization is terminated  or revoked sooner. The test has been validated but independent review by FDA  and CLIA is pending.    Test performed using [employer] GeneXpert: This RT-PCR assay targets N2,  a region unique to SARS-CoV-2. A conserved region in the E-gene was chosen  for pan-Sarbecovirus detection which includes SARS-CoV-2.    According to CMS-2020-01-R, this platform meets the definition of high-throughput technology.    Strep A PCR [[id number]]  (Normal) Collected: 02/09/24 0803    Lab Status: Final result Specimen: Throat Updated: 02/09/24 0848     STREP A PCR Not Detected    Basic  metabolic panel [[id number]] Collected: 02/09/24 0803    Lab Status: Final result Specimen: Blood from Arm, Right Updated: 02/09/24 0836     Sodium 138 mmol/L      Potassium 3.9 mmol/L      Chloride 106 mmol/L      CO2 28 mmol/L      ANION GAP 4 mmol/L      BUN 17 mg/dL      Creatinine 0.85 mg/dL      Glucose 113 mg/dL      Calcium 9.1 mg/dL      eGFR 90 ml/min/1.73sq m     Narrative:      National Kidney Disease Foundation guidelines for Chronic Kidney Disease (CKD):     Stage 1 with normal or high GFR (GFR > 90 mL/min/1.73 square meters)    Stage 2 Mild CKD (GFR = 60-89 mL/min/1.73 square meters)    Stage 3A Moderate CKD (GFR = 45-59 mL/min/1.73 square meters)    Stage 3B Moderate CKD (GFR = 30-44 mL/min/1.73 square meters)    Stage 4 Severe CKD (GFR = 15-29 mL/min/1.73 square meters)    Stage 5 End Stage CKD (GFR <15 mL/min/1.73 square meters)  Note: GFR calculation is accurate only with a steady state creatinine    CBC and differential [[id number]]  (Abnormal) Collected: 02/09/24 0803    Lab Status: Final result Specimen: Blood from Arm, Right Updated: 02/09/24 0820     WBC 7.29 Thousand/uL      RBC 5.29 Million/uL      Hemoglobin 12.3 g/dL      Hematocrit 40.0 %      MCV 76 fL      MCH 23.3 pg      MCHC 30.8 g/dL      RDW 14.9 %      MPV 10.3 fL      Platelets 320 Thousands/uL      nRBC 0 /100 WBCs      Neutrophils Relative 71 %      Immat GRANS % 0 %      Lymphocytes Relative 16 %      Monocytes Relative 9 %      Eosinophils Relative 4 %      Basophils Relative 0 %      Neutrophils Absolute 5.10 Thousands/µL      Immature Grans Absolute 0.02 Thousand/uL      Lymphocytes Absolute 1.19 Thousands/µL      Monocytes Absolute 0.64 Thousand/µL      Eosinophils Absolute 0.32 Thousand/µL      Basophils Absolute 0.02 Thousands/µL                    CT soft tissue neck with contrast   Final Result by Diogenes Jones MD (02/09 1000)         1. Stable exam with no significant abnormality identified in the neck. In  particular, no findings identified to explain the patient's right-sided neck pain or masslike sensation.      2. Additional stable findings as noted.      Workstation performed: [id number]                    Procedures  Procedures         ED Course  ED Course as of 02/13/24 1152   Fri Feb 09, 2024 0736 Cough and sore throat x 3-4 days. Feels scratchy. Right side feels swollen. Able to swallow but hurts. No sick contacts. On abx for recent hydrocele surgery. No known sick contacts                Identification of Seniors at Risk      Flowsheet Row Most Recent Value   (ISAR) Identification of Seniors at Risk    Before the illness or injury that brought you to the Emergency, did you need someone to help you on a regular basis? 0 Filed at: 02/09/2024 0727   In the last 24 hours, have you needed more help than usual? 0 Filed at: 02/09/2024 0727   Have you been hospitalized for one or more nights during the past 6 months? 0 Filed at: 02/09/2024 0727   In general, do you see well? 0 Filed at: 02/09/2024 0727   In general, do you have serious problems with your memory? 0 Filed at: 02/09/2024 0727   Do you take more than three different medications every day? 0 Filed at: 02/09/2024 0727   ISAR Score 0 Filed at: 02/09/2024 0727                        SBIRT 22yo+      Flowsheet Row Most Recent Value   Initial Alcohol Screen: US AUDIT-C     1. How often do you have a drink containing alcohol? 0 Filed at: 02/09/2024 0727   2. How many drinks containing alcohol do you have on a typical day you are drinking?  0 Filed at: 02/09/2024 0727   3a. Male UNDER 65: How often do you have five or more drinks on one occasion? 0 Filed at: 02/09/2024 0727   3b. FEMALE Any Age, or MALE 65+: How often do you have 4 or more drinks on one occassion? 0 Filed at: 02/09/2024 0727   Audit-C Score 0 Filed at: 02/09/2024 0727   JARON: How many times in the past year have you...    Used an illegal drug or used a prescription medication for non-medical  reasons? Never Filed at: 02/09/2024 0727                      Medical Decision Making  67 y/o male presents to the ED for sore throat and cough- will get labs, ct scan, and strep/ covid/ flu/ rsv testing.     Amount and/or Complexity of Data Reviewed  Labs: ordered.  Radiology: ordered.    Risk  Prescription drug management.             Disposition  Final diagnoses:   Viral pharyngitis     Time reflects when diagnosis was documented in both MDM as applicable and the Disposition within this note       Time User Action Codes Description Comment    2/9/2024 10:24 AM Radha Mccray [J02.9] Viral pharyngitis           ED Disposition       ED Disposition   Discharge    Condition   Stable    Date/Time   Fri Feb 9, 2024 1023    Comment   Danie Liao discharge to home/self care.                   Follow-up Information       Follow up With Specialties Details Why Contact Info Additional Information    Gosia Mcgill MD Family Medicine Call in 1 day for follow up within 2-3 days [address] [place] [id number]  [phone]       [facility] Emergency Department Emergency Medicine Go to  immediately for any new or worsening symptoms [address] [facility] [address]  [phone] [facility] Emergency Department, [address] [place], [place], [address]    [facility] [place] [facility] Call in 1 day As needed [address] [facility] [address]  [place] [facility] [id number] [phone] [facility] [place], [address] [facility] [place], [place], [id number] [phone]             Discharge Medication List as of 2/9/2024 10:25 AM        CONTINUE these medications which have NOT CHANGED    Details   albuterol (PROVENTIL HFA,VENTOLIN HFA) 90 mcg/act inhaler INHALE 1-2 PUFFS EVERY 4-6 HOURS AS NEEDED AND AS DIRECTED.,  Starting Mon 1/15/2024, Normal      diclofenac potassium (CATAFLAM) 50 mg tablet Take 1 tablet (50 mg total) by mouth 2 (two) times a day for 5 days, Starting Tue 1/30/2024, Until Sun 2/4/2024, Normal      DOXYCYCLINE PO Take by mouth, Historical Med      ipratropium-albuterol (DUO-NEB) 0.5-2.5 mg/3 mL nebulizer solution Take 3 mL by nebulization 4 (four) times a day, Starting Mon 2/5/2024, Normal      omeprazole (PriLOSEC) 20 mg delayed release capsule Take 1 capsule (20 mg total) by mouth daily, Starting Mon 11/6/2023, Normal      predniSONE 20 mg tablet Take 2 tablets (40 mg total) by mouth daily, Starting Mon 1/15/2024, Normal      Georgina 1 MG TBEC Take 1 tablet (1 mg total) by mouth in the morning, Starting Tue 11/14/2023, Normal      Tapinarof (Vtama) 1 % CREA Apply 1 Application topically in the morning, Starting Mon 2/5/2024, Normal      Trelegy Ellipta 100-62.5-25 MCG/ACT inhaler Inhale 1 puff daily, Starting Tue 11/14/2023, Normal      Ubrelvy 100 MG tablet Take 1 tablet (100 mg total) by mouth daily as needed (migraine), Starting Mon 2/5/2024, Normal             No discharge procedures on file.    PDMP Review         Value Time User    PDMP Reviewed  Yes 8/24/2023  9:28 AM Gosia Mcgill MD            ED Provider  Electronically Signed by             Radha Mccray DO  02/13/24 7284

## 2024-02-19 ENCOUNTER — OFFICE VISIT (OUTPATIENT)
Dept: UROLOGY | Facility: CLINIC | Age: 69
End: 2024-02-19

## 2024-02-19 ENCOUNTER — NURSE TRIAGE (OUTPATIENT)
Age: 69
End: 2024-02-19

## 2024-02-19 VITALS
BODY MASS INDEX: 35.84 KG/M2 | HEIGHT: 66 IN | DIASTOLIC BLOOD PRESSURE: 80 MMHG | SYSTOLIC BLOOD PRESSURE: 130 MMHG | OXYGEN SATURATION: 96 % | RESPIRATION RATE: 16 BRPM | WEIGHT: 223 LBS | HEART RATE: 94 BPM

## 2024-02-19 DIAGNOSIS — N43.0 ENCYSTED HYDROCELE: ICD-10-CM

## 2024-02-19 DIAGNOSIS — E66.01 MORBID (SEVERE) OBESITY DUE TO EXCESS CALORIES (HCC): ICD-10-CM

## 2024-02-19 DIAGNOSIS — N50.89 SCROTAL SWELLING: Primary | ICD-10-CM

## 2024-02-19 PROCEDURE — 99024 POSTOP FOLLOW-UP VISIT: CPT | Performed by: UROLOGY

## 2024-02-19 RX ORDER — BENZONATATE 200 MG/1
CAPSULE ORAL
COMMUNITY
Start: 2024-02-11 | End: 2024-02-26 | Stop reason: ALTCHOICE

## 2024-02-19 RX ORDER — OXYCODONE HYDROCHLORIDE 5 MG/1
5 TABLET ORAL EVERY 4 HOURS PRN
Qty: 15 TABLET | Refills: 0 | Status: SHIPPED | OUTPATIENT
Start: 2024-02-19 | End: 2024-02-24

## 2024-02-19 RX ORDER — AZITHROMYCIN 500 MG/1
TABLET, FILM COATED ORAL
COMMUNITY
Start: 2024-02-11 | End: 2024-02-26 | Stop reason: ALTCHOICE

## 2024-02-19 RX ORDER — DOCUSATE SODIUM 100 MG/1
100 CAPSULE, LIQUID FILLED ORAL 3 TIMES DAILY
Qty: 42 CAPSULE | Refills: 0 | Status: SHIPPED | OUTPATIENT
Start: 2024-02-19 | End: 2024-02-26 | Stop reason: ALTCHOICE

## 2024-02-19 NOTE — TELEPHONE ENCOUNTER
Regarding: incision pain and swollen  ----- Message from Lashay Michelle sent at 2/19/2024  8:14 AM EST -----  Pt states that his incision hurts and he can't put pants on. He says he is swollen up  more than he was before he had the surgery. He is in a lot of pain discomfort

## 2024-02-19 NOTE — TELEPHONE ENCOUNTER
"Patient had a hydrocelectomy performed on 1/30/2024. He has been experiencing ongoing pain and swelling in left testicle since the surgery. He has difficulty even getting dressed. He states he tried all the care advice measures prior and nothing helps. He refuses the ER and wants to see MD only. I explained I will forward this to clinical team and call him with recommendations. He wants to be seen by and MD. Please assist in getting him seen.     Answer Assessment - Initial Assessment Questions  1. SYMPTOM: \"What's the main symptom you're concerned about?\" (e.g., redness, pain, drainage)      Swelling and pain   2. ONSET: \"When did pain  start?\"      Ongoing   3. SURGERY: \"What surgery was performed?\"      hydrocelectomy  4. DATE of SURGERY: \"When was surgery performed?\"       1/30/2024  5. INCISION SITE: \"Where is the incision located?\"       Left   6. REDNESS: \"Is there any redness at the incision site?\" If yes, ask: \"How wide across is the redness?\" (Inches, centimeters)       No changes in skin   7. PAIN: \"Is there any pain?\" If Yes, ask: \"How bad is it?\"  (Scale 1-10; or mild, moderate, severe)      Pain scale of 8  8. BLEEDING: \"Is there any bleeding?\" If Yes, ask: \"How much?\" and \"Where?\"      No blood   9. DRAINAGE: \"Is there any drainage from the incision site?\" If yes, ask: \"What color and how much?\" (e.g., red, cloudy, pus; drops, teaspoon)      No pus   10. FEVER: \"Do you have a fever?\" If Yes, ask: \"What is your temperature, how was it measured, and when did it start?\"        No   11. OTHER SYMPTOMS: \"Do you have any other symptoms?\" (e.g., shaking chills, weakness, rash elsewhere on body)        No    Protocols used: Post-Op Incision Symptoms and Questions-ADULT-[place]    "

## 2024-02-19 NOTE — PROGRESS NOTES
"     Problem List Items Addressed This Visit       Encysted hydrocele     Other Visit Diagnoses       Scrotal swelling    -  Primary    Relevant Orders    US scrotum and groin area              Discussion:      Danie is having significant scrotal swelling on the left-hand side.  No signs of infection, the incision is healing nicely.  He does have scrotal swelling with some discomfort of the scrotum upon heavy palpation.  I have ordered a scrotal ultrasound for evaluation.  He may have a seroma or some small hematoma.  I have given him stronger pain medications given uncontrolled pain on Tylenol and diclofenac.  He will see us back as scheduled on 18 March.    I do recommend that a photo be taken and uploaded into epic at that time    Portions of the above record have been created with voice recognition software.  Occasional wrong word or \"sound alike\" substitution may have occurred due to the inherent limitations of voice recognition software.  Read the chart carefully and recognize, using context, where substitution may have occurred.    Assessment and plan:       Please see problem oriented charting for the assessment plan of today's urological complaints      Evan Salmeron MD      Chief Complaint     As listed above      History of Present Illness     Danie Liao is a 68 y.o. man with left hydrocele s/p hydrocelectomy. Presents with pain and swelling today    The following portions of the patient's history were reviewed and updated as appropriate: allergies, current medications, past family history, past medical history, past social history, past surgical history and problem list.    Detailed Urologic History     - please refer to HPI    Review of Systems     Review of Systems   Constitutional: Negative.    HENT: Negative.     Eyes: Negative.    Respiratory: Negative.     Cardiovascular: Negative.    Gastrointestinal: Negative.    Endocrine: Negative.    Genitourinary:  Positive for scrotal swelling. "   Musculoskeletal: Negative.    Skin: Negative.    Allergic/Immunologic: Negative.    Neurological: Negative.    Hematological: Negative.    Psychiatric/Behavioral: Negative.         AUA SYMPTOM SCORE      Flowsheet Row Most Recent Value   AUA SYMPTOM SCORE    How often have you had a sensation of not emptying your bladder completely after you finished urinating? 2 (P)    How often have you had to urinate again less than two hours after you finished urinating? 4 (P)    How often have you found you stopped and started again several times when you urinate? 4 (P)    How often have you found it difficult to postpone urination? 2 (P)    How often have you had a weak urinary stream? 4 (P)    How often have you had to push or strain to begin urination? 4 (P)    How many times did you most typically get up to urinate from the time you went to bed at night until the time you got up in the morning? 4 (P)    Quality of Life: If you were to spend the rest of your life with your urinary condition just the way it is now, how would you feel about that? 3 (P)    AUA SYMPTOM SCORE 24 (P)               Allergies     Allergies   Allergen Reactions    Other Other (See Comments)     Ian Franco       Physical Exam     Physical Exam  Vitals and nursing note reviewed.   Constitutional:       General: He is not in acute distress.     Appearance: Normal appearance. He is well-developed. He is not ill-appearing, toxic-appearing or diaphoretic.   HENT:      Head: Normocephalic and atraumatic.   Eyes:      General: No scleral icterus.  Pulmonary:      Effort: Pulmonary effort is normal. No respiratory distress.   Abdominal:      General: There is no distension.      Palpations: Abdomen is soft.      Tenderness: There is no abdominal tenderness. There is no right CVA tenderness or left CVA tenderness.   Genitourinary:         Comments: Scrotal swelling  Musculoskeletal:         General: No deformity.      Cervical back: Neck supple.   Skin:     " Coloration: Skin is not jaundiced or pale.   Neurological:      Mental Status: He is alert and oriented to person, place, and time. Mental status is at baseline.      Cranial Nerves: No cranial nerve deficit.      Motor: No weakness.      Coordination: Coordination normal.   Psychiatric:         Mood and Affect: Mood normal.         Behavior: Behavior normal.         Thought Content: Thought content normal.         Judgment: Judgment normal.             Vital Signs  Vitals:    02/19/24 1503   BP: 130/80   BP Location: Left arm   Patient Position: Sitting   Cuff Size: Adult   Pulse: 94   Resp: 16   SpO2: 96%   Weight: 101 kg (223 lb)   Height: 5' 6\" (1.676 m)         Current Medications       Current Outpatient Medications:     albuterol (PROVENTIL HFA,VENTOLIN HFA) 90 mcg/act inhaler, INHALE 1-2 PUFFS EVERY 4-6 HOURS AS NEEDED AND AS DIRECTED., Disp: 36 g, Rfl: 10    azithromycin (ZITHROMAX) 500 MG tablet, TAKE 1 TABLET BY MOUTH EVERY DAY IN THE MORNING FOR 5 DAYS, Disp: , Rfl:     benzonatate (TESSALON) 200 MG capsule, TAKE 1 CAPSULE BY MOUTH THREE TIMES A DAY FOR 5 DAYS, Disp: , Rfl:     ipratropium-albuterol (DUO-NEB) 0.5-2.5 mg/3 mL nebulizer solution, Take 3 mL by nebulization 4 (four) times a day, Disp: 120 mL, Rfl: 3    omeprazole (PriLOSEC) 20 mg delayed release capsule, Take 1 capsule (20 mg total) by mouth daily, Disp: 90 capsule, Rfl: 3    predniSONE 20 mg tablet, Take 2 tablets (40 mg total) by mouth daily, Disp: 10 tablet, Rfl: 0    Georgina 1 MG TBEC, Take 1 tablet (1 mg total) by mouth in the morning, Disp: 90 tablet, Rfl: 2    Tapinarof (Vtama) 1 % CREA, Apply 1 Application topically in the morning, Disp: 60 g, Rfl: 3    Trelegy Ellipta 100-62.5-25 MCG/ACT inhaler, Inhale 1 puff daily, Disp: 60 blister, Rfl: 5    Ubrelvy 100 MG tablet, Take 1 tablet (100 mg total) by mouth daily as needed (migraine), Disp: 30 tablet, Rfl: 2    diclofenac potassium (CATAFLAM) 50 mg tablet, Take 1 tablet (50 mg total) by " mouth 2 (two) times a day for 5 days, Disp: 10 tablet, Rfl: 0    DOXYCYCLINE PO, Take by mouth (Patient not taking: Reported on 2/19/2024), Disp: , Rfl:       Active Problems     Patient Active Problem List   Diagnosis    Chronic obstructive pulmonary disease (HCC)    Testicular swelling, left    Localized osteoporosis without current pathological fracture    Lumbar disc disease    Intractable migraine without status migrainosus    Kidney donor    Psoriasis    Polyp of colon    Morbid (severe) obesity due to excess calories (HCC)    Obesity, Class II, BMI 35-39.9    Prediabetes    LEATHA (obstructive sleep apnea)    Hyperlipidemia    Chronic pain    GERD (gastroesophageal reflux disease)    Encysted hydrocele         Past Medical History     Past Medical History:   Diagnosis Date    Asthma     Chronic pain 1/30/2024    cervic and lumbar    COPD (chronic obstructive pulmonary disease) (HCC)     Emphysema of lung (HCC)     GERD (gastroesophageal reflux disease) 1/30/2024    Hyperlipidemia 1/30/2024    Migraine     Sleep apnea          Surgical History     Past Surgical History:   Procedure Laterality Date    APPENDECTOMY      HERNIA REPAIR      4 times    NEPHRECTOMY LIVING DONOR Left 10/2009    AL EXCISION HYDROCELE UNILATERAL Left 1/30/2024    Procedure: HYDROCELECTOMY;  Surgeon: Evan Salmeron MD;  Location: [facility];  Service: Urology         Family History     Family History   Problem Relation Age of Onset    Breast cancer Mother     Heart disease Father     Breast cancer Maternal Grandmother     Heart disease Paternal Grandmother          Social History     Social History     Social History     Tobacco Use   Smoking Status Former    Current packs/day: 0.00    Average packs/day: 2.0 packs/day for 39.0 years (78.0 ttl pk-yrs)    Types: Cigarettes    Start date: 1/1/1970    Quit date: 1/1/2009    Years since quitting: 15.1   Smokeless Tobacco Never         Pertinent Lab Values     Lab Results   Component Value  Date    CREATININE 0.85 02/09/2024       Lab Results   Component Value Date    PSA 1.50 09/23/2023

## 2024-02-19 NOTE — TELEPHONE ENCOUNTER
Patient ID: Mallory Barraza is a 27 y.o. female.    Chief Complaint: No chief complaint on file.    VIRTUAL VISIT    Assessment and Plan      1. Binge eating disorder  - dextroamphetamine-amphetamine (ADDERALL XR) 20 MG 24 hr capsule; Take 1 capsule (20 mg total) by mouth once daily.  Dispense: 30 capsule; Refill: 0    2. Class 2 obesity  - Hemoglobin A1C; Future    3. Screening for thyroid disorder  - TSH; Future    4. Screening for lipid disorders  - Lipid Panel; Future    5. Screening for diabetes mellitus  - Hemoglobin A1C; Future    6. Preventative health care  - CBC Auto Differential; Future  - Comprehensive Metabolic Panel; Future      Continue sertraline   Continue Adderall XR 20 mg daily   Consider taper of BuSpar, discussed   Continue Wellbutrin  Labs and annual in 3 months    HPI     Improvement in depression and anxiety on wellbutrin.  Not binge eating while on Adderall.  Weight stable Taking weekends off of Adderall.      Review of Systems   Constitutional:  Negative for fever.   Respiratory:  Negative for shortness of breath.    Cardiovascular:  Negative for chest pain.   Gastrointestinal:  Negative for abdominal pain.        Wt Readings from Last 3 Encounters:   02/16/23 1506 102.2 kg (225 lb 5 oz)   02/11/22 1324 100.7 kg (222 lb 0.1 oz)   11/11/21 1351 108.2 kg (238 lb 8.6 oz)     Medication List with Changes/Refills   Current Medications    BUPROPION (WELLBUTRIN XL) 150 MG TB24 TABLET    Take 1 tablet (150 mg total) by mouth once daily.    BUSPIRONE (BUSPAR) 7.5 MG TABLET    Take 1 tablet (7.5 mg total) by mouth 2 (two) times daily.    NORGESTIMATE-ETHINYL ESTRADIOL (ORTHO TRI-CYCLEN LO) 0.18/0.215/0.25 MG-25 MCG TABLET    Take 1 tablet by mouth once daily.    SERTRALINE (ZOLOFT) 100 MG TABLET    Take 1 tablet (100 mg total) by mouth once daily.   Changed and/or Refilled Medications    Modified Medication Previous Medication    DEXTROAMPHETAMINE-AMPHETAMINE (ADDERALL XR) 20 MG 24 HR CAPSULE  He needs to be seen for in office physical examination and can determine if further work-up with imaging is needed.    dextroamphetamine-amphetamine (ADDERALL XR) 20 MG 24 hr capsule       Take 1 capsule (20 mg total) by mouth once daily.    Take 1 capsule (20 mg total) by mouth once daily.   Discontinued Medications    DEXTROAMPHETAMINE-AMPHETAMINE (ADDERALL XR) 20 MG 24 HR CAPSULE    Take 1 capsule (20 mg total) by mouth once daily.    DEXTROAMPHETAMINE-AMPHETAMINE (ADDERALL XR) 20 MG 24 HR CAPSULE    Take 1 capsule (20 mg total) by mouth once daily.           The patient location is:  [place]  The chief complaint leading to consultation is:  Binge eating disorder  Face to Face time with patient:  17 minutes  minutes of total time spent on the encounter, which includes face to face time and   non-face to face time preparing to see the patient (eg, review of tests), Obtaining and/or   reviewing separately obtained history, Documenting clinical information in the electronic   or other health record, Independently interpreting results (not separately reported) and   communicating results to the patient/family/caregiver, or   Care coordination (not separately reported).     Each patient to whom he or she provides medical services by telemedicine is:    (1) informed of the relationship between the physician and patient and the respective   role of any other health care provider with respect to management of the patient; and   (2) notified that he or she may decline to receive medical services by telemedicine and   may withdraw from such care at any time.    I personally reviewed past medical, family and social history.

## 2024-02-23 ENCOUNTER — CLINICAL SUPPORT (OUTPATIENT)
Dept: BARIATRICS | Facility: CLINIC | Age: 69
End: 2024-02-23

## 2024-02-23 VITALS — WEIGHT: 222.8 LBS | BODY MASS INDEX: 35.96 KG/M2

## 2024-02-23 DIAGNOSIS — E66.9 OBESITY, CLASS II, BMI 35-39.9: Primary | ICD-10-CM

## 2024-02-23 PROCEDURE — RECHECK

## 2024-02-23 NOTE — PROGRESS NOTES
" Bariatric Nutrition Assessment Note-  Weight Check- Follow-up visit    Insurance: 6 required monthly weight checks  5/6 today    Type of surgery    Interested in sleeve  Surgery Date: TBD  Surgeon: Consult with Dr. Chan  pending       Nutrition Assessment   Danie Liao  68 y.o.  male     There were no vitals taken for this visit.    Height: 5'4.5\"  Weight: 222.8#  Eval Weight: 218#   BMI: 36.8  Wt with BMI of 25: 148#  Pre-Op Excess Wt: 70#  BMI to Qualify at 40 = 236.5#  BMI to Qualify at 35 = 207#  PMH includes:  Asthma, COPD., Migraines,  One kidney - donor DX- severe LEATHA    Pt advised not to gain weight during preop process. Pt encouraged to lose weight via healthy eating and exercise. Pt may follow Liver Shrinking diet 2 weeks prior to DOS depending on BMI at time. This diet will promote weight loss.    Maverick- . Jeor Equation:  Estimated calories to maintain weightt: 2025   Estimated calories for weight loss 6747-2589 ( 1-2# per wk wt loss - sedentary )  Estimated protein needs  Grams (1.0-1.5 gms/kg BMI at 25 )   Estimated fluid needs 70-82 oz (30-35 ml/kg BMI at 25)      NAFLD Fibrosis Score is: -.945    NAFLD Score Correlated Fibrosis Severity   <0.12 F0-F2   0.12-0.676 Indeterminate Score   >0.676 F3-F4   **Fibrosis Severity Scale: F0 = no fibrosis; F1= mild fibrosis; F2 = moderate fibrosis; F3 = severe fibrosis; F4 = cirrhosis    NAFLD Score Component Values:  Component Value Date   Age: 68 y.o.     BMI: 35.99 kg/m²    IFG or DM: Yes    AST: 22 U/L 1/15/2024   ALT: 34 U/L 1/15/2024   Platelet: 320 Thousands/uL 2/9/2024   Albumin: 4.1 g/dL 1/15/2024       Weight History Reason for WLS: COPD and trouble breathing - can't lose on his own  Onset of Obesity: Adult - past 6 years  Family history of obesity: Yes  Wt Loss Attempts: Commercial Programs ( [employer].)  High Protein/Low CHO diets (Keto, Atkins, Aurora, etc.)  Meal Replacements (Medifast, Slim Fast, etc.)  Self Created Diets " (Portion Control, Healthy Food Choices, etc.)  Patient has tried the above for 6 months or more with insufficient weight loss or weight regain, which is why patient has requested to be evaluated for weight loss surgery today  Maximum Wt Lost: 5#None       Review of History and Medications   OTC: None  Past Medical History:   Diagnosis Date    Asthma     Chronic pain 1/30/2024    cervic and lumbar    COPD (chronic obstructive pulmonary disease) (HCC)     Emphysema of lung (HCC)     GERD (gastroesophageal reflux disease) 1/30/2024    Hyperlipidemia 1/30/2024    Migraine     Sleep apnea      Past Surgical History:   Procedure Laterality Date    APPENDECTOMY      HERNIA REPAIR      4 times    NEPHRECTOMY LIVING DONOR Left 10/2009    UT EXCISION HYDROCELE UNILATERAL Left 1/30/2024    Procedure: HYDROCELECTOMY;  Surgeon: Evan Salmeron MD;  Location: [facility];  Service: Urology     Social History     Socioeconomic History    Marital status: /Civil Union     Spouse name: Not on file    Number of children: Not on file    Years of education: Not on file    Highest education level: Not on file   Occupational History    Not on file   Tobacco Use    Smoking status: Former     Current packs/day: 0.00     Average packs/day: 2.0 packs/day for 39.0 years (78.0 ttl pk-yrs)     Types: Cigarettes     Start date: 1/1/1970     Quit date: 1/1/2009     Years since quitting: 15.1    Smokeless tobacco: Never   Vaping Use    Vaping status: Former   Substance and Sexual Activity    Alcohol use: Yes     Comment: Occasional    Drug use: No    Sexual activity: Yes     Partners: Female   Other Topics Concern    Not on file   Social History Narrative    Not on file     Social Determinants of Health     Financial Resource Strain: Low Risk  (8/24/2023)    Overall Financial Resource Strain (CARDIA)     Difficulty of Paying Living Expenses: Not hard at all   Food Insecurity: Not on file   Transportation Needs: No Transportation Needs  (8/24/2023)    PRAPARE - Transportation     Lack of Transportation (Medical): No     Lack of Transportation (Non-Medical): No   Physical Activity: Not on file   Stress: Not on file   Social Connections: Not on file   Intimate Partner Violence: Not on file   Housing Stability: Not on file       Current Outpatient Medications:     albuterol (PROVENTIL HFA,VENTOLIN HFA) 90 mcg/act inhaler, INHALE 1-2 PUFFS EVERY 4-6 HOURS AS NEEDED AND AS DIRECTED., Disp: 36 g, Rfl: 10    azithromycin (ZITHROMAX) 500 MG tablet, TAKE 1 TABLET BY MOUTH EVERY DAY IN THE MORNING FOR 5 DAYS, Disp: , Rfl:     benzonatate (TESSALON) 200 MG capsule, TAKE 1 CAPSULE BY MOUTH THREE TIMES A DAY FOR 5 DAYS, Disp: , Rfl:     diclofenac potassium (CATAFLAM) 50 mg tablet, Take 1 tablet (50 mg total) by mouth 2 (two) times a day for 5 days, Disp: 10 tablet, Rfl: 0    docusate sodium (COLACE) 100 mg capsule, Take 1 capsule (100 mg total) by mouth 3 (three) times a day for 14 days, Disp: 42 capsule, Rfl: 0    DOXYCYCLINE PO, Take by mouth (Patient not taking: Reported on 2/19/2024), Disp: , Rfl:     ipratropium-albuterol (DUO-NEB) 0.5-2.5 mg/3 mL nebulizer solution, Take 3 mL by nebulization 4 (four) times a day, Disp: 120 mL, Rfl: 3    omeprazole (PriLOSEC) 20 mg delayed release capsule, Take 1 capsule (20 mg total) by mouth daily, Disp: 90 capsule, Rfl: 3    oxyCODONE (Roxicodone) 5 immediate release tablet, Take 1 tablet (5 mg total) by mouth every 4 (four) hours as needed for moderate pain for up to 5 days Max Daily Amount: 30 mg, Disp: 15 tablet, Rfl: 0    predniSONE 20 mg tablet, Take 2 tablets (40 mg total) by mouth daily, Disp: 10 tablet, Rfl: 0    Georgina 1 MG TBEC, Take 1 tablet (1 mg total) by mouth in the morning, Disp: 90 tablet, Rfl: 2    Tapinarof (Vtama) 1 % CREA, Apply 1 Application topically in the morning, Disp: 60 g, Rfl: 3    Trelegy Ellipta 100-62.5-25 MCG/ACT inhaler, Inhale 1 puff daily, Disp: 60 blister, Rfl: 5    Ubrelvy 100 MG  tablet, Take 1 tablet (100 mg total) by mouth daily as needed (migraine), Disp: 30 tablet, Rfl: 2  Food Intake and Lifestyle Assessment   Food Intake Assessment completed via usual diet recall  Breakfast: 4 pancakes, butter, Syrup - Real  or oatmeal 0 or Eng Muffin   Lunch: Frozen meal - Michaelina  Snack: Chocolate, cupcakes  Dinner: 1/2 crab Dungeoness -  Snack: Wakes - Corn muffin - half  8 oz Chocolate milk  Beverage intake: water, Chocolate whole milk, juice, coffee 1 cup , and (alcohol and soda - very rare)  Portions:  6 oz Protein  1 c Starch   1/2 c Vegetable  or less   Protein supplement: None   Estimated protein intake per day: 75-80  Estimated fluid intake per day: 2 bottles water 16 oz juice, 1 cup milk  Meals eaten away from home: 2X month  Typical meal pattern: 3 meals per day and 1-2 snacks per day  Eating Behaviors: Consumption of high calorie/ high fat foods, Consumption of high calorie beverages, Large portion sizes, and Craves sweet foods  Food allergies or intolerances:   Allergies   Allergen Reactions    Other Other (See Comments)     Green Pepper    - intolerances to peppers  Cultural or Nondenominational considerations: none    Physical Assessment  Physical Activity Retired - household jobs -  and   Types of exercise: None  Current physical limitations: Breathing    Psychosocial Assessment   Support systems: spouse  Friends who had surgery  Socioeconomic factors: Work as  and  -  - 3 grown children     Nutrition Diagnosis  Diagnosis: Overweight / Obesity (NC-3.3)  Related to: Physical inactivity and Excessive energy intake  As Evidenced by: BMI >25     Nutrition Prescription: Recommend the following diet  Regular    Interventions and Teaching   Discussed pre-op and post-op nutrition guidelines.       Patient educated and handouts provided.  Surgical changes to stomach / GI  Capacity of post-surgery stomach  Diet progression  Adequate hydration  Sugar and fat  "restriction to decrease \"dumping syndrome\"  Fat restriction to decrease steatorrhea  Expected weight loss  Weight loss plateaus/ possibility of weight regain  Exercise  Suggestions for pre-op diet  Nutrition considerations after surgery  Protein supplements  Meal planning and preparation  Appropriate carbohydrate, protein, and fat intake, and food/fluid choices to maximize safe weight loss, nutrient intake, and tolerance   Dietary and lifestyle changes  Possible problems with poor eating habits  Intuitive eating  Techniques for self monitoring and keeping daily food journal  Potential for food intolerance after surgery, and ways to deal with them including: lactose intolerance, nausea, reflux, vomiting, diarrhea, food intolerance, appetite changes, gas  Vitamin / Mineral supplementation of Multivitamin with minerals, Calcium, Vitamin B12, Iron, Fat Soluble vitamins, and Vitamin D    Patient is not currently pregnant and doesn't desire to become pregnant a minimum of one year post-op    Education provided to: patient and wife    Barriers to learning: No barriers identified    Readiness to change: preparation    Prior research on procedure: internet and friends or family    Comprehension: verbalizes understanding     Expected Compliance: good  Recommendations  Pt is an appropriate candidate for surgery. Yes    Evaluation / Monitoring  Dietitian to Monitor: Eating pattern as discussed Tolerance of nutrition prescription Body weight Lab values Physical activity Bowel pattern    3/6 Weight Check Visit Summary 12/27/2023  Started pre op process. Reading over manual and working on guidelines. Hydration good - 48-64 oz water decreasing juice from 16oz to 8 oz portion.    Has been avoiding fast food. Dines out occasionally but take portion home. Has overall reduced portions - Exercise limited - d/t SOB and back pain from herniated discs. Questions/concerns addressed. Wife attends sessions with pt for support.  Workflow " reviewed and updated    5/6 Weight Check Visit Summary 2/23/2024  Had recent surgery (hydrocolectomy) - painful and uncomfortable - not able to sleep as cannot lay down - had to cancel sleep study due to this - on cancellation list. Continues to make gradual changes to diet. Stopped drinking juice. Consumes 2-3 bottles water  - adds flavoring. Eating more vegetables and protein at meals. Finds 30/60 challenging - reinforced importance. Exercise limited with back issues and now with recovery from recent surgery, Question answered during discussion. Pt receptive. Wife in attendance and supportive.     Workflow reviewed:   Psych and/or D+A Clearance: N/A  PCP Letter: referral  Support Group: No longer required, strongly encouraged  Surgeon Appt: 10/26/23  EGD: Completed 11/10/23  **Cardiac Risk Assessment: scheduled 3/26/2024  **Sleep Studies: sleep study 12/7/2023 - Severe LEATHA - awaiting BiPAP 5/29/24 - on wait list for sooner  **Blood work: Needs to complete- February  Nicotine test: N/A  Required weight checks: 5/6 today  Weight Loss: Not required, encouraged positive lifestyle changes.    Goals  Eliminate sugar sweetened beverages, Food journal, Exercise 30 minutes 5 times per week, Complete lession plans 1-6, Eat 3 meals per day, and Eliminate mindless snacking  Follow Pre-Surgery guidelines  > Trial Baritastic for food logging  > Maintain  regular meal pattern - include fruits, vegetables and whole grains  > Avoid skipping meals- Can use protein drink as meal replacement  > Decrease portions  > Focus on protein - include lean protein at each meal and snack - Learn to eat protein first  > Limit processed foods, fast foods and dining away from home  > Include meal prepping  > Limit snacks - healthier choices and portion; avoid grazing  > Slow pace of eating and sip fluids  - practice 30/60 minute rule  > Reduce caffeine/ eliminate by day of surgery  > Reduce/eliminate carbonation by pre-op diet  > Increase water  intake - 64 oz  > Increase physical activity/establish exercise regimen as able  > Start multi vitamin and additional Vitamin D 2000IU  Work on skills to cope with emotional eating/mindfull eating  Pre-op weight loss not required but advised not to gain weight  F/U next month with bariatric provider      Time Spent:   30 Minutes

## 2024-02-26 ENCOUNTER — OFFICE VISIT (OUTPATIENT)
Dept: FAMILY MEDICINE CLINIC | Facility: CLINIC | Age: 69
End: 2024-02-26
Payer: COMMERCIAL

## 2024-02-26 ENCOUNTER — TELEPHONE (OUTPATIENT)
Dept: FAMILY MEDICINE CLINIC | Facility: CLINIC | Age: 69
End: 2024-02-26

## 2024-02-26 VITALS
WEIGHT: 222 LBS | OXYGEN SATURATION: 95 % | BODY MASS INDEX: 35.68 KG/M2 | SYSTOLIC BLOOD PRESSURE: 112 MMHG | TEMPERATURE: 98.4 F | HEART RATE: 100 BPM | HEIGHT: 66 IN | DIASTOLIC BLOOD PRESSURE: 72 MMHG

## 2024-02-26 DIAGNOSIS — G47.33 OSA (OBSTRUCTIVE SLEEP APNEA): ICD-10-CM

## 2024-02-26 DIAGNOSIS — K63.5 POLYP OF COLON, UNSPECIFIED PART OF COLON, UNSPECIFIED TYPE: Primary | ICD-10-CM

## 2024-02-26 DIAGNOSIS — J44.9 CHRONIC OBSTRUCTIVE PULMONARY DISEASE, UNSPECIFIED COPD TYPE (HCC): ICD-10-CM

## 2024-02-26 PROCEDURE — 99214 OFFICE O/P EST MOD 30 MIN: CPT | Performed by: STUDENT IN AN ORGANIZED HEALTH CARE EDUCATION/TRAINING PROGRAM

## 2024-02-26 RX ORDER — AZITHROMYCIN 250 MG/1
250 TABLET, FILM COATED ORAL EVERY 24 HOURS
Qty: 28 TABLET | Refills: 0 | Status: SHIPPED | OUTPATIENT
Start: 2024-02-26 | End: 2024-02-27 | Stop reason: SDUPTHER

## 2024-02-26 NOTE — TELEPHONE ENCOUNTER
T/c from pt -- he called the pharmacy in [place] and they are just going to transfer the medication so there is no need to resend the script.

## 2024-02-26 NOTE — TELEPHONE ENCOUNTER
T/c from pt -- medication sent in this AM for him was sent to a pharmacy in [place].  Requesting it be resent to [facility] on [address]

## 2024-02-26 NOTE — PROGRESS NOTES
Assessment/Plan:     Problem List Items Addressed This Visit        Digestive    Polyp of colon - Primary       Respiratory    Chronic obstructive pulmonary disease (HCC)    Relevant Medications    azithromycin (ZITHROMAX) 250 mg tablet    LEATHA (obstructive sleep apnea)     Trial of low dose azithro    Subjective:      Patient ID: Danie Liao is a 68 y.o. male.    HPI    Testicle is still painful, no    Breathing is overall not better. Breathiong worse at night, was to have a study/sleep and had to cancel it.    The following portions of the patient's history were reviewed and updated as appropriate:   Past Medical History:  He has a past medical history of Arthritis, Asthma, Chronic pain (01/30/2024), COPD (chronic obstructive pulmonary disease) ([facility]), Emphysema of lung ([facility]), GERD (gastroesophageal reflux disease) (01/30/2024), Headache(784.0), Hyperlipidemia (01/30/2024), Migraine, Obesity, and Sleep apnea.,  _______________________________________________________________________  Medical Problems:  does not have any pertinent problems on file.,  _______________________________________________________________________  Past Surgical History:   has a past surgical history that includes Appendectomy; Nephrectomy living donor (Left, 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 His smoking use included cigarettes. He started smoking about 54 years ago. He has a 78 pack-year smoking history. He has never used smokeless tobacco. He reports current alcohol use. He reports that he does not use  drugs.,  _______________________________________________________________________  Allergies:  is allergic to other..  _______________________________________________________________________  Current Outpatient Medications   Medication Sig Dispense Refill   • albuterol (PROVENTIL HFA,VENTOLIN HFA) 90 mcg/act inhaler INHALE 1-2 PUFFS EVERY 4-6 HOURS AS NEEDED AND AS DIRECTED. 36 g 10   • azithromycin (ZITHROMAX) 250 mg tablet Take 1 tablet (250 mg total) by mouth every 24 hours for 28 days Take 2 tablets today then 1 tablet daily x 4 days 28 tablet 0   • ipratropium-albuterol (DUO-NEB) 0.5-2.5 mg/3 mL nebulizer solution Take 3 mL by nebulization 4 (four) times a day 120 mL 3   • omeprazole (PriLOSEC) 20 mg delayed release capsule Take 1 capsule (20 mg total) by mouth daily 90 capsule 3   • Georgina 1 MG TBEC Take 1 tablet (1 mg total) by mouth in the morning 90 tablet 2   • Tapinarof (Vtama) 1 % CREA Apply 1 Application topically in the morning 60 g 3   • Trelegy Ellipta 100-62.5-25 MCG/ACT inhaler Inhale 1 puff daily 60 blister 5   • Ubrelvy 100 MG tablet Take 1 tablet (100 mg total) by mouth daily as needed (migraine) 30 tablet 2     No current facility-administered medications for this visit.     _______________________________________________________________________  Review of Systems   Constitutional:  Negative for chills, fatigue and fever.   HENT:  Negative for rhinorrhea and sore throat.    Eyes:  Negative for visual disturbance.   Respiratory:  Positive for cough, shortness of breath and wheezing.    Cardiovascular:  Negative for chest pain and palpitations.   Gastrointestinal:  Negative for abdominal pain, constipation, diarrhea, nausea and vomiting.   Genitourinary:  Negative for difficulty urinating, dysuria and frequency.   Musculoskeletal:  Negative for arthralgias and myalgias.   Skin:  Negative for color change and rash.   Neurological:  Negative for weakness and headaches.         Objective:  Vitals:     "02/26/24 0934   BP: 112/72   Pulse: 100   Temp: 98.4 °F (36.9 °C)   SpO2: 95%   Weight: 101 kg (222 lb)   Height: 5' 6\" (1.676 m)     Body mass index is 35.83 kg/m².     Physical Exam  Constitutional:       General: He is not in acute distress.     Appearance: He is not ill-appearing.   HENT:      Head: Normocephalic and atraumatic.      Right Ear: External ear normal.      Left Ear: External ear normal.      Nose: Nose normal. No congestion or rhinorrhea.      Mouth/Throat:      Mouth: Mucous membranes are moist.      Pharynx: Oropharynx is clear. No oropharyngeal exudate or posterior oropharyngeal erythema.   Eyes:      Extraocular Movements: Extraocular movements intact.      Conjunctiva/sclera: Conjunctivae normal.      Pupils: Pupils are equal, round, and reactive to light.   Cardiovascular:      Rate and Rhythm: Normal rate and regular rhythm.      Pulses: Normal pulses.      Heart sounds: No murmur heard.  Pulmonary:      Effort: Pulmonary effort is normal. No respiratory distress.      Breath sounds: Examination of the right-middle field reveals wheezing. Examination of the left-middle field reveals wheezing. Examination of the right-lower field reveals wheezing. Examination of the left-lower field reveals wheezing. Wheezing present.   Chest:      Chest wall: No tenderness.   Abdominal:      General: Bowel sounds are normal.      Palpations: Abdomen is soft.      Tenderness: There is no abdominal tenderness.   Musculoskeletal:         General: Normal range of motion.      Cervical back: Normal range of motion.   Skin:     General: Skin is warm and dry.      Capillary Refill: Capillary refill takes less than 2 seconds.      Findings: No rash.   Neurological:      General: No focal deficit present.      Mental Status: He is alert. Mental status is at baseline.         "

## 2024-02-27 ENCOUNTER — TELEPHONE (OUTPATIENT)
Dept: FAMILY MEDICINE CLINIC | Facility: CLINIC | Age: 69
End: 2024-02-27

## 2024-02-27 DIAGNOSIS — J44.9 CHRONIC OBSTRUCTIVE PULMONARY DISEASE, UNSPECIFIED COPD TYPE (HCC): ICD-10-CM

## 2024-02-27 RX ORDER — AZITHROMYCIN 250 MG/1
250 TABLET, FILM COATED ORAL EVERY 24 HOURS
Qty: 28 TABLET | Refills: 0 | Status: SHIPPED | OUTPATIENT
Start: 2024-02-27 | End: 2024-03-26

## 2024-02-27 NOTE — TELEPHONE ENCOUNTER
T/c with [facility] on [address] -- need pts script for azithromycin resent with updated directions (script was originally sent to a pharmacy in [place], but transferred to [facility] by pharmacy for pt).    Report there are two different instructions listed -- one to take 1 tablet by mouth every 24 hours for 28 days, and also to take 2 tablets on first day and then one a day for four days, like zpack instructions.    Would like script resent with updated instructions

## 2024-03-01 ENCOUNTER — HOSPITAL ENCOUNTER (OUTPATIENT)
Dept: ULTRASOUND IMAGING | Facility: CLINIC | Age: 69
Discharge: HOME | End: 2024-03-01
Payer: COMMERCIAL

## 2024-03-01 ENCOUNTER — TELEPHONE (OUTPATIENT)
Dept: UROLOGY | Facility: CLINIC | Age: 69
End: 2024-03-01

## 2024-03-01 DIAGNOSIS — N50.89 SCROTAL SWELLING: ICD-10-CM

## 2024-03-01 PROCEDURE — 76870 US EXAM SCROTUM: CPT

## 2024-03-01 NOTE — TELEPHONE ENCOUNTER
I called and spoke to Danie about his ultrasound.  He has a recurrent hydrocele.  We reviewed his options in terms of aspiration, aspiration and injection of a sclerosing agent, as well as repeat hydrocelectomy with use of a sclerosing irrigant after repeat surgery.    He is very upset and having a lot of discomfort and swelling.  He does wish to think about his options prior to scheduling a repeat intervention.  Should he wish for aspiration he would like to have this done under anesthesia and this is reasonable given his severe discomfort.  I did speak with him about me doing this or getting him a second opinion with another one of our partners or elsewhere.    We did also discuss the potential need for future surgical interventions given that recurrence of hydrocele is a risk factor for repeat recurrence after repeat surgery.    I expressed to him my opinions on his case and apologized to him that he has had a untoward event following surgery.    All questions and concerns answered and addressed.  He will let us know how he wishes to proceed going forward

## 2024-03-01 NOTE — TELEPHONE ENCOUNTER
Your next Follow up is 8/7 and next Office Visiting [facility] which is usually for Biopsy results is 5/1/24. Please let me know if this is an appropriate time frame. Please Advise.    Also, PT sent 2 [employer] messages for you and PT is not very happy.    ----- Message from Evan Salmeron MD sent at 3/1/2024 11:13 AM EST -----  Please make his follow up with me or Dr. Stone

## 2024-03-08 ENCOUNTER — TELEPHONE (OUTPATIENT)
Age: 69
End: 2024-03-08

## 2024-03-08 NOTE — TELEPHONE ENCOUNTER
Scheduled date of colonoscopy (as of today): 4/29/24  Physician performing colonoscopy: KERMIT  Location of colonoscopy: [place]

## 2024-03-21 NOTE — PROGRESS NOTES
6/6 weight check. Very difficult recovery after surgery. Had to cancel his Bipap study due to difficulty with the recovery from his surgery. Has been having 3 meals per day- smaller portions. Having 64 oz of water. Nearing completion of workflow- should be done once cardio is complete next week.  Workflow reviewed:   Psych and/or D+A Clearance: N/A  PCP Letter: referral  Support Group: No longer required, strongly encouraged  Surgeon Appt: 10/26/23  EGD: Completed 11/10/23  Cardiac Risk Assessment: scheduled 3/25/24  Sleep Studies: had sleep study 12/7 and dx with severe LEATHA- bipap study ordered- scheduled 7/16/24- LCSW reached out to move to 3/24  Blood work: Needs to complete  Nicotine test: N/A  Required weight checks: 6 months required  Weight Loss: Not required, encouraged positive lifestyle changes.  Maura Vo, LCSW

## 2024-03-22 ENCOUNTER — TELEPHONE (OUTPATIENT)
Dept: CARDIOLOGY CLINIC | Facility: CLINIC | Age: 69
End: 2024-03-22

## 2024-03-22 ENCOUNTER — CLINICAL SUPPORT (OUTPATIENT)
Dept: BARIATRICS | Facility: CLINIC | Age: 69
End: 2024-03-22

## 2024-03-22 VITALS — BODY MASS INDEX: 35.99 KG/M2 | WEIGHT: 223 LBS

## 2024-03-22 DIAGNOSIS — Z71.89 ENCOUNTER FOR PRE-BARIATRIC SURGERY COUNSELING AND EDUCATION: Primary | ICD-10-CM

## 2024-03-22 PROCEDURE — RECHECK

## 2024-03-25 ENCOUNTER — CONSULT (OUTPATIENT)
Dept: CARDIOLOGY CLINIC | Facility: CLINIC | Age: 69
End: 2024-03-25
Payer: COMMERCIAL

## 2024-03-25 ENCOUNTER — TELEPHONE (OUTPATIENT)
Dept: BARIATRICS | Facility: CLINIC | Age: 69
End: 2024-03-25

## 2024-03-25 ENCOUNTER — TELEPHONE (OUTPATIENT)
Dept: FAMILY MEDICINE CLINIC | Facility: CLINIC | Age: 69
End: 2024-03-25

## 2024-03-25 ENCOUNTER — OFFICE VISIT (OUTPATIENT)
Dept: FAMILY MEDICINE CLINIC | Facility: CLINIC | Age: 69
End: 2024-03-25
Payer: COMMERCIAL

## 2024-03-25 VITALS
BODY MASS INDEX: 35.84 KG/M2 | SYSTOLIC BLOOD PRESSURE: 108 MMHG | HEIGHT: 66 IN | HEART RATE: 98 BPM | DIASTOLIC BLOOD PRESSURE: 76 MMHG | WEIGHT: 223 LBS | RESPIRATION RATE: 16 BRPM | OXYGEN SATURATION: 94 %

## 2024-03-25 VITALS
TEMPERATURE: 98.4 F | HEIGHT: 66 IN | BODY MASS INDEX: 35.84 KG/M2 | SYSTOLIC BLOOD PRESSURE: 118 MMHG | DIASTOLIC BLOOD PRESSURE: 70 MMHG | HEART RATE: 107 BPM | WEIGHT: 223 LBS | OXYGEN SATURATION: 97 %

## 2024-03-25 DIAGNOSIS — Z52.4 KIDNEY DONOR: ICD-10-CM

## 2024-03-25 DIAGNOSIS — J44.9 CHRONIC OBSTRUCTIVE PULMONARY DISEASE, UNSPECIFIED COPD TYPE (HCC): Primary | ICD-10-CM

## 2024-03-25 DIAGNOSIS — K63.5 POLYP OF COLON, UNSPECIFIED PART OF COLON, UNSPECIFIED TYPE: ICD-10-CM

## 2024-03-25 DIAGNOSIS — Z98.84 BARIATRIC SURGERY STATUS: ICD-10-CM

## 2024-03-25 DIAGNOSIS — N50.89 TESTICULAR SWELLING, LEFT: ICD-10-CM

## 2024-03-25 DIAGNOSIS — I20.89 ANGINA OF EFFORT: Primary | ICD-10-CM

## 2024-03-25 DIAGNOSIS — N43.0 ENCYSTED HYDROCELE: ICD-10-CM

## 2024-03-25 PROCEDURE — 99214 OFFICE O/P EST MOD 30 MIN: CPT | Performed by: STUDENT IN AN ORGANIZED HEALTH CARE EDUCATION/TRAINING PROGRAM

## 2024-03-25 PROCEDURE — 99204 OFFICE O/P NEW MOD 45 MIN: CPT | Performed by: INTERNAL MEDICINE

## 2024-03-25 PROCEDURE — G2211 COMPLEX E/M VISIT ADD ON: HCPCS | Performed by: STUDENT IN AN ORGANIZED HEALTH CARE EDUCATION/TRAINING PROGRAM

## 2024-03-25 RX ORDER — BENZONATATE 200 MG/1
200 CAPSULE ORAL 3 TIMES DAILY PRN
COMMUNITY
Start: 2024-02-11

## 2024-03-25 RX ORDER — FAMOTIDINE 20 MG/1
20 TABLET, FILM COATED ORAL 2 TIMES DAILY
COMMUNITY
End: 2024-03-26 | Stop reason: SDUPTHER

## 2024-03-25 RX ORDER — DEXTROMETHORPHAN HYDROBROMIDE AND PROMETHAZINE HYDROCHLORIDE 15; 6.25 MG/5ML; MG/5ML
5 SYRUP ORAL 4 TIMES DAILY PRN
Qty: 180 ML | Refills: 0 | Status: SHIPPED | OUTPATIENT
Start: 2024-03-25

## 2024-03-25 RX ORDER — ROFLUMILAST 250 UG/1
250 TABLET ORAL DAILY
Qty: 30 TABLET | Refills: 3 | Status: SHIPPED | OUTPATIENT
Start: 2024-03-25

## 2024-03-25 NOTE — TELEPHONE ENCOUNTER
Patient is upset.  He said you took care of scheduling him sleep management for his sleep study(?), when he was in the office with you last.  He didn't hear anything and when he called central scheduling to check on the appointment, they had no record of this appointment.  I see there were appointment in the past scheduled for April of this year but they were cancelled, not by the patient.  I explained that we don't schedule them for these appointments but he stated that you did, when he was in the office with you.  Please call him ASAP.

## 2024-03-25 NOTE — PROGRESS NOTES
Assessment/Plan:         Problem List Items Addressed This Visit        Respiratory    Chronic obstructive pulmonary disease (HCC) - Primary    Relevant Medications    benzonatate (TESSALON) 200 MG capsule    roflumilast (DALIRESP) 250 MCG tablet    promethazine-dextromethorphan (PHENERGAN-DM) 6.25-15 mg/5 mL oral syrup       Digestive    Polyp of colon       Genitourinary    Encysted hydrocele     Reviewed urology note             Other    Testicular swelling, left    Kidney donor       Trial daliresp, has follow-up with pulmonologist.  Discussed can trial sending in a cough medicine to take at night.  Discussed other cancer screens up-to-date as had his yearly lung cancer screening if there is further testing for mesothelioma should reach out to his pulmonologist.  Has a colonoscopy in the next couple weeks PSA up-to-date. Has appointment with cardio for preop bariatric testing       Subjective:      Patient ID: Danie Liao is a 69 y.o. male.    HPI      Failed azithromycin treatment, after 2 weeks had GI upset and constipation./    Can't sleep due to chronic cough    Wants cancer screening, iwll ask pulmologist for a mesothelioma screen (states he found this    The following portions of the patient's history were reviewed and updated as appropriate:   Past Medical History:  He has a past medical history of Arthritis, Asthma, Chronic pain (01/30/2024), COPD (chronic obstructive pulmonary disease) ([facility]), Emphysema of lung (HCC), GERD (gastroesophageal reflux disease) (01/30/2024), Headache(784.0), Hyperlipidemia (01/30/2024), Migraine, Obesity, and Sleep apnea.,  _______________________________________________________________________  Medical Problems:  does not have any pertinent problems on file.,  _______________________________________________________________________  Past Surgical History:   has a past surgical history that includes Appendectomy; Nephrectomy living donor (Left, 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 His smoking use included cigarettes. He started smoking about 54 years ago. He has a 78 pack-year smoking history. He has never used smokeless tobacco. He reports current alcohol use. He reports that he does not use drugs.,  _______________________________________________________________________  Allergies:  is allergic to other..  _______________________________________________________________________  Current Outpatient Medications   Medication Sig Dispense Refill   • albuterol (PROVENTIL HFA,VENTOLIN HFA) 90 mcg/act inhaler INHALE 1-2 PUFFS EVERY 4-6 HOURS AS NEEDED AND AS DIRECTED. 36 g 10   • benzonatate (TESSALON) 200 MG capsule Take 200 mg by mouth 3 (three) times a day as needed for cough     • famotidine (PEPCID) 20 mg tablet Take 20 mg by mouth 2 (two) times a day     • ipratropium-albuterol (DUO-NEB) 0.5-2.5 mg/3 mL nebulizer solution Take 3 mL by nebulization 4 (four) times a day 120 mL 3   • promethazine-dextromethorphan (PHENERGAN-DM) 6.25-15 mg/5 mL oral syrup Take 5 mL by mouth 4 (four) times a day as needed for cough 180 mL 0   • Georgina 1 MG TBEC Take 1 tablet (1 mg total) by mouth in the morning 90 tablet 2   • roflumilast (DALIRESP) 250 MCG tablet Take 1 tablet (250 mcg total) by mouth daily 30 tablet 3   • Tapinarof (Vtama) 1 % CREA Apply 1 Application topically in the morning 60 g 3   • Trelegy Ellipta 100-62.5-25 MCG/ACT inhaler Inhale 1 puff daily 60 blister 5   • Ubrelvy 100 MG tablet Take 1 tablet (100 mg total) by mouth daily as needed (migraine) 30 tablet 2   • omeprazole  "(PriLOSEC) 20 mg delayed release capsule Take 1 capsule (20 mg total) by mouth daily (Patient not taking: Reported on 3/25/2024) 90 capsule 3     No current facility-administered medications for this visit.     _______________________________________________________________________  Review of Systems   Constitutional:  Negative for chills, fatigue and fever.   HENT:  Negative for rhinorrhea and sore throat.    Eyes:  Negative for visual disturbance.   Respiratory:  Positive for cough and wheezing. Negative for shortness of breath.    Cardiovascular:  Negative for chest pain and palpitations.   Gastrointestinal:  Negative for abdominal pain, constipation, diarrhea, nausea and vomiting.   Genitourinary:  Negative for difficulty urinating, dysuria and frequency.   Musculoskeletal:  Negative for arthralgias and myalgias.   Skin:  Negative for color change and rash.   Neurological:  Negative for weakness and headaches.         Objective:  Vitals:    03/25/24 0817   BP: 118/70   BP Location: Left arm   Patient Position: Sitting   Cuff Size: Standard   Pulse: (!) 107   Temp: 98.4 °F (36.9 °C)   TempSrc: Tympanic   SpO2: 97%   Weight: 101 kg (223 lb)   Height: 5' 6\" (1.676 m)     Body mass index is 35.99 kg/m².     Physical Exam  Constitutional:       General: He is not in acute distress.     Appearance: He is not ill-appearing.   HENT:      Head: Normocephalic and atraumatic.      Right Ear: External ear normal.      Left Ear: External ear normal.      Nose: Nose normal. No congestion or rhinorrhea.      Mouth/Throat:      Mouth: Mucous membranes are moist.      Pharynx: Oropharynx is clear. No oropharyngeal exudate or posterior oropharyngeal erythema.   Eyes:      Extraocular Movements: Extraocular movements intact.      Conjunctiva/sclera: Conjunctivae normal.      Pupils: Pupils are equal, round, and reactive to light.   Cardiovascular:      Rate and Rhythm: Normal rate and regular rhythm.      Pulses: Normal pulses.    "   Heart sounds: No murmur heard.  Pulmonary:      Effort: Pulmonary effort is normal. No respiratory distress.      Breath sounds: Examination of the right-lower field reveals wheezing. Examination of the left-lower field reveals wheezing. Wheezing present.   Chest:      Chest wall: No tenderness.   Abdominal:      General: Bowel sounds are normal.      Palpations: Abdomen is soft.      Tenderness: There is no abdominal tenderness.   Musculoskeletal:         General: Normal range of motion.      Cervical back: Normal range of motion.   Skin:     General: Skin is warm and dry.      Capillary Refill: Capillary refill takes less than 2 seconds.      Findings: No rash.   Neurological:      General: No focal deficit present.      Mental Status: He is alert. Mental status is at baseline.

## 2024-03-25 NOTE — TELEPHONE ENCOUNTER
Spoke with patient and apologized for the misunderstanding. Patient understands that LCSW does not schedule appointments for sleep medicine. Patient appreciated LCSW call.

## 2024-03-25 NOTE — PROGRESS NOTES
Cardiology Consultation     Danie Shinrita  [id number]  1955  [facility] [place] [id number]    HPI:  Very pleasant semiretired 69-year-old man who works as a  and  who is planning bariatric surgery.  He is looking for a gastric sleeve.    Earlier this year he had hydrocele surgery without any cardiovascular problems.    He does have severe COPD and apparently has been cleared by his pulmonologist for this surgery.  He uses inhalers on a daily basis.  He is a former smoker but does not smoke now.    He is able to walk a flight of stairs but not more.    He has been in the hospital several times related to his lungs which he said got much worse after having COVID.    He sometimes notes that as a discomfort in the center of the chest for which he stops walking and which takes a few minutes to go away.  He grades it as an intensity of 5 out of 10.    He had a stress test in the past when he had kidney surgery to donate a kidney.    He is unsure of his cholesterol status.  He has no prior history of cardiac disease.    1. Angina of effort  -     Lipid panel  -     NM myocardial perfusion spect (rx stress and/or rest); Future; Expected date: 03/25/2024    2. Bariatric surgery status  -     Ambulatory referral to Cardiology      Patient Active Problem List   Diagnosis    Chronic obstructive pulmonary disease (HCC)    Testicular swelling, left    Localized osteoporosis without current pathological fracture    Lumbar disc disease    Intractable migraine without status migrainosus    Kidney donor    Psoriasis    Polyp of colon    Morbid (severe) obesity due to excess calories (HCC)    Obesity, Class II, BMI 35-39.9    Prediabetes    LEATHA (obstructive sleep apnea)    Hyperlipidemia    Chronic pain    GERD (gastroesophageal reflux disease)    Encysted hydrocele     Past Medical History:    Diagnosis Date    Arthritis     Asthma     Chronic pain 01/30/2024    cervic and lumbar    COPD (chronic obstructive pulmonary disease) (HCC)     Emphysema of lung (HCC)     GERD (gastroesophageal reflux disease) 01/30/2024    Headache(784.0)     Hyperlipidemia 01/30/2024    Migraine     Obesity     Sleep apnea      Social History     Socioeconomic History    Marital status: /Civil Union     Spouse name: Not on file    Number of children: Not on file    Years of education: Not on file    Highest education level: Not on file   Occupational History    Not on file   Tobacco Use    Smoking status: Former     Current packs/day: 0.00     Average packs/day: 2.0 packs/day for 39.0 years (78.0 ttl pk-yrs)     Types: Cigarettes     Start date: 1/1/1970     Quit date: 1/1/2009     Years since quitting: 15.2    Smokeless tobacco: Never   Vaping Use    Vaping status: Former   Substance and Sexual Activity    Alcohol use: Yes     Comment: Occasional    Drug use: No    Sexual activity: Yes     Partners: Female   Other Topics Concern    Not on file   Social History Narrative    Not on file     Social Determinants of Health     Financial Resource Strain: Low Risk  (8/24/2023)    Overall Financial Resource Strain (CARDIA)     Difficulty of Paying Living Expenses: Not hard at all   Food Insecurity: Not on file   Transportation Needs: No Transportation Needs (8/24/2023)    PRAPARE - Transportation     Lack of Transportation (Medical): No     Lack of Transportation (Non-Medical): No   Physical Activity: Not on file   Stress: Not on file   Social Connections: Not on file   Intimate Partner Violence: Not on file   Housing Stability: Not on file      Family History   Problem Relation Age of Onset    Breast cancer Mother     Heart disease Father     Breast cancer Maternal Grandmother     Heart disease Paternal Grandmother      Past Surgical History:   Procedure Laterality Date    APPENDECTOMY      HERNIA REPAIR      4 times     "NEPHRECTOMY LIVING DONOR Left 10/2009    CO EXCISION HYDROCELE UNILATERAL Left 1/30/2024    Procedure: HYDROCELECTOMY;  Surgeon: Evan Salmeron MD;  Location: [facility];  Service: Urology       Current Outpatient Medications:     albuterol (PROVENTIL HFA,VENTOLIN HFA) 90 mcg/act inhaler, INHALE 1-2 PUFFS EVERY 4-6 HOURS AS NEEDED AND AS DIRECTED., Disp: 36 g, Rfl: 10    famotidine (PEPCID) 20 mg tablet, Take 20 mg by mouth 2 (two) times a day, Disp: , Rfl:     ipratropium-albuterol (DUO-NEB) 0.5-2.5 mg/3 mL nebulizer solution, Take 3 mL by nebulization 4 (four) times a day, Disp: 120 mL, Rfl: 3    promethazine-dextromethorphan (PHENERGAN-DM) 6.25-15 mg/5 mL oral syrup, Take 5 mL by mouth 4 (four) times a day as needed for cough, Disp: 180 mL, Rfl: 0    Georgina 1 MG TBEC, Take 1 tablet (1 mg total) by mouth in the morning, Disp: 90 tablet, Rfl: 2    roflumilast (DALIRESP) 250 MCG tablet, Take 1 tablet (250 mcg total) by mouth daily, Disp: 30 tablet, Rfl: 3    Tapinarof (Vtama) 1 % CREA, Apply 1 Application topically in the morning, Disp: 60 g, Rfl: 3    Trelegy Ellipta 100-62.5-25 MCG/ACT inhaler, Inhale 1 puff daily, Disp: 60 blister, Rfl: 5    Ubrelvy 100 MG tablet, Take 1 tablet (100 mg total) by mouth daily as needed (migraine), Disp: 30 tablet, Rfl: 2    benzonatate (TESSALON) 200 MG capsule, Take 200 mg by mouth 3 (three) times a day as needed for cough (Patient not taking: Reported on 3/25/2024), Disp: , Rfl:     omeprazole (PriLOSEC) 20 mg delayed release capsule, Take 1 capsule (20 mg total) by mouth daily (Patient not taking: Reported on 3/25/2024), Disp: 90 capsule, Rfl: 3  Allergies   Allergen Reactions    Other Other (See Comments)     Green Pepper     Vitals:    03/25/24 1320   BP: 108/76   BP Location: Left arm   Patient Position: Sitting   Cuff Size: Large   Pulse: 98   Resp: 16   SpO2: 94%   Weight: 101 kg (223 lb)   Height: 5' 6\" (1.676 m)       Labs:  Admission on 02/09/2024, Discharged on " 02/09/2024   Component Date Value    WBC 02/09/2024 7.29     RBC 02/09/2024 5.29     Hemoglobin 02/09/2024 12.3     Hematocrit 02/09/2024 40.0     MCV 02/09/2024 76 (L)     MCH 02/09/2024 23.3 (L)     MCHC 02/09/2024 30.8 (L)     RDW 02/09/2024 14.9     MPV 02/09/2024 10.3     Platelets 02/09/2024 320     nRBC 02/09/2024 0     Neutrophils Relative 02/09/2024 71     Immature Grans % 02/09/2024 0     Lymphocytes Relative 02/09/2024 16     Monocytes Relative 02/09/2024 9     Eosinophils Relative 02/09/2024 4     Basophils Relative 02/09/2024 0     Neutrophils Absolute 02/09/2024 5.10     Absolute Immature Grans 02/09/2024 0.02     Absolute Lymphocytes 02/09/2024 1.19     Absolute Monocytes 02/09/2024 0.64     Eosinophils Absolute 02/09/2024 0.32     Basophils Absolute 02/09/2024 0.02     Sodium 02/09/2024 138     Potassium 02/09/2024 3.9     Chloride 02/09/2024 106     CO2 02/09/2024 28     ANION GAP 02/09/2024 4     BUN 02/09/2024 17     Creatinine 02/09/2024 0.85     Glucose 02/09/2024 113     Calcium 02/09/2024 9.1     eGFR 02/09/2024 90     SARS-CoV-2 02/09/2024 Negative     INFLUENZA A PCR 02/09/2024 Negative     INFLUENZA B PCR 02/09/2024 Negative     RSV PCR 02/09/2024 Negative     STREP A PCR 02/09/2024 Not Detected      Imaging: US scrotum and testicles    Result Date: 3/1/2024  Narrative: SCROTAL ULTRASOUND INDICATION: N50.89: Other specified disorders of the male genital organs. COMPARISON: Scrotal ultrasound 9/28/2023. TECHNIQUE: Ultrasound the scrotal contents was performed with a high frequency linear transducer utilizing volumetric sweep imaging as well as standard still image techniques. Imaging performed in longitudinal and transverse orientation. Color and spectral Doppler evaluation also performed bilaterally. FINDINGS: TESTES: Testes are symmetric and normal in size. RIGHT testis = 5.7 x 2.6 x 3.4 cm. Volume 26.1 mL Normal contour with homogeneous smooth echotexture. No intratesticular mass lesion  or calcifications. LEFT testis = 4.3 x 3.1 x 3.9 cm. Volume 27.7 mL Normal contour with homogeneous smooth echotexture. No intratesticular mass lesion or calcifications. Doppler flow within both testes is present and symmetric. EPIDIDYMIDES: Normal Size. Doppler ultrasound demonstrates normal blood flow. No epididymal lesions. HYDROCELE: Large left hydrocele with several septations. VARICOCELE: None present. SCROTUM: Scrotal thickness and appearance within normal limits. No evidence for extratesticular mass or hernia demonstrated.     Impression: Large septated left hydrocele. Testes are within normal limits. Workstation performed: [id number]       Review of Systems:  I noticed that he had he has a chronic microcytic anemia and I suspect that he has thalassemia minor given his [place] heritage.    Physical Exam:  108/76.  Heart rate 90 and regular.  He is overweight and has a prominent abdomen.  Skin warm and dry.  Pupils equal.  Carotids 2+ without bruits.  Lungs reveal generalized decreased breath sounds and prolonged expiratory phase without wheezing.  Rhythm regular.  No murmurs or gallops.  No organomegaly.  Good pulses.  Trace edema with signs of venous insufficiency.    EKG shows incomplete right bundle branch block but is otherwise negative.    Discussion/Summary:    1.  Preop bariatric surgery  2.  Suspicious history for angina  3.  Uncertain lipid status  4.  COPD  5.  Suspect thalassemia minor    Recommendations:    1.  The risk of laparoscopic gastric sleeve surgery from a cardiovascular standpoint would be small  2.  Check lipid profile  3.  Given occasional exertional discomfort I think he should have a dobutamine stress test.  4.  I will see him again afterwards            Guicho Villegas MD

## 2024-04-02 ENCOUNTER — OFFICE VISIT (OUTPATIENT)
Dept: PULMONOLOGY | Facility: CLINIC | Age: 69
End: 2024-04-02
Payer: COMMERCIAL

## 2024-04-02 VITALS
SYSTOLIC BLOOD PRESSURE: 124 MMHG | TEMPERATURE: 98.6 F | DIASTOLIC BLOOD PRESSURE: 76 MMHG | OXYGEN SATURATION: 94 % | HEIGHT: 66 IN | HEART RATE: 61 BPM | WEIGHT: 220 LBS | BODY MASS INDEX: 35.36 KG/M2

## 2024-04-02 DIAGNOSIS — G47.33 OSA (OBSTRUCTIVE SLEEP APNEA): ICD-10-CM

## 2024-04-02 DIAGNOSIS — J44.9 CHRONIC OBSTRUCTIVE PULMONARY DISEASE, UNSPECIFIED COPD TYPE (HCC): Primary | ICD-10-CM

## 2024-04-02 PROCEDURE — G2211 COMPLEX E/M VISIT ADD ON: HCPCS | Performed by: INTERNAL MEDICINE

## 2024-04-02 PROCEDURE — 99215 OFFICE O/P EST HI 40 MIN: CPT | Performed by: INTERNAL MEDICINE

## 2024-04-02 RX ORDER — PREDNISONE 20 MG/1
60 TABLET ORAL DAILY
Qty: 9 TABLET | Refills: 0 | Status: SHIPPED | OUTPATIENT
Start: 2024-04-02 | End: 2024-04-05

## 2024-04-02 RX ORDER — BUDESONIDE 0.5 MG/2ML
0.5 INHALANT ORAL 2 TIMES DAILY
Qty: 120 ML | Refills: 2 | Status: SHIPPED | OUTPATIENT
Start: 2024-04-02 | End: 2024-07-01

## 2024-04-02 NOTE — PROGRESS NOTES
"    Follow Up - Pulmonary Medicine   Danie Liao 69 y.o. male MRN: [id number]    Physician Requesting Consult: Dr Gosia Mcgill  Reason for Consult: COPD    Danie Liao is a 69 y.o. male who presents for follow up of COPD.    COPD, mod-severe  Mod Emphysema  Patient has a chart diagnosis of COPD due to former smoker + dyspnea.  PFTs 2023 with moderately severe obstruction (52% post BD).  CT with mod upper lobe emphysema  Highest eos 460, benefit from ICS included in his triple therapy inhaler.  Has very frequent exacerbations requiring steroids 5+ times a year when lived in [place]. Was started on Georgina (prednisone) 1mg by other pulmonologist and reports he has decreased his exacerbations.   Had multiple exacerbations since last visit, suspect part are due to vocal cord disease and persistent stridor, follows with Ent as below    - continue Trelegy  - duonebs prn  - prednisone short course (pt requested higher dose for 3 days)  - continue for now Georgina 1mg (brand name prednisone)   - follow up in 3 months  - pulmicort neb add as well  - repeat Pfts because wants to check for EBV candidacy (did not qualify on last PFTs)  - could not tolerate azithromycin of roflulmilast due to GI side effects    Stridor, abnormal vocal cord  ZENA/LPR  Pt with diffuse wheezing that appears to be coming from upper airway. Ddx includes EDAC (excessive dynamic airway collapse) vs TBM (tracheobronchomalacis) vs vocal cord disease. Pt has had many steroid courses putting him at risk for TBM and EDAC CT neck does show \"abnormal appearance of laryngeal vestibule.\"   - ENT following: abnormal false vocal cord  - continue PPI per ENT    LEATHA  Does have nighttime symptoms, daytime fatigue, snoring, obesity  Severe LEATHA on PSG, AHI 38, rec BiPAP study given severe COPD  - prescribe Autopap for now  - BiPAP study needs to be scheduled    Pleural plaque  Asbestos Exposure  Pt with prior asbestos exposure as . Was told by [place] " pulmonologist he has asbestos in his lung. On review of CT with thin pleural calcification on right? Left, consistent with very thin pleural plaque from asbestos exposure.   No pleural thickening, no evidence of cancer  - reviewed CT with radiologist  - asbestos plaques do not need routine follow up    Heterogeneous Pulm Nodule, rsolved  RUL 7mm nodule/area of GGO/thickening around a cystic air space. In correlated from 2022 CT neck appears to have more thickening, possibly evolving scar but cannot rule out malignancy. Not seen on repeat CT chest in Sept 2023.     Vaccines      Immunization History   Administered Date(s) Administered    COVID-19 Moderna mRNA Vaccine 12 Yr+ 50 mcg/0.5 mL (Spikevax) 09/24/2023    INFLUENZA 09/24/2023        I have spent a total time of 40 minutes on 04/02/24 in caring for this patient including Diagnostic results, Risks and benefits of tx options, Instructions for management, Risk factor reductions, Impressions, Counseling / Coordination of care, Documenting in the medical record, Reviewing / ordering tests, medicine, procedures  , Obtaining or reviewing history   and Communicating with other healthcare professionals .   ______________________________________________________________________    HPI:    Danie Liao is a 69 y.o. male who presents for evaluation of COPD.    Dyspnea for a few years  Has a pulmonologist in [facility] [place]  Had COVID in 2020 and since then breathing was worse  Trelegy inhaler for years  Previously on Breztri but didn't like it  Uses neb 2-3x a day  Had frequent exacerbations, 5+ exacerbations a year  Georgina (prednisone) 1mg  Former smoker, quit years ago but still was vaping until earlier this year    Interval Hx:  Tried azithromycin and roflumilast without improvement and with significant GI symptoms, stopped both  2 prednisone courses since last seen 4 months ago  Taking Trelegy: using daily,  Requiring many nebs at night due to worsening breahting  Diagnosed  with severe LEATHA but sleep medicine rec BiPAP study so was not empirically started on CPAP yet      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   HENT:  Positive for rhinorrhea, sneezing, sore throat and trouble swallowing. Negative for ear pain and postnasal drip.    Respiratory:  Positive for cough, shortness of breath and wheezing.    Cardiovascular:  Negative for chest pain.   Musculoskeletal:  Positive for myalgias.   Neurological:  Positive for headaches.     Aside from what is mentioned in the HPI, the review of systems otherwise negative.    Current Medications:    Current Outpatient Medications:     albuterol (PROVENTIL HFA,VENTOLIN HFA) 90 mcg/act inhaler, INHALE 1-2 PUFFS EVERY 4-6 HOURS AS NEEDED AND AS DIRECTED., Disp: 36 g, Rfl: 10    benzonatate (TESSALON) 200 MG capsule, Take 200 mg by mouth 3 (three) times a day as needed for cough, Disp: , Rfl:     famotidine (PEPCID) 20 mg tablet, Take 1 tablet (20 mg total) by mouth 2 (two) times a day, Disp: 180 tablet, Rfl: 3    ipratropium-albuterol (DUO-NEB) 0.5-2.5 mg/3 mL nebulizer solution, Take 3 mL by nebulization 4 (four) times a day, Disp: 120 mL, Rfl: 3    promethazine-dextromethorphan (PHENERGAN-DM) 6.25-15 mg/5 mL oral syrup, Take 5 mL by mouth 4 (four) times a day as needed for cough, Disp: 180 mL, Rfl: 0    Georgina 1 MG TBEC, Take 1 tablet (1 mg total) by mouth in the morning, Disp: 90 tablet, Rfl: 2    roflumilast (DALIRESP) 250 MCG tablet, Take 1 tablet (250 mcg total) by mouth daily, Disp: 30 tablet, Rfl: 3    Tapinarof (Vtama) 1 % CREA, Apply 1 Application topically in the morning, Disp: 60 g, Rfl: 3    Trelegy Ellipta 100-62.5-25 MCG/ACT inhaler, Inhale 1 puff daily, Disp: 60 blister, Rfl: 5    Ubrelvy 100 MG tablet, Take 1 tablet (100 mg total) by mouth daily as needed (migraine), Disp: 30 tablet, Rfl: 2     "omeprazole (PriLOSEC) 20 mg delayed release capsule, Take 1 capsule (20 mg total) by mouth daily (Patient not taking: Reported on 3/25/2024), Disp: 90 capsule, Rfl: 3    Historical Information   Past Medical History:   Diagnosis Date    Arthritis     Asthma     Chronic pain 01/30/2024    cervic and lumbar    COPD (chronic obstructive pulmonary disease) (HCC)     Emphysema of lung (HCC)     GERD (gastroesophageal reflux disease) 01/30/2024    Headache(784.0)     Hyperlipidemia 01/30/2024    Migraine     Obesity     Sleep apnea      Past Surgical History:   Procedure Laterality Date    APPENDECTOMY      HERNIA REPAIR      4 times    NEPHRECTOMY LIVING DONOR Left 10/2009    [place] EXCISION HYDROCELE UNILATERAL Left 1/30/2024    Procedure: HYDROCELECTOMY;  Surgeon: Evan Salmeron MD;  [facility];  Service: Urology     Social History   Social History     Tobacco Use   Smoking Status Former    Current packs/day: 0.00    Average packs/day: 2.0 packs/day for 39.0 years (78.0 ttl pk-yrs)    Types: Cigarettes    Start date: 1/1/1970    Quit date: 1/1/2009    Years since quitting: 15.2   Smokeless Tobacco Never       Family History:   Family History   Problem Relation Age of Onset    Breast cancer Mother     Heart disease Father     Breast cancer Maternal Grandmother     Heart disease Paternal Grandmother          PhysicalExamination:  Vitals:   /76 (BP Location: Left arm, Patient Position: Sitting, Cuff Size: Adult)   Pulse 61   Temp 98.6 °F (37 °C)   Ht 5' 6\" (1.676 m)   Wt 99.8 kg (220 lb)   SpO2 94%   BMI 35.51 kg/m²     Appearance -- NAD, speaking full sentences  HEENT -- anicteric sclera, clear OP, MMM  Neck -- no JVD, stridor  Heart -- RRR, no murmurs  Lungs -- diffuse end expiratory wheezing that appears to radiate from the neck  Abdomen -- soft, NTND, +bs  Extremities -- WWP, no LE edema  Skin -- no rash  Neuro -- A&Ox3, wnl  Psych -- no obvious depression or hallucination        Diagnostic " "Data:  Labs:  I personally reviewed the most recent laboratory data pertinent to today's visit    Lab Results   Component Value Date    WBC 7.29 02/09/2024    HGB 12.3 02/09/2024    HCT 40.0 02/09/2024    MCV 76 (L) 02/09/2024     02/09/2024     Lab Results   Component Value Date    CALCIUM 9.1 02/09/2024    K 3.9 02/09/2024    CO2 28 02/09/2024     02/09/2024    BUN 17 02/09/2024    CREATININE 0.85 02/09/2024     No results found for: \"IGE\"  Lab Results   Component Value Date    ALT 34 01/15/2024    AST 22 01/15/2024    ALKPHOS 101 01/15/2024       PFT results:  The most recent pulmonary function tests were reviewed.  [facility] PFTs 2020: No obstruction (FEV1/FVC 71%, FEV1 72%) no BD change, normal DLCO  9/2023: Severe obstruction + BD response (mod-severe  post BD), some air trapping (NO hyperinflation as per report), mild restriction and mod decrease in DLCO    Imaging:  I personally reviewed the images on the PAC system pertinent to today's visit  CT Chest 5/2023  There is a new 7 mm heterogeneous nodular density right upper lobe, indeterminate may be inflammatory/ infectious or neoplastic  Short interval follow-up at 3 months is suggested.  Emphysema  No acute inflammatory stranding  No bowel obstruction  No hydronephrosis  Right inguinal hernia containing a small segment of the right lateral bladder wall  S/p left nephrectomy with no mass in the nephrectomy bed    CT Chest 9/2023:  1. The previously described nodule in the right upper lobe is not seen on today's exam however there are few ill-defined opacities in this region which may be due to scarring.  2. Moderate centrilobular emphysema.    CT Neck 2022: (my read) RUL w ill defined GGO around a possible emphesematous cystic area.  \"Abnormal appearance of laryngeal vestibule with mucosal apposition of aryepiglottic folds, effacement of bilateral piriform sinuses, and slight irregularity of bilateral true vocal cords.  Consider direct visualization by " "ENT for further evaluation.\"        Maryan Gillette MD  [facility]  "

## 2024-04-05 ENCOUNTER — HOSPITAL ENCOUNTER (OUTPATIENT)
Dept: NUCLEAR MEDICINE | Facility: HOSPITAL | Age: 69
Discharge: HOME | End: 2024-04-05
Payer: COMMERCIAL

## 2024-04-05 ENCOUNTER — HOSPITAL ENCOUNTER (OUTPATIENT)
Dept: NUCLEAR MEDICINE | Facility: HOSPITAL | Age: 69
End: 2024-04-05
Payer: COMMERCIAL

## 2024-04-05 ENCOUNTER — HOSPITAL ENCOUNTER (OUTPATIENT)
Dept: NON INVASIVE DIAGNOSTICS | Facility: HOSPITAL | Age: 69
Discharge: HOME | End: 2024-04-05
Payer: COMMERCIAL

## 2024-04-05 VITALS — BODY MASS INDEX: 35.36 KG/M2 | WEIGHT: 220 LBS | HEIGHT: 66 IN

## 2024-04-05 DIAGNOSIS — I20.89 ANGINA OF EFFORT: ICD-10-CM

## 2024-04-05 LAB
ARRHY DURING EX: NORMAL
CHEST PAIN STATEMENT: NORMAL
MAX DIASTOLIC BP: 98 MMHG
MAX HR PERCENT: 98 %
MAX HR: 150 BPM
MAX PREDICTED HEART RATE: 153 BPM
NUC STRESS EJECTION FRACTION: 68 %
PROTOCOL NAME: NORMAL
RATE PRESSURE PRODUCT: NORMAL
REASON FOR TERMINATION: NORMAL
SL CV REST NUCLEAR ISOTOPE DOSE: 10.9 MCI
SL CV STRESS NUCLEAR ISOTOPE DOSE: 33 MCI
SL CV STRESS RECOVERY BP: NORMAL MMHG
SL CV STRESS RECOVERY HR: 113 BPM
SL CV STRESS RECOVERY O2 SAT: 97 %
STRESS BASELINE BP: NORMAL MMHG
STRESS BASELINE HR: 97 BPM
STRESS O2 SAT REST: 96 %
STRESS PEAK HR: 150 BPM
STRESS POST EXERCISE DUR MIN: 12 MIN
STRESS POST EXERCISE DUR SEC: 53 SEC
STRESS POST O2 SAT PEAK: 98 %
STRESS POST PEAK BP: 160 MMHG
STRESS POST PEAK HR: 150 BPM
STRESS POST PEAK SYSTOLIC BP: 165 MMHG
STRESS/REST PERFUSION RATIO: 1.57
TARGET HR FORMULA: NORMAL
TEST INDICATION: NORMAL

## 2024-04-05 PROCEDURE — A9502 TC99M TETROFOSMIN: HCPCS

## 2024-04-05 PROCEDURE — 93018 CV STRESS TEST I&R ONLY: CPT | Performed by: INTERNAL MEDICINE

## 2024-04-05 PROCEDURE — 78452 HT MUSCLE IMAGE SPECT MULT: CPT

## 2024-04-05 PROCEDURE — 93017 CV STRESS TEST TRACING ONLY: CPT

## 2024-04-05 PROCEDURE — 78452 HT MUSCLE IMAGE SPECT MULT: CPT | Performed by: INTERNAL MEDICINE

## 2024-04-05 PROCEDURE — 93016 CV STRESS TEST SUPVJ ONLY: CPT | Performed by: INTERNAL MEDICINE

## 2024-04-05 RX ORDER — DOBUTAMINE HYDROCHLORIDE 200 MG/100ML
5-50 INJECTION INTRAVENOUS CONTINUOUS
Status: DISCONTINUED | OUTPATIENT
Start: 2024-04-05 | End: 2024-04-06 | Stop reason: HOSPADM

## 2024-04-05 RX ADMIN — DOBUTAMINE HYDROCHLORIDE 10 MCG/KG/MIN: 200 INJECTION INTRAVENOUS at 09:57

## 2024-04-09 ENCOUNTER — TELEPHONE (OUTPATIENT)
Age: 69
End: 2024-04-09

## 2024-04-09 DIAGNOSIS — J44.9 CHRONIC OBSTRUCTIVE PULMONARY DISEASE, UNSPECIFIED COPD TYPE (HCC): Primary | ICD-10-CM

## 2024-04-09 RX ORDER — ALBUTEROL SULFATE AND BUDESONIDE 90; 80 UG/1; UG/1
2 AEROSOL, METERED RESPIRATORY (INHALATION) EVERY 6 HOURS PRN
Qty: 10.7 G | Refills: 5 | Status: SHIPPED | OUTPATIENT
Start: 2024-04-09

## 2024-04-09 NOTE — TELEPHONE ENCOUNTER
[facility] calling because they need a copy of the patients sleep study please     Fax: [phone]

## 2024-04-10 ENCOUNTER — TELEPHONE (OUTPATIENT)
Age: 69
End: 2024-04-10

## 2024-04-10 NOTE — TELEPHONE ENCOUNTER
PA for [employer]    Submitted via    []CMM-KEY     [x][facility]-Case ID # Case ID: [id number]     []Faxed to plan   []Other website   []Phone call Case ID #     Office notes sent, clinical questions answered. Awaiting determination    Turnaround time for your insurance to make a decision on your Prior Authorization can take 7-21 business days.

## 2024-04-18 ENCOUNTER — CLINICAL SUPPORT (OUTPATIENT)
Dept: BARIATRICS | Facility: CLINIC | Age: 69
End: 2024-04-18

## 2024-04-18 VITALS — BODY MASS INDEX: 35.96 KG/M2 | WEIGHT: 222.8 LBS

## 2024-04-18 DIAGNOSIS — E66.9 OBESITY, CLASS II, BMI 35-39.9: Primary | ICD-10-CM

## 2024-04-18 PROCEDURE — RECHECK

## 2024-04-18 NOTE — PROGRESS NOTES
" Bariatric Nutrition Assessment Note-  Weight Check- Follow-up visit    Insurance: 6 required monthly weight checks  5/6 today    Type of surgery    Interested in sleeve  Surgery Date: TBD  Surgeon: Consult with Dr. Chan  pending       Nutrition Assessment   Danie Liao  69 y.o.  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 Pt encouraged to lose weight via healthy eating and exercise. Pt may follow Liver Shrinking diet 2 weeks prior to DOS depending on BMI at time. This diet will promote weight loss.    Maverick- . Vicente Equation:  Estimated calories to maintain weightt: 2025   Estimated calories for weight loss 0921-6489 ( 1-2# per wk wt loss - sedentary )  Estimated protein needs  Grams (1.0-1.5 gms/kg BMI at 25 )   Estimated fluid needs 70-82 oz (30-35 ml/kg BMI at 25)      NAFLD Fibrosis Score is: -.911    NAFLD Score Correlated Fibrosis Severity   <0.12 F0-F2   0.12-0.676 Indeterminate Score   >0.676 F3-F4   **Fibrosis Severity Scale: F0 = no fibrosis; F1= mild fibrosis; F2 = moderate fibrosis; F3 = severe fibrosis; F4 = cirrhosis    NAFLD Score Component Values:  Component Value Date   Age: 69 y.o.     BMI: 35.96 kg/m²    IFG or DM: Yes    AST: 22 U/L 1/15/2024   ALT: 34 U/L 1/15/2024   Platelet: 320 Thousands/uL 2/9/2024   Albumin: 4.1 g/dL 1/15/2024       Weight History Reason for WLS: COPD and trouble breathing - can't lose on his own  Onset of Obesity: Adult - past 6 years  Family history of obesity: Yes  Wt Loss Attempts: Commercial Programs ( [employer]  High Protein/Low CHO diets (Keto, Atkins, Strong, etc.)  Meal Replacements (Medifast, Slim Fast, etc.)  Self Created " Diets (Portion Control, Healthy Food Choices, etc.)  Patient has tried the above for 6 months or more with insufficient weight loss or weight regain, which is why patient has requested to be evaluated for weight loss surgery today  Maximum Wt Lost: 5#None       Review of History and Medications   OTC: None  Past Medical History:   Diagnosis Date    Arthritis     Asthma     Chronic pain 01/30/2024    cervic and lumbar    COPD (chronic obstructive pulmonary disease) (HCC)     Emphysema of lung (HCC)     GERD (gastroesophageal reflux disease) 01/30/2024    Headache(784.0)     Hyperlipidemia 01/30/2024    Migraine     Obesity     Sleep apnea      Past Surgical History:   Procedure Laterality Date    APPENDECTOMY      HERNIA REPAIR      4 times    NEPHRECTOMY LIVING DONOR Left 10/2009    [place] EXCISION HYDROCELE UNILATERAL Left 1/30/2024    Procedure: HYDROCELECTOMY;  Surgeon: Evan Salmeron MD;  Location: [facility];  Service: Urology     Social History     Socioeconomic History    Marital status: /Civil Union     Spouse name: Not on file    Number of children: Not on file    Years of education: Not on file    Highest education level: Not on file   Occupational History    Not on file   Tobacco Use    Smoking status: Former     Current packs/day: 0.00     Average packs/day: 2.0 packs/day for 39.0 years (78.0 ttl pk-yrs)     Types: Cigarettes     Start date: 1/1/1970     Quit date: 1/1/2009     Years since quitting: 15.3    Smokeless tobacco: Never   Vaping Use    Vaping status: Former   Substance and Sexual Activity    Alcohol use: Yes     Comment: Occasional    Drug use: No    Sexual activity: Yes     Partners: Female   Other Topics Concern    Not on file   Social History Narrative    Not on file     Social Determinants of Health     Financial Resource Strain: Low Risk  (8/24/2023)    Overall Financial Resource Strain (CARDIA)     Difficulty of Paying Living Expenses: Not hard at all   Food Insecurity: Not on file    Transportation Needs: No Transportation Needs (8/24/2023)    PRAPARE - Transportation     Lack of Transportation (Medical): No     Lack of Transportation (Non-Medical): No   Physical Activity: Not on file   Stress: Not on file   Social Connections: Not on file   Intimate Partner Violence: Not on file   Housing Stability: Not on file       Current Outpatient Medications:     albuterol (PROVENTIL HFA,VENTOLIN HFA) 90 mcg/act inhaler, INHALE 1-2 PUFFS EVERY 4-6 HOURS AS NEEDED AND AS DIRECTED., Disp: 36 g, Rfl: 10    Albuterol-Budesonide (Airsupra) 90-80 MCG/ACT AERO, Inhale 2 puffs every 6 (six) hours as needed (wheezing), Disp: 10.7 g, Rfl: 5    benzonatate (TESSALON) 200 MG capsule, Take 200 mg by mouth 3 (three) times a day as needed for cough, Disp: , Rfl:     budesonide (PULMICORT) 0.5 mg/2 mL nebulizer solution, TAKE 2 ML (0.5 MG TOTAL) BY NEBULIZATION TWICE A DAY RINSE MOUTH AFTER USE, Disp: 360 mL, Rfl: 1    famotidine (PEPCID) 20 mg tablet, Take 1 tablet (20 mg total) by mouth 2 (two) times a day, Disp: 180 tablet, Rfl: 3    ipratropium-albuterol (DUO-NEB) 0.5-2.5 mg/3 mL nebulizer solution, Take 3 mL by nebulization 4 (four) times a day, Disp: 120 mL, Rfl: 3    omeprazole (PriLOSEC) 20 mg delayed release capsule, Take 1 capsule (20 mg total) by mouth daily (Patient not taking: Reported on 3/25/2024), Disp: 90 capsule, Rfl: 3    promethazine-dextromethorphan (PHENERGAN-DM) 6.25-15 mg/5 mL oral syrup, Take 5 mL by mouth 4 (four) times a day as needed for cough, Disp: 180 mL, Rfl: 0    Georgina 1 MG TBEC, Take 1 tablet (1 mg total) by mouth in the morning, Disp: 90 tablet, Rfl: 2    roflumilast (DALIRESP) 250 MCG tablet, Take 1 tablet (250 mcg total) by mouth daily, Disp: 30 tablet, Rfl: 3    Tapinarof (Vtama) 1 % CREA, Apply 1 Application topically in the morning, Disp: 60 g, Rfl: 3    Trelegy Ellipta 100-62.5-25 MCG/ACT inhaler, Inhale 1 puff daily, Disp: 60 blister, Rfl: 5    Ubrelvy 100 MG tablet, Take 1  tablet (100 mg total) by mouth daily as needed (migraine), Disp: 30 tablet, Rfl: 2  No current facility-administered medications for this visit.  Food Intake and Lifestyle Assessment   Food Intake Assessment completed via usual diet recall  Breakfast: 4 pancakes, butter, Syrup - Real  or oatmeal 0 or Eng Muffin   Lunch: Frozen meal - Michaelina  Snack: Chocolate, cupcakes  Dinner: 1/2 crab Dungeoness -  Snack: Wakes - Corn muffin - half  8 oz Chocolate milk  Beverage intake: water, Chocolate whole milk, juice, coffee 1 cup , and (alcohol and soda - very rare)  Portions:  6 oz Protein  1 c Starch   1/2 c Vegetable  or less   Protein supplement: None   Estimated protein intake per day: 75-80  Estimated fluid intake per day: 2 bottles water 16 oz juice, 1 cup milk  Meals eaten away from home: 2X month  Typical meal pattern: 3 meals per day and 1-2 snacks per day  Eating Behaviors: Consumption of high calorie/ high fat foods, Consumption of high calorie beverages, Large portion sizes, and Craves sweet foods  Food allergies or intolerances:   Allergies   Allergen Reactions    Other Other (See Comments)     Green Pepper    - intolerances to peppers  Cultural or Baptism considerations: none    Physical Assessment  Physical Activity Retired - household jobs -  and   Types of exercise: None  Current physical limitations: Breathing    Psychosocial Assessment   Support systems: spouse  Friends who had surgery  Socioeconomic factors: Work as  and  -  - 3 grown children     Nutrition Diagnosis  Diagnosis: Overweight / Obesity (NC-3.3)  Related to: Physical inactivity and Excessive energy intake  As Evidenced by: BMI >25     Nutrition Prescription: Recommend the following diet  Regular    Interventions and Teaching   Discussed pre-op and post-op nutrition guidelines.       Patient educated and handouts provided.  Surgical changes to stomach / GI  Capacity of post-surgery stomach  Diet  "progression  Adequate hydration  Sugar and fat restriction to decrease \"dumping syndrome\"  Fat restriction to decrease steatorrhea  Expected weight loss  Weight loss plateaus/ possibility of weight regain  Exercise  Suggestions for pre-op diet  Nutrition considerations after surgery  Protein supplements  Meal planning and preparation  Appropriate carbohydrate, protein, and fat intake, and food/fluid choices to maximize safe weight loss, nutrient intake, and tolerance   Dietary and lifestyle changes  Possible problems with poor eating habits  Intuitive eating  Techniques for self monitoring and keeping daily food journal  Potential for food intolerance after surgery, and ways to deal with them including: lactose intolerance, nausea, reflux, vomiting, diarrhea, food intolerance, appetite changes, gas  Vitamin / Mineral supplementation of Multivitamin with minerals, Calcium, Vitamin B12, Iron, Fat Soluble vitamins, and Vitamin D    Patient is not currently pregnant and doesn't desire to become pregnant a minimum of one year post-op    Education provided to: patient and wife    Barriers to learning: No barriers identified    Readiness to change: preparation    Prior research on procedure: internet and friends or family    Comprehension: verbalizes understanding     Expected Compliance: good  Recommendations  Pt is an appropriate candidate for surgery. Yes    Evaluation / Monitoring  Dietitian to Monitor: Eating pattern as discussed Tolerance of nutrition prescription Body weight Lab values Physical activity Bowel pattern    3/6 Weight Check Visit Summary 12/27/2023  Started pre op process. Reading over manual and working on guidelines. Hydration good - 48-64 oz water decreasing juice from 16oz to 8 oz portion.    Has been avoiding fast food. Dines out occasionally but take portion home. Has overall reduced portions - Exercise limited - d/t SOB and back pain from herniated discs. Questions/concerns addressed. Wife " attends sessions with pt for support.  Workflow reviewed and updated    5/6 Weight Check Visit Summary 2/23/2024  Had recent surgery (hydrocolectomy) - painful and uncomfortable - not able to sleep as cannot lay down - had to cancel sleep study due to this - on cancellation list. Continues to make gradual changes to diet. Stopped drinking juice. Consumes 2-3 bottles water  - adds flavoring. Eating more vegetables and protein at meals. Finds 30/60 challenging - reinforced importance. Exercise limited with back issues and now with recovery from recent surgery, Question answered during discussion. Pt receptive. Wife in attendance and supportive.     PreOp  Weight Check Visit Summary 4/18/2024  Near completion Workflow reviewed. Continue to encourage diet modification and incorporating guidelines. Hydration better. Activity still limited. Questions answered during discussion. Pt receptive. Wife in attendance and supportive.   Workflow reviewed:   Psych and/or D+A Clearance: N/A  PCP Letter: referral  Support Group: No longer required, strongly encouraged  Surgeon Appt: 10/26/23  EGD: Completed 11/10/23  **Cardiac Risk Assessment: 3/26/2024 - further work-up  **Sleep Studies: sleep study 12/7/2023 - Severe LEATHA - awaiting BiPAP 5/29/24 - on wait list for sooner  **Blood work: Needs to complete-   Nicotine test: N/A  Required weight checks: Completed   Weight Loss: Not required, encouraged positive lifestyle changes.    Goals  Eliminate sugar sweetened beverages, Food journal, Exercise 30 minutes 5 times per week, Complete lession plans 1-6, Eat 3 meals per day, and Eliminate mindless snacking  Follow Pre-Surgery guidelines  > Trial Baritastic for food logging  > Maintain  regular meal pattern - include fruits, vegetables and whole grains  > Avoid skipping meals- Can use protein drink as meal replacement  > Decrease portions  > Focus on protein - include lean protein at each meal and snack - Learn to eat protein first  >  Limit processed foods, fast foods and dining away from home  > Include meal prepping  > Limit snacks - healthier choices and portion; avoid grazing  > Slow pace of eating and sip fluids  - practice 30/60 minute rule  > Reduce caffeine/ eliminate by day of surgery  > Reduce/eliminate carbonation by pre-op diet  > Increase water intake - 64 oz  > Increase physical activity/establish exercise regimen as able  > Start multi vitamin and additional Vitamin D 2000IU  Work on skills to cope with emotional eating/mindfull eating  Pre-op weight loss not required but advised not to gain weight  F/U next month with bariatric provider      Time Spent:   30 Minutes

## 2024-04-25 ENCOUNTER — HOSPITAL ENCOUNTER (OUTPATIENT)
Dept: PULMONOLOGY | Facility: HOSPITAL | Age: 69
End: 2024-04-25
Attending: INTERNAL MEDICINE
Payer: COMMERCIAL

## 2024-04-25 DIAGNOSIS — J44.9 CHRONIC OBSTRUCTIVE PULMONARY DISEASE, UNSPECIFIED COPD TYPE (HCC): ICD-10-CM

## 2024-04-25 LAB
DME PARACHUTE DELIVERY DATE ACTUAL: NORMAL
DME PARACHUTE DELIVERY DATE EXPECTED: NORMAL
DME PARACHUTE DELIVERY DATE REQUESTED: NORMAL
DME PARACHUTE ITEM DESCRIPTION: NORMAL
DME PARACHUTE ORDER STATUS: NORMAL
DME PARACHUTE SUPPLIER NAME: NORMAL
DME PARACHUTE SUPPLIER PHONE: NORMAL

## 2024-04-25 PROCEDURE — 94060 EVALUATION OF WHEEZING: CPT | Performed by: INTERNAL MEDICINE

## 2024-04-25 PROCEDURE — 94729 DIFFUSING CAPACITY: CPT

## 2024-04-25 PROCEDURE — 94729 DIFFUSING CAPACITY: CPT | Performed by: INTERNAL MEDICINE

## 2024-04-25 PROCEDURE — 94726 PLETHYSMOGRAPHY LUNG VOLUMES: CPT | Performed by: INTERNAL MEDICINE

## 2024-04-25 PROCEDURE — 94760 N-INVAS EAR/PLS OXIMETRY 1: CPT

## 2024-04-25 PROCEDURE — 94726 PLETHYSMOGRAPHY LUNG VOLUMES: CPT

## 2024-04-25 PROCEDURE — 94060 EVALUATION OF WHEEZING: CPT

## 2024-04-25 RX ORDER — ALBUTEROL SULFATE 2.5 MG/3ML
2.5 SOLUTION RESPIRATORY (INHALATION) ONCE
Status: COMPLETED | OUTPATIENT
Start: 2024-04-25 | End: 2024-04-25

## 2024-04-25 RX ORDER — BUDESONIDE 0.5 MG/2ML
INHALANT ORAL
Qty: 360 ML | Refills: 1 | Status: SHIPPED | OUTPATIENT
Start: 2024-04-25

## 2024-04-25 RX ADMIN — ALBUTEROL SULFATE 2.5 MG: 2.5 SOLUTION RESPIRATORY (INHALATION) at 13:44

## 2024-04-29 ENCOUNTER — ANESTHESIA EVENT (OUTPATIENT)
Dept: GASTROENTEROLOGY | Facility: HOSPITAL | Age: 69
End: 2024-04-29

## 2024-04-29 ENCOUNTER — HOSPITAL ENCOUNTER (OUTPATIENT)
Dept: GASTROENTEROLOGY | Facility: HOSPITAL | Age: 69
Setting detail: OUTPATIENT SURGERY
Discharge: HOME | End: 2024-04-29
Attending: INTERNAL MEDICINE
Payer: COMMERCIAL

## 2024-04-29 ENCOUNTER — ANESTHESIA (OUTPATIENT)
Dept: GASTROENTEROLOGY | Facility: HOSPITAL | Age: 69
End: 2024-04-29

## 2024-04-29 ENCOUNTER — PREP FOR PROCEDURE (OUTPATIENT)
Age: 69
End: 2024-04-29

## 2024-04-29 VITALS
RESPIRATION RATE: 16 BRPM | SYSTOLIC BLOOD PRESSURE: 124 MMHG | DIASTOLIC BLOOD PRESSURE: 90 MMHG | WEIGHT: 225 LBS | TEMPERATURE: 97.7 F | HEART RATE: 94 BPM | HEIGHT: 66 IN | BODY MASS INDEX: 36.16 KG/M2 | OXYGEN SATURATION: 95 %

## 2024-04-29 DIAGNOSIS — Z86.010 HISTORY OF COLON POLYPS: ICD-10-CM

## 2024-04-29 PROBLEM — Z90.5 SINGLE KIDNEY: Status: ACTIVE | Noted: 2024-04-29

## 2024-04-29 PROCEDURE — 88305 TISSUE EXAM BY PATHOLOGIST: CPT | Performed by: STUDENT IN AN ORGANIZED HEALTH CARE EDUCATION/TRAINING PROGRAM

## 2024-04-29 PROCEDURE — 45380 COLONOSCOPY AND BIOPSY: CPT | Performed by: INTERNAL MEDICINE

## 2024-04-29 PROCEDURE — 45385 COLONOSCOPY W/LESION REMOVAL: CPT | Performed by: INTERNAL MEDICINE

## 2024-04-29 RX ORDER — LIDOCAINE HYDROCHLORIDE 20 MG/ML
INJECTION, SOLUTION EPIDURAL; INFILTRATION; INTRACAUDAL; PERINEURAL AS NEEDED
Status: DISCONTINUED | OUTPATIENT
Start: 2024-04-29 | End: 2024-04-29

## 2024-04-29 RX ORDER — IPRATROPIUM BROMIDE AND ALBUTEROL SULFATE 2.5; .5 MG/3ML; MG/3ML
3 SOLUTION RESPIRATORY (INHALATION) ONCE
Status: COMPLETED | OUTPATIENT
Start: 2024-04-29 | End: 2024-04-29

## 2024-04-29 RX ORDER — SODIUM CHLORIDE, SODIUM LACTATE, POTASSIUM CHLORIDE, CALCIUM CHLORIDE 600; 310; 30; 20 MG/100ML; MG/100ML; MG/100ML; MG/100ML
INJECTION, SOLUTION INTRAVENOUS CONTINUOUS PRN
Status: DISCONTINUED | OUTPATIENT
Start: 2024-04-29 | End: 2024-04-29

## 2024-04-29 RX ORDER — PROPOFOL 10 MG/ML
INJECTION, EMULSION INTRAVENOUS AS NEEDED
Status: DISCONTINUED | OUTPATIENT
Start: 2024-04-29 | End: 2024-04-29

## 2024-04-29 RX ADMIN — PROPOFOL 25 MG: 10 INJECTION, EMULSION INTRAVENOUS at 08:47

## 2024-04-29 RX ADMIN — PROPOFOL 50 MG: 10 INJECTION, EMULSION INTRAVENOUS at 08:35

## 2024-04-29 RX ADMIN — SODIUM CHLORIDE, SODIUM LACTATE, POTASSIUM CHLORIDE, AND CALCIUM CHLORIDE: .6; .31; .03; .02 INJECTION, SOLUTION INTRAVENOUS at 07:38

## 2024-04-29 RX ADMIN — PROPOFOL 25 MG: 10 INJECTION, EMULSION INTRAVENOUS at 08:44

## 2024-04-29 RX ADMIN — PROPOFOL 25 MG: 10 INJECTION, EMULSION INTRAVENOUS at 08:41

## 2024-04-29 RX ADMIN — LIDOCAINE HYDROCHLORIDE 50 MG: 20 INJECTION, SOLUTION EPIDURAL; INFILTRATION; INTRACAUDAL; PERINEURAL at 08:26

## 2024-04-29 RX ADMIN — PROPOFOL 50 MG: 10 INJECTION, EMULSION INTRAVENOUS at 08:31

## 2024-04-29 RX ADMIN — PROPOFOL 100 MG: 10 INJECTION, EMULSION INTRAVENOUS at 08:26

## 2024-04-29 RX ADMIN — IPRATROPIUM BROMIDE AND ALBUTEROL SULFATE 3 ML: 2.5; .5 SOLUTION RESPIRATORY (INHALATION) at 09:24

## 2024-04-29 RX ADMIN — PROPOFOL 35 MG: 10 INJECTION, EMULSION INTRAVENOUS at 08:38

## 2024-04-29 RX ADMIN — PROPOFOL 50 MG: 10 INJECTION, EMULSION INTRAVENOUS at 08:28

## 2024-04-29 NOTE — INTERVAL H&P NOTE
H&P reviewed. After examining the patient I find no changes in the patients condition since the H&P had been written.    Vitals:    04/29/24 0746   BP: 127/73   Pulse: 88   Resp: 18   Temp: 97.7 °F (36.5 °C)   SpO2: 95%

## 2024-04-29 NOTE — H&P
History and Physical - [facility]  Danie Liao 69 y.o. male MRN: [id number]                  HPI: Danie Liao is a 69 y.o. year old male who presents for colonoscopy for history of colon polyps.  Last colonoscopy more than 10 years ago      REVIEW OF SYSTEMS: Per the HPI, and otherwise unremarkable.    Historical Information   Past Medical History:   Diagnosis Date    Arthritis     Asthma     Chronic pain 01/30/2024    cervic and lumbar    COPD (chronic obstructive pulmonary disease) (HCC)     Emphysema of lung (HCC)     GERD (gastroesophageal reflux disease) 01/30/2024    Headache(784.0)     Hyperlipidemia 01/30/2024    Migraine     Obesity     Sleep apnea      Past Surgical History:   Procedure Laterality Date    APPENDECTOMY      HERNIA REPAIR      4 times    NEPHRECTOMY LIVING DONOR Left 10/2009    [place] EXCISION HYDROCELE UNILATERAL Left 1/30/2024    Procedure: HYDROCELECTOMY;  Surgeon: Evan Salmeron MD;  Location: [facility] OR;  Service: Urology     Social History   Social History     Substance and Sexual Activity   Alcohol Use Yes    Comment: Occasional     Social History     Substance and Sexual Activity   Drug Use No     Social History     Tobacco Use   Smoking Status Former    Current packs/day: 0.00    Average packs/day: 2.0 packs/day for 39.0 years (78.0 ttl pk-yrs)    Types: Cigarettes    Start date: 1/1/1970    Quit date: 1/1/2009    Years since quitting: 15.3   Smokeless Tobacco Never     Family History   Problem Relation Age of Onset    Breast cancer Mother     Heart disease Father     Breast cancer Maternal Grandmother     Heart disease Paternal Grandmother        Meds/Allergies     (Not in a hospital admission)      Allergies   Allergen Reactions    Other Other (See Comments)     Green Pepper       Objective     There were no vitals taken for this visit.      PHYSICAL EXAM    Gen: NAD  CV: RRR  CHEST: Clear  ABD: soft, NT/ND  EXT: no edema  Neuro:  AAO      ASSESSMENT/PLAN:  This is a 69 y.o. year old male here for history of colon polyps    PLAN:   Procedure: Colonoscopy

## 2024-04-29 NOTE — QUICK NOTE
"Patient received nebulizer post op. After nebulizer patient was cleared for discharge. Patient felt okay to leave. Was brought out to Volunteer to get walked down. Volunteer came back and stated that the patient felt he couldn't breathe and is waiting in the waiting room. Another nurse went out to bring patient back and the patient then stated that \"I just need a Wheelchair I have COPD\". Patient was brought down in the wheelchair.    Pavithra Valdivia RN  4/29/24 0946  "
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
## 2024-04-29 NOTE — ANESTHESIA POSTPROCEDURE EVALUATION
Post-Op Assessment Note    CV Status:  Stable  Pain Score: 0    Pain management: adequate       Mental Status:  Alert and awake   Hydration Status:  Euvolemic   PONV Controlled:  Controlled   Airway Patency:  Patent     Post Op Vitals Reviewed: Yes    No anethesia notable event occurred.    Staff: CRNA               /55 (04/29/24 0852)    Temp 97.7 °F (36.5 °C) (04/29/24 0852)    Pulse 88 (04/29/24 0852)   Resp 16 (04/29/24 0852)    SpO2 90 % (04/29/24 0852)

## 2024-04-29 NOTE — ANESTHESIA PREPROCEDURE EVALUATION
Procedure:  COLONOSCOPY    Moderate-severe COPD - ~5x exacerbations per year. Chronic prednisone 1 mg, Trelegy, Pulmicort, Duonebs PRN. States breathing feels well, no hospitalizations since last few procedures. Last course of higher dose prednisone earlier this month x3 days. Took albuterol and neb this AM.  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    Relevant Problems   ANESTHESIA (within normal limits)  Hydrocele sx 1/2024      CARDIO   (+) Hyperlipidemia   (+) Intractable migraine without status migrainosus      GI/HEPATIC   (+) GERD (gastroesophageal reflux disease)      /RENAL   (+) Single kidney (Kidney donor)      HEMATOLOGY (within normal limits)      NEURO/PSYCH   (+) Chronic pain   (+) Intractable migraine without status migrainosus      PULMONARY   (+) Chronic obstructive pulmonary disease (HCC)   (+) LEATHA (obstructive sleep apnea) (BIPAP qHS)      Other   (+) Obesity, Class II, BMI 35-39.9      NM Stress ECG (4/5/24):  The stress ECG revealed no diagnostic evidence of ischemia.  Occasional PVCs were noted.  He had chest discomfort following dobutamine infusion.  There was a fixed inferior defect suggestive of infarct.  There was also evidence of transient ischemic dilation.  TID ratio was 1.57. Balanced ischemia could not be excluded.     PFT (2024):  FEV1/FVC Ratio: 72.47  Forced Vital Capacity: 2.13 L, 56% predicted  FEV1: 1.54 L, 52% predicted  After administration of bronchodilator FEV1: 1.64 L, 56% predicted with no appreciable response to the bronchodilator    The flow volume curve demonstrates a restrictive pattern, also with mild scooping of the expiratory loop.  Spirometry demonstrates restrictive airflow limitation.    CT neck (11/16/23):   Redemonstrated asymmetric inward buckling of the left thyroid cartilage, suggestive of remote injury. Secondary crowding of the aryepiglottic folds with mucosal  abutment and partial effacement of the piriform sinuses. There is unchanged mucosal   irregularity of the true vocal cords with no suspicious lesion.      Physical Exam    Airway    Mallampati score: III  TM Distance: >3 FB  Neck ROM: full     Dental    upper dentures and lower dentures    Cardiovascular  Cardiovascular exam normal    Pulmonary  Pulmonary exam normal     Other Findings        Anesthesia Plan  ASA Score- 3     Anesthesia Type- IV sedation with anesthesia with ASA Monitors.         Additional Monitors:     Airway Plan:            Plan Factors-Exercise tolerance (METS): >4 METS.    Chart reviewed. EKG reviewed. Imaging results reviewed. Existing labs reviewed. Patient summary reviewed.    Patient is not a current smoker.      Obstructive sleep apnea risk education given perioperatively.        Induction- intravenous.    Postoperative Plan-     Informed Consent- Anesthetic plan and risks discussed with patient.  I personally reviewed this patient with the CRNA. Discussed and agreed on the Anesthesia Plan with the CRNA..

## 2024-05-01 PROCEDURE — 88305 TISSUE EXAM BY PATHOLOGIST: CPT | Performed by: STUDENT IN AN ORGANIZED HEALTH CARE EDUCATION/TRAINING PROGRAM

## 2024-05-03 DIAGNOSIS — J44.9 CHRONIC OBSTRUCTIVE PULMONARY DISEASE, UNSPECIFIED COPD TYPE (HCC): ICD-10-CM

## 2024-05-04 RX ORDER — IPRATROPIUM BROMIDE AND ALBUTEROL SULFATE 2.5; .5 MG/3ML; MG/3ML
SOLUTION RESPIRATORY (INHALATION)
Qty: 360 ML | Refills: 2 | Status: SHIPPED | OUTPATIENT
Start: 2024-05-04

## 2024-05-08 ENCOUNTER — TELEPHONE (OUTPATIENT)
Dept: CARDIOLOGY CLINIC | Facility: CLINIC | Age: 69
End: 2024-05-08

## 2024-05-08 NOTE — TELEPHONE ENCOUNTER
----- Message from Tiffanie Ribeiro sent at 5/8/2024  9:02 AM EDT -----  Regarding: FW: Test results    ----- Message -----  From: Guicho Villegas MD  Sent: 5/8/2024   7:29 AM EDT  To: [facility]  Subject: Test results                                     Please tell the patient that we will review the test results and discussed them at the time of his next visit

## 2024-05-16 DIAGNOSIS — G43.919 INTRACTABLE MIGRAINE WITHOUT STATUS MIGRAINOSUS, UNSPECIFIED MIGRAINE TYPE: ICD-10-CM

## 2024-05-16 DIAGNOSIS — L40.9 PSORIASIS: ICD-10-CM

## 2024-05-16 DIAGNOSIS — J44.9 COPD (CHRONIC OBSTRUCTIVE PULMONARY DISEASE) (HCC): ICD-10-CM

## 2024-05-17 ENCOUNTER — CLINICAL SUPPORT (OUTPATIENT)
Dept: BARIATRICS | Facility: CLINIC | Age: 69
End: 2024-05-17

## 2024-05-17 VITALS — WEIGHT: 226 LBS | BODY MASS INDEX: 36.48 KG/M2

## 2024-05-17 DIAGNOSIS — Z71.89 ENCOUNTER FOR PRE-BARIATRIC SURGERY COUNSELING AND EDUCATION: Primary | ICD-10-CM

## 2024-05-17 PROCEDURE — RECHECK

## 2024-05-17 RX ORDER — FLUTICASONE FUROATE, UMECLIDINIUM BROMIDE AND VILANTEROL TRIFENATATE 100; 62.5; 25 UG/1; UG/1; UG/1
1 POWDER RESPIRATORY (INHALATION) DAILY
Qty: 60 BLISTER | Refills: 0 | Status: SHIPPED | OUTPATIENT
Start: 2024-05-17

## 2024-05-17 RX ORDER — UBROGEPANT 100 MG/1
100 TABLET ORAL DAILY PRN
Qty: 30 TABLET | Refills: 1 | Status: SHIPPED | OUTPATIENT
Start: 2024-05-17

## 2024-05-17 NOTE — PROGRESS NOTES
Pre op. Has been eating 3 meals per day. Drinking 64 oz of water, 10 oz coffee, occasionally apple juice. Still struggling with activity due to his breathing. Struggling to use CPAP- encouraged him to reach out to his pulmonologist for suggestions. Just needs labs and then should be able to submit.  Workflow reviewed:   Psych and/or D+A Clearance: N/A  PCP Letter: referral  Support Group: No longer required, strongly encouraged  Surgeon Appt: 10/26/23  EGD: Completed 11/10/23  Cardiac Risk Assessment: completed 3/25/24- ordered stress test that was completed 4/5/24, needs to complete lipid panel that he ordered  Sleep Studies: had sleep study 12/7 and dx with severe LEATHA- Has had CPAP for 4 week  Blood work: Needs to complete  Nicotine test: N/A  Required weight checks: 6 months required  Weight Loss: Not required, encouraged positive lifestyle changes.  Maura Vo LCSW

## 2024-05-18 ENCOUNTER — APPOINTMENT (OUTPATIENT)
Age: 69
End: 2024-05-18
Payer: COMMERCIAL

## 2024-05-18 DIAGNOSIS — Z01.818 PREOP TESTING: ICD-10-CM

## 2024-05-18 DIAGNOSIS — E66.9 OBESITY, CLASS II, BMI 35-39.9: ICD-10-CM

## 2024-05-18 LAB
ALBUMIN SERPL BCP-MCNC: 4 G/DL (ref 3.5–5)
ALP SERPL-CCNC: 109 U/L (ref 34–104)
ALT SERPL W P-5'-P-CCNC: 18 U/L (ref 7–52)
ANION GAP SERPL CALCULATED.3IONS-SCNC: 8 MMOL/L (ref 4–13)
AST SERPL W P-5'-P-CCNC: 16 U/L (ref 13–39)
BILIRUB SERPL-MCNC: 0.57 MG/DL (ref 0.2–1)
BUN SERPL-MCNC: 13 MG/DL (ref 5–25)
CALCIUM SERPL-MCNC: 9.2 MG/DL (ref 8.4–10.2)
CHLORIDE SERPL-SCNC: 105 MMOL/L (ref 96–108)
CHOLEST SERPL-MCNC: 190 MG/DL
CO2 SERPL-SCNC: 27 MMOL/L (ref 21–32)
CREAT SERPL-MCNC: 0.92 MG/DL (ref 0.6–1.3)
ERYTHROCYTE [DISTWIDTH] IN BLOOD BY AUTOMATED COUNT: 17.2 % (ref 11.6–15.1)
GFR SERPL CREATININE-BSD FRML MDRD: 85 ML/MIN/1.73SQ M
GLUCOSE P FAST SERPL-MCNC: 109 MG/DL (ref 65–99)
HCT VFR BLD AUTO: 44.2 % (ref 36.5–49.3)
HDLC SERPL-MCNC: 64 MG/DL
HGB BLD-MCNC: 13.4 G/DL (ref 12–17)
LDLC SERPL CALC-MCNC: 107 MG/DL (ref 0–100)
MCH RBC QN AUTO: 22.4 PG (ref 26.8–34.3)
MCHC RBC AUTO-ENTMCNC: 30.3 G/DL (ref 31.4–37.4)
MCV RBC AUTO: 74 FL (ref 82–98)
NONHDLC SERPL-MCNC: 126 MG/DL
PLATELET # BLD AUTO: 385 THOUSANDS/UL (ref 149–390)
PMV BLD AUTO: 10.9 FL (ref 8.9–12.7)
POTASSIUM SERPL-SCNC: 4 MMOL/L (ref 3.5–5.3)
PROT SERPL-MCNC: 7.4 G/DL (ref 6.4–8.4)
RBC # BLD AUTO: 5.99 MILLION/UL (ref 3.88–5.62)
SODIUM SERPL-SCNC: 140 MMOL/L (ref 135–147)
TRIGL SERPL-MCNC: 94 MG/DL
TSH SERPL DL<=0.05 MIU/L-ACNC: 1.27 UIU/ML (ref 0.45–4.5)
WBC # BLD AUTO: 9.54 THOUSAND/UL (ref 4.31–10.16)

## 2024-05-18 PROCEDURE — 83036 HEMOGLOBIN GLYCOSYLATED A1C: CPT

## 2024-05-18 PROCEDURE — 85027 COMPLETE CBC AUTOMATED: CPT

## 2024-05-18 PROCEDURE — 80053 COMPREHEN METABOLIC PANEL: CPT

## 2024-05-18 PROCEDURE — 36415 COLL VENOUS BLD VENIPUNCTURE: CPT

## 2024-05-18 PROCEDURE — 84443 ASSAY THYROID STIM HORMONE: CPT

## 2024-05-19 LAB
EST. AVERAGE GLUCOSE BLD GHB EST-MCNC: 126 MG/DL
HBA1C MFR BLD: 6 %

## 2024-05-20 ENCOUNTER — TELEPHONE (OUTPATIENT)
Dept: BARIATRICS | Facility: CLINIC | Age: 69
End: 2024-05-20

## 2024-05-20 DIAGNOSIS — R79.89 ABNORMAL CBC: Primary | ICD-10-CM

## 2024-05-20 DIAGNOSIS — Z01.818 PRE-OP TESTING: ICD-10-CM

## 2024-05-20 RX ORDER — TAPINAROF 10 MG/1000MG
1 CREAM TOPICAL DAILY
Qty: 60 G | Refills: 0 | Status: SHIPPED | OUTPATIENT
Start: 2024-05-20

## 2024-05-20 NOTE — TELEPHONE ENCOUNTER
"Pt returned RD's phone call.  Pt reports he is surprised at his possible iron \"issue\" because he has been a blood donor and has never had any issues. Discussed with pt that would just like a check the iron panel to assess if there is a deficiency and what his iron stores are.  Advised pt that lab orders in EPIC. Pt verbalizes understanding.    "

## 2024-05-20 NOTE — TELEPHONE ENCOUNTER
Called pt and spoke to him briefly but then got disconnected. Called pt back and left a message that CBC abnormal and would like to check an iron panel.  Orders will be entered. Did request a call back to confirm.  Left RD phone number ([phone]).

## 2024-05-20 NOTE — TELEPHONE ENCOUNTER
Patient returning call, could not get through to office. He is asking for a call back when available. He can be reached at [phone]

## 2024-05-21 ENCOUNTER — LAB (OUTPATIENT)
Age: 69
End: 2024-05-21
Payer: COMMERCIAL

## 2024-05-21 DIAGNOSIS — R79.89 ABNORMAL CBC: ICD-10-CM

## 2024-05-21 DIAGNOSIS — Z01.818 PRE-OP TESTING: ICD-10-CM

## 2024-05-21 LAB
FERRITIN SERPL-MCNC: 12 NG/ML (ref 24–336)
IRON SATN MFR SERPL: 23 % (ref 15–50)
IRON SERPL-MCNC: 95 UG/DL (ref 50–212)
TIBC SERPL-MCNC: 413 UG/DL (ref 250–450)
UIBC SERPL-MCNC: 318 UG/DL (ref 155–355)

## 2024-05-21 PROCEDURE — 83540 ASSAY OF IRON: CPT

## 2024-05-21 PROCEDURE — 83550 IRON BINDING TEST: CPT

## 2024-05-21 PROCEDURE — 36415 COLL VENOUS BLD VENIPUNCTURE: CPT

## 2024-05-21 PROCEDURE — 82728 ASSAY OF FERRITIN: CPT

## 2024-05-22 ENCOUNTER — TELEPHONE (OUTPATIENT)
Dept: BARIATRICS | Facility: CLINIC | Age: 69
End: 2024-05-22

## 2024-05-22 DIAGNOSIS — E61.1 IRON DEFICIENCY: Primary | ICD-10-CM

## 2024-05-22 RX ORDER — SODIUM CHLORIDE 9 MG/ML
20 INJECTION, SOLUTION INTRAVENOUS ONCE
OUTPATIENT
Start: 2024-05-31

## 2024-05-22 NOTE — RESULT ENCOUNTER NOTE
Please notify the patient that their iron stores are low and it will be very difficult to improve iron stores or iron levels orally given they are preparing for surgery. I am placing orders for the infusion center to administer IV venofer iron weekly x 5 treatments. Please advise the patient of the potential side effects of IV Venofer, including, but not limited to nausea, headache, hypotension, tattooing of the skin, and anaphylactic reaction.  Please call the infusion center to notify them of the orders so they can obtain insurance prior-authorization and help schedule the patient asap. I have entered repeat CBC and iron panel and if needed B12 labs to be repeated in 3 months. Thank you!    I am also placing hematology referral thank you! I see he just had colonsocopy    Infusion Center numbers:  Peter:[phone] option 2

## 2024-05-22 NOTE — TELEPHONE ENCOUNTER
"Called pt back after MA called pt to inform of recommended iron infusions. Pt declines infusions at this time.  Reports the \"he doesn't want the side effect of SOB, he already has COPD\".  Dicussed with pt how his CBC since 2018 (MCV, MCH, MCHC) have always been trending low. This is the first iron panel so do not have a comparison. Did explain to pt that he is going for a restrictive sx therefore it will be harder to replete after.  Pt did report he purchased an iron gummie so will start taking that. Suggested one per day (65mg) for one week and increase to 2x/day.  Did highly recommend pt at least schedule the appt with heme when they call since a referral was entered; pt agreed.  Will discuss with team.    "

## 2024-05-24 ENCOUNTER — TELEPHONE (OUTPATIENT)
Dept: HEMATOLOGY ONCOLOGY | Facility: CLINIC | Age: 69
End: 2024-05-24

## 2024-05-24 NOTE — TELEPHONE ENCOUNTER
I called Danie in response to a referral that was received for patient to establish care with Hematology.     Outreach was made to schedule a consultation.    Patient request a call back.  Another attempt will be made to contact patient.

## 2024-05-28 ENCOUNTER — OFFICE VISIT (OUTPATIENT)
Dept: CARDIOLOGY CLINIC | Facility: CLINIC | Age: 69
End: 2024-05-28
Payer: COMMERCIAL

## 2024-05-28 VITALS
RESPIRATION RATE: 16 BRPM | BODY MASS INDEX: 36 KG/M2 | WEIGHT: 224 LBS | SYSTOLIC BLOOD PRESSURE: 110 MMHG | HEART RATE: 83 BPM | OXYGEN SATURATION: 95 % | HEIGHT: 66 IN | DIASTOLIC BLOOD PRESSURE: 62 MMHG

## 2024-05-28 DIAGNOSIS — I25.118 CORONARY ARTERY DISEASE OF NATIVE HEART WITH STABLE ANGINA PECTORIS, UNSPECIFIED VESSEL OR LESION TYPE (HCC): Primary | ICD-10-CM

## 2024-05-28 DIAGNOSIS — J44.9 CHRONIC OBSTRUCTIVE PULMONARY DISEASE, UNSPECIFIED COPD TYPE (HCC): ICD-10-CM

## 2024-05-28 PROCEDURE — 99213 OFFICE O/P EST LOW 20 MIN: CPT | Performed by: INTERNAL MEDICINE

## 2024-05-28 NOTE — H&P (VIEW-ONLY)
"                                           Cardiology Follow Up    Danie Liao  1955  [id number]  [facility] [place] [address] [phone]    1. Coronary artery disease of native heart with stable angina pectoris, unspecified vessel or lesion type ([facility])  2. Chronic obstructive pulmonary disease, unspecified COPD type ([facility])        Interval History: 69-year-old retired  and  whom I saw in preparation for bariatric surgery.  He has known severe COPD and apparently he was cleared by his pulmonologist for gastric surgery.  He does take inhalers on a daily basis.        He did smoke for many years.  He did mention to me he had discomfort in the center of the chest which \"hurts\" that comes on with exertion and is relieved by rest.  He graded it as a 5 out of 10.    He had a stress test and this showed transient ischemic dilatation and a defect in the inferobasal area that was fixed.    I ordered an LDL cholesterol and it is 107.    He also has a chronic hypochromic microcytic low blood count which I suspect represents thalassemia given his [place] heritage.      Patient Active Problem List   Diagnosis    Chronic obstructive pulmonary disease (HCC)    Testicular swelling, left    Localized osteoporosis without current pathological fracture    Lumbar disc disease    Intractable migraine without status migrainosus    Kidney donor    Psoriasis    Polyp of colon    Morbid (severe) obesity due to excess calories ([facility])    Obesity, Class II, BMI 35-39.9    Prediabetes    LEATHA (obstructive sleep apnea)    Hyperlipidemia    Chronic pain    GERD (gastroesophageal reflux disease)    Encysted hydrocele    Single kidney    Iron deficiency     Past Medical History:   Diagnosis Date    Arthritis     Asthma     Chronic pain 01/30/2024    cervic and lumbar    Colon polyp     COPD (chronic obstructive pulmonary disease) ([facility])     Emphysema " of lung (HCC)     GERD (gastroesophageal reflux disease) 01/30/2024    Headache(784.0)     Hyperlipidemia 01/30/2024    Migraine     Obesity     Sleep apnea      Social History     Socioeconomic History    Marital status: /Civil Union     Spouse name: Not on file    Number of children: Not on file    Years of education: Not on file    Highest education level: Not on file   Occupational History    Not on file   Tobacco Use    Smoking status: Former     Current packs/day: 0.00     Average packs/day: 2.0 packs/day for 39.0 years (78.0 ttl pk-yrs)     Types: Cigarettes     Start date: 1/1/1970     Quit date: 1/1/2009     Years since quitting: 15.4    Smokeless tobacco: Never   Vaping Use    Vaping status: Former   Substance and Sexual Activity    Alcohol use: Yes     Comment: Occasional    Drug use: No    Sexual activity: Yes     Partners: Female   Other Topics Concern    Not on file   Social History Narrative    Not on file     Social Determinants of Health     Financial Resource Strain: Low Risk  (8/24/2023)    Overall Financial Resource Strain (CARDIA)     Difficulty of Paying Living Expenses: Not hard at all   Food Insecurity: Not on file   Transportation Needs: No Transportation Needs (8/24/2023)    PRAPARE - Transportation     Lack of Transportation (Medical): No     Lack of Transportation (Non-Medical): No   Physical Activity: Not on file   Stress: Not on file   Social Connections: Not on file   Intimate Partner Violence: Not on file   Housing Stability: Not on file      Family History   Problem Relation Age of Onset    Breast cancer Mother     Heart disease Father     Breast cancer Maternal Grandmother     Heart disease Paternal Grandmother      Past Surgical History:   Procedure Laterality Date    APPENDECTOMY      COLONOSCOPY      HERNIA REPAIR      4 times    NEPHRECTOMY LIVING DONOR Left 10/2009    AK EXCISION HYDROCELE UNILATERAL Left 01/30/2024    Procedure: HYDROCELECTOMY;  Surgeon: Evan REDMOND  MD Faviola;  Location: [facility];  Service: Urology       Current Outpatient Medications:     albuterol (PROVENTIL HFA,VENTOLIN HFA) 90 mcg/act inhaler, INHALE 1-2 PUFFS EVERY 4-6 HOURS AS NEEDED AND AS DIRECTED., Disp: 36 g, Rfl: 10    budesonide (PULMICORT) 0.5 mg/2 mL nebulizer solution, TAKE 2 ML (0.5 MG TOTAL) BY NEBULIZATION TWICE A DAY RINSE MOUTH AFTER USE, Disp: 360 mL, Rfl: 1    famotidine (PEPCID) 20 mg tablet, Take 1 tablet (20 mg total) by mouth 2 (two) times a day, Disp: 180 tablet, Rfl: 3    ipratropium-albuterol (DUO-NEB) 0.5-2.5 mg/3 mL nebulizer solution, TAKE THREE ML BY NEBULIZATION 4 (FOUR) TIMES A DAY, Disp: 360 mL, Rfl: 2    promethazine-dextromethorphan (PHENERGAN-DM) 6.25-15 mg/5 mL oral syrup, Take 5 mL by mouth 4 (four) times a day as needed for cough, Disp: 180 mL, Rfl: 0    Georgina 1 MG TBEC, Take 1 tablet (1 mg total) by mouth in the morning, Disp: 90 tablet, Rfl: 2    Tapinarof (Vtama) 1 % CREA, Apply 1 Application topically in the morning, Disp: 60 g, Rfl: 0    Trelegy Ellipta 100-62.5-25 MCG/ACT inhaler, Inhale 1 puff daily, Disp: 60 blister, Rfl: 0    Ubrelvy 100 MG tablet, Take 1 tablet (100 mg total) by mouth daily as needed (migraine), Disp: 30 tablet, Rfl: 1    Albuterol-Budesonide (Airsupra) 90-80 MCG/ACT AERO, Inhale 2 puffs every 6 (six) hours as needed (wheezing), Disp: 10.7 g, Rfl: 5    benzonatate (TESSALON) 200 MG capsule, Take 200 mg by mouth 3 (three) times a day as needed for cough (Patient not taking: Reported on 5/28/2024), Disp: , Rfl:     omeprazole (PriLOSEC) 20 mg delayed release capsule, Take 1 capsule (20 mg total) by mouth daily (Patient not taking: Reported on 3/25/2024), Disp: 90 capsule, Rfl: 3    roflumilast (DALIRESP) 250 MCG tablet, Take 1 tablet (250 mcg total) by mouth daily, Disp: 30 tablet, Rfl: 3  Allergies   Allergen Reactions    Other Other (See Comments)     Green Pepper       Labs:  Lab on 05/21/2024   Component Date Value    Iron Saturation  05/21/2024 23     TIBC 05/21/2024 413     Iron 05/21/2024 95     UIBC 05/21/2024 318     Ferritin 05/21/2024 12 (L)    Appointment on 05/18/2024   Component Date Value    WBC 05/18/2024 9.54     RBC 05/18/2024 5.99 (H)     Hemoglobin 05/18/2024 13.4     Hematocrit 05/18/2024 44.2     MCV 05/18/2024 74 (L)     MCH 05/18/2024 22.4 (L)     MCHC 05/18/2024 30.3 (L)     RDW 05/18/2024 17.2 (H)     Platelets 05/18/2024 385     MPV 05/18/2024 10.9     Sodium 05/18/2024 140     Potassium 05/18/2024 4.0     Chloride 05/18/2024 105     CO2 05/18/2024 27     ANION GAP 05/18/2024 8     BUN 05/18/2024 13     Creatinine 05/18/2024 0.92     Glucose, Fasting 05/18/2024 109 (H)     Calcium 05/18/2024 9.2     AST 05/18/2024 16     ALT 05/18/2024 18     Alkaline Phosphatase 05/18/2024 109 (H)     Total Protein 05/18/2024 7.4     Albumin 05/18/2024 4.0     Total Bilirubin 05/18/2024 0.57     eGFR 05/18/2024 85     Hemoglobin A1C 05/18/2024 6.0 (H)     EAG 05/18/2024 126     TSH 3RD GENERATON 05/18/2024 1.275    Hospital Outpatient Visit on 04/29/2024   Component Date Value    Case Report 04/29/2024                      Value:Surgical Pathology Report                         Case: [id number]                                  Authorizing Provider:  Kamran Montes De Oca MD      Collected:           04/29/2024 0830              Ordering Location:      [facility]       Received:            04/29/2024 [address] [place] [address] Endoscopy                                                             Pathologist:           Eron Porter MD                                                            Specimens:   A) - Polyp, Colorectal, splenic flexure                                                             B) - Polyp, Colorectal, hepatic flexure x4                                                          C) - Polyp, Colorectal, proximal transverse                                                         D) - Polyp,  Colorectal, distal transverse                                                           E) - Polyp, Colorectal, recto sigmoid                                                      Final Diagnosis 04/29/2024                      Value:This result contains rich text formatting which cannot be displayed here.    Additional Information 04/29/2024                      Value:This result contains rich text formatting which cannot be displayed here.    Synoptic Checklist 04/29/2024                      Value:                            COLON/RECTUM POLYP FORM - GI - A, B, C, D                                                                                     :    Adenoma(s)      Gross Description 04/29/2024                      Value:This result contains rich text formatting which cannot be displayed here.    Clinical Information 04/29/2024                      Value:FINDINGS:  · Two 2 mm sessile polyps in the hepatic flexure; performed cold forceps biopsy with complete en bloc removal  · Two 6 mm sessile polyps in the hepatic flexure; performed cold snare with complete en bloc removal and retrieved specimen  · One 10 mm sessile polyp in the proximal transverse colon; performed cold snare with complete en bloc removal and retrieved specimen  · One sessile polyp measuring smaller than 5 mm in the distal transverse colon; performed cold forceps biopsy with complete en bloc removal  · One 6 mm sessile polyp in the splenic flexure; performed cold snare with complete en bloc removal and retrieved specimen  · One 2 mm sessile polyp in the rectosigmoid; performed cold forceps biopsy with complete en bloc removal  · Few small and medium diverticula of mild severity in the sigmoid colon   Hospital Outpatient Visit on 04/05/2024   Component Date Value    Rest Nuclear Isotope Dose 04/05/2024 10.90     Stress Nuclear Isotope D* 04/05/2024 33.00     Baseline HR 04/05/2024 97     Baseline BP 04/05/2024 137/89     O2 sat rest 04/05/2024  96     Stress peak HR 04/05/2024 150     Post peak BP 04/05/2024 160     O2 sat peak 04/05/2024 98     Recovery HR 04/05/2024 113     Recovery BP 04/05/2024 165/98     O2 sat recovery 04/05/2024 97     Max HR 04/05/2024 150     Max HR Percent 04/05/2024 98     Rate Pressure Product 04/05/2024 24,000.0     Stress/rest perfusion ra* 04/05/2024 1.57     EF (%) 04/05/2024 68     Protocol Name 04/05/2024 DOBUTAMINE     Exercise duration (min) 04/05/2024 12     Exercise duration (sec) 04/05/2024 53     Post Peak Systolic BP 04/05/2024 165     Max Diastolic Bp 04/05/2024 98     Peak HR 04/05/2024 150     Max Predicted Heart Rate 04/05/2024 153     Reason for Termination 04/05/2024 Protocol Complete     Test Indication 04/05/2024 CHEST PAIN     Target Hr Formular 04/05/2024 (220 - Age)*85%     Arrhy During Ex 04/05/2024 ventricular premature beats-isolated     Chest Pain Statement 04/05/2024 non-limiting    Orders Only on 04/02/2024   Component Date Value    Supp[facility]     Supplier Phone Number 04/02/2024 [phone]     Order Status 04/02/2024 Completed     Delivery Request Date 04/02/2024 04/02/2024     Date Delivered  04/02/2024 04/19/2024     Supplier Name 04/02/2024 04/19/2024     Item Description 04/02/2024 CPAP Machine, Generic     Item Description 04/02/2024 PAP Mask, Fit to Comfort, Full Face, 1 per 3 months     Item Description 04/02/2024 PAP Mask Interface Cushion, Full Face, 1 per 1 month     Item Description 04/02/2024 PAP Headgear, 1 per 6 months     Item Description 04/02/2024 PAP Humidifier, Heated     Item Description 04/02/2024 Disposable PAP Filter, 2 per 1 month     Item Description 04/02/2024 PAP Machine Tubing, Heated, 1 per 3 months     Item Description 04/02/2024 PAP Monitoring Modem     Item Description 04/02/2024 Humidifier Water Chamber, 1 per 6 months     Item Description 04/02/2024 Non-Disposable PAP Filter, 1 per 6 months      Imaging: Colonoscopy    Result Date:  4/29/2024  Narrative: Table formatting from the original result was not included.  [facility] [address] [facility] [place] [address] [phone] DATE OF SERVICE: 4/29/24 PHYSICIAN(S): Attending: Kamran Montes De Oca MD Fellow: No Staff Documented INDICATION: History of colon polyps POST-OP DIAGNOSIS: See the impression below. HISTORY: Prior colonoscopy: More than 10 years ago. BOWEL PREPARATION: Miralax/Dulcolax PREPROCEDURE: Informed consent was obtained for the procedure, including sedation. Risks including but not limited to bleeding, infection, perforation, adverse drug reaction and aspiration were explained in detail. Also explained about less than 100% sensitivity with the exam and other alternatives. The patient was placed in the left lateral decubitus position. Procedure: Colonoscopy DETAILS OF PROCEDURE: Patient was taken to the procedure room where a time out was performed to confirm correct patient and correct procedure. The patient underwent monitored anesthesia care, which was administered by an anesthesia professional. The patient's blood pressure, heart rate, level of consciousness, oxygen, respirations, ECG and ETCO2 were monitored throughout the procedure. A digital rectal exam was performed. The scope was introduced through the anus and advanced to the cecum. Retroflexion was performed in the rectum. The quality of bowel preparation was evaluated using the [place] Bowel Preparation Scale with scores of: right colon = 2, transverse colon = 2, left colon = 2. The total BBPS score was 6. Bowel prep was adequate. The patient's estimated blood loss was minimal (<5 mL). The procedure was not difficult. The patient tolerated the procedure well. There were no apparent adverse events. ANESTHESIA INFORMATION: ASA: III Anesthesia Type: IV Sedation with Anesthesia MEDICATIONS: No administrations occurring from 0823 to 0851 on 04/29/24 FINDINGS: Two 2 mm sessile polyps in the hepatic  flexure; performed cold forceps biopsy with complete en bloc removal Two 6 mm sessile polyps in the hepatic flexure; performed cold snare with complete en bloc removal and retrieved specimen One 10 mm sessile polyp in the proximal transverse colon; performed cold snare with complete en bloc removal and retrieved specimen One sessile polyp measuring smaller than 5 mm in the distal transverse colon; performed cold forceps biopsy with complete en bloc removal One 6 mm sessile polyp in the splenic flexure; performed cold snare with complete en bloc removal and retrieved specimen One 2 mm sessile polyp in the rectosigmoid; performed cold forceps biopsy with complete en bloc removal Few small and medium diverticula of mild severity in the sigmoid colon EVENTS: Procedure Events Event Event Time ENDO CECUM REACHED 4/29/2024  8:41 AM ENDO SCOPE OUT TIME 4/29/2024  8:50 AM SPECIMENS: ID Type Source Tests Collected by Time Destination 1 : splenic flexure Tissue Polyp, Colorectal TISSUE EXAM Kamran Montes De Oca MD 4/29/2024  8:30 AM  2 : hepatic flexure x4 Tissue Polyp, Colorectal TISSUE EXAM Kamran Montes De Oca MD 4/29/2024  8:38 AM  3 : proximal transverse Tissue Polyp, Colorectal TISSUE EXAM Kamran Montes De Oca MD 4/29/2024  8:46 AM  4 : distal transverse Tissue Polyp, Colorectal TISSUE EXAM Kamran Montes De Oca MD 4/29/2024  8:47 AM  5 : recto sigmoid Tissue Polyp, Colorectal TISSUE EXAM Kamran Montes De Oca MD 4/29/2024  8:49 AM  EQUIPMENT: Scope not documented     Impression: 7 subcentimeter polyps were removed One 10 mm polyp in the proximal transverse colon; performed cold snare removal Diverticulosis of mild severity in the sigmoid colon RECOMMENDATION:  Repeat colonoscopy in 3 years  Personal history of colon polyps  Follow-up biopsy results in 3 weeks  Kamran Montes De Oca MD       Review of Systems:  Review of Systems    Physical Exam:  He is overweight.  Lungs reveal generalized decreased breath sounds to a mild degree.   Rhythm is regular.  No murmur or gallop.  No carotid bruits.  No edema.    Discussion/Summary:    1.  History suggestive of angina in a patient with an abnormal stress test and transient ischemic dilatation    Recommendations:    1.  I recommended he have cardiac catheterization to assess his current anatomy  2.  I will see him again afterwards.      Guicho Villegas MD

## 2024-05-28 NOTE — PROGRESS NOTES
"                                           Cardiology Follow Up    Danie Liao  1955  [id number]  [facility] [place] [id number] [phone]    1. Coronary artery disease of native heart with stable angina pectoris, unspecified vessel or lesion type ([facility])  2. Chronic obstructive pulmonary disease, unspecified COPD type ([facility])        Interval History: 69-year-old retired  and  whom I saw in preparation for bariatric surgery.  He has known severe COPD and apparently he was cleared by his pulmonologist for gastric surgery.  He does take inhalers on a daily basis.        He did smoke for many years.  He did mention to me he had discomfort in the center of the chest which \"hurts\" that comes on with exertion and is relieved by rest.  He graded it as a 5 out of 10.    He had a stress test and this showed transient ischemic dilatation and a defect in the inferobasal area that was fixed.    I ordered an LDL cholesterol and it is 107.    He also has a chronic hypochromic microcytic low blood count which I suspect represents thalassemia given his [place] heritage.      Patient Active Problem List   Diagnosis    Chronic obstructive pulmonary disease (HCC)    Testicular swelling, left    Localized osteoporosis without current pathological fracture    Lumbar disc disease    Intractable migraine without status migrainosus    Kidney donor    Psoriasis    Polyp of colon    Morbid (severe) obesity due to excess calories ([facility])    Obesity, Class II, BMI 35-39.9    Prediabetes    LEATHA (obstructive sleep apnea)    Hyperlipidemia    Chronic pain    GERD (gastroesophageal reflux disease)    Encysted hydrocele    Single kidney    Iron deficiency     Past Medical History:   Diagnosis Date    Arthritis     Asthma     Chronic pain 01/30/2024    cervic and lumbar    Colon polyp     COPD (chronic obstructive pulmonary disease) ([facility])     Emphysema " of lung (HCC)     GERD (gastroesophageal reflux disease) 01/30/2024    Headache(784.0)     Hyperlipidemia 01/30/2024    Migraine     Obesity     Sleep apnea      Social History     Socioeconomic History    Marital status: /Civil Union     Spouse name: Not on file    Number of children: Not on file    Years of education: Not on file    Highest education level: Not on file   Occupational History    Not on file   Tobacco Use    Smoking status: Former     Current packs/day: 0.00     Average packs/day: 2.0 packs/day for 39.0 years (78.0 ttl pk-yrs)     Types: Cigarettes     Start date: 1/1/1970     Quit date: 1/1/2009     Years since quitting: 15.4    Smokeless tobacco: Never   Vaping Use    Vaping status: Former   Substance and Sexual Activity    Alcohol use: Yes     Comment: Occasional    Drug use: No    Sexual activity: Yes     Partners: Female   Other Topics Concern    Not on file   Social History Narrative    Not on file     Social Determinants of Health     Financial Resource Strain: Low Risk  (8/24/2023)    Overall Financial Resource Strain (CARDIA)     Difficulty of Paying Living Expenses: Not hard at all   Food Insecurity: Not on file   Transportation Needs: No Transportation Needs (8/24/2023)    PRAPARE - Transportation     Lack of Transportation (Medical): No     Lack of Transportation (Non-Medical): No   Physical Activity: Not on file   Stress: Not on file   Social Connections: Not on file   Intimate Partner Violence: Not on file   Housing Stability: Not on file      Family History   Problem Relation Age of Onset    Breast cancer Mother     Heart disease Father     Breast cancer Maternal Grandmother     Heart disease Paternal Grandmother      Past Surgical History:   Procedure Laterality Date    APPENDECTOMY      COLONOSCOPY      HERNIA REPAIR      4 times    NEPHRECTOMY LIVING DONOR Left 10/2009    TN EXCISION HYDROCELE UNILATERAL Left 01/30/2024    Procedure: HYDROCELECTOMY;  Surgeon: Evan REDMOND  MD Faviola;  Location: [facility];  Service: Urology       Current Outpatient Medications:     albuterol (PROVENTIL HFA,VENTOLIN HFA) 90 mcg/act inhaler, INHALE 1-2 PUFFS EVERY 4-6 HOURS AS NEEDED AND AS DIRECTED., Disp: 36 g, Rfl: 10    budesonide (PULMICORT) 0.5 mg/2 mL nebulizer solution, TAKE 2 ML (0.5 MG TOTAL) BY NEBULIZATION TWICE A DAY RINSE MOUTH AFTER USE, Disp: 360 mL, Rfl: 1    famotidine (PEPCID) 20 mg tablet, Take 1 tablet (20 mg total) by mouth 2 (two) times a day, Disp: 180 tablet, Rfl: 3    ipratropium-albuterol (DUO-NEB) 0.5-2.5 mg/3 mL nebulizer solution, TAKE THREE ML BY NEBULIZATION 4 (FOUR) TIMES A DAY, Disp: 360 mL, Rfl: 2    promethazine-dextromethorphan (PHENERGAN-DM) 6.25-15 mg/5 mL oral syrup, Take 5 mL by mouth 4 (four) times a day as needed for cough, Disp: 180 mL, Rfl: 0    Georgina 1 MG TBEC, Take 1 tablet (1 mg total) by mouth in the morning, Disp: 90 tablet, Rfl: 2    Tapinarof (Vtama) 1 % CREA, Apply 1 Application topically in the morning, Disp: 60 g, Rfl: 0    Trelegy Ellipta 100-62.5-25 MCG/ACT inhaler, Inhale 1 puff daily, Disp: 60 blister, Rfl: 0    Ubrelvy 100 MG tablet, Take 1 tablet (100 mg total) by mouth daily as needed (migraine), Disp: 30 tablet, Rfl: 1    Albuterol-Budesonide (Airsupra) 90-80 MCG/ACT AERO, Inhale 2 puffs every 6 (six) hours as needed (wheezing), Disp: 10.7 g, Rfl: 5    benzonatate (TESSALON) 200 MG capsule, Take 200 mg by mouth 3 (three) times a day as needed for cough (Patient not taking: Reported on 5/28/2024), Disp: , Rfl:     omeprazole (PriLOSEC) 20 mg delayed release capsule, Take 1 capsule (20 mg total) by mouth daily (Patient not taking: Reported on 3/25/2024), Disp: 90 capsule, Rfl: 3    roflumilast (DALIRESP) 250 MCG tablet, Take 1 tablet (250 mcg total) by mouth daily, Disp: 30 tablet, Rfl: 3  Allergies   Allergen Reactions    Other Other (See Comments)     Green Pepper       Labs:  Lab on 05/21/2024   Component Date Value    Iron Saturation  05/21/2024 23     TIBC 05/21/2024 413     Iron 05/21/2024 95     UIBC 05/21/2024 318     Ferritin 05/21/2024 12 (L)    Appointment on 05/18/2024   Component Date Value    WBC 05/18/2024 9.54     RBC 05/18/2024 5.99 (H)     Hemoglobin 05/18/2024 13.4     Hematocrit 05/18/2024 44.2     MCV 05/18/2024 74 (L)     MCH 05/18/2024 22.4 (L)     MCHC 05/18/2024 30.3 (L)     RDW 05/18/2024 17.2 (H)     Platelets 05/18/2024 385     MPV 05/18/2024 10.9     Sodium 05/18/2024 140     Potassium 05/18/2024 4.0     Chloride 05/18/2024 105     CO2 05/18/2024 27     ANION GAP 05/18/2024 8     BUN 05/18/2024 13     Creatinine 05/18/2024 0.92     Glucose, Fasting 05/18/2024 109 (H)     Calcium 05/18/2024 9.2     AST 05/18/2024 16     ALT 05/18/2024 18     Alkaline Phosphatase 05/18/2024 109 (H)     Total Protein 05/18/2024 7.4     Albumin 05/18/2024 4.0     Total Bilirubin 05/18/2024 0.57     eGFR 05/18/2024 85     Hemoglobin A1C 05/18/2024 6.0 (H)     EAG 05/18/2024 126     TSH 3RD GENERATON 05/18/2024 1.275    Hospital Outpatient Visit on 04/29/2024   Component Date Value    Case Report 04/29/2024                      Value:Surgical Pathology Report                         Case: [id number]                                  Authorizing Provider:  Kamran Montes De Oca MD      Collected:           04/29/2024 0830              Ordering Location:      [facility]       Received:            04/29/2024 [address] [place] [address] Endoscopy                                                             Pathologist:           Eron Porter MD                                                            Specimens:   A) - Polyp, Colorectal, splenic flexure                                                             B) - Polyp, Colorectal, hepatic flexure x4                                                          C) - Polyp, Colorectal, proximal transverse                                                         D) - Polyp,  Colorectal, distal transverse                                                           E) - Polyp, Colorectal, recto sigmoid                                                      Final Diagnosis 04/29/2024                      Value:This result contains rich text formatting which cannot be displayed here.    Additional Information 04/29/2024                      Value:This result contains rich text formatting which cannot be displayed here.    Synoptic Checklist 04/29/2024                      Value:                            COLON/RECTUM POLYP FORM - GI - A, B, C, D                                                                                     :    Adenoma(s)      Gross Description 04/29/2024                      Value:This result contains rich text formatting which cannot be displayed here.    Clinical Information 04/29/2024                      Value:FINDINGS:  · Two 2 mm sessile polyps in the hepatic flexure; performed cold forceps biopsy with complete en bloc removal  · Two 6 mm sessile polyps in the hepatic flexure; performed cold snare with complete en bloc removal and retrieved specimen  · One 10 mm sessile polyp in the proximal transverse colon; performed cold snare with complete en bloc removal and retrieved specimen  · One sessile polyp measuring smaller than 5 mm in the distal transverse colon; performed cold forceps biopsy with complete en bloc removal  · One 6 mm sessile polyp in the splenic flexure; performed cold snare with complete en bloc removal and retrieved specimen  · One 2 mm sessile polyp in the rectosigmoid; performed cold forceps biopsy with complete en bloc removal  · Few small and medium diverticula of mild severity in the sigmoid colon   Hospital Outpatient Visit on 04/05/2024   Component Date Value    Rest Nuclear Isotope Dose 04/05/2024 10.90     Stress Nuclear Isotope D* 04/05/2024 33.00     Baseline HR 04/05/2024 97     Baseline BP 04/05/2024 137/89     O2 sat rest 04/05/2024  96     Stress peak HR 04/05/2024 150     Post peak BP 04/05/2024 160     O2 sat peak 04/05/2024 98     Recovery HR 04/05/2024 113     Recovery BP 04/05/2024 165/98     O2 sat recovery 04/05/2024 97     Max HR 04/05/2024 150     Max HR Percent 04/05/2024 98     Rate Pressure Product 04/05/2024 24,000.0     Stress/rest perfusion ra* 04/05/2024 1.57     EF (%) 04/05/2024 68     Protocol Name 04/05/2024 DOBUTAMINE     Exercise duration (min) 04/05/2024 12     Exercise duration (sec) 04/05/2024 53     Post Peak Systolic BP 04/05/2024 165     Max Diastolic Bp 04/05/2024 98     Peak HR 04/05/2024 150     Max Predicted Heart Rate 04/05/2024 153     Reason for Termination 04/05/2024 Protocol Complete     Test Indication 04/05/2024 CHEST PAIN     Target Hr Formular 04/05/2024 (220 - Age)*85%     Arrhy During Ex 04/05/2024 ventricular premature beats-isolated     Chest Pain Statement 04/05/2024 non-limiting    Orders Only on 04/02/2024   Component Date Value    Supp[facility] Name [facility]02/[facility]     Supplier Phone Number 04/02/2024 [phone]     Order Status 04/02/2024 Completed     Delivery Request Date 04/02/2024 04/02/2024     Date Delivered  04/02/2024 04/19/2024     Supplier Name 04/02/2024 04/19/2024     Item Description 04/02/2024 CPAP Machine, Generic     Item Description 04/02/2024 PAP Mask, Fit to Comfort, Full Face, 1 per 3 months     Item Description 04/02/2024 PAP Mask Interface Cushion, Full Face, 1 per 1 month     Item Description 04/02/2024 PAP Headgear, 1 per 6 months     Item Description 04/02/2024 PAP Humidifier, Heated     Item Description 04/02/2024 Disposable PAP Filter, 2 per 1 month     Item Description 04/02/2024 PAP Machine Tubing, Heated, 1 per 3 months     Item Description 04/02/2024 PAP Monitoring Modem     Item Description 04/02/2024 Humidifier Water Chamber, 1 per 6 months     Item Description 04/02/2024 Non-Disposable PAP Filter, 1 per 6 months      Imaging: Colonoscopy    Result Date:  4/29/2024  Narrative: Table formatting from the original result was not included.  [facility] [address] [facility] [place] [address] [phone] DATE OF SERVICE: 4/29/24 PHYSICIAN(S): Attending: Kamran Montes De Oca MD Fellow: No Staff Documented INDICATION: History of colon polyps POST-OP DIAGNOSIS: See the impression below. HISTORY: Prior colonoscopy: More than 10 years ago. BOWEL PREPARATION: Miralax/Dulcolax PREPROCEDURE: Informed consent was obtained for the procedure, including sedation. Risks including but not limited to bleeding, infection, perforation, adverse drug reaction and aspiration were explained in detail. Also explained about less than 100% sensitivity with the exam and other alternatives. The patient was placed in the left lateral decubitus position. Procedure: Colonoscopy DETAILS OF PROCEDURE: Patient was taken to the procedure room where a time out was performed to confirm correct patient and correct procedure. The patient underwent monitored anesthesia care, which was administered by an anesthesia professional. The patient's blood pressure, heart rate, level of consciousness, oxygen, respirations, ECG and ETCO2 were monitored throughout the procedure. A digital rectal exam was performed. The scope was introduced through the anus and advanced to the cecum. Retroflexion was performed in the rectum. The quality of bowel preparation was evaluated using the Britton Bowel Preparation Scale with scores of: right colon = 2, transverse colon = 2, left colon = 2. The total BBPS score was 6. Bowel prep was adequate. The patient's estimated blood loss was minimal (<5 mL). The procedure was not difficult. The patient tolerated the procedure well. There were no apparent adverse events. ANESTHESIA INFORMATION: ASA: III Anesthesia Type: IV Sedation with Anesthesia MEDICATIONS: No administrations occurring from 0823 to 0851 on 04/29/24 FINDINGS: Two 2 mm sessile polyps in the hepatic  flexure; performed cold forceps biopsy with complete en bloc removal Two 6 mm sessile polyps in the hepatic flexure; performed cold snare with complete en bloc removal and retrieved specimen One 10 mm sessile polyp in the proximal transverse colon; performed cold snare with complete en bloc removal and retrieved specimen One sessile polyp measuring smaller than 5 mm in the distal transverse colon; performed cold forceps biopsy with complete en bloc removal One 6 mm sessile polyp in the splenic flexure; performed cold snare with complete en bloc removal and retrieved specimen One 2 mm sessile polyp in the rectosigmoid; performed cold forceps biopsy with complete en bloc removal Few small and medium diverticula of mild severity in the sigmoid colon EVENTS: Procedure Events Event Event Time ENDO CECUM REACHED 4/29/2024  8:41 AM ENDO SCOPE OUT TIME 4/29/2024  8:50 AM SPECIMENS: ID Type Source Tests Collected by Time Destination 1 : splenic flexure Tissue Polyp, Colorectal TISSUE EXAM Kamran Montes De Oca MD 4/29/2024  8:30 AM  2 : hepatic flexure x4 Tissue Polyp, Colorectal TISSUE EXAM Kamran Montes De Oca MD 4/29/2024  8:38 AM  3 : proximal transverse Tissue Polyp, Colorectal TISSUE EXAM Kamran Montes De Oca MD 4/29/2024  8:46 AM  4 : distal transverse Tissue Polyp, Colorectal TISSUE EXAM Kamran Montes De Oca MD 4/29/2024  8:47 AM  5 : recto sigmoid Tissue Polyp, Colorectal TISSUE EXAM Kamran Montes De Oca MD 4/29/2024  8:49 AM  EQUIPMENT: Scope not documented     Impression: 7 subcentimeter polyps were removed One 10 mm polyp in the proximal transverse colon; performed cold snare removal Diverticulosis of mild severity in the sigmoid colon RECOMMENDATION:  Repeat colonoscopy in 3 years  Personal history of colon polyps  Follow-up biopsy results in 3 weeks  Kamran Montes De Oca MD       Review of Systems:  Review of Systems    Physical Exam:  He is overweight.  Lungs reveal generalized decreased breath sounds to a mild degree.   Rhythm is regular.  No murmur or gallop.  No carotid bruits.  No edema.    Discussion/Summary:    1.  History suggestive of angina in a patient with an abnormal stress test and transient ischemic dilatation    Recommendations:    1.  I recommended he have cardiac catheterization to assess his current anatomy  2.  I will see him again afterwards.      Guicho Villegas MD

## 2024-05-30 ENCOUNTER — TELEPHONE (OUTPATIENT)
Dept: CARDIOLOGY CLINIC | Facility: CLINIC | Age: 69
End: 2024-05-30

## 2024-05-30 NOTE — TELEPHONE ENCOUNTER
----- Message from Guicho Villegas MD sent at 5/28/2024  4:50 PM EDT -----  Please schedule for left heart catheterization.            1.Is this for pre-valve or open heart surgery request?  NO    2.Did the pt have a stress test? YES    3.Is pt on maximal antianginal medications?  NO               If No, what is the reason for not being on maximal antianginal medication?  Judgment  4.Is pt symptomatic?  YES    5.Does pt having ischemic symptoms?  YES                 If yes, is what type?   Typical

## 2024-06-03 ENCOUNTER — PREP FOR PROCEDURE (OUTPATIENT)
Dept: CARDIOLOGY CLINIC | Facility: CLINIC | Age: 69
End: 2024-06-03

## 2024-06-03 DIAGNOSIS — I25.118 CORONARY ARTERY DISEASE OF NATIVE HEART WITH STABLE ANGINA PECTORIS, UNSPECIFIED VESSEL OR LESION TYPE (HCC): Primary | ICD-10-CM

## 2024-06-03 DIAGNOSIS — I20.89 ANGINA OF EFFORT: ICD-10-CM

## 2024-06-03 NOTE — TELEPHONE ENCOUNTER
01/03/24 7:21 AM     [employer] SmartForm used to document caregap status.    JULIANE ALLEN    Prescreen completed

## 2024-06-04 ENCOUNTER — TELEPHONE (OUTPATIENT)
Age: 69
End: 2024-06-04

## 2024-06-04 ENCOUNTER — PREP FOR PROCEDURE (OUTPATIENT)
Dept: NON INVASIVE DIAGNOSTICS | Facility: HOSPITAL | Age: 69
End: 2024-06-04

## 2024-06-04 DIAGNOSIS — I20.89 ANGINA OF EFFORT: ICD-10-CM

## 2024-06-04 DIAGNOSIS — I25.118 CORONARY ARTERY DISEASE OF NATIVE HEART WITH STABLE ANGINA PECTORIS, UNSPECIFIED VESSEL OR LESION TYPE (HCC): Primary | ICD-10-CM

## 2024-06-04 RX ORDER — SODIUM CHLORIDE 9 MG/ML
75 INJECTION, SOLUTION INTRAVENOUS CONTINUOUS
Status: CANCELLED | OUTPATIENT
Start: 2024-06-04

## 2024-06-04 NOTE — PROGRESS NOTES
Pt called infusion center stating his appointment on 6/6 at 2:30PM would need to be cancelled as he would not accept receiving an iron infusion at this time. States he will be discussing this with Dr. Tariq.

## 2024-06-04 NOTE — TELEPHONE ENCOUNTER
Pt called in to schedule appt with Dr. Tariq (specifically). Pt was told will be receiving a call back from the practice.

## 2024-06-06 ENCOUNTER — HOSPITAL ENCOUNTER (OUTPATIENT)
Dept: INFUSION CENTER | Facility: CLINIC | Age: 69
Discharge: HOME | End: 2024-06-06

## 2024-06-07 ENCOUNTER — TELEPHONE (OUTPATIENT)
Dept: CARDIOLOGY CLINIC | Facility: CLINIC | Age: 69
End: 2024-06-07

## 2024-06-07 ENCOUNTER — HOSPITAL ENCOUNTER (OUTPATIENT)
Facility: HOSPITAL | Age: 69
Setting detail: OUTPATIENT SURGERY
Discharge: HOME | End: 2024-06-07
Attending: INTERNAL MEDICINE | Admitting: INTERNAL MEDICINE
Payer: COMMERCIAL

## 2024-06-07 VITALS
OXYGEN SATURATION: 97 % | RESPIRATION RATE: 22 BRPM | SYSTOLIC BLOOD PRESSURE: 122 MMHG | BODY MASS INDEX: 35.61 KG/M2 | TEMPERATURE: 97.5 F | WEIGHT: 221.56 LBS | HEART RATE: 82 BPM | DIASTOLIC BLOOD PRESSURE: 86 MMHG | HEIGHT: 66 IN

## 2024-06-07 DIAGNOSIS — Z90.5 H/O UNILATERAL NEPHRECTOMY: ICD-10-CM

## 2024-06-07 DIAGNOSIS — I20.89 ANGINA OF EFFORT: ICD-10-CM

## 2024-06-07 DIAGNOSIS — I25.118 CORONARY ARTERY DISEASE OF NATIVE HEART WITH STABLE ANGINA PECTORIS, UNSPECIFIED VESSEL OR LESION TYPE (HCC): ICD-10-CM

## 2024-06-07 DIAGNOSIS — I25.118 CORONARY ARTERY DISEASE OF NATIVE HEART WITH STABLE ANGINA PECTORIS, UNSPECIFIED VESSEL OR LESION TYPE (HCC): Primary | ICD-10-CM

## 2024-06-07 LAB
ATRIAL RATE: 80 BPM
P AXIS: 66 DEGREES
PR INTERVAL: 172 MS
QRS AXIS: -35 DEGREES
QRSD INTERVAL: 94 MS
QT INTERVAL: 382 MS
QTC INTERVAL: 440 MS
T WAVE AXIS: 38 DEGREES
VENTRICULAR RATE: 80 BPM

## 2024-06-07 PROCEDURE — 93010 ELECTROCARDIOGRAM REPORT: CPT | Performed by: INTERNAL MEDICINE

## 2024-06-07 PROCEDURE — 93005 ELECTROCARDIOGRAM TRACING: CPT

## 2024-06-07 RX ORDER — SODIUM CHLORIDE 9 MG/ML
75 INJECTION, SOLUTION INTRAVENOUS CONTINUOUS
Status: DISCONTINUED | OUTPATIENT
Start: 2024-06-07 | End: 2024-06-07 | Stop reason: HOSPADM

## 2024-06-07 RX ADMIN — SODIUM CHLORIDE 75 ML/HR: 0.9 INJECTION, SOLUTION INTRAVENOUS at 09:00

## 2024-06-07 NOTE — TELEPHONE ENCOUNTER
"----- Message from Alannah CLAUDIO sent at 6/7/2024  1:56 PM EDT -----    ,    Can you please enter prep for procedure for [facility] to be done at [place] so I can schedule him?     Thanks,  Alannah \"Yisel\" Chemo    ----- Message -----  From: More Hollins  Sent: 6/7/2024  10:03 AM EDT  To: Selena Brown MA; Alannah Mclain    Good Morning,    Would you guys be able to schedule this pt in [place] instead per the provider's note below??    Thanks in advance,  Tiff    ----- Message -----  From: Joe Daily PA-C  Sent: 6/7/2024   9:52 AM EDT  To: Guicho Villegas MD; More Hollins    This patient was scheduled to undergo cardiac catheterization today.  Please see my note from today.  Patient is refusing cardiac catheterization at [place].  He wants cardiac catheterization scheduled at [facility] with general anesthesia and surgical backup.  Please schedule within the next week if possible.    Also wanted to make you aware Dr. Villegas, in case you need to make any medication changes while he is pending cardiac catheterization.    Thank you!  "

## 2024-06-07 NOTE — QUICK NOTE
Patient seen and examined.  Patient is here for elective cardiac catheterization.  He follows with Dr. Villegas.  He was referred for cardiac catheterization due to abnormal stress test.  Spoke with patient regarding cardiac catheterization at [place].  Patient is refusing cardiac catheterization.  Wife is at bedside.  They understand indication, reasoning, rationale, risk and benefit for procedure.  He states he wants to have cardiac catheterization with general anesthesia.  Furthermore, he wants cardiac catheterization performed in [place] with surgical backup.    Explained to patient that this procedure is usually done with conscious sedation.  Patient is refusing procedure, and wants general anesthesia with surgical backup on site.    Will cancel procedure today and plan to reschedule at [facility] with anesthesia and surgical backup.    He says he has no worsening symptoms.  Denies chest pain, pressure, or shortness of breath.  If he was to have any symptoms, recommend further evaluation in the emergency department.    Message sent to office for rescheduling.

## 2024-06-10 NOTE — TELEPHONE ENCOUNTER
"Patient scheduled for Left Heart Cath w/Anesthesia on 6/17/24 at [facility] with Dr. Noland.      Patient said can't do week of 6/10/24-6/14/24.    I called cath lab and talked w/Kim and she will let Jaycee know about this anesthesia case and she said to make it first case of the day.     Instructions sent to patient through [employer].      Patient aware of all general instructions.    Medication holds:   N/A    Labs to be done on 6/11/24:  CBC (No fasting)   (Patient did CMP on 5/18/24)         Thank you,  Alannah \"Yisel\" Chemo      "

## 2024-06-11 ENCOUNTER — APPOINTMENT (OUTPATIENT)
Age: 69
End: 2024-06-11
Payer: COMMERCIAL

## 2024-06-11 DIAGNOSIS — I20.89 ANGINA OF EFFORT: ICD-10-CM

## 2024-06-11 DIAGNOSIS — I25.118 CORONARY ARTERY DISEASE OF NATIVE HEART WITH STABLE ANGINA PECTORIS, UNSPECIFIED VESSEL OR LESION TYPE (HCC): ICD-10-CM

## 2024-06-11 LAB
BASOPHILS # BLD AUTO: 0.05 THOUSANDS/ÂΜL (ref 0–0.1)
BASOPHILS NFR BLD AUTO: 1 % (ref 0–1)
EOSINOPHIL # BLD AUTO: 0.27 THOUSAND/ÂΜL (ref 0–0.61)
EOSINOPHIL NFR BLD AUTO: 3 % (ref 0–6)
ERYTHROCYTE [DISTWIDTH] IN BLOOD BY AUTOMATED COUNT: 17.6 % (ref 11.6–15.1)
HCT VFR BLD AUTO: 44.3 % (ref 36.5–49.3)
HGB BLD-MCNC: 13.5 G/DL (ref 12–17)
IMM GRANULOCYTES # BLD AUTO: 0.02 THOUSAND/UL (ref 0–0.2)
IMM GRANULOCYTES NFR BLD AUTO: 0 % (ref 0–2)
INR PPP: 1.02 (ref 0.84–1.19)
LYMPHOCYTES # BLD AUTO: 1.87 THOUSANDS/ÂΜL (ref 0.6–4.47)
LYMPHOCYTES NFR BLD AUTO: 18 % (ref 14–44)
MCH RBC QN AUTO: 22.8 PG (ref 26.8–34.3)
MCHC RBC AUTO-ENTMCNC: 30.5 G/DL (ref 31.4–37.4)
MCV RBC AUTO: 75 FL (ref 82–98)
MONOCYTES # BLD AUTO: 0.94 THOUSAND/ÂΜL (ref 0.17–1.22)
MONOCYTES NFR BLD AUTO: 9 % (ref 4–12)
NEUTROPHILS # BLD AUTO: 7.47 THOUSANDS/ÂΜL (ref 1.85–7.62)
NEUTS SEG NFR BLD AUTO: 69 % (ref 43–75)
NRBC BLD AUTO-RTO: 0 /100 WBCS
PLATELET # BLD AUTO: 388 THOUSANDS/UL (ref 149–390)
PMV BLD AUTO: 11.8 FL (ref 8.9–12.7)
PROTHROMBIN TIME: 13.3 SECONDS (ref 11.6–14.5)
RBC # BLD AUTO: 5.92 MILLION/UL (ref 3.88–5.62)
WBC # BLD AUTO: 10.62 THOUSAND/UL (ref 4.31–10.16)

## 2024-06-11 PROCEDURE — 36415 COLL VENOUS BLD VENIPUNCTURE: CPT

## 2024-06-11 PROCEDURE — 85025 COMPLETE CBC W/AUTO DIFF WBC: CPT

## 2024-06-11 PROCEDURE — 85610 PROTHROMBIN TIME: CPT

## 2024-06-13 ENCOUNTER — OFFICE VISIT (OUTPATIENT)
Dept: BARIATRICS | Facility: CLINIC | Age: 69
End: 2024-06-13
Payer: COMMERCIAL

## 2024-06-13 VITALS
WEIGHT: 225 LBS | HEIGHT: 65 IN | SYSTOLIC BLOOD PRESSURE: 122 MMHG | HEART RATE: 91 BPM | DIASTOLIC BLOOD PRESSURE: 80 MMHG | BODY MASS INDEX: 37.49 KG/M2 | RESPIRATION RATE: 14 BRPM

## 2024-06-13 DIAGNOSIS — G47.33 OSA (OBSTRUCTIVE SLEEP APNEA): ICD-10-CM

## 2024-06-13 DIAGNOSIS — Z01.818 ENCOUNTER FOR OTHER PREPROCEDURAL EXAMINATION: Primary | ICD-10-CM

## 2024-06-13 DIAGNOSIS — J44.9 CHRONIC OBSTRUCTIVE PULMONARY DISEASE, UNSPECIFIED COPD TYPE (HCC): ICD-10-CM

## 2024-06-13 DIAGNOSIS — E78.5 HYPERLIPIDEMIA: ICD-10-CM

## 2024-06-13 DIAGNOSIS — K21.9 GERD (GASTROESOPHAGEAL REFLUX DISEASE): ICD-10-CM

## 2024-06-13 DIAGNOSIS — E66.01 MORBID (SEVERE) OBESITY DUE TO EXCESS CALORIES (HCC): ICD-10-CM

## 2024-06-13 DIAGNOSIS — E66.9 OBESITY, CLASS II, BMI 35-39.9: ICD-10-CM

## 2024-06-13 DIAGNOSIS — R73.03 PREDIABETES: ICD-10-CM

## 2024-06-13 PROCEDURE — 99213 OFFICE O/P EST LOW 20 MIN: CPT | Performed by: SURGERY

## 2024-06-13 PROCEDURE — G2211 COMPLEX E/M VISIT ADD ON: HCPCS | Performed by: SURGERY

## 2024-06-13 NOTE — PROGRESS NOTES
BARIATRIC INITIAL CONSULT - BARIATRIC SURGERY    Danie Liao 69 y.o. male MRN: [id number]  Unit/Bed#:  Encounter: [id number]      HPI:  Danie Liao is a 69 y.o. male who presents with a longstanding history of morbid obesity and inability to sustain a meaningful weight loss.    He has completed his workflow.  He is excited to improve his health.    Visit type: follow up-routine clinic     Symptoms: inability to loss weight, knee pain, and back pain    Associated Symptoms: none    Associated Conditions: sleep apnea and abdominal obesity  Disease Complications: hypertension, sleep apnea, and Prediabetes  Weight Loss Interest: high    Review of Systems   Respiratory:  Positive for shortness of breath (w/ exertion).    Musculoskeletal:  Positive for arthralgias and back pain.   All other systems reviewed and are negative.      Historical Information   Past Medical History:   Diagnosis Date    Arthritis     Asthma     Chronic pain 01/30/2024    cervic and lumbar    Colon polyp     COPD (chronic obstructive pulmonary disease) (HCC)     Emphysema of lung (HCC)     GERD (gastroesophageal reflux disease) 01/30/2024    Headache(784.0)     Hyperlipidemia 01/30/2024    Migraine     Obesity     Sleep apnea      Past Surgical History:   Procedure Laterality Date    APPENDECTOMY      COLONOSCOPY      HERNIA REPAIR      4 times    NEPHRECTOMY LIVING DONOR Left 10/2009    DE EXCISION HYDROCELE UNILATERAL Left 01/30/2024    Procedure: HYDROCELECTOMY;  Surgeon: Evan Salmeron MD;  Location: [facility];  Service: Urology     Social History   Social History     Substance and Sexual Activity   Alcohol Use Yes    Comment: Occasional     Social History     Substance and Sexual Activity   Drug Use No     Social History     Tobacco Use   Smoking Status Former    Current packs/day: 0.00    Average packs/day: 2.0 packs/day for 39.0 years (78.0 ttl pk-yrs)    Types: Cigarettes    Start date: 1/1/1970    Quit date: 1/1/2009     "Years since quitting: 15.4   Smokeless Tobacco Never     Family History: non-contributory    Meds/Allergies   all medications and allergies reviewed  Allergies   Allergen Reactions    Other Other (See Comments)     Ian Franco       Objective     Current Vitals:   /80 (BP Location: Left arm, Patient Position: Sitting, Cuff Size: Large)   Pulse 91   Resp 14   Ht 5' 4.5\" (1.638 m)   Wt 102 kg (225 lb)   BMI 38.02 kg/m²     Invasive Devices       Peripheral Intravenous Line  Duration             Peripheral IV 02/09/24 Distal;Left;Upper;Ventral (anterior) Arm 124 days    Peripheral IV 06/07/24 Dorsal (posterior);Left Hand 5 days              Drain  Duration             Closed/Suction Drain Left Groin Bulb 19 Fr. 135 days                    Physical Exam  Constitutional:       Appearance: Normal appearance.   HENT:      Head: Atraumatic.      Nose: No rhinorrhea.   Eyes:      Extraocular Movements: Extraocular movements intact.   Cardiovascular:      Rate and Rhythm: Normal rate.   Pulmonary:      Effort: Pulmonary effort is normal. No respiratory distress.   Abdominal:      General: Abdomen is flat. There is no distension.   Musculoskeletal:         General: Normal range of motion.      Cervical back: Normal range of motion.   Skin:     General: Skin is warm and dry.   Neurological:      General: No focal deficit present.      Mental Status: He is alert and oriented to person, place, and time.   Psychiatric:         Mood and Affect: Mood normal.         Behavior: Behavior normal.         Lab Results: I have personally reviewed pertinent lab results.    Imaging: I have personally reviewed pertinent reports.    EKG, Pathology, and Other Studies: I have personally reviewed pertinent reports.        Assessment/PLAN:    69 y.o. yo male with a long standing h/o of obesity and inability to sustain any meaningful weight loss on his own despite several attempts.    He is interested in the Laparoscopic sleeve " gastrectomy.    After review and reassessment of the patient and chart, I still deem it medically necessary that he undergo metabolic and bariatric surgery. More specifically laparoscopic sleeve gastrectomy. Pt will be on our program mandated 800 calorie liver shrinking, very low calorie preoperative diet for 2-4 weeks prior to surgery.     Mindful eating, stress reduction, sleep hygiene   Exercise  MVI/minerals  RD/LCSW f/u      Butch Chan MD  6/13/2024  8:25 AM

## 2024-06-14 DIAGNOSIS — J44.9 COPD (CHRONIC OBSTRUCTIVE PULMONARY DISEASE) (HCC): ICD-10-CM

## 2024-06-14 DIAGNOSIS — L40.9 PSORIASIS: ICD-10-CM

## 2024-06-15 ENCOUNTER — ANESTHESIA EVENT (OUTPATIENT)
Dept: NON INVASIVE DIAGNOSTICS | Facility: HOSPITAL | Age: 69
End: 2024-06-15
Payer: COMMERCIAL

## 2024-06-17 ENCOUNTER — HOSPITAL ENCOUNTER (OUTPATIENT)
Facility: HOSPITAL | Age: 69
Setting detail: OUTPATIENT SURGERY
Discharge: HOME | End: 2024-06-18
Attending: INTERNAL MEDICINE | Admitting: INTERNAL MEDICINE
Payer: COMMERCIAL

## 2024-06-17 ENCOUNTER — ANESTHESIA (OUTPATIENT)
Dept: NON INVASIVE DIAGNOSTICS | Facility: HOSPITAL | Age: 69
End: 2024-06-17
Payer: COMMERCIAL

## 2024-06-17 DIAGNOSIS — K91.840 COLONOSCOPY CAUSING POST-PROCEDURAL BLEEDING: ICD-10-CM

## 2024-06-17 DIAGNOSIS — I25.118 CORONARY ARTERY DISEASE OF NATIVE HEART WITH STABLE ANGINA PECTORIS, UNSPECIFIED VESSEL OR LESION TYPE (HCC): ICD-10-CM

## 2024-06-17 DIAGNOSIS — Z95.5 S/P DRUG ELUTING CORONARY STENT PLACEMENT: Primary | ICD-10-CM

## 2024-06-17 DIAGNOSIS — E78.5 HYPERLIPIDEMIA: ICD-10-CM

## 2024-06-17 DIAGNOSIS — I20.89 ANGINA OF EFFORT: ICD-10-CM

## 2024-06-17 LAB
ATRIAL RATE: 108 BPM
ATRIAL RATE: 83 BPM
ATRIAL RATE: 85 BPM
KCT BLD-ACNC: 223 SEC (ref 89–137)
KCT BLD-ACNC: 347 SEC (ref 89–137)
P AXIS: 47 DEGREES
P AXIS: 60 DEGREES
P AXIS: 90 DEGREES
PR INTERVAL: 160 MS
PR INTERVAL: 167 MS
PR INTERVAL: 196 MS
QRS AXIS: -23 DEGREES
QRS AXIS: -27 DEGREES
QRS AXIS: -29 DEGREES
QRSD INTERVAL: 54 MS
QRSD INTERVAL: 96 MS
QRSD INTERVAL: 96 MS
QT INTERVAL: 346 MS
QT INTERVAL: 364 MS
QT INTERVAL: 392 MS
QTC INTERVAL: 427 MS
QTC INTERVAL: 464 MS
QTC INTERVAL: 467 MS
SPECIMEN SOURCE: ABNORMAL
SPECIMEN SOURCE: ABNORMAL
T WAVE AXIS: -34 DEGREES
T WAVE AXIS: 11 DEGREES
T WAVE AXIS: 45 DEGREES
VENTRICULAR RATE: 108 BPM
VENTRICULAR RATE: 83 BPM
VENTRICULAR RATE: 85 BPM

## 2024-06-17 PROCEDURE — 94760 N-INVAS EAR/PLS OXIMETRY 1: CPT

## 2024-06-17 PROCEDURE — C1887 CATHETER, GUIDING: HCPCS | Performed by: INTERNAL MEDICINE

## 2024-06-17 PROCEDURE — C9460 INJECTION, CANGRELOR: HCPCS | Performed by: INTERNAL MEDICINE

## 2024-06-17 PROCEDURE — C1725 CATH, TRANSLUMIN NON-LASER: HCPCS | Performed by: INTERNAL MEDICINE

## 2024-06-17 PROCEDURE — C1769 GUIDE WIRE: HCPCS | Performed by: INTERNAL MEDICINE

## 2024-06-17 PROCEDURE — 94640 AIRWAY INHALATION TREATMENT: CPT | Performed by: SOCIAL WORKER

## 2024-06-17 PROCEDURE — C1894 INTRO/SHEATH, NON-LASER: HCPCS | Performed by: INTERNAL MEDICINE

## 2024-06-17 PROCEDURE — C1874 STENT, COATED/COV W/DEL SYS: HCPCS | Performed by: INTERNAL MEDICINE

## 2024-06-17 PROCEDURE — 93572 IV DOP VEL&/PRESS C FLO EA: CPT | Performed by: INTERNAL MEDICINE

## 2024-06-17 PROCEDURE — 85347 COAGULATION TIME ACTIVATED: CPT

## 2024-06-17 PROCEDURE — 93571 IV DOP VEL&/PRESS C FLO 1ST: CPT | Performed by: INTERNAL MEDICINE

## 2024-06-17 PROCEDURE — C1760 CLOSURE DEV, VASC: HCPCS | Performed by: INTERNAL MEDICINE

## 2024-06-17 PROCEDURE — C1753 CATH, INTRAVAS ULTRASOUND: HCPCS | Performed by: INTERNAL MEDICINE

## 2024-06-17 PROCEDURE — 93454 CORONARY ARTERY ANGIO S&I: CPT | Performed by: INTERNAL MEDICINE

## 2024-06-17 PROCEDURE — 94664 DEMO&/EVAL PT USE INHALER: CPT | Performed by: SOCIAL WORKER

## 2024-06-17 PROCEDURE — 92978 ENDOLUMINL IVUS OCT C 1ST: CPT | Performed by: INTERNAL MEDICINE

## 2024-06-17 PROCEDURE — 94760 N-INVAS EAR/PLS OXIMETRY 1: CPT | Performed by: SOCIAL WORKER

## 2024-06-17 PROCEDURE — C9600 PERC DRUG-EL COR STENT SING: HCPCS | Performed by: INTERNAL MEDICINE

## 2024-06-17 PROCEDURE — 93010 ELECTROCARDIOGRAM REPORT: CPT | Performed by: INTERNAL MEDICINE

## 2024-06-17 PROCEDURE — 93005 ELECTROCARDIOGRAM TRACING: CPT

## 2024-06-17 PROCEDURE — 93799 UNLISTED CV SVC/PROCEDURE: CPT | Performed by: INTERNAL MEDICINE

## 2024-06-17 PROCEDURE — 94640 AIRWAY INHALATION TREATMENT: CPT

## 2024-06-17 PROCEDURE — 92928 PRQ TCAT PLMT NTRAC ST 1 LES: CPT | Performed by: INTERNAL MEDICINE

## 2024-06-17 DEVICE — ANGIO-SEAL VIP VASCULAR CLOSURE DEVICE
Type: IMPLANTABLE DEVICE | Site: GROIN | Status: FUNCTIONAL
Brand: ANGIO-SEAL

## 2024-06-17 DEVICE — XIENCE SKYPOINT™ EVEROLIMUS ELUTING CORONARY STENT SYSTEM 2.25 MM X 18 MM / RAPID-EXCHANGE
Type: IMPLANTABLE DEVICE | Site: CORONARY | Status: FUNCTIONAL
Brand: XIENCE SKYPOINT™

## 2024-06-17 DEVICE — XIENCE SKYPOINT™ EVEROLIMUS ELUTING CORONARY STENT SYSTEM 2.75 MM X 08 MM / RAPID-EXCHANGE
Type: IMPLANTABLE DEVICE | Site: CORONARY | Status: FUNCTIONAL
Brand: XIENCE SKYPOINT™

## 2024-06-17 RX ORDER — FENTANYL CITRATE/PF 50 MCG/ML
50 SYRINGE (ML) INJECTION
Status: COMPLETED | OUTPATIENT
Start: 2024-06-17 | End: 2024-06-17

## 2024-06-17 RX ORDER — FENTANYL CITRATE 50 UG/ML
INJECTION, SOLUTION INTRAMUSCULAR; INTRAVENOUS AS NEEDED
Status: DISCONTINUED | OUTPATIENT
Start: 2024-06-17 | End: 2024-06-17

## 2024-06-17 RX ORDER — NITROGLYCERIN 20 MG/100ML
INJECTION INTRAVENOUS CODE/TRAUMA/SEDATION MEDICATION
Status: DISCONTINUED | OUTPATIENT
Start: 2024-06-17 | End: 2024-06-17 | Stop reason: HOSPADM

## 2024-06-17 RX ORDER — HEPARIN SODIUM 1000 [USP'U]/ML
INJECTION, SOLUTION INTRAVENOUS; SUBCUTANEOUS CODE/TRAUMA/SEDATION MEDICATION
Status: DISCONTINUED | OUTPATIENT
Start: 2024-06-17 | End: 2024-06-17 | Stop reason: HOSPADM

## 2024-06-17 RX ORDER — IPRATROPIUM BROMIDE AND ALBUTEROL SULFATE 2.5; .5 MG/3ML; MG/3ML
3 SOLUTION RESPIRATORY (INHALATION)
Status: DISCONTINUED | OUTPATIENT
Start: 2024-06-17 | End: 2024-06-17

## 2024-06-17 RX ORDER — CLOPIDOGREL BISULFATE 75 MG/1
600 TABLET ORAL ONCE
Status: COMPLETED | OUTPATIENT
Start: 2024-06-17 | End: 2024-06-17

## 2024-06-17 RX ORDER — TAPINAROF 10 MG/1000MG
1 CREAM TOPICAL DAILY
Qty: 60 G | Refills: 0 | Status: SHIPPED | OUTPATIENT
Start: 2024-06-17 | End: 2024-06-18 | Stop reason: SDUPTHER

## 2024-06-17 RX ORDER — ONDANSETRON 2 MG/ML
INJECTION INTRAMUSCULAR; INTRAVENOUS AS NEEDED
Status: DISCONTINUED | OUTPATIENT
Start: 2024-06-17 | End: 2024-06-17

## 2024-06-17 RX ORDER — PROPOFOL 10 MG/ML
INJECTION, EMULSION INTRAVENOUS AS NEEDED
Status: DISCONTINUED | OUTPATIENT
Start: 2024-06-17 | End: 2024-06-17

## 2024-06-17 RX ORDER — BUDESONIDE 0.5 MG/2ML
0.5 INHALANT ORAL
Status: DISCONTINUED | OUTPATIENT
Start: 2024-06-17 | End: 2024-06-18 | Stop reason: HOSPADM

## 2024-06-17 RX ORDER — LORAZEPAM 2 MG/ML
0.5 INJECTION INTRAMUSCULAR ONCE AS NEEDED
Status: DISCONTINUED | OUTPATIENT
Start: 2024-06-17 | End: 2024-06-17 | Stop reason: HOSPADM

## 2024-06-17 RX ORDER — ALBUTEROL SULFATE 90 UG/1
2 AEROSOL, METERED RESPIRATORY (INHALATION) EVERY 4 HOURS PRN
Status: DISCONTINUED | OUTPATIENT
Start: 2024-06-17 | End: 2024-06-18 | Stop reason: HOSPADM

## 2024-06-17 RX ORDER — SODIUM CHLORIDE 9 MG/ML
125 INJECTION, SOLUTION INTRAVENOUS CONTINUOUS
Status: DISPENSED | OUTPATIENT
Start: 2024-06-17 | End: 2024-06-17

## 2024-06-17 RX ORDER — SODIUM CHLORIDE 9 MG/ML
125 INJECTION, SOLUTION INTRAVENOUS CONTINUOUS
Status: DISCONTINUED | OUTPATIENT
Start: 2024-06-17 | End: 2024-06-18 | Stop reason: HOSPADM

## 2024-06-17 RX ORDER — ONDANSETRON 2 MG/ML
4 INJECTION INTRAMUSCULAR; INTRAVENOUS ONCE AS NEEDED
Status: DISCONTINUED | OUTPATIENT
Start: 2024-06-17 | End: 2024-06-17 | Stop reason: HOSPADM

## 2024-06-17 RX ORDER — ATORVASTATIN CALCIUM 80 MG/1
80 TABLET, FILM COATED ORAL EVERY EVENING
Status: DISCONTINUED | OUTPATIENT
Start: 2024-06-17 | End: 2024-06-18 | Stop reason: HOSPADM

## 2024-06-17 RX ORDER — ASPIRIN 81 MG/1
81 TABLET, CHEWABLE ORAL DAILY
Status: DISCONTINUED | OUTPATIENT
Start: 2024-06-18 | End: 2024-06-18 | Stop reason: HOSPADM

## 2024-06-17 RX ORDER — IPRATROPIUM BROMIDE AND ALBUTEROL SULFATE 2.5; .5 MG/3ML; MG/3ML
3 SOLUTION RESPIRATORY (INHALATION) ONCE
Status: COMPLETED | OUTPATIENT
Start: 2024-06-17 | End: 2024-06-17

## 2024-06-17 RX ORDER — LORAZEPAM 2 MG/ML
INJECTION INTRAMUSCULAR AS NEEDED
Status: DISCONTINUED | OUTPATIENT
Start: 2024-06-17 | End: 2024-06-17

## 2024-06-17 RX ORDER — VERAPAMIL HYDROCHLORIDE 2.5 MG/ML
INJECTION, SOLUTION INTRAVENOUS CODE/TRAUMA/SEDATION MEDICATION
Status: DISCONTINUED | OUTPATIENT
Start: 2024-06-17 | End: 2024-06-17 | Stop reason: HOSPADM

## 2024-06-17 RX ORDER — FAMOTIDINE 20 MG/1
20 TABLET, FILM COATED ORAL
Status: DISCONTINUED | OUTPATIENT
Start: 2024-06-17 | End: 2024-06-18 | Stop reason: HOSPADM

## 2024-06-17 RX ORDER — IPRATROPIUM BROMIDE AND ALBUTEROL SULFATE 2.5; .5 MG/3ML; MG/3ML
3 SOLUTION RESPIRATORY (INHALATION)
Status: DISCONTINUED | OUTPATIENT
Start: 2024-06-17 | End: 2024-06-18 | Stop reason: HOSPADM

## 2024-06-17 RX ORDER — ASPIRIN 81 MG/1
324 TABLET, CHEWABLE ORAL ONCE
Status: COMPLETED | OUTPATIENT
Start: 2024-06-17 | End: 2024-06-17

## 2024-06-17 RX ORDER — ALBUTEROL SULFATE 2.5 MG/3ML
2.5 SOLUTION RESPIRATORY (INHALATION) EVERY 4 HOURS PRN
Status: DISCONTINUED | OUTPATIENT
Start: 2024-06-17 | End: 2024-06-18 | Stop reason: HOSPADM

## 2024-06-17 RX ORDER — FLUTICASONE FUROATE, UMECLIDINIUM BROMIDE AND VILANTEROL TRIFENATATE 100; 62.5; 25 UG/1; UG/1; UG/1
1 POWDER RESPIRATORY (INHALATION) DAILY
Qty: 60 BLISTER | Refills: 0 | Status: SHIPPED | OUTPATIENT
Start: 2024-06-17 | End: 2024-06-22 | Stop reason: SDUPTHER

## 2024-06-17 RX ORDER — TRAMADOL HYDROCHLORIDE 50 MG/1
50 TABLET ORAL EVERY 6 HOURS PRN
Status: DISCONTINUED | OUTPATIENT
Start: 2024-06-17 | End: 2024-06-18 | Stop reason: HOSPADM

## 2024-06-17 RX ORDER — DEXAMETHASONE SODIUM PHOSPHATE 10 MG/ML
INJECTION, SOLUTION INTRAMUSCULAR; INTRAVENOUS AS NEEDED
Status: DISCONTINUED | OUTPATIENT
Start: 2024-06-17 | End: 2024-06-17

## 2024-06-17 RX ORDER — LIDOCAINE HYDROCHLORIDE 10 MG/ML
INJECTION, SOLUTION EPIDURAL; INFILTRATION; INTRACAUDAL; PERINEURAL AS NEEDED
Status: DISCONTINUED | OUTPATIENT
Start: 2024-06-17 | End: 2024-06-17

## 2024-06-17 RX ORDER — OXYCODONE HYDROCHLORIDE AND ACETAMINOPHEN 5; 325 MG/1; MG/1
1 TABLET ORAL ONCE AS NEEDED
Status: DISCONTINUED | OUTPATIENT
Start: 2024-06-17 | End: 2024-06-18 | Stop reason: HOSPADM

## 2024-06-17 RX ORDER — METOCLOPRAMIDE HYDROCHLORIDE 5 MG/ML
10 INJECTION INTRAMUSCULAR; INTRAVENOUS ONCE AS NEEDED
Status: DISCONTINUED | OUTPATIENT
Start: 2024-06-17 | End: 2024-06-17 | Stop reason: HOSPADM

## 2024-06-17 RX ORDER — MIDAZOLAM HYDROCHLORIDE 2 MG/2ML
INJECTION, SOLUTION INTRAMUSCULAR; INTRAVENOUS AS NEEDED
Status: DISCONTINUED | OUTPATIENT
Start: 2024-06-17 | End: 2024-06-17

## 2024-06-17 RX ORDER — CLOPIDOGREL BISULFATE 75 MG/1
75 TABLET ORAL DAILY
Status: DISCONTINUED | OUTPATIENT
Start: 2024-06-18 | End: 2024-06-18 | Stop reason: HOSPADM

## 2024-06-17 RX ORDER — LIDOCAINE HYDROCHLORIDE 10 MG/ML
INJECTION, SOLUTION EPIDURAL; INFILTRATION; INTRACAUDAL; PERINEURAL CODE/TRAUMA/SEDATION MEDICATION
Status: DISCONTINUED | OUTPATIENT
Start: 2024-06-17 | End: 2024-06-17 | Stop reason: HOSPADM

## 2024-06-17 RX ORDER — FLUTICASONE FUROATE AND VILANTEROL 100; 25 UG/1; UG/1
1 POWDER RESPIRATORY (INHALATION) DAILY
Status: DISCONTINUED | OUTPATIENT
Start: 2024-06-18 | End: 2024-06-18 | Stop reason: HOSPADM

## 2024-06-17 RX ORDER — DIPHENHYDRAMINE HYDROCHLORIDE 50 MG/ML
12.5 INJECTION INTRAMUSCULAR; INTRAVENOUS ONCE AS NEEDED
Status: DISCONTINUED | OUTPATIENT
Start: 2024-06-17 | End: 2024-06-17 | Stop reason: HOSPADM

## 2024-06-17 RX ADMIN — PROPOFOL 50 MG: 10 INJECTION, EMULSION INTRAVENOUS at 10:44

## 2024-06-17 RX ADMIN — DEXAMETHASONE SODIUM PHOSPHATE 10 MG: 10 INJECTION, SOLUTION INTRAMUSCULAR; INTRAVENOUS at 08:25

## 2024-06-17 RX ADMIN — CLOPIDOGREL BISULFATE 600 MG: 75 TABLET ORAL at 17:19

## 2024-06-17 RX ADMIN — FENTANYL CITRATE 50 MCG: 50 INJECTION INTRAMUSCULAR; INTRAVENOUS at 12:56

## 2024-06-17 RX ADMIN — CANGRELOR 4 MCG/KG/MIN: 50 INJECTION, POWDER, LYOPHILIZED, FOR SOLUTION INTRAVENOUS at 15:20

## 2024-06-17 RX ADMIN — ATORVASTATIN CALCIUM 80 MG: 80 TABLET, FILM COATED ORAL at 17:17

## 2024-06-17 RX ADMIN — FENTANYL CITRATE 25 MCG: 50 INJECTION INTRAMUSCULAR; INTRAVENOUS at 10:41

## 2024-06-17 RX ADMIN — FAMOTIDINE 20 MG: 20 TABLET, FILM COATED ORAL at 17:17

## 2024-06-17 RX ADMIN — FENTANYL CITRATE 25 MCG: 50 INJECTION INTRAMUSCULAR; INTRAVENOUS at 10:08

## 2024-06-17 RX ADMIN — LORAZEPAM 0.5 MG: 2 INJECTION INTRAMUSCULAR; INTRAVENOUS at 10:46

## 2024-06-17 RX ADMIN — LIDOCAINE HYDROCHLORIDE 100 MG: 10 INJECTION, SOLUTION EPIDURAL; INFILTRATION; INTRACAUDAL; PERINEURAL at 08:25

## 2024-06-17 RX ADMIN — BUDESONIDE 0.5 MG: 0.5 INHALANT RESPIRATORY (INHALATION) at 19:54

## 2024-06-17 RX ADMIN — FENTANYL CITRATE 50 MCG: 50 INJECTION INTRAMUSCULAR; INTRAVENOUS at 08:24

## 2024-06-17 RX ADMIN — SODIUM CHLORIDE 125 ML/HR: 0.9 INJECTION, SOLUTION INTRAVENOUS at 06:56

## 2024-06-17 RX ADMIN — MIDAZOLAM 2 MG: 1 INJECTION INTRAMUSCULAR; INTRAVENOUS at 08:17

## 2024-06-17 RX ADMIN — FENTANYL CITRATE 50 MCG: 50 INJECTION INTRAMUSCULAR; INTRAVENOUS at 13:47

## 2024-06-17 RX ADMIN — FENTANYL CITRATE 50 MCG: 50 INJECTION INTRAMUSCULAR; INTRAVENOUS at 08:20

## 2024-06-17 RX ADMIN — LORAZEPAM 0.5 MG: 2 INJECTION INTRAMUSCULAR; INTRAVENOUS at 11:19

## 2024-06-17 RX ADMIN — LORAZEPAM 0.5 MG: 2 INJECTION INTRAMUSCULAR; INTRAVENOUS at 10:51

## 2024-06-17 RX ADMIN — FENTANYL CITRATE 50 MCG: 50 INJECTION INTRAMUSCULAR; INTRAVENOUS at 10:44

## 2024-06-17 RX ADMIN — IPRATROPIUM BROMIDE AND ALBUTEROL SULFATE 3 ML: 2.5; .5 SOLUTION RESPIRATORY (INHALATION) at 15:30

## 2024-06-17 RX ADMIN — IPRATROPIUM BROMIDE AND ALBUTEROL SULFATE 3 ML: 2.5; .5 SOLUTION RESPIRATORY (INHALATION) at 19:54

## 2024-06-17 RX ADMIN — LORAZEPAM 0.5 MG: 2 INJECTION INTRAMUSCULAR; INTRAVENOUS at 11:07

## 2024-06-17 RX ADMIN — ONDANSETRON 4 MG: 2 INJECTION INTRAMUSCULAR; INTRAVENOUS at 08:25

## 2024-06-17 RX ADMIN — IPRATROPIUM BROMIDE AND ALBUTEROL SULFATE 3 ML: .5; 3 SOLUTION RESPIRATORY (INHALATION) at 11:05

## 2024-06-17 RX ADMIN — ASPIRIN 81 MG CHEWABLE TABLET 324 MG: 81 TABLET CHEWABLE at 06:56

## 2024-06-17 RX ADMIN — CANGRELOR 4 MCG/KG/MIN: 50 INJECTION, POWDER, LYOPHILIZED, FOR SOLUTION INTRAVENOUS at 12:45

## 2024-06-17 RX ADMIN — TRAMADOL HYDROCHLORIDE 50 MG: 50 TABLET, COATED ORAL at 18:29

## 2024-06-17 RX ADMIN — PROPOFOL 150 MG: 10 INJECTION, EMULSION INTRAVENOUS at 08:25

## 2024-06-17 NOTE — Clinical Note
Unable to obtained support from brachiocephalic to complete procedure via radial approach, defer to femoral approach to complete procedure

## 2024-06-17 NOTE — Clinical Note
Agitated unable to follow directions requires frequent reinforcement and multiple staff  to aid with safety and move from table to stretcher and restrain patient,  PACU phoned and spoke with Devin regarding transfer of care.  See anesthesia record for sedation administered.  Knee immobilizer applied to right left.  Right groin dressing remains clean and dry soft to palpation.  Patient continually yelling out wants to sit up bed in revers e trendelenburg for comfort

## 2024-06-17 NOTE — Clinical Note
Extubated to simple mask,  Danie agitated with this and quickly exchanged to nasal cannula for comfort

## 2024-06-17 NOTE — Clinical Note
Anessthsia her for procedure, see anesthesia record for documentation of vitals and medications administered

## 2024-06-17 NOTE — ANESTHESIA POSTPROCEDURE EVALUATION
Post-Op Assessment Note    CV Status:  Stable  Pain Score: 0    Pain management: adequate       Mental Status:  Awake, agitated, anxious and combative   Hydration Status:  Euvolemic and stable   PONV Controlled:  None   Airway Patency:  Patent     Post Op Vitals Reviewed: Yes    No anethesia notable event occurred.    Staff: CRNA               BP (!) 186/104 (06/17/24 1112)    Temp (!) 97.4 °F (36.3 °C) (06/17/24 1112)    Pulse 87 (06/17/24 1112)   Resp (!) 23 (06/17/24 1112)    SpO2 98 % (06/17/24 1112)

## 2024-06-17 NOTE — ANESTHESIA PREPROCEDURE EVALUATION
Procedure:  Cardiac Left Heart Cath (Left: Chest)    Cardiology Note:    Patient is here for elective cardiac catheterization.  He follows with Dr. Villegas.  He was referred for cardiac catheterization due to abnormal stress test.  Spoke with patient regarding cardiac catheterization at [place].  Patient is refusing cardiac catheterization.  Wife is at bedside.  They understand indication, reasoning, rationale, risk and benefit for procedure.  He states he wants to have cardiac catheterization with general anesthesia.  Furthermore, he wants cardiac catheterization performed in [place] with surgical backup.     Explained to patient that this procedure is usually done with conscious sedation.  Patient is refusing procedure, and wants general anesthesia with surgical backup on site.     Will cancel procedure today and plan to reschedule at [facility] with anesthesia and surgical backup.     He says he has no worsening symptoms.  Denies chest pain, pressure, or shortness of breath.  If he was to have any symptoms, recommend further evaluation in the emergency department.      Moderate-severe COPD - ~5x exacerbations per year. Chronic prednisone 1 mg, Trelegy, Pulmicort, Duonebs PRN. States breathing feels well, no hospitalizations since last few procedures. Took albuterol and neb this AM.  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    Relevant Problems   CARDIO   (+) Hyperlipidemia   (+) Intractable migraine without status migrainosus      GI/HEPATIC   (+) GERD (gastroesophageal reflux disease)      /RENAL   (+) Single kidney      NEURO/PSYCH   (+) Chronic pain   (+) Intractable migraine without status migrainosus      PULMONARY   (+) Chronic obstructive pulmonary disease (HCC)   (+) LEATHA (obstructive sleep apnea)      NM Stress ECG (4/5/24):  The stress ECG revealed no diagnostic evidence of ischemia.  Occasional PVCs were  noted.  He had chest discomfort following dobutamine infusion.  There was a fixed inferior defect suggestive of infarct.  There was also evidence of transient ischemic dilation.  TID ratio was 1.57. Balanced ischemia could not be excluded.     PFT (2024):  FEV1/FVC Ratio: 72.47  Forced Vital Capacity: 2.13 L, 56% predicted  FEV1: 1.54 L, 52% predicted  After administration of bronchodilator FEV1: 1.64 L, 56% predicted with no appreciable response to the bronchodilator    The flow volume curve demonstrates a restrictive pattern, also with mild scooping of the expiratory loop.  Spirometry demonstrates restrictive airflow limitation.    CT neck (11/16/23):   Redemonstrated asymmetric inward buckling of the left thyroid cartilage, suggestive of remote injury. Secondary crowding of the aryepiglottic folds with mucosal abutment and partial effacement of the piriform sinuses. There is unchanged mucosal   irregularity of the true vocal cords with no suspicious lesion.      Physical Exam    Airway    Mallampati score: III  TM Distance: >3 FB  Neck ROM: full     Dental    upper dentures and lower dentures    Cardiovascular  Cardiovascular exam normal    Pulmonary  Pulmonary exam normal     Other Findings        Anesthesia Plan  ASA Score- 3     Anesthesia Type- general with ASA Monitors.         Additional Monitors:     Airway Plan: ETT and LMA.           Plan Factors-    Chart reviewed. EKG reviewed. Imaging results reviewed. Existing labs reviewed. Patient summary reviewed.    Patient is not a current smoker.      Obstructive sleep apnea risk education given perioperatively.        Induction- intravenous.    Postoperative Plan- . Planned trial extubation    Perioperative Resuscitation Plan - Level 1 - Full Code.       Informed Consent- Anesthetic plan and risks discussed with patient.  I personally reviewed this patient with the CRNA. Discussed and agreed on the Anesthesia Plan with the CRNA..

## 2024-06-17 NOTE — RESPIRATORY THERAPY NOTE
RT Protocol Note  Danie Liao 69 y.o. male MRN: [id number]  Unit/Bed#: [facility] 531-01 Encounter: [id number]    Assessment    Active Problems:  There are no active Hospital Problems.      Home Pulmonary Medications:  Duoneb, pulmicort. trelegy       Past Medical History:   Diagnosis Date    Arthritis     Asthma     Chronic pain 01/30/2024    cervic and lumbar    Colon polyp     COPD (chronic obstructive pulmonary disease) (HCC)     Emphysema of lung (HCC)     GERD (gastroesophageal reflux disease) 01/30/2024    Headache(784.0)     Hyperlipidemia 01/30/2024    Migraine     Obesity     Sleep apnea      Social History     Socioeconomic History    Marital status: /Civil Union     Spouse name: None    Number of children: None    Years of education: None    Highest education level: None   Occupational History    None   Tobacco Use    Smoking status: Former     Current packs/day: 0.00     Average packs/day: 2.0 packs/day for 39.0 years (78.0 ttl pk-yrs)     Types: Cigarettes     Start date: 1/1/1970     Quit date: 1/1/2009     Years since quitting: 15.4    Smokeless tobacco: Never   Vaping Use    Vaping status: Former   Substance and Sexual Activity    Alcohol use: Yes     Comment: Occasional    Drug use: No    Sexual activity: Yes     Partners: Female   Other Topics Concern    None   Social History Narrative    None     Social Determinants of Health     Financial Resource Strain: Low Risk  (8/24/2023)    Overall Financial Resource Strain (CARDIA)     Difficulty of Paying Living Expenses: Not hard at all   Food Insecurity: Not on file   Transportation Needs: No Transportation Needs (8/24/2023)    PRAPARE - Transportation     Lack of Transportation (Medical): No     Lack of Transportation (Non-Medical): No   Physical Activity: Not on file   Stress: Not on file   Social Connections: Not on file   Intimate Partner Violence: Not on file   Housing Stability: Not on file       Subjective         Objective    Physical  "Exam:   Assessment Type: Pre-treatment  General Appearance: Awake, Alert  Respiratory Pattern: Normal  Chest Assessment: Chest expansion symmetrical  Bilateral Breath Sounds: Clear, Diminished  Cough: Non-productive, Moist    Vitals:  Blood pressure 110/67, pulse 78, temperature 97.6 °F (36.4 °C), resp. rate 18, height 5' 6\" (1.676 m), weight 102 kg (225 lb), SpO2 98%.          Imaging and other studies: I have personally reviewed pertinent reports.            Plan    Respiratory Plan: Home Bronchodilator Patient pathway        Resp Comments: (P) Pt admitted from cath lab after pre bariatric surgery cath. PT has hx of severe COPD. Pt uses duoneb qid, pulmicort and trelegy. CXR from January shows clear lungs. Pt states he uses 2lpm 02 at home prn. Pt very upset and unfortunately becoming uncontrollable at this time due to leg brace not being removed. Pt request he uses his own nebulizer.Will cont duoneb qid and pulmicort bid as ordered and as pt uses at home. Will add albuterol udn prn for sob/wheezing HS. Pt states he has cpap at home but does not use .  "

## 2024-06-17 NOTE — INTERVAL H&P NOTE
"Prior note reviewed.  Patient seen and examined.    /87 (BP Location: Right arm)   Pulse 94   Temp 98.5 °F (36.9 °C) (Temporal)   Resp 16   Ht 5' 6\" (1.676 m)   Wt 102 kg (225 lb)   SpO2 97%   BMI 36.32 kg/m²      After examining the patient, I find no significant changes to the note since it has been written.  Today's procedure will be completed with sedation managed by anesthesiology.     Accept for today's history and physical.    Sarah Noland, DO 06/17/24    "
Length Of Therapy: 1 month
Add High Risk Medication Management Associated Diagnosis?: No
Detail Level: Zone

## 2024-06-17 NOTE — Clinical Note
Patient aware of planned procedure moderately anxious reassured.  Spoke with DO Ludin and anesthesia team, explanations given and questions answered.  Informed consent obtained.

## 2024-06-17 NOTE — DISCHARGE INSTR - AVS FIRST PAGE
1. Please see the post cardiac catheterization dishcarge instructions.   No heavy lifting, greater than 10 lbs. or strenuous  activity for 48 hrs.    2.Remove band aid tomorrow.  Shower and wash area- groin gently with soap and water- beginning tomorrow. Rinse and pat dry.  Apply new water seal band aid.  Repeat this process for 5 days. No powders, creams lotions or antibiotic ointments  for 5 days.  No tub baths, hot tubs or swimming for 5 days.     3. Please call our office ([phone]) if you have any fever, redness, swelling, discharge from your groin access site.    4.No driving for 1 day    5. Angioseal Booklet

## 2024-06-18 VITALS
RESPIRATION RATE: 19 BRPM | BODY MASS INDEX: 36.16 KG/M2 | TEMPERATURE: 98.9 F | HEIGHT: 66 IN | WEIGHT: 225 LBS | DIASTOLIC BLOOD PRESSURE: 90 MMHG | HEART RATE: 96 BPM | SYSTOLIC BLOOD PRESSURE: 134 MMHG | OXYGEN SATURATION: 98 %

## 2024-06-18 DIAGNOSIS — L40.9 PSORIASIS: ICD-10-CM

## 2024-06-18 PROBLEM — I25.118 CORONARY ARTERY DISEASE OF NATIVE HEART WITH STABLE ANGINA PECTORIS (HCC): Status: ACTIVE | Noted: 2024-06-18

## 2024-06-18 LAB
ANION GAP SERPL CALCULATED.3IONS-SCNC: 9 MMOL/L (ref 4–13)
BUN SERPL-MCNC: 22 MG/DL (ref 5–25)
CALCIUM SERPL-MCNC: 8.1 MG/DL (ref 8.4–10.2)
CHLORIDE SERPL-SCNC: 106 MMOL/L (ref 96–108)
CO2 SERPL-SCNC: 21 MMOL/L (ref 21–32)
CREAT SERPL-MCNC: 0.92 MG/DL (ref 0.6–1.3)
ERYTHROCYTE [DISTWIDTH] IN BLOOD BY AUTOMATED COUNT: 17.1 % (ref 11.6–15.1)
GFR SERPL CREATININE-BSD FRML MDRD: 85 ML/MIN/1.73SQ M
GLUCOSE P FAST SERPL-MCNC: 134 MG/DL (ref 65–99)
GLUCOSE SERPL-MCNC: 134 MG/DL (ref 65–140)
HCT VFR BLD AUTO: 35.2 % (ref 36.5–49.3)
HGB BLD-MCNC: 10.8 G/DL (ref 12–17)
MCH RBC QN AUTO: 22.8 PG (ref 26.8–34.3)
MCHC RBC AUTO-ENTMCNC: 30.7 G/DL (ref 31.4–37.4)
MCV RBC AUTO: 74 FL (ref 82–98)
PLATELET # BLD AUTO: 336 THOUSANDS/UL (ref 149–390)
PMV BLD AUTO: 10.7 FL (ref 8.9–12.7)
POTASSIUM SERPL-SCNC: 4.5 MMOL/L (ref 3.5–5.3)
RBC # BLD AUTO: 4.73 MILLION/UL (ref 3.88–5.62)
SODIUM SERPL-SCNC: 136 MMOL/L (ref 135–147)
WBC # BLD AUTO: 15.73 THOUSAND/UL (ref 4.31–10.16)

## 2024-06-18 PROCEDURE — 94760 N-INVAS EAR/PLS OXIMETRY 1: CPT

## 2024-06-18 PROCEDURE — 94760 N-INVAS EAR/PLS OXIMETRY 1: CPT | Performed by: SOCIAL WORKER

## 2024-06-18 PROCEDURE — 94664 DEMO&/EVAL PT USE INHALER: CPT | Performed by: SOCIAL WORKER

## 2024-06-18 PROCEDURE — NC001 PR NO CHARGE: Performed by: INTERNAL MEDICINE

## 2024-06-18 PROCEDURE — 94640 AIRWAY INHALATION TREATMENT: CPT

## 2024-06-18 PROCEDURE — 80048 BASIC METABOLIC PNL TOTAL CA: CPT | Performed by: INTERNAL MEDICINE

## 2024-06-18 PROCEDURE — 85027 COMPLETE CBC AUTOMATED: CPT | Performed by: INTERNAL MEDICINE

## 2024-06-18 PROCEDURE — 94640 AIRWAY INHALATION TREATMENT: CPT | Performed by: SOCIAL WORKER

## 2024-06-18 RX ORDER — ASPIRIN 81 MG/1
81 TABLET, CHEWABLE ORAL DAILY
Start: 2024-06-19

## 2024-06-18 RX ORDER — NITROGLYCERIN 0.4 MG/1
0.4 TABLET SUBLINGUAL
Qty: 30 TABLET | Refills: 1 | Status: SHIPPED | OUTPATIENT
Start: 2024-06-18

## 2024-06-18 RX ORDER — ATORVASTATIN CALCIUM 80 MG/1
80 TABLET, FILM COATED ORAL EVERY EVENING
Qty: 30 TABLET | Refills: 4 | Status: SHIPPED | OUTPATIENT
Start: 2024-06-18

## 2024-06-18 RX ORDER — CLOPIDOGREL BISULFATE 75 MG/1
75 TABLET ORAL DAILY
Qty: 30 TABLET | Refills: 11 | Status: SHIPPED | OUTPATIENT
Start: 2024-06-19

## 2024-06-18 RX ADMIN — BUDESONIDE 0.5 MG: 0.5 INHALANT RESPIRATORY (INHALATION) at 07:44

## 2024-06-18 RX ADMIN — CLOPIDOGREL BISULFATE 75 MG: 75 TABLET ORAL at 08:45

## 2024-06-18 RX ADMIN — ASPIRIN 81 MG CHEWABLE TABLET 81 MG: 81 TABLET CHEWABLE at 08:45

## 2024-06-18 RX ADMIN — ALBUTEROL SULFATE 2.5 MG: 2.5 SOLUTION RESPIRATORY (INHALATION) at 01:34

## 2024-06-18 RX ADMIN — IPRATROPIUM BROMIDE AND ALBUTEROL SULFATE 3 ML: 2.5; .5 SOLUTION RESPIRATORY (INHALATION) at 07:44

## 2024-06-18 RX ADMIN — TRAMADOL HYDROCHLORIDE 50 MG: 50 TABLET, COATED ORAL at 01:31

## 2024-06-18 RX ADMIN — FAMOTIDINE 20 MG: 20 TABLET, FILM COATED ORAL at 06:21

## 2024-06-18 NOTE — DISCHARGE SUMMARY
"Discharge Summary - Danie Liao 69 y.o. male MRN: [id number]    [facility] Encounter: [id number]    Admission Date: 6/17/2024   Discharge Date: 6/18/2024    Disposition: Home    Condition at Discharge: good     PCP:Gosia Mcgill MD      OP Cardiologist: Dr Villegas    Interventional cardiologist: DR. nelson    Admitting Diagnosis:  CAD    Secondary Diagnoses:   COPD  Anxiety    Discharge Diagnosis:  CAD S/p PCI and SHELL to D1 STENT XIENCE SKYPOINT 2.75 X 8MM     /90 (BP Location: Right arm)   Pulse 96   Temp 98.9 °F (37.2 °C) (Oral)   Resp 19   Ht 5' 6\" (1.676 m)   Wt 102 kg (225 lb)   SpO2 98%   BMI 36.32 kg/m²       Review of Systems   Constitutional: Negative.    HENT: Negative.     Respiratory:  Positive for shortness of breath.    Cardiovascular: Negative.    Gastrointestinal: Negative.    Genitourinary: Negative.    Musculoskeletal: Negative.    Neurological: Negative.    Hematological: Negative.    Psychiatric/Behavioral: Negative.         Physical Exam  Vitals reviewed.   Constitutional:       Appearance: Normal appearance.   HENT:      Head: Normocephalic and atraumatic.   Cardiovascular:      Rate and Rhythm: Normal rate and regular rhythm.   Pulmonary:      Effort: Pulmonary effort is normal.      Breath sounds: Normal breath sounds.   Abdominal:      Palpations: Abdomen is soft.   Musculoskeletal:      Right lower leg: No edema.      Left lower leg: No edema.   Skin:     General: Skin is warm and dry.      Capillary Refill: Capillary refill takes 2 to 3 seconds.   Neurological:      Mental Status: He is alert and oriented to person, place, and time.   Psychiatric:         Behavior: Behavior normal.     Right groin is severely ecchymotic from hip bone to hip bone into his right testicle.  Area is soft to palpation and there is no bruit or hematoma.    Patient will be discharged home on aspirin 81 mg daily  HPI and Hospital Course: 69-year-old retired  and  " whom I saw in preparation for bariatric surgery.  He has known severe COPD and apparently he was cleared by his pulmonologist for gastric surgery.  He does take inhalers on a daily basis.    He had a stress test and this showed transient ischemic dilatation and a defect in the inferobasal area that was fixed.     Patient has a history of severe anxiety and refused to be cath at [facility] as he had general anesthesia.    Was electively admitted for cardiac catheterization with general anesthesia.  Initial arterial access was unsuccessful via right radial artery.  Right femoral artery was access was obtained.  Results of the catheter as follows:  Left Anterior Descending      First Diagonal Branch   1st Diag lesion is 65% stenosed. Culprit lesion. Culprit for anginal symptoms.BEBO flow is 3. iFR was measured in the 1st Diag. iFR ratio: 0.79. The iFR was significant, intervened.      Left Circumflex   Prox Cx lesion is 50% stenosed. Not the culprit lesion. Culprit for abnormal stress.BEBO flow is 3. iFR was measured in the Prox Cx. iFR ratio: 0.92. The iFR was nonsignificant, not intervened.   Dist Cx lesion is 60% stenosed. Not the culprit lesion. Culprit for abnormal stress.BEBO flow is 3. iFR was measured in the Dist Cx. iFR ratio: 0.92. The iFR was nonsignificant, not intervened.      Right Coronary Artery   The vessel was visualized by angiography, is small and non-dominant. The vessel exhibits minimal luminal irregularities.      Intervention: PCI and drug-eluting stent was Xience Skypoint 2.75 x 8 mm to the D1.  His groin was closed with Angio-Seal.    Procedure the patient was then severely anxious uncontrollable thrashing around still trying to sit up.  He required sedation and extremity lower leg extremity restraints for short period of time.  Patient went to PACU for several hours for recovery.    Has postprocedural blood loss hemoglobin.  Hemoglobin went from 13.5-10.8 over there was significant blood  loss on the table and also his the cause of the drop in hemoglobin.  There is no hematoma. CBC will be checked on Monday.    This morning the patient was threatening to sign out AMA.  Dr. Noland talked to the patient via phone.    Patient was seen and examined discharge instructions given to both the patient and wife.    Patient will be discharged home on aspirin 81 mg daily Plavix 75 mg daily, Lipitor 80 mg daily.  Beta-blocker has been ordered for this patient due to his severe COPD and also he is stable ischemic heart disease which not required based on ACC  guideline.    Cardiac rehab referral has been made.    Current Facility-Administered Medications   Medication Dose Route Frequency    albuterol (PROVENTIL HFA,VENTOLIN HFA) inhaler 2 puff  2 puff Inhalation Q4H PRN    albuterol inhalation solution 2.5 mg  2.5 mg Nebulization Q4H PRN    aspirin chewable tablet 81 mg  81 mg Oral Daily    atorvastatin (LIPITOR) tablet 80 mg  80 mg Oral QPM    budesonide (PULMICORT) inhalation solution 0.5 mg  0.5 mg Nebulization Q12H    clopidogrel (PLAVIX) tablet 75 mg  75 mg Oral Daily    famotidine (PEPCID) tablet 20 mg  20 mg Oral BID AC    Fluticasone Furoate-Vilanterol 100-25 mcg/actuation 1 puff  1 puff Inhalation Daily    And    umeclidinium 62.5 mcg/actuation inhaler AEPB 1 puff  1 puff Inhalation Daily    ipratropium-albuterol (DUO-NEB) 0.5-2.5 mg/3 mL inhalation solution 3 mL  3 mL Nebulization 4x Daily    oxyCODONE-acetaminophen (PERCOCET) 5-325 mg per tablet 1 tablet  1 tablet Oral Once PRN    sodium chloride 0.9 % infusion  125 mL/hr Intravenous Continuous    traMADol (ULTRAM) tablet 50 mg  50 mg Oral Q6H PRN       Pertinent Labs/diagnostics:        Lab Ressults:  Recent Results (from the past 24 hour(s))   POCT activated clotting time    Collection Time: 06/17/24  9:47 AM   Result Value Ref Range    Activated Clotting Time, i-STAT 223 (H) 89 - 137 sec    Specimen Type VENOUS    POCT activated clotting time     "Collection Time: 06/17/24 10:03 AM   Result Value Ref Range    Activated Clotting Time, i-STAT 347 (H) 89 - 137 sec    Specimen Type VENOUS    ECG 12 lead    Collection Time: 06/17/24 11:12 AM   Result Value Ref Range    Ventricular Rate 108 BPM    Atrial Rate 108 BPM    OR Interval 196 ms    QRSD Interval 54 ms    QT Interval 346 ms    QTC Interval 464 ms    P Axis 90 degrees    QRS Axis -27 degrees    T Wave Axis -34 degrees   ECG 12 lead    Collection Time: 06/17/24 11:13 AM   Result Value Ref Range    Ventricular Rate 85 BPM    Atrial Rate 85 BPM    OR Interval 167 ms    QRSD Interval 96 ms    QT Interval 392 ms    QTC Interval 467 ms    P Axis 60 degrees    QRS Axis -29 degrees    T Wave Axis 45 degrees   Basic metabolic panel    Collection Time: 06/18/24  3:23 AM   Result Value Ref Range    Sodium 136 135 - 147 mmol/L    Potassium 4.5 3.5 - 5.3 mmol/L    Chloride 106 96 - 108 mmol/L    CO2 21 21 - 32 mmol/L    ANION GAP 9 4 - 13 mmol/L    BUN 22 5 - 25 mg/dL    Creatinine 0.92 0.60 - 1.30 mg/dL    Glucose 134 65 - 140 mg/dL    Glucose, Fasting 134 (H) 65 - 99 mg/dL    Calcium 8.1 (L) 8.4 - 10.2 mg/dL    eGFR 85 ml/min/1.73sq m   CBC    Collection Time: 06/18/24  3:23 AM   Result Value Ref Range    WBC 15.73 (H) 4.31 - 10.16 Thousand/uL    RBC 4.73 3.88 - 5.62 Million/uL    Hemoglobin 10.8 (L) 12.0 - 17.0 g/dL    Hematocrit 35.2 (L) 36.5 - 49.3 %    MCV 74 (L) 82 - 98 fL    MCH 22.8 (L) 26.8 - 34.3 pg    MCHC 30.7 (L) 31.4 - 37.4 g/dL    RDW 17.1 (H) 11.6 - 15.1 %    Platelets 336 149 - 390 Thousands/uL    MPV 10.7 8.9 - 12.7 fL           Lipid Profile:   No results found for: \"CHOL\"  Lab Results   Component Value Date    HDL 64 05/18/2024     Lab Results   Component Value Date    LDLCALC 107 (H) 05/18/2024     Lab Results   Component Value Date    TRIG 94 05/18/2024       Tele: Sinus rhythm      Discharge instructions/Information to patient and family:   See after visit summary for information provided to " patient and family.      Provisions for Follow-Up Care:  See after visit summary for information related to follow-up care and any pertinent home health orders.      Planned Readmission: No    Discharge Statement:  I spent 45 minutes minutes discharging the patient. This time was spent on the day of discharge. I had direct contact with the patient on the day of discharge. Additional documentation is required if more than 30 minutes were spent on discharge.         ** Please Note: Fluency Direct Dictation voice to text software may have been used in the creation of this document. **

## 2024-06-19 ENCOUNTER — TELEPHONE (OUTPATIENT)
Age: 69
End: 2024-06-19

## 2024-06-19 ENCOUNTER — APPOINTMENT (EMERGENCY)
Dept: RADIOLOGY | Facility: HOSPITAL | Age: 69
End: 2024-06-19
Payer: COMMERCIAL

## 2024-06-19 ENCOUNTER — HOSPITAL ENCOUNTER (EMERGENCY)
Facility: HOSPITAL | Age: 69
Discharge: HOME | End: 2024-06-19
Attending: EMERGENCY MEDICINE
Payer: COMMERCIAL

## 2024-06-19 ENCOUNTER — APPOINTMENT (EMERGENCY)
Dept: CT IMAGING | Facility: HOSPITAL | Age: 69
End: 2024-06-19
Payer: COMMERCIAL

## 2024-06-19 VITALS
DIASTOLIC BLOOD PRESSURE: 78 MMHG | RESPIRATION RATE: 18 BRPM | SYSTOLIC BLOOD PRESSURE: 128 MMHG | HEART RATE: 89 BPM | TEMPERATURE: 98 F | OXYGEN SATURATION: 100 %

## 2024-06-19 DIAGNOSIS — I25.118 CORONARY ARTERY DISEASE OF NATIVE HEART WITH STABLE ANGINA PECTORIS, UNSPECIFIED VESSEL OR LESION TYPE (HCC): Primary | ICD-10-CM

## 2024-06-19 DIAGNOSIS — R06.00 DYSPNEA: ICD-10-CM

## 2024-06-19 DIAGNOSIS — R10.9 ABDOMINAL PAIN: Primary | ICD-10-CM

## 2024-06-19 LAB
2HR DELTA HS TROPONIN: 2 NG/L
ALBUMIN SERPL BCP-MCNC: 3.7 G/DL (ref 3.5–5)
ALP SERPL-CCNC: 86 U/L (ref 34–104)
ALT SERPL W P-5'-P-CCNC: 21 U/L (ref 7–52)
ANION GAP SERPL CALCULATED.3IONS-SCNC: 6 MMOL/L (ref 4–13)
APTT PPP: 28 SECONDS (ref 23–37)
AST SERPL W P-5'-P-CCNC: 21 U/L (ref 13–39)
BASOPHILS # BLD AUTO: 0.05 THOUSANDS/ÂΜL (ref 0–0.1)
BASOPHILS NFR BLD AUTO: 1 % (ref 0–1)
BILIRUB DIRECT SERPL-MCNC: 0.17 MG/DL (ref 0–0.2)
BILIRUB SERPL-MCNC: 0.62 MG/DL (ref 0.2–1)
BNP SERPL-MCNC: 49 PG/ML (ref 0–100)
BUN SERPL-MCNC: 18 MG/DL (ref 5–25)
CALCIUM SERPL-MCNC: 8.7 MG/DL (ref 8.4–10.2)
CARDIAC TROPONIN I PNL SERPL HS: 23 NG/L
CARDIAC TROPONIN I PNL SERPL HS: 25 NG/L
CHLORIDE SERPL-SCNC: 106 MMOL/L (ref 96–108)
CO2 SERPL-SCNC: 24 MMOL/L (ref 21–32)
CREAT SERPL-MCNC: 0.92 MG/DL (ref 0.6–1.3)
EOSINOPHIL # BLD AUTO: 0.09 THOUSAND/ÂΜL (ref 0–0.61)
EOSINOPHIL NFR BLD AUTO: 1 % (ref 0–6)
ERYTHROCYTE [DISTWIDTH] IN BLOOD BY AUTOMATED COUNT: 16.5 % (ref 11.6–15.1)
FLUAV RNA RESP QL NAA+PROBE: NEGATIVE
FLUBV RNA RESP QL NAA+PROBE: NEGATIVE
GFR SERPL CREATININE-BSD FRML MDRD: 85 ML/MIN/1.73SQ M
GLUCOSE SERPL-MCNC: 108 MG/DL (ref 65–140)
HCT VFR BLD AUTO: 33.1 % (ref 36.5–49.3)
HGB BLD-MCNC: 10.3 G/DL (ref 12–17)
IMM GRANULOCYTES # BLD AUTO: 0.05 THOUSAND/UL (ref 0–0.2)
IMM GRANULOCYTES NFR BLD AUTO: 1 % (ref 0–2)
INR PPP: 0.88 (ref 0.84–1.19)
LYMPHOCYTES # BLD AUTO: 1.76 THOUSANDS/ÂΜL (ref 0.6–4.47)
LYMPHOCYTES NFR BLD AUTO: 18 % (ref 14–44)
MCH RBC QN AUTO: 22.7 PG (ref 26.8–34.3)
MCHC RBC AUTO-ENTMCNC: 31.1 G/DL (ref 31.4–37.4)
MCV RBC AUTO: 73 FL (ref 82–98)
MONOCYTES # BLD AUTO: 1.07 THOUSAND/ÂΜL (ref 0.17–1.22)
MONOCYTES NFR BLD AUTO: 11 % (ref 4–12)
NEUTROPHILS # BLD AUTO: 6.77 THOUSANDS/ÂΜL (ref 1.85–7.62)
NEUTS SEG NFR BLD AUTO: 68 % (ref 43–75)
NRBC BLD AUTO-RTO: 0 /100 WBCS
PLATELET # BLD AUTO: 329 THOUSANDS/UL (ref 149–390)
PMV BLD AUTO: 10.7 FL (ref 8.9–12.7)
POTASSIUM SERPL-SCNC: 3.9 MMOL/L (ref 3.5–5.3)
PROT SERPL-MCNC: 6.8 G/DL (ref 6.4–8.4)
PROTHROMBIN TIME: 12.5 SECONDS (ref 11.6–14.5)
RBC # BLD AUTO: 4.53 MILLION/UL (ref 3.88–5.62)
RSV RNA RESP QL NAA+PROBE: NEGATIVE
SARS-COV-2 RNA RESP QL NAA+PROBE: NEGATIVE
SODIUM SERPL-SCNC: 136 MMOL/L (ref 135–147)
WBC # BLD AUTO: 9.79 THOUSAND/UL (ref 4.31–10.16)

## 2024-06-19 PROCEDURE — 80076 HEPATIC FUNCTION PANEL: CPT | Performed by: EMERGENCY MEDICINE

## 2024-06-19 PROCEDURE — 85025 COMPLETE CBC W/AUTO DIFF WBC: CPT | Performed by: EMERGENCY MEDICINE

## 2024-06-19 PROCEDURE — 80048 BASIC METABOLIC PNL TOTAL CA: CPT | Performed by: EMERGENCY MEDICINE

## 2024-06-19 PROCEDURE — 83880 ASSAY OF NATRIURETIC PEPTIDE: CPT | Performed by: EMERGENCY MEDICINE

## 2024-06-19 PROCEDURE — 99285 EMERGENCY DEPT VISIT HI MDM: CPT

## 2024-06-19 PROCEDURE — 99285 EMERGENCY DEPT VISIT HI MDM: CPT | Performed by: EMERGENCY MEDICINE

## 2024-06-19 PROCEDURE — 74177 CT ABD & PELVIS W/CONTRAST: CPT

## 2024-06-19 PROCEDURE — 36415 COLL VENOUS BLD VENIPUNCTURE: CPT | Performed by: EMERGENCY MEDICINE

## 2024-06-19 PROCEDURE — 71045 X-RAY EXAM CHEST 1 VIEW: CPT

## 2024-06-19 PROCEDURE — 93005 ELECTROCARDIOGRAM TRACING: CPT

## 2024-06-19 PROCEDURE — 85730 THROMBOPLASTIN TIME PARTIAL: CPT | Performed by: EMERGENCY MEDICINE

## 2024-06-19 PROCEDURE — 96374 THER/PROPH/DIAG INJ IV PUSH: CPT

## 2024-06-19 PROCEDURE — 84484 ASSAY OF TROPONIN QUANT: CPT | Performed by: EMERGENCY MEDICINE

## 2024-06-19 PROCEDURE — 94640 AIRWAY INHALATION TREATMENT: CPT

## 2024-06-19 PROCEDURE — 85610 PROTHROMBIN TIME: CPT | Performed by: EMERGENCY MEDICINE

## 2024-06-19 PROCEDURE — 0241U HB NFCT DS VIR RESP RNA 4 TRGT: CPT | Performed by: EMERGENCY MEDICINE

## 2024-06-19 RX ORDER — TRAMADOL HYDROCHLORIDE 50 MG/1
50 TABLET ORAL EVERY 6 HOURS PRN
Qty: 20 TABLET | Refills: 0 | Status: SHIPPED | OUTPATIENT
Start: 2024-06-19 | End: 2024-06-24 | Stop reason: SDUPTHER

## 2024-06-19 RX ORDER — TAPINAROF 10 MG/1000MG
1 CREAM TOPICAL DAILY
Qty: 60 G | Refills: 0 | Status: SHIPPED | OUTPATIENT
Start: 2024-06-19

## 2024-06-19 RX ORDER — LORAZEPAM 2 MG/ML
1 INJECTION INTRAMUSCULAR ONCE
Status: COMPLETED | OUTPATIENT
Start: 2024-06-19 | End: 2024-06-19

## 2024-06-19 RX ORDER — OXYCODONE HYDROCHLORIDE 5 MG/1
5 TABLET ORAL EVERY 4 HOURS PRN
Qty: 12 TABLET | Refills: 0 | Status: SHIPPED | OUTPATIENT
Start: 2024-06-19

## 2024-06-19 RX ORDER — IPRATROPIUM BROMIDE AND ALBUTEROL SULFATE 2.5; .5 MG/3ML; MG/3ML
3 SOLUTION RESPIRATORY (INHALATION) ONCE
Status: COMPLETED | OUTPATIENT
Start: 2024-06-19 | End: 2024-06-19

## 2024-06-19 RX ADMIN — IOHEXOL 100 ML: 350 INJECTION, SOLUTION INTRAVENOUS at 17:52

## 2024-06-19 RX ADMIN — LORAZEPAM 1 MG: 2 INJECTION INTRAMUSCULAR; INTRAVENOUS at 16:47

## 2024-06-19 RX ADMIN — IPRATROPIUM BROMIDE AND ALBUTEROL SULFATE 3 ML: 2.5; .5 SOLUTION RESPIRATORY (INHALATION) at 16:28

## 2024-06-19 NOTE — ED PROVIDER NOTES
History  Chief Complaint   Patient presents with    Shortness of Breath     Patient reports SOB and urinary retention since this morning. Patient s/p stent placement monday     70 yo male who presents to ED with a multitude of complaints including SOB, suprapubic abdominal pain and chronic pain. All are worse since his recent cardiac cath and stent placement. He denies chest pain. He denies fever. He denies leg weakness or numbness. Additional history from review of recent d/c summary listed below:       Right groin is severely ecchymotic from hip bone to hip bone into his right testicle.  Area is soft to palpation and there is no bruit or hematoma.     Patient will be discharged home on aspirin 81 mg daily  HPI and Hospital Course: 69-year-old retired  and  whom I saw in preparation for bariatric surgery.  He has known severe COPD and apparently he was cleared by his pulmonologist for gastric surgery.  He does take inhalers on a daily basis.     He had a stress test and this showed transient ischemic dilatation and a defect in the inferobasal area that was fixed.      Patient has a history of severe anxiety and refused to be cath at [facility] as he had general anesthesia.     Was electively admitted for cardiac catheterization with general anesthesia.  Initial arterial access was unsuccessful via right radial artery.  Right femoral artery was access was obtained.  Results of the catheter as follows:  Left Anterior Descending    First Diagonal Branch  1st Diag lesion is 65% stenosed. Culprit lesion. Culprit for anginal symptoms.BEBO flow is 3. iFR was measured in the 1st Diag. iFR ratio: 0.79. The iFR was significant, intervened.    Left Circumflex  Prox Cx lesion is 50% stenosed. Not the culprit lesion. Culprit for abnormal stress.BEBO flow is 3. iFR was measured in the Prox Cx. iFR ratio: 0.92. The iFR was nonsignificant, not intervened.  Dist Cx lesion is 60% stenosed. Not the culprit  lesion. Culprit for abnormal stress.BEBO flow is 3. iFR was measured in the Dist Cx. iFR ratio: 0.92. The iFR was nonsignificant, not intervened.    Right Coronary Artery  The vessel was visualized by angiography, is small and non-dominant. The vessel exhibits minimal luminal irregularities.    Intervention: PCI and drug-eluting stent was Xience Skypoint 2.75 x 8 mm to the D1.  His groin was closed with Angio-Seal.     Procedure the patient was then severely anxious uncontrollable thrashing around still trying to sit up.  He required sedation and extremity lower leg extremity restraints for short period of time.  Patient went to PACU for several hours for recovery.             Prior to Admission Medications   Prescriptions Last Dose Informant Patient Reported? Taking?   Georgina 1 MG TBEC  Self No No   Sig: Take 1 tablet (1 mg total) by mouth in the morning   Tapinarof (Vtama) 1 % CREA   No No   Sig: Apply 1 Application topically in the morning   Trelegy Ellipta 100-62.5-25 MCG/ACT inhaler   No No   Sig: Inhale 1 puff daily   Ubrelvy 100 MG tablet  Self No No   Sig: Take 1 tablet (100 mg total) by mouth daily as needed (migraine)   albuterol (PROVENTIL HFA,VENTOLIN HFA) 90 mcg/act inhaler  Self No No   Sig: INHALE 1-2 PUFFS EVERY 4-6 HOURS AS NEEDED AND AS DIRECTED.   aspirin 81 mg chewable tablet   No No   Sig: Chew 1 tablet (81 mg total) daily   atorvastatin (LIPITOR) 80 mg tablet   No No   Sig: Take 1 tablet (80 mg total) by mouth every evening   budesonide (PULMICORT) 0.5 mg/2 mL nebulizer solution  Self No No   Sig: TAKE 2 ML (0.5 MG TOTAL) BY NEBULIZATION TWICE A DAY RINSE MOUTH AFTER USE   clopidogrel (PLAVIX) 75 mg tablet   No No   Sig: Take 1 tablet (75 mg total) by mouth daily   famotidine (PEPCID) 20 mg tablet   No No   Sig: Take 1 tablet (20 mg total) by mouth 2 (two) times a day   ipratropium-albuterol (DUO-NEB) 0.5-2.5 mg/3 mL nebulizer solution  Self No No   Sig: TAKE THREE ML BY NEBULIZATION 4 (FOUR)  TIMES A DAY   nitroglycerin (NITROSTAT) 0.4 mg SL tablet   No No   Sig: Place 1 tablet (0.4 mg total) under the tongue every 5 (five) minutes as needed for chest pain   promethazine-dextromethorphan (PHENERGAN-DM) 6.25-15 mg/5 mL oral syrup  Self No No   Sig: Take 5 mL by mouth 4 (four) times a day as needed for cough   traMADol (Ultram) 50 mg tablet   No No   Sig: Take 1 tablet (50 mg total) by mouth every 6 (six) hours as needed for severe pain      Facility-Administered Medications: None       Past Medical History:   Diagnosis Date    Arthritis     Asthma     Chronic pain 01/30/2024    cervic and lumbar    Colon polyp     COPD (chronic obstructive pulmonary disease) ([facility])     Emphysema of lung ([facility])     GERD (gastroesophageal reflux disease) 01/30/2024    Headache(784.0)     Hyperlipidemia 01/30/2024    Migraine     Obesity     Sleep apnea        Past Surgical History:   Procedure Laterality Date    APPENDECTOMY      CARDIAC CATHETERIZATION  6/17/2024    Procedure: Cardiac catheterization;  Surgeon: Sarah Noland DO;  Location: [facility];  Service: Cardiology    CARDIAC CATHETERIZATION N/A 6/17/2024    Procedure: Cardiac Coronary Angiogram;  Surgeon: Sarah Noland DO;  [facility]: [facility];  Service: Cardiology    CARDIAC CATHETERIZATION N/A 6/17/2024    Procedure: Cardiac FFR/IFR;  Surgeon: Sraah Noland DO;  [facility]: [facility];  Service: Cardiology    CARDIAC CATHETERIZATION N/A 6/17/2024    Procedure: Cardiac pci;  Surgeon: Sarah Noland DO;  [facility];  Service: Cardiology    CARDIAC CATHETERIZATION N/A 6/17/2024    Procedure: Cardiac IVUS;  Surgeon: Sarah Noland DO;  [facility]: [facility];  Service: Cardiology    COLONOSCOPY      HERNIA REPAIR      4 times    NEPHRECTOMY LIVING DONOR Left 10/2009    TX EXCISION HYDROCELE UNILATERAL Left 01/30/2024    Procedure: HYDROCELECTOMY;  Surgeon: Evan Salmeron MD;  Location: [facility]  OR;  Service: Urology       Family History   Problem Relation Age of Onset    Breast cancer Mother     Heart disease Father     Breast cancer Maternal Grandmother     Heart disease Paternal Grandmother      I have reviewed and agree with the history as documented.    E-Cigarette/Vaping    E-Cigarette Use Former User      E-Cigarette/Vaping Substances    Nicotine No     THC No     CBD No     Flavoring No     Other No     Unknown No      Social History     Tobacco Use    Smoking status: Former     Current packs/day: 0.00     Average packs/day: 2.0 packs/day for 39.0 years (78.0 ttl pk-yrs)     Types: Cigarettes     Start date: 1/1/1970     Quit date: 1/1/2009     Years since quitting: 15.4    Smokeless tobacco: Never   Vaping Use    Vaping status: Former   Substance Use Topics    Alcohol use: Yes     Comment: Occasional    Drug use: No       Review of Systems   Respiratory:  Positive for shortness of breath.    Cardiovascular:  Negative for leg swelling.   Gastrointestinal:  Positive for abdominal pain.       Physical Exam  Physical Exam  Vitals and nursing note reviewed.   Constitutional:       General: He is not in acute distress.     Appearance: He is well-developed. He is not ill-appearing, toxic-appearing or diaphoretic.   HENT:      Head: Normocephalic and atraumatic.      Mouth/Throat:      Mouth: Mucous membranes are moist.      Pharynx: Oropharynx is clear.   Eyes:      Conjunctiva/sclera: Conjunctivae normal.      Pupils: Pupils are equal, round, and reactive to light.   Neck:      Vascular: No JVD.   Cardiovascular:      Rate and Rhythm: Normal rate and regular rhythm.      Pulses: Normal pulses.      Heart sounds: Normal heart sounds. No murmur heard.     No friction rub. No gallop.   Pulmonary:      Effort: Pulmonary effort is normal. No respiratory distress.      Breath sounds: Normal breath sounds. No stridor. No wheezing, rhonchi or rales.   Chest:      Chest wall: No tenderness.   Abdominal:       General: There is no distension.      Palpations: Abdomen is soft.      Tenderness: There is no abdominal tenderness. There is no guarding or rebound.   Musculoskeletal:         General: No swelling, tenderness, deformity or signs of injury. Normal range of motion.      Cervical back: Normal range of motion and neck supple. No rigidity.      Comments: There is ecchymosis of the R groin. No crepitus. No fluctuance. Normal perfusion and sensation RLE.    Skin:     General: Skin is warm and dry.      Capillary Refill: Capillary refill takes less than 2 seconds.      Coloration: Skin is not jaundiced or pale.      Findings: No bruising or erythema.   Neurological:      General: No focal deficit present.      Mental Status: He is alert and oriented to person, place, and time.      Cranial Nerves: No cranial nerve deficit.      Sensory: No sensory deficit.      Motor: No weakness or abnormal muscle tone.      Coordination: Coordination normal.      Gait: Gait normal.         Vital Signs  ED Triage Vitals   Temperature Pulse Respirations Blood Pressure SpO2   06/19/24 1900 06/19/24 1621 06/19/24 1621 06/19/24 1658 06/19/24 1621   98.5 °F (36.9 °C) 103 (!) 26 128/74 98 %      Temp Source Heart Rate Source Patient Position - Orthostatic VS BP Location FiO2 (%)   06/19/24 1900 06/19/24 1900 06/19/24 1900 06/19/24 1900 --   Oral Monitor Lying Right arm       Pain Score       06/19/24 1900       5           Vitals:    06/19/24 1621 06/19/24 1658 06/19/24 1900 06/19/24 1923   BP:  128/74 130/80 128/78   Pulse: 103 97 96 89   Patient Position - Orthostatic VS:   Lying Lying         Visual Acuity      ED Medications  Medications   ipratropium-albuterol (DUO-NEB) 0.5-2.5 mg/3 mL inhalation solution 3 mL (3 mL Nebulization Given 6/19/24 1628)   LORazepam (ATIVAN) injection 1 mg (1 mg Intravenous Given 6/19/24 1647)   iohexol (OMNIPAQUE) 350 MG/ML injection (MULTI-DOSE) 100 mL (100 mL Intravenous Given 6/19/24 1752)       Diagnostic  Studies  Results Reviewed       Procedure Component Value Units Date/Time    HS Troponin I 2hr [[id number]]  (Normal) Collected: 06/19/24 1902    Lab Status: Final result Specimen: Blood from Arm, Left Updated: 06/19/24 1943     hs TnI 2hr 25 ng/L      Delta 2hr hsTnI 2 ng/L     FLU/RSV/COVID - if FLU/RSV clinically relevant [[id number]]  (Normal) Collected: 06/19/24 1627    Lab Status: Final result Specimen: Nares from Nose Updated: 06/19/24 1715     SARS-CoV-2 Negative     INFLUENZA A PCR Negative     INFLUENZA B PCR Negative     RSV PCR Negative    Narrative:      FOR PEDIATRIC PATIENTS - copy/paste COVID Guidelines URL to browser: [email or web address]    SARS-CoV-2 assay is a Nucleic Acid Amplification assay intended for the  qualitative detection of nucleic acid from SARS-CoV-2 in nasopharyngeal  swabs. Results are for the presumptive identification of SARS-CoV-2 RNA.    Positive results are indicative of infection with SARS-CoV-2, the virus  causing COVID-19, but do not rule out bacterial infection or co-infection  with other viruses. Laboratories within the [place] and its  territories are required to report all positive results to the appropriate  public health authorities. Negative results do not preclude SARS-CoV-2  infection and should not be used as the sole basis for treatment or other  patient management decisions. Negative results must be combined with  clinical observations, patient history, and epidemiological information.  This test has not been FDA cleared or approved.    This test has been authorized by FDA under an Emergency Use Authorization  (EUA). This test is only authorized for the duration of time the  declaration that circumstances exist justifying the authorization of the  emergency use of an in vitro diagnostic tests for detection of SARS-CoV-2  virus and/or diagnosis of COVID-19 infection under section 564(b)(1) of  the Act, 21  U.S.C. 360bbb-3(b)(1), unless the authorization is terminated  or revoked sooner. The test has been validated but independent review by FDA  and CLIA is pending.    Test performed using [employer]: This RT-PCR assay targets N2,  a region unique to SARS-CoV-2. A conserved region in the E-gene was chosen  for pan-Sarbecovirus detection which includes SARS-CoV-2.    According to CMS-2020-01-R, this platform meets the definition of high-throughput technology.    HS Troponin 0hr (reflex protocol) [[id number]]  (Normal) Collected: 06/19/24 1627    Lab Status: Final result Specimen: Blood from Arm, Left Updated: 06/19/24 1707     hs TnI 0hr 23 ng/L     B-Type Natriuretic Peptide(BNP) [[id number]]  (Normal) Collected: 06/19/24 1627    Lab Status: Final result Specimen: Blood from Arm, Left Updated: 06/19/24 1706     BNP 49 pg/mL     Protime-INR [[id number]]  (Normal) Collected: 06/19/24 1627    Lab Status: Final result Specimen: Blood from Arm, Left Updated: 06/19/24 1705     Protime 12.5 seconds      INR 0.88    APTT [[id number]]  (Normal) Collected: 06/19/24 1627    Lab Status: Final result Specimen: Blood from Arm, Left Updated: 06/19/24 1705     PTT 28 seconds     Basic metabolic panel [[id number]] Collected: 06/19/24 1627    Lab Status: Final result Specimen: Blood from Arm, Left Updated: 06/19/24 1702     Sodium 136 mmol/L      Potassium 3.9 mmol/L      Chloride 106 mmol/L      CO2 24 mmol/L      ANION GAP 6 mmol/L      BUN 18 mg/dL      Creatinine 0.92 mg/dL      Glucose 108 mg/dL      Calcium 8.7 mg/dL      eGFR 85 ml/min/1.73sq m     Narrative:      National Kidney Disease Foundation guidelines for Chronic Kidney Disease (CKD):     Stage 1 with normal or high GFR (GFR > 90 mL/min/1.73 square meters)    Stage 2 Mild CKD (GFR = 60-89 mL/min/1.73 square meters)    Stage 3A Moderate CKD (GFR = 45-59 mL/min/1.73 square meters)    Stage 3B Moderate CKD (GFR = 30-44 mL/min/1.73 square meters)    Stage 4 Severe CKD (GFR  = 15-29 mL/min/1.73 square meters)    Stage 5 End Stage CKD (GFR <15 mL/min/1.73 square meters)  Note: GFR calculation is accurate only with a steady state creatinine    Hepatic function panel [[id number]]  (Normal) Collected: 06/19/24 1627    Lab Status: Final result Specimen: Blood from Arm, Left Updated: 06/19/24 1702     Total Bilirubin 0.62 mg/dL      Bilirubin, Direct 0.17 mg/dL      Alkaline Phosphatase 86 U/L      AST 21 U/L      ALT 21 U/L      Total Protein 6.8 g/dL      Albumin 3.7 g/dL     CBC and differential [[id number]]  (Abnormal) Collected: 06/19/24 1627    Lab Status: Final result Specimen: Blood from Arm, Left Updated: 06/19/24 1636     WBC 9.79 Thousand/uL      RBC 4.53 Million/uL      Hemoglobin 10.3 g/dL      Hematocrit 33.1 %      MCV 73 fL      MCH 22.7 pg      MCHC 31.1 g/dL      RDW 16.5 %      MPV 10.7 fL      Platelets 329 Thousands/uL      nRBC 0 /100 WBCs      Segmented % 68 %      Immature Grans % 1 %      Lymphocytes % 18 %      Monocytes % 11 %      Eosinophils Relative 1 %      Basophils Relative 1 %      Absolute Neutrophils 6.77 Thousands/µL      Absolute Immature Grans 0.05 Thousand/uL      Absolute Lymphocytes 1.76 Thousands/µL      Absolute Monocytes 1.07 Thousand/µL      Eosinophils Absolute 0.09 Thousand/µL      Basophils Absolute 0.05 Thousands/µL                    CT abdomen pelvis with contrast   Final Result by Donavon Helton MD (06/19 1912)         1. Fat stranding and minimal fluid in the lower abdomen and pelvis may be related to recent catheterization. No evidence of pseudoaneurysm, dissection or active bleeding.   2. No evidence of bowel obstruction, colitis or diverticulitis.         Workstation performed: [id number]         XR chest 1 view portable   Final Result by Asa Dillard MD (06/20 6794)      No acute cardiopulmonary disease.            Workstation performed: [id number]                    Procedures  ECG 12 Lead Documentation Only    Date/Time:  6/19/2024 4:28 PM    Performed by: Norm Savage MD  Authorized by: Norm Savage MD    Indications / Diagnosis:  SOB  ECG reviewed by me, the ED Provider: yes    Patient location:  ED  Previous ECG:     Previous ECG:  Compared to current    Comparison ECG info:  17 june 2024    Similarity:  No change  Interpretation:     Interpretation: non-specific    Rate:     ECG rate:  95    ECG rate assessment: normal    Rhythm:     Rhythm: sinus rhythm             ED Course                                             Medical Decision Making  Problems Addressed:  Abdominal pain: acute illness or injury  Dyspnea: acute illness or injury    Amount and/or Complexity of Data Reviewed  Labs: ordered.  Radiology: ordered.    Risk  Prescription drug management.             Disposition  Final diagnoses:   Abdominal pain   Dyspnea     Time reflects when diagnosis was documented in both MDM as applicable and the Disposition within this note       Time User Action Codes Description Comment    6/19/2024  7:18 PM Norm Savage Add [R10.9] Abdominal pain     6/19/2024  7:18 PM Norm Savage Add [R06.00] Dyspnea           ED Disposition       ED Disposition   Discharge    Condition   Stable    Date/Time   Wed Jun 19, 2024  7:18 PM    Comment   Danie Liao discharge to home/self care.                   Follow-up Information       Follow up With Specialties Details Why Contact Info Additional Information    [facility] Emergency Department Emergency Medicine  If symptoms worsen [address] [facility] [address] [phone] [facility] Emergency Department, [address], [place], [address]            Discharge Medication List as of 6/19/2024  7:19 PM        CONTINUE these medications which have NOT CHANGED    Details   albuterol (PROVENTIL HFA,VENTOLIN HFA) 90 mcg/act inhaler INHALE 1-2 PUFFS EVERY 4-6 HOURS AS NEEDED AND AS DIRECTED., Starting Mon 1/15/2024,  Normal      aspirin 81 mg chewable tablet Chew 1 tablet (81 mg total) daily, Starting Wed 6/19/2024, No Print      atorvastatin (LIPITOR) 80 mg tablet Take 1 tablet (80 mg total) by mouth every evening, Starting Tue 6/18/2024, Normal      budesonide (PULMICORT) 0.5 mg/2 mL nebulizer solution TAKE 2 ML (0.5 MG TOTAL) BY NEBULIZATION TWICE A DAY RINSE MOUTH AFTER USE, Normal      clopidogrel (PLAVIX) 75 mg tablet Take 1 tablet (75 mg total) by mouth daily, Starting Wed 6/19/2024, Normal      famotidine (PEPCID) 20 mg tablet Take 1 tablet (20 mg total) by mouth 2 (two) times a day, Starting Mon 6/17/2024, Normal      ipratropium-albuterol (DUO-NEB) 0.5-2.5 mg/3 mL nebulizer solution TAKE THREE ML BY NEBULIZATION 4 (FOUR) TIMES A DAY, Normal      nitroglycerin (NITROSTAT) 0.4 mg SL tablet Place 1 tablet (0.4 mg total) under the tongue every 5 (five) minutes as needed for chest pain, Starting Tue 6/18/2024, Normal      promethazine-dextromethorphan (PHENERGAN-DM) 6.25-15 mg/5 mL oral syrup Take 5 mL by mouth 4 (four) times a day as needed for cough, Starting Mon 3/25/2024, Normal      Georgina 1 MG TBEC Take 1 tablet (1 mg total) by mouth in the morning, Starting Tue 11/14/2023, Normal      Tapinarof (Vtama) 1 % CREA Apply 1 Application topically in the morning, Starting Wed 6/19/2024, Normal      traMADol (Ultram) 50 mg tablet Take 1 tablet (50 mg total) by mouth every 6 (six) hours as needed for severe pain, Starting Wed 6/19/2024, Normal      Trelegy Ellipta 100-62.5-25 MCG/ACT inhaler Inhale 1 puff daily, Starting Mon 6/17/2024, Normal      Ubrelvy 100 MG tablet Take 1 tablet (100 mg total) by mouth daily as needed (migraine), Starting Fri 5/17/2024, Normal             No discharge procedures on file.    PDMP Review         Value Time User    PDMP Reviewed  Yes 2/19/2024  3:30 PM Evan Salmeron MD            ED Provider  Electronically Signed by             Norm Savage MD  06/21/24 112

## 2024-06-19 NOTE — TELEPHONE ENCOUNTER
What pain? If still having significant chest pain after procedure cardio should be aware. Pain at the site of incision in the groin? Can send a short refill but would not recommend long term use of this medication

## 2024-06-19 NOTE — TELEPHONE ENCOUNTER
Patient called and states he tried getting a prescription for his pain from his cardiac cath procedure on 6/17/24 and was told he needed to reach out to his PCP for any prescriptions.  Patient states he is in a lot of pain and needs something.  He would like a call back as soon as possible.

## 2024-06-19 NOTE — ED NOTES
[facility] ARRIVES AMBULATORY FOR CONSULTATION APPOINTMENT   DR Julisa Mason IN TO SEE PATIENT  ORDERS RECEIVED  CONTINUE ORAL IRON  RV 3 MONTHS  NEED CBC & IRON STUDIES BEFORE VISIT  LABS CDP FE TIBC FERRITIN 10/18/21, ORDERS GIVEN TO PATIENT  MD VISIT 10/25/21 @2PM  AVS PRINTED AND GIVEN TO PATIENT WITH INSTRUCTIONS  PATIENT DISCHARGED AMBULATORY Patient transported to CT     Keith Smith RN  06/19/24 4875

## 2024-06-19 NOTE — ED NOTES
Patient ambulated out of the ED, AAOx4 resp even and unlabored with no S$S of distress.       Keith Smith RN  06/19/24 1925

## 2024-06-20 ENCOUNTER — TELEPHONE (OUTPATIENT)
Dept: UROLOGY | Facility: CLINIC | Age: 69
End: 2024-06-20

## 2024-06-20 ENCOUNTER — PROCEDURE VISIT (OUTPATIENT)
Dept: UROLOGY | Facility: CLINIC | Age: 69
End: 2024-06-20
Payer: COMMERCIAL

## 2024-06-20 ENCOUNTER — NURSE TRIAGE (OUTPATIENT)
Age: 69
End: 2024-06-20

## 2024-06-20 DIAGNOSIS — R39.9 UTI SYMPTOMS: Primary | ICD-10-CM

## 2024-06-20 DIAGNOSIS — R33.9 URINARY RETENTION: ICD-10-CM

## 2024-06-20 LAB
BACTERIA UR QL AUTO: ABNORMAL /HPF
BILIRUB UR QL STRIP: NEGATIVE
CLARITY UR: CLEAR
COLOR UR: ABNORMAL
GLUCOSE UR STRIP-MCNC: NEGATIVE MG/DL
HGB UR QL STRIP.AUTO: NEGATIVE
KETONES UR STRIP-MCNC: ABNORMAL MG/DL
LEUKOCYTE ESTERASE UR QL STRIP: NEGATIVE
NITRITE UR QL STRIP: NEGATIVE
NON-SQ EPI CELLS URNS QL MICRO: ABNORMAL /HPF
PH UR STRIP.AUTO: 6 [PH]
POST-VOID RESIDUAL VOLUME, ML POC: 36 ML
PROT UR STRIP-MCNC: ABNORMAL MG/DL
RBC #/AREA URNS AUTO: ABNORMAL /HPF
SP GR UR STRIP.AUTO: 1.02 (ref 1–1.03)
UROBILINOGEN UR STRIP-ACNC: <2 MG/DL
WBC #/AREA URNS AUTO: ABNORMAL /HPF

## 2024-06-20 PROCEDURE — 87086 URINE CULTURE/COLONY COUNT: CPT

## 2024-06-20 PROCEDURE — 81001 URINALYSIS AUTO W/SCOPE: CPT

## 2024-06-20 PROCEDURE — 51798 US URINE CAPACITY MEASURE: CPT

## 2024-06-20 NOTE — TELEPHONE ENCOUNTER
Patient called stating he is waiting to hear from the office to see if he can be seen today in regards to not able to empty bladder. He is having urinary retention and he needs to push to get urine out.     Patient can be reached [phone]

## 2024-06-20 NOTE — TELEPHONE ENCOUNTER
Patient called in with concerns of having difficulty urinating and unable to empty bladder fully. He had a recent surgery and was in the hospital. Patient denies any fevers or hematuria. States he has to push on his bladder to try to get the urine out. Patient is requesting to be seen today.     Answer Questions - Initial Assessment   When was the last time you voided: an hour ago     Do you have any abdominal pain: no     Do you have a history of BPH with obstruction or urethral stricture: no    Are you straining to void:yes     Are you having suprapubic or pelvic pressure: yes    Do you have other urinary symptoms such as frequency, urgency, incontinence, dysuria: urgency     Are your bowel movements regular: yes    Are you taking any pain medication: yes     Have you taken any allergy or cold medication: no    Have you had a recent urologic surgery or procedure: yes    Protocols used: SL AMB URO DIFFICULTY / UNABLE TO VOID

## 2024-06-20 NOTE — PROGRESS NOTES
6/20/2024  Danie Liao is a 69 y.o. male  [id number]    Diagnosis:      Patient presents for  post void residual managed by our office    Plan:  Patient will call office with any questions or concerns.      Assessment:  Patient called stating he was unable to urinate (see telephone encounter). He came in and seemed to be in a lot pain. Had cardiac stents placed on Monday and was having a lot of groin pain. Went to ER and all testing normal. Was sent home with pain medication. Called cardiologist and was referred to urology for possible urinary retention. In office he reports having no issues with urination but does have some pressure in groin area while urinating. Urine sample collected and sent out for testing. Severe ecchymosis noted in right hip/groin area due to procedure. Pt had not taken pain medication prescribed, although he states it does work when he takes. Advised to increase fluid intake, take pain medication, rest and contact cardiologist with any questions or concerns. Should contact urology with any urological concerns. Pt verbalized understanding.         Patient voided 25 mL in the office.  Post void residual measured via bladder scanner to be 36 mL      Recent Results (from the past 1 hour(s))   POCT Measure PVR    Collection Time: 06/20/24  3:09 PM   Result Value Ref Range    POST-VOID RESIDUAL VOLUME, ML POC 36 mL        Maria M Lozano RN

## 2024-06-20 NOTE — TELEPHONE ENCOUNTER
Spoke with pt. Patient went to ER last night. He is having a lot of pain his side. States he has to keep pushing the area due to the pain

## 2024-06-20 NOTE — TELEPHONE ENCOUNTER
Spoke with patient who was able to verbalize that he continues to have difficulty urinating with a weak stream. Patient states he has to constantly push the side of his bladder region in order to expel additional urine. Patient also makes c/o abd pain/discomfort. Patient was offered a nurse visit at the [facility] today for 1 pm to which he is extremely agreeable and grateful. Patient was given address of [facility]. This nurse made [facility] nurse aware of appointment made for patient.

## 2024-06-21 ENCOUNTER — HOSPITAL ENCOUNTER (EMERGENCY)
Facility: HOSPITAL | Age: 69
Discharge: HOME | End: 2024-06-21
Attending: EMERGENCY MEDICINE
Payer: COMMERCIAL

## 2024-06-21 ENCOUNTER — APPOINTMENT (EMERGENCY)
Dept: CT IMAGING | Facility: HOSPITAL | Age: 69
End: 2024-06-21
Payer: COMMERCIAL

## 2024-06-21 ENCOUNTER — TRANSCRIBE ORDERS (OUTPATIENT)
Dept: VASCULAR SURGERY | Facility: CLINIC | Age: 69
End: 2024-06-21

## 2024-06-21 ENCOUNTER — DOCUMENTATION (OUTPATIENT)
Dept: CARDIOLOGY CLINIC | Facility: CLINIC | Age: 69
End: 2024-06-21

## 2024-06-21 ENCOUNTER — TELEPHONE (OUTPATIENT)
Age: 69
End: 2024-06-21

## 2024-06-21 VITALS
SYSTOLIC BLOOD PRESSURE: 144 MMHG | DIASTOLIC BLOOD PRESSURE: 74 MMHG | OXYGEN SATURATION: 96 % | TEMPERATURE: 97.7 F | HEART RATE: 88 BPM | RESPIRATION RATE: 14 BRPM

## 2024-06-21 DIAGNOSIS — I72.4 FEMORAL ARTERY PSEUDO-ANEURYSM, RIGHT (HCC): Primary | ICD-10-CM

## 2024-06-21 DIAGNOSIS — R10.30 GROIN PAIN: Primary | ICD-10-CM

## 2024-06-21 DIAGNOSIS — R93.89 ABNORMAL CT SCAN: ICD-10-CM

## 2024-06-21 LAB
2HR DELTA HS TROPONIN: -3 NG/L
ALBUMIN SERPL BCG-MCNC: 3.7 G/DL (ref 3.5–5)
ALP SERPL-CCNC: 90 U/L (ref 34–104)
ALT SERPL W P-5'-P-CCNC: 23 U/L (ref 7–52)
ANION GAP SERPL CALCULATED.3IONS-SCNC: 7 MMOL/L (ref 4–13)
APTT PPP: 28 SECONDS (ref 23–37)
AST SERPL W P-5'-P-CCNC: 25 U/L (ref 13–39)
BACTERIA UR CULT: NORMAL
BASOPHILS # BLD AUTO: 0.03 THOUSANDS/ÂΜL (ref 0–0.1)
BASOPHILS NFR BLD AUTO: 0 % (ref 0–1)
BILIRUB SERPL-MCNC: 1.02 MG/DL (ref 0.2–1)
BILIRUB UR QL STRIP: NEGATIVE
BNP SERPL-MCNC: 86 PG/ML (ref 0–100)
BUN SERPL-MCNC: 9 MG/DL (ref 5–25)
CALCIUM SERPL-MCNC: 9 MG/DL (ref 8.4–10.2)
CARDIAC TROPONIN I PNL SERPL HS: 16 NG/L
CARDIAC TROPONIN I PNL SERPL HS: 19 NG/L
CHLORIDE SERPL-SCNC: 104 MMOL/L (ref 96–108)
CLARITY UR: CLEAR
CO2 SERPL-SCNC: 24 MMOL/L (ref 21–32)
COLOR UR: ABNORMAL
CREAT SERPL-MCNC: 0.81 MG/DL (ref 0.6–1.3)
EOSINOPHIL # BLD AUTO: 0.23 THOUSAND/ÂΜL (ref 0–0.61)
EOSINOPHIL NFR BLD AUTO: 2 % (ref 0–6)
ERYTHROCYTE [DISTWIDTH] IN BLOOD BY AUTOMATED COUNT: 16.4 % (ref 11.6–15.1)
GFR SERPL CREATININE-BSD FRML MDRD: 91 ML/MIN/1.73SQ M
GLUCOSE SERPL-MCNC: 107 MG/DL (ref 65–140)
GLUCOSE UR STRIP-MCNC: NEGATIVE MG/DL
HCT VFR BLD AUTO: 33.1 % (ref 36.5–49.3)
HGB BLD-MCNC: 10.4 G/DL (ref 12–17)
HGB UR QL STRIP.AUTO: NEGATIVE
IMM GRANULOCYTES # BLD AUTO: 0.06 THOUSAND/UL (ref 0–0.2)
IMM GRANULOCYTES NFR BLD AUTO: 1 % (ref 0–2)
INR PPP: 0.91 (ref 0.84–1.19)
KETONES UR STRIP-MCNC: ABNORMAL MG/DL
LEUKOCYTE ESTERASE UR QL STRIP: NEGATIVE
LYMPHOCYTES # BLD AUTO: 1.56 THOUSANDS/ÂΜL (ref 0.6–4.47)
LYMPHOCYTES NFR BLD AUTO: 16 % (ref 14–44)
MCH RBC QN AUTO: 22.9 PG (ref 26.8–34.3)
MCHC RBC AUTO-ENTMCNC: 31.4 G/DL (ref 31.4–37.4)
MCV RBC AUTO: 73 FL (ref 82–98)
MONOCYTES # BLD AUTO: 0.95 THOUSAND/ÂΜL (ref 0.17–1.22)
MONOCYTES NFR BLD AUTO: 10 % (ref 4–12)
NEUTROPHILS # BLD AUTO: 7.13 THOUSANDS/ÂΜL (ref 1.85–7.62)
NEUTS SEG NFR BLD AUTO: 71 % (ref 43–75)
NITRITE UR QL STRIP: NEGATIVE
NRBC BLD AUTO-RTO: 0 /100 WBCS
PH UR STRIP.AUTO: 7 [PH]
PLATELET # BLD AUTO: 324 THOUSANDS/UL (ref 149–390)
PMV BLD AUTO: 10.6 FL (ref 8.9–12.7)
POTASSIUM SERPL-SCNC: 3.7 MMOL/L (ref 3.5–5.3)
PROT SERPL-MCNC: 6.7 G/DL (ref 6.4–8.4)
PROT UR STRIP-MCNC: NEGATIVE MG/DL
PROTHROMBIN TIME: 12.8 SECONDS (ref 11.6–14.5)
RBC # BLD AUTO: 4.55 MILLION/UL (ref 3.88–5.62)
SODIUM SERPL-SCNC: 135 MMOL/L (ref 135–147)
SP GR UR STRIP.AUTO: 1.01 (ref 1–1.03)
UROBILINOGEN UR STRIP-ACNC: 2 MG/DL
WBC # BLD AUTO: 9.96 THOUSAND/UL (ref 4.31–10.16)

## 2024-06-21 PROCEDURE — 36415 COLL VENOUS BLD VENIPUNCTURE: CPT

## 2024-06-21 PROCEDURE — 85730 THROMBOPLASTIN TIME PARTIAL: CPT

## 2024-06-21 PROCEDURE — 85025 COMPLETE CBC W/AUTO DIFF WBC: CPT

## 2024-06-21 PROCEDURE — 83880 ASSAY OF NATRIURETIC PEPTIDE: CPT

## 2024-06-21 PROCEDURE — 93005 ELECTROCARDIOGRAM TRACING: CPT

## 2024-06-21 PROCEDURE — 80053 COMPREHEN METABOLIC PANEL: CPT

## 2024-06-21 PROCEDURE — 81003 URINALYSIS AUTO W/O SCOPE: CPT

## 2024-06-21 PROCEDURE — 74177 CT ABD & PELVIS W/CONTRAST: CPT

## 2024-06-21 PROCEDURE — 99285 EMERGENCY DEPT VISIT HI MDM: CPT

## 2024-06-21 PROCEDURE — 99284 EMERGENCY DEPT VISIT MOD MDM: CPT

## 2024-06-21 PROCEDURE — 85610 PROTHROMBIN TIME: CPT

## 2024-06-21 PROCEDURE — 84484 ASSAY OF TROPONIN QUANT: CPT

## 2024-06-21 RX ORDER — HYDROCODONE BITARTRATE AND ACETAMINOPHEN 5; 325 MG/1; MG/1
1 TABLET ORAL ONCE
Status: COMPLETED | OUTPATIENT
Start: 2024-06-21 | End: 2024-06-21

## 2024-06-21 RX ADMIN — IOHEXOL 100 ML: 350 INJECTION, SOLUTION INTRAVENOUS at 02:02

## 2024-06-21 RX ADMIN — HYDROCODONE BITARTRATE AND ACETAMINOPHEN 1 TABLET: 5; 325 TABLET ORAL at 03:00

## 2024-06-21 NOTE — TELEPHONE ENCOUNTER
Lvm for pt to schedule him an ov w/ vs  as previously stated pt was offered 7/11/24 w/ lmd in [place], phonecall was disconnected.

## 2024-06-21 NOTE — TELEPHONE ENCOUNTER
Pt calling to schedule OV. Pt seen in the ED three times for groin pain. 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 Initially offered the pt an appt in [place] today at 2:30 but since the PT had a CT scan he would need to be seen by a vascular surgeon. Offered next available of 7/11 in [place]. Pt did not want to wait that long and ended the call. Please reach out to the patient for scheduling.

## 2024-06-21 NOTE — PROGRESS NOTES
I received a Secure Chat message from RN Carley Thorpe, on Tue 6/18/24, regarding Mr. Liao and his pending discharge.  She stated that the patient noted that no one from cardiology had been by to check on him since he arrived on the unit after his LHC/PCI.  She also noted that the patient was unhappy with his care and was considering leaving.  When I spoke to Carley she clarified that the patient was also considering leaving AMA.    I used Secure Chart to contact our CRNP David Tolentino who'd evaluated the patient post cath for subcutaneous oozing. I also used [employer] Chart to reach out to the patient as well.  In doing so, my personal telephone number was made available to the patient.  We discussed his case and plans for GDMT and Cardiac Rehab.    On 06/19 at 5:18 PM, I received a text message regarding his inability to sleep and regarding a request for a sleep aid.  I called the patient to follow up. Regarding his sleep, I explained that I do not prescribe sleep aids.  He then described a pressure sensation in his abdomen that felt like he wanted to urinate but could not. I explained that during his post-sedation recovery, he experienced urinary retention that required straight cath x 1 to empty his bladder.  I also advised that since he was so acutely uncomfortable, he should seek an evaluation in the ED.    He was seen in the ED on 06/19 with a CT Abd and Pelvis that was negative for an overt bleed.  On 06/20 at 07:30, I received a call from Mr. Liao.  He noted that he'd gone to the ED the night prior.  Though he missed a call from his PCP, he was going to try to be seen by Urology for evaluation.      Later in the day, at 14:27, Mr. Liao sent a text message stating that he was told that his bladder was swollen and inflamed from the procedure.  I did call the patient to follow up, and he clarified that he'd been seen by urology today with no retention but plans for a urinalysis.    At 17:56 I received  another text message stating that he was not doing well.  I replied by text message explaining that there was little I could do by phone. I advised him to seek an evaluation at the ED to help with his pain control and urinary symptoms.    He was seen in the ED this morning for Trouble Urinating, Retention, and what he described as Bladder Spasms.  His CT Abd and Pelvis this morning was suspicious for a tiny pseudoaneurysm, and a referral to Vascular Surgery.    During my first two cases today, I received four text messages from Mr. Liao.  When I was able, I called Mr. Liao using my work phone to follow up.  He explained what he had experienced, and he noted that he was still having pain control problems. I offered that he return to the ED as his pain was so severe. I also explained that for better continuity of care, he should connect with me via [employer] or through our office.  He voiced understanding at that time.    I reached using my work phone this afternoon to follow up.  I spoke with his Mrs. Liao, as the patient was sleeping.  She noted that he'd had better pain control with Tramadol and was planning on alternating his Tramadol and Tylenol.  I also explained that I would reach out via [employer] to follow up as well.

## 2024-06-21 NOTE — TELEPHONE ENCOUNTER
S/w patient explained we spoke w/ our provider and they would like him to have a pseudoanuresym u/s study done at [facility] in 7-10 days to see if there was any growth of the anuersym, then to follow w/ VS. Appt for u/s 6/28/24 OV 7/11/24 w/ lmd. Pt will be moved up if needed

## 2024-06-21 NOTE — DISCHARGE INSTRUCTIONS
Follow-up with vascular surgery, urology, PCP as needed.  Take medicine as directed.  If any symptoms worsen or new symptoms return to the ER.

## 2024-06-21 NOTE — TELEPHONE ENCOUNTER
Pt's wife called again. I let her know that their request has been sent along and that someone would be in touch when something could be made available to them.

## 2024-06-21 NOTE — ED PROVIDER NOTES
History  Chief Complaint   Patient presents with    Urinary Retention     Pt states he had cardiac stents placed on Monday, pt is now c/o trouble urinating, and retention.      The patient is a 69 y.o. male with a history of arthritis, asthma, chronic pain, colon polyp, COPD, emphysema, GERD, headache, hyperlipidemia, migraine, obesity, cardiac stents who presents to [facility] with a chief complaint of right lower abdominal/groin pain. Symptoms began after his cardiac cath on Monday and have been intermittent since onset. His pain is currently rated as a 8/10 in severity and described as sharp/spasms without radiation. Associated symptoms include ecchymosis of the groin. Symptoms are aggravated with none noted and alleviating factors include none noted. The patient denies fever, chills, night sweats, cough, sputum, wheezing, chest pain, shortness of breath, dysuria, frequency, urgency, hematuria, diarrhea, nausea, vomiting, falls, trauma. Patient reports that his doctors sent him to urology due to thinking he had possible urinary retention after cardiac cath. Patient saw urology today and per note had no urinary retention. Patient was also recently in the ER with similar symptoms. No other reported symptoms at this time.  Patient reports feeling as though his bladder is spasming          History provided by:  Patient   used: No        Prior to Admission Medications   Prescriptions Last Dose Informant Patient Reported? Taking?   Georgina 1 MG TBEC  Self No No   Sig: Take 1 tablet (1 mg total) by mouth in the morning   Tapinarof (Vtama) 1 % CREA   No No   Sig: Apply 1 Application topically in the morning   Trelegy Ellipta 100-62.5-25 MCG/ACT inhaler   No No   Sig: Inhale 1 puff daily   Ubrelvy 100 MG tablet  Self No No   Sig: Take 1 tablet (100 mg total) by mouth daily as needed (migraine)   albuterol (PROVENTIL HFA,VENTOLIN HFA) 90 mcg/act inhaler  Self No No   Sig: INHALE 1-2 PUFFS  EVERY 4-6 HOURS AS NEEDED AND AS DIRECTED.   aspirin 81 mg chewable tablet   No No   Sig: Chew 1 tablet (81 mg total) daily   atorvastatin (LIPITOR) 80 mg tablet   No No   Sig: Take 1 tablet (80 mg total) by mouth every evening   budesonide (PULMICORT) 0.5 mg/2 mL nebulizer solution  Self No No   Sig: TAKE 2 ML (0.5 MG TOTAL) BY NEBULIZATION TWICE A DAY RINSE MOUTH AFTER USE   clopidogrel (PLAVIX) 75 mg tablet   No No   Sig: Take 1 tablet (75 mg total) by mouth daily   famotidine (PEPCID) 20 mg tablet   No No   Sig: Take 1 tablet (20 mg total) by mouth 2 (two) times a day   ipratropium-albuterol (DUO-NEB) 0.5-2.5 mg/3 mL nebulizer solution  Self No No   Sig: TAKE THREE ML BY NEBULIZATION 4 (FOUR) TIMES A DAY   nitroglycerin (NITROSTAT) 0.4 mg SL tablet   No No   Sig: Place 1 tablet (0.4 mg total) under the tongue every 5 (five) minutes as needed for chest pain   oxyCODONE (Roxicodone) 5 immediate release tablet   No No   Sig: Take 1 tablet (5 mg total) by mouth every 4 (four) hours as needed for moderate pain for up to 12 doses Max Daily Amount: 30 mg   promethazine-dextromethorphan (PHENERGAN-DM) 6.25-15 mg/5 mL oral syrup  Self No No   Sig: Take 5 mL by mouth 4 (four) times a day as needed for cough   traMADol (Ultram) 50 mg tablet   No No   Sig: Take 1 tablet (50 mg total) by mouth every 6 (six) hours as needed for severe pain      Facility-Administered Medications: None       Past Medical History:   Diagnosis Date    Arthritis     Asthma     Chronic pain 01/30/2024    cervic and lumbar    Colon polyp     COPD (chronic obstructive pulmonary disease) (HCC)     Emphysema of lung (HCC)     GERD (gastroesophageal reflux disease) 01/30/2024    Headache(784.0)     Hyperlipidemia 01/30/2024    Migraine     Obesity     Sleep apnea        Past Surgical History:   Procedure Laterality Date    APPENDECTOMY      CARDIAC CATHETERIZATION  6/17/2024    Procedure: Cardiac catheterization;  Surgeon: Sarah Noland DO;   Location: [facility];  Service: Cardiology    CARDIAC CATHETERIZATION N/A 6/17/2024    Procedure: Cardiac Coronary Angiogram;  Surgeon: Sarah Noland DO;  [facility]: [facility];  Service: Cardiology    CARDIAC CATHETERIZATION N/A 6/17/2024    Procedure: Cardiac FFR/IFR;  Surgeon: Sarah Noland DO;  [facility];  Service: Cardiology    CARDIAC CATHETERIZATION N/A 6/17/2024    Procedure: Cardiac pci;  Surgeon: Sarah Noland DO;  [facility];  Service: Cardiology    CARDIAC CATHETERIZATION N/A 6/17/2024    Procedure: Cardiac IVUS;  Surgeon: Sarah Noland DO;  [facility]: [facility];  Service: Cardiology    COLONOSCOPY      HERNIA REPAIR      4 times    NEPHRECTOMY LIVING DONOR Left 10/2009    MA EXCISION HYDROCELE UNILATERAL Left 01/30/2024    Procedure: HYDROCELECTOMY;  Surgeon: Evan Salmeron MD;  Location: [facility];  Service: Urology       Family History   Problem Relation Age of Onset    Breast cancer Mother     Heart disease Father     Breast cancer Maternal Grandmother     Heart disease Paternal Grandmother      I have reviewed and agree with the history as documented.    E-Cigarette/Vaping    E-Cigarette Use Former User      E-Cigarette/Vaping Substances    Nicotine No     THC No     CBD No     Flavoring No     Other No     Unknown No      Social History     Tobacco Use    Smoking status: Former     Current packs/day: 0.00     Average packs/day: 2.0 packs/day for 39.0 years (78.0 ttl pk-yrs)     Types: Cigarettes     Start date: 1/1/1970     Quit date: 1/1/2009     Years since quitting: 15.4    Smokeless tobacco: Never   Vaping Use    Vaping status: Former   Substance Use Topics    Alcohol use: Yes     Comment: Occasional    Drug use: No       Review of Systems   Constitutional:  Negative for chills and fever.   HENT:  Negative for ear pain and sore throat.    Eyes:  Negative for pain and visual disturbance.   Respiratory:   Negative for cough and shortness of breath.    Cardiovascular:  Negative for chest pain and palpitations.   Gastrointestinal:  Positive for abdominal pain. Negative for vomiting.   Genitourinary:  Negative for dysuria and hematuria.   Musculoskeletal:  Positive for myalgias. Negative for arthralgias and back pain.   Skin:  Negative for color change and rash.   Neurological:  Negative for seizures and syncope.   All other systems reviewed and are negative.      Physical Exam  Physical Exam  Vitals reviewed.   Constitutional:       General: He is not in acute distress.     Appearance: Normal appearance. He is not ill-appearing.   HENT:      Head: Normocephalic.      Nose: Nose normal.      Mouth/Throat:      Mouth: Mucous membranes are moist.      Pharynx: Oropharynx is clear.   Eyes:      Conjunctiva/sclera: Conjunctivae normal.   Cardiovascular:      Rate and Rhythm: Normal rate.      Pulses: Normal pulses.   Pulmonary:      Effort: Pulmonary effort is normal. No respiratory distress.      Breath sounds: Normal breath sounds. No stridor. No wheezing, rhonchi or rales.   Chest:      Chest wall: No tenderness.   Abdominal:      General: Abdomen is flat. Bowel sounds are normal.      Palpations: Abdomen is soft.      Tenderness: There is abdominal tenderness.   Musculoskeletal:         General: Tenderness present. Normal range of motion.      Right hip: No deformity, lacerations, bony tenderness or crepitus. Normal range of motion. Normal strength.      Right ankle: Normal.      Left ankle: Normal.      Right foot: Normal. Normal range of motion and normal capillary refill. No foot drop, tenderness, bony tenderness or crepitus. Normal pulse.      Left foot: Normal. Normal capillary refill. No crepitus. Normal pulse.        Legs:    Skin:     General: Skin is warm and dry.      Capillary Refill: Capillary refill takes less than 2 seconds.      Findings: Bruising present.   Neurological:      Mental Status: He is alert  and oriented to person, place, and time. Mental status is at baseline.         Vital Signs  ED Triage Vitals   Temperature Pulse Respirations Blood Pressure SpO2   06/21/24 0011 06/21/24 0011 06/21/24 0011 06/21/24 0011 06/21/24 0011   97.7 °F (36.5 °C) 90 20 138/87 96 %      Temp Source Heart Rate Source Patient Position - Orthostatic VS BP Location FiO2 (%)   06/21/24 0011 06/21/24 0011 06/21/24 0011 06/21/24 0011 --   Temporal Monitor Sitting Left arm       Pain Score       06/21/24 0300       9           Vitals:    06/21/24 0011 06/21/24 0130 06/21/24 0315   BP: 138/87 144/74    Pulse: 90 87 88   Patient Position - Orthostatic VS: Sitting           Visual Acuity      ED Medications  Medications   iohexol (OMNIPAQUE) 350 MG/ML injection (MULTI-DOSE) 100 mL (100 mL Intravenous Given 6/21/24 0202)   HYDROcodone-acetaminophen (NORCO) 5-325 mg per tablet 1 tablet (1 tablet Oral Given 6/21/24 0300)       Diagnostic Studies  Results Reviewed       Procedure Component Value Units Date/Time    HS Troponin I 2hr [[id number]]  (Normal) Collected: 06/21/24 0302    Lab Status: Final result Specimen: Blood from Arm, Left Updated: 06/21/24 0333     hs TnI 2hr 16 ng/L      Delta 2hr hsTnI -3 ng/L     HS Troponin 0hr (reflex protocol) [[id number]]  (Normal) Collected: 06/21/24 0054    Lab Status: Final result Specimen: Blood from Arm, Left Updated: 06/21/24 0122     hs TnI 0hr 19 ng/L     B-Type Natriuretic Peptide(BNP) [[id number]]  (Normal) Collected: 06/21/24 0054    Lab Status: Final result Specimen: Blood from Arm, Left Updated: 06/21/24 0121     BNP 86 pg/mL     Comprehensive metabolic panel [[id number]]  (Abnormal) Collected: 06/21/24 0054    Lab Status: Final result Specimen: Blood from Arm, Left Updated: 06/21/24 0117     Sodium 135 mmol/L      Potassium 3.7 mmol/L      Chloride 104 mmol/L      CO2 24 mmol/L      ANION GAP 7 mmol/L      BUN 9 mg/dL      Creatinine 0.81 mg/dL      Glucose 107 mg/dL      Calcium 9.0 mg/dL       AST 25 U/L      ALT 23 U/L      Alkaline Phosphatase 90 U/L      Total Protein 6.7 g/dL      Albumin 3.7 g/dL      Total Bilirubin 1.02 mg/dL      eGFR 91 ml/min/1.73sq m     Narrative:      National Kid[employer] Foundation guidelines for Chronic Kidney Disease (CKD):     Stage 1 with normal or high GFR (GFR > 90 mL/min/1.73 square meters)    Stage 2 Mild CKD (GFR = 60-89 mL/min/1.73 square meters)    Stage 3A Moderate CKD (GFR = 45-59 mL/min/1.73 square meters)    Stage 3B Moderate CKD (GFR = 30-44 mL/min/1.73 square meters)    Stage 4 Severe CKD (GFR = 15-29 mL/min/1.73 square meters)    Stage 5 End Stage CKD (GFR <15 mL/min/1.73 square meters)  Note: GFR calculation is accurate only with a steady state creatinine    Protime-INR [[id number]]  (Normal) Collected: 06/21/24 0054    Lab Status: Final result Specimen: Blood from Arm, Left Updated: 06/21/24 0113     Protime 12.8 seconds      INR 0.91    APTT [[id number]]  (Normal) Collected: 06/21/24 0054    Lab Status: Final result Specimen: Blood from Arm, Left Updated: 06/21/24 0113     PTT 28 seconds     UA w Reflex to Microscopic w Reflex to Culture [[id number]]  (Abnormal) Collected: 06/21/24 0101    Lab Status: Final result Specimen: Urine, Clean Catch Updated: 06/21/24 0107     Color, UA Light Yellow     Clarity, UA Clear     Specific Gravity, UA 1.015     pH, UA 7.0     Leukocytes, UA Negative     Nitrite, UA Negative     Protein, UA Negative mg/dl      Glucose, UA Negative mg/dl      Ketones, UA Trace mg/dl      Urobilinogen, UA 2.0 mg/dl      Bilirubin, UA Negative     Occult Blood, UA Negative    CBC and differential [[id number]]  (Abnormal) Collected: 06/21/24 0054    Lab Status: Final result Specimen: Blood from Arm, Left Updated: 06/21/24 0100     WBC 9.96 Thousand/uL      RBC 4.55 Million/uL      Hemoglobin 10.4 g/dL      Hematocrit 33.1 %      MCV 73 fL      MCH 22.9 pg      MCHC 31.4 g/dL      RDW 16.4 %      MPV 10.6 fL      Platelets 324 Thousands/uL       nRBC 0 /100 WBCs      Segmented % 71 %      Immature Grans % 1 %      Lymphocytes % 16 %      Monocytes % 10 %      Eosinophils Relative 2 %      Basophils Relative 0 %      Absolute Neutrophils 7.13 Thousands/µL      Absolute Immature Grans 0.06 Thousand/uL      Absolute Lymphocytes 1.56 Thousands/µL      Absolute Monocytes 0.95 Thousand/µL      Eosinophils Absolute 0.23 Thousand/µL      Basophils Absolute 0.03 Thousands/µL                    CT abdomen pelvis with contrast   Final Result by Jasen Moran MD (06/21 0316)      7 mm branching focus of enhancement arising from the anterior aspect of the right common femoral artery (2:146), slightly increased in size and more conspicuous from prior recent study, suspicious for tiny pseudoaneurysm. Similar adjacent subcutaneous    infiltrative changes in the groin region from of recent access/intervention without discrete hematoma. Vascular consultation advised.      Similar small volume retroperitoneal hemorrhage and fatty stranding in the lower abdomen and pelvis, right greater than left.      Aforementioned findings discussed with EMELY Hanson at 3:00 a.m. on 6/21/2024.         Workstation performed: [id number]                    Procedures  Procedures         ED Course  ED Course as of 06/21/24 0727   Fri Jun 21, 2024   0126 hs TnI 0hr: 19   0336 Delta 2hr hsTnI: -3             HEART Risk Score      Flowsheet Row Most Recent Value   Heart Score Risk Calculator    History 0 Filed at: 06/21/2024 0726   ECG 1 Filed at: 06/21/2024 0726   Age 2 Filed at: 06/21/2024 0726   Risk Factors 2 Filed at: 06/21/2024 0726   Troponin 1 Filed at: 06/21/2024 0726   HEART Score 6 Filed at: 06/21/2024 0726                                        Medical Decision Making  Abdominal exam without peritoneal signs. No evidence of acute abdomen at this time. Well appearing. Given work up, low suspicion for acute hepatobiliary disease (including acute cholecystitis or cholangitis), acute  pancreatitis (neg lipase), PUD (including gastric perforation), acute infectious processes (pneumonia, hepatitis, pyelonephritis), acute appendicitis, vascular catastrophe, bowel obstruction, viscus perforation, or testicular torsion, diverticulitis. CXR without acute cardiopulmonary disease. EKG and trop without signs of ischemia. Patient is neurovascularly intact. CT of the abdomen and pelvis showed 7 mm branching focus of enhancement arising from the anterior aspect of the right common femoral artery (2:146), slightly increased in size and more conspicuous from prior recent study, suspicious for tiny pseudoaneurysm. Similar adjacent subcutaneous   infiltrative changes in the groin region from of recent access/intervention without discrete hematoma. Vascular consultation advised.  Similar small volume retroperitoneal hemorrhage and fatty stranding in the lower abdomen and pelvis, right greater than left.  Discussed the results with the patient. Patient will take his home pain medicine as prescribed, talk to urology about bladder spasms due to the fluid around his bladder and follow up with vascular surgery for possible small pseudoaneurysm. Prior to discharge, discharge instructions were discussed with patient at bedside. Patient was provided both verbal and written instructions. Patient is understanding of the discharge instructions and is agreeable to plan of care. Return precautions were discussed with patient bedside, patient verbalized understanding of signs and symptoms that would necessitate return to the ED. All questions were answered. Patient was comfortable with the plan of care and discharged to home.       Problems Addressed:  Abnormal CT scan: acute illness or injury  Groin pain: acute illness or injury    Amount and/or Complexity of Data Reviewed  Labs: ordered. Decision-making details documented in ED Course.  Radiology: ordered.    Risk  Prescription drug management.             Disposition  Final  diagnoses:   Groin pain   Abnormal CT scan     Time reflects when diagnosis was documented in both MDM as applicable and the Disposition within this note       Time User Action Codes Description Comment    6/21/2024  3:51 AM Baljinder Hanson Add [R10.30] Groin pain     6/21/2024  3:51 AM Baljinder Hanson Add [R93.89] Abnormal CT scan           ED Disposition       ED Disposition   Discharge    Condition   Stable    Date/Time   Fri Jun 21, 2024 0350    Comment   Danie Liao discharge to home/self care.                   Follow-up Information       Follow up With Specialties Details Why Contact Info Additional Information    Gosia Mcgill MD Family Medicine Schedule an appointment as soon as possible for a visit   [address]  [facility] [address] [phone]       [facility] Emergency Medicine  If symptoms worsen [address] [facility] [id number] [phone] [facility], [address], [place], [address]            Discharge Medication List as of 6/21/2024  3:53 AM        CONTINUE these medications which have NOT CHANGED    Details   albuterol (PROVENTIL HFA,VENTOLIN HFA) 90 mcg/act inhaler INHALE 1-2 PUFFS EVERY 4-6 HOURS AS NEEDED AND AS DIRECTED., Starting Mon 1/15/2024, Normal      aspirin 81 mg chewable tablet Chew 1 tablet (81 mg total) daily, Starting Wed 6/19/2024, No Print      atorvastatin (LIPITOR) 80 mg tablet Take 1 tablet (80 mg total) by mouth every evening, Starting Tue 6/18/2024, Normal      budesonide (PULMICORT) 0.5 mg/2 mL nebulizer solution TAKE 2 ML (0.5 MG TOTAL) BY NEBULIZATION TWICE A DAY RINSE MOUTH AFTER USE, Normal      clopidogrel (PLAVIX) 75 mg tablet Take 1 tablet (75 mg total) by mouth daily, Starting Wed 6/19/2024, Normal      famotidine (PEPCID) 20 mg tablet Take 1 tablet (20 mg total) by mouth 2 (two) times a day, Starting Mon 6/17/2024, Normal       ipratropium-albuterol (DUO-NEB) 0.5-2.5 mg/3 mL nebulizer solution TAKE THREE ML BY NEBULIZATION 4 (FOUR) TIMES A DAY, Normal      nitroglycerin (NITROSTAT) 0.4 mg SL tablet Place 1 tablet (0.4 mg total) under the tongue every 5 (five) minutes as needed for chest pain, Starting Tue 6/18/2024, Normal      oxyCODONE (Roxicodone) 5 immediate release tablet Take 1 tablet (5 mg total) by mouth every 4 (four) hours as needed for moderate pain for up to 12 doses Max Daily Amount: 30 mg, Starting Wed 6/19/2024, Print      promethazine-dextromethorphan (PHENERGAN-DM) 6.25-15 mg/5 mL oral syrup Take 5 mL by mouth 4 (four) times a day as needed for cough, Starting Mon 3/25/2024, Normal      Georgina 1 MG TBEC Take 1 tablet (1 mg total) by mouth in the morning, Starting Tue 11/14/2023, Normal      Tapinarof (Vtama) 1 % CREA Apply 1 Application topically in the morning, Starting Wed 6/19/2024, Normal      traMADol (Ultram) 50 mg tablet Take 1 tablet (50 mg total) by mouth every 6 (six) hours as needed for severe pain, Starting Wed 6/19/2024, Normal      Trelegy Ellipta 100-62.5-25 MCG/ACT inhaler Inhale 1 puff daily, Starting Mon 6/17/2024, Normal      Ubrelvy 100 MG tablet Take 1 tablet (100 mg total) by mouth daily as needed (migraine), Starting Fri 5/17/2024, Normal                 PDMP Review         Value Time User    PDMP Reviewed  Yes 2/19/2024  3:30 PM Evan Salmeron MD            ED Provider  Electronically Signed by             Baljinder Hanson PA-C  06/21/24 5197

## 2024-06-22 DIAGNOSIS — J44.9 COPD (CHRONIC OBSTRUCTIVE PULMONARY DISEASE) (HCC): ICD-10-CM

## 2024-06-22 LAB
ATRIAL RATE: 76 BPM
ATRIAL RATE: 95 BPM
P AXIS: 46 DEGREES
P AXIS: 70 DEGREES
PR INTERVAL: 164 MS
PR INTERVAL: 168 MS
QRS AXIS: -9 DEGREES
QRS AXIS: 1 DEGREES
QRSD INTERVAL: 90 MS
QRSD INTERVAL: 96 MS
QT INTERVAL: 338 MS
QT INTERVAL: 388 MS
QTC INTERVAL: 424 MS
QTC INTERVAL: 436 MS
T WAVE AXIS: 14 DEGREES
T WAVE AXIS: 38 DEGREES
VENTRICULAR RATE: 76 BPM
VENTRICULAR RATE: 95 BPM

## 2024-06-22 PROCEDURE — 93010 ELECTROCARDIOGRAM REPORT: CPT | Performed by: INTERNAL MEDICINE

## 2024-06-24 ENCOUNTER — TELEPHONE (OUTPATIENT)
Age: 69
End: 2024-06-24

## 2024-06-24 ENCOUNTER — TRANSITIONAL CARE MANAGEMENT (OUTPATIENT)
Dept: FAMILY MEDICINE CLINIC | Facility: CLINIC | Age: 69
End: 2024-06-24

## 2024-06-24 ENCOUNTER — OFFICE VISIT (OUTPATIENT)
Dept: FAMILY MEDICINE CLINIC | Facility: CLINIC | Age: 69
End: 2024-06-24
Payer: COMMERCIAL

## 2024-06-24 VITALS
HEIGHT: 66 IN | SYSTOLIC BLOOD PRESSURE: 136 MMHG | OXYGEN SATURATION: 95 % | BODY MASS INDEX: 34.72 KG/M2 | HEART RATE: 84 BPM | TEMPERATURE: 97.6 F | WEIGHT: 216 LBS | DIASTOLIC BLOOD PRESSURE: 72 MMHG

## 2024-06-24 DIAGNOSIS — J44.9 CHRONIC OBSTRUCTIVE PULMONARY DISEASE, UNSPECIFIED COPD TYPE (HCC): ICD-10-CM

## 2024-06-24 DIAGNOSIS — I25.118 CORONARY ARTERY DISEASE OF NATIVE ARTERY OF NATIVE HEART WITH STABLE ANGINA PECTORIS (HCC): ICD-10-CM

## 2024-06-24 DIAGNOSIS — I72.9 PSEUDOANEURYSM (HCC): Primary | ICD-10-CM

## 2024-06-24 DIAGNOSIS — I25.118 CORONARY ARTERY DISEASE OF NATIVE HEART WITH STABLE ANGINA PECTORIS, UNSPECIFIED VESSEL OR LESION TYPE (HCC): ICD-10-CM

## 2024-06-24 PROBLEM — N50.89 TESTICULAR SWELLING, LEFT: Status: RESOLVED | Noted: 2023-08-24 | Resolved: 2024-06-24

## 2024-06-24 PROCEDURE — 99496 TRANSJ CARE MGMT HIGH F2F 7D: CPT | Performed by: STUDENT IN AN ORGANIZED HEALTH CARE EDUCATION/TRAINING PROGRAM

## 2024-06-24 RX ORDER — FLUTICASONE FUROATE, UMECLIDINIUM BROMIDE AND VILANTEROL TRIFENATATE 100; 62.5; 25 UG/1; UG/1; UG/1
1 POWDER RESPIRATORY (INHALATION) DAILY
Qty: 60 BLISTER | Refills: 0 | Status: SHIPPED | OUTPATIENT
Start: 2024-06-24

## 2024-06-24 RX ORDER — TRAMADOL HYDROCHLORIDE 50 MG/1
50 TABLET ORAL EVERY 6 HOURS PRN
Qty: 40 TABLET | Refills: 0 | Status: SHIPPED | OUTPATIENT
Start: 2024-06-24

## 2024-06-24 NOTE — TELEPHONE ENCOUNTER
Received call from pt.  He calls asking if he is okay to take Tramadol with his cardiac medications, noting he is taking Plavix.    Please advise.

## 2024-06-24 NOTE — PROGRESS NOTES
Transition of Care Visit  Name: Danie Liao      : 1955      MRN: [id number]  Encounter Provider: Gosia Mcgill MD  Encounter Date: 2024   Encounter department: [facility] [address]    Assessment & Plan   1. Pseudoaneurysm (HCC)  -     Ambulatory Referral to Vascular Surgery; Future  2. Coronary artery disease of native artery of native heart with stable angina pectoris (HCC)  3. Chronic obstructive pulmonary disease, unspecified COPD type (HCC)  4. Coronary artery disease of native heart with stable angina pectoris, unspecified vessel or lesion type (HCC)  -     traMADol (Ultram) 50 mg tablet; Take 1 tablet (50 mg total) by mouth every 6 (six) hours as needed for severe pain    Call cardio for recommendations for what to take in place        History of Present Illness     Transitional Care Management Review:   Danie Liao is a 69 y.o. male here for TCM follow up.     During the TCM phone call patient stated:  TCM Call     Date and time call was made  2024  8:55 AM    Hospital care reviewed  Records reviewed    Patient was hospitialized at  [facility]    Date of Admission  24    Date of discharge  24    Diagnosis  Coronary artery disease of native heart with stable angina pectoris    Disposition  Home    Were the patients medications reviewed and updated  Yes    Current Symptoms  None      TCM Call     Post hospital issues  None    Should patient be enrolled in anticoag monitoring?  No    Scheduled for follow up?  Yes    Did you obtain your prescribed medications  Yes    Do you need help managing your prescriptions or medications  No    Is transportation to your appointment needed  No    I have advised the patient to call PCP with any new or worsening symptoms  Ashly Denis/ Practice Coordinator    Living Arrangements  Spouse or Significiant other    Support System  Spouse    The type of support provided  Emotional;  "Physical    Do you have social support  Yes, as much as I need        Cardiac stent placement    [facility], had some complications. Having bladder spasms, but in the R groin. Has been using azo otc    Stopped plavix because he is getting SOB      Review of Systems   Constitutional:  Negative for activity change, appetite change, chills, fatigue and fever.   HENT:  Negative for congestion, rhinorrhea and sore throat.    Eyes:  Negative for visual disturbance.   Respiratory:  Positive for shortness of breath. Negative for cough.    Cardiovascular:  Negative for chest pain and palpitations.   Gastrointestinal:  Negative for abdominal pain, constipation, diarrhea, nausea and vomiting.   Genitourinary:  Positive for frequency and urgency. Negative for difficulty urinating and dysuria.   Musculoskeletal:  Negative for arthralgias and myalgias.   Skin:  Negative for color change and rash.   Neurological:  Negative for seizures, weakness and headaches.     Objective     /72   Pulse 84   Temp 97.6 °F (36.4 °C)   Ht 5' 6\" (1.676 m)   Wt 98 kg (216 lb)   SpO2 95%   BMI 34.86 kg/m²     Physical Exam  Constitutional:       General: He is not in acute distress.     Appearance: Normal appearance. He is not ill-appearing.   HENT:      Head: Normocephalic and atraumatic.      Right Ear: Tympanic membrane, ear canal and external ear normal.      Left Ear: Tympanic membrane, ear canal and external ear normal.      Nose: Nose normal.      Mouth/Throat:      Mouth: Mucous membranes are moist.      Pharynx: Oropharynx is clear. No oropharyngeal exudate or posterior oropharyngeal erythema.   Eyes:      General: No scleral icterus.        Right eye: No discharge.         Left eye: No discharge.      Extraocular Movements: Extraocular movements intact.      Conjunctiva/sclera: Conjunctivae normal.      Pupils: Pupils are equal, round, and reactive to light.   Cardiovascular:      Rate and Rhythm: Normal rate and regular rhythm. "      Pulses: Normal pulses.      Heart sounds: Normal heart sounds. No murmur heard.  Pulmonary:      Effort: Pulmonary effort is normal. No respiratory distress.      Breath sounds: Normal breath sounds.   Abdominal:      General: Bowel sounds are normal.      Palpations: Abdomen is soft.      Tenderness: There is no abdominal tenderness.   Musculoskeletal:         General: Normal range of motion.      Cervical back: Normal range of motion and neck supple.   Lymphadenopathy:      Cervical: No cervical adenopathy.   Skin:     General: Skin is warm and dry.      Capillary Refill: Capillary refill takes less than 2 seconds.   Neurological:      General: No focal deficit present.      Mental Status: He is alert and oriented to person, place, and time. Mental status is at baseline.      Cranial Nerves: No cranial nerve deficit.   Psychiatric:         Mood and Affect: Mood normal.       Medications have been reviewed by provider in current encounter

## 2024-06-25 NOTE — TELEPHONE ENCOUNTER
Pt called to check the status of his appt. Confirmed with the pt he is scheduled for 7/11. Pt understood. Added appt notes and put him on the waitlist for all locations and VS's.

## 2024-06-28 ENCOUNTER — PATIENT MESSAGE (OUTPATIENT)
Dept: UROLOGY | Facility: CLINIC | Age: 69
End: 2024-06-28

## 2024-06-28 ENCOUNTER — HOSPITAL ENCOUNTER (OUTPATIENT)
Dept: VASCULAR ULTRASOUND | Facility: HOSPITAL | Age: 69
Discharge: HOME | End: 2024-06-28
Payer: COMMERCIAL

## 2024-06-28 ENCOUNTER — TELEPHONE (OUTPATIENT)
Age: 69
End: 2024-06-28

## 2024-06-28 DIAGNOSIS — I72.4 FEMORAL ARTERY PSEUDO-ANEURYSM, RIGHT (HCC): ICD-10-CM

## 2024-06-28 PROCEDURE — 93926 LOWER EXTREMITY STUDY: CPT | Performed by: SURGERY

## 2024-06-28 PROCEDURE — 93926 LOWER EXTREMITY STUDY: CPT

## 2024-06-28 NOTE — TELEPHONE ENCOUNTER
Spoke to patient and he states he has pain and pressure in groin area. Pain scale is a 10. Slight difficulty walking. He struggles to void and he is voiding and urinary stream varies. No pain medication helps and Tramadol does not help. He states the ER does not help. He would like to know what to do. Reviewed ER precautions/patient refused.

## 2024-06-28 NOTE — TELEPHONE ENCOUNTER
Can have him go for urine testing/urine culture. Appears he had recent imaging without acute urologic findings. Can bring in for RN PVR check to ensure he not retaining. Otherwise he will need in office visit for physical examination/symptom assessment as scheduled 7/11. ER precautions for severe 10/10 pain

## 2024-07-01 ENCOUNTER — PATIENT MESSAGE (OUTPATIENT)
Dept: UROLOGY | Facility: CLINIC | Age: 69
End: 2024-07-01

## 2024-07-02 ENCOUNTER — OFFICE VISIT (OUTPATIENT)
Dept: CARDIOLOGY CLINIC | Facility: CLINIC | Age: 69
End: 2024-07-02
Payer: COMMERCIAL

## 2024-07-02 ENCOUNTER — TELEPHONE (OUTPATIENT)
Dept: UROLOGY | Facility: CLINIC | Age: 69
End: 2024-07-02

## 2024-07-02 ENCOUNTER — PATIENT MESSAGE (OUTPATIENT)
Dept: UROLOGY | Facility: CLINIC | Age: 69
End: 2024-07-02

## 2024-07-02 VITALS
DIASTOLIC BLOOD PRESSURE: 70 MMHG | SYSTOLIC BLOOD PRESSURE: 130 MMHG | RESPIRATION RATE: 16 BRPM | OXYGEN SATURATION: 97 % | WEIGHT: 209 LBS | BODY MASS INDEX: 33.59 KG/M2 | HEART RATE: 71 BPM | HEIGHT: 66 IN

## 2024-07-02 DIAGNOSIS — Z95.5 S/P DRUG ELUTING CORONARY STENT PLACEMENT: ICD-10-CM

## 2024-07-02 DIAGNOSIS — E78.5 HYPERLIPIDEMIA: ICD-10-CM

## 2024-07-02 DIAGNOSIS — I25.10 CORONARY ARTERY DISEASE INVOLVING NATIVE CORONARY ARTERY OF NATIVE HEART WITHOUT ANGINA PECTORIS: Primary | ICD-10-CM

## 2024-07-02 DIAGNOSIS — R42 EPISODIC LIGHTHEADEDNESS: ICD-10-CM

## 2024-07-02 DIAGNOSIS — D64.9 ANEMIA, UNSPECIFIED TYPE: ICD-10-CM

## 2024-07-02 PROCEDURE — 93000 ELECTROCARDIOGRAM COMPLETE: CPT

## 2024-07-02 PROCEDURE — 99214 OFFICE O/P EST MOD 30 MIN: CPT

## 2024-07-02 RX ORDER — NITROGLYCERIN 0.4 MG/1
0.4 TABLET SUBLINGUAL
Qty: 30 TABLET | Refills: 3 | Status: SHIPPED | OUTPATIENT
Start: 2024-07-02

## 2024-07-02 RX ORDER — ASPIRIN 81 MG/1
81 TABLET, CHEWABLE ORAL DAILY
Qty: 90 TABLET | Refills: 3 | Status: SHIPPED | OUTPATIENT
Start: 2024-07-02

## 2024-07-02 NOTE — TELEPHONE ENCOUNTER
----- Message from Joe Daily PA-C sent at 7/2/2024 12:48 PM EDT -----  Good afternoon,    I saw this patient in office this morning, and he states he is experiencing significant bladder spasms.  He has an upcoming appointment at the [place] office on 7/12.  I wanted to reach out and ask if there is any earlier availability for an appointment for this patient given his significant symptoms?  He does state he will not see a female provider or a PA.    Thank you,    Joe

## 2024-07-02 NOTE — PROGRESS NOTES
CARDIOLOGY OFFICE VISIT  [facility]  [address] [place], [place] [id number]  [address] [place], [place] [address]  Tel: [phone]      NAME: Danie Laio  AGE: 69 y.o.  SEX: male  : 1955  MRN: [id number]      Chief Complaint:  Chief Complaint   Patient presents with    Follow-up       Assessment and Plan:    1.  CAD s/p SHELL to D1 (2024)  Continue DAPT with aspirin and Plavix.  Importance of taking both of these medications without missing any doses reinforced with patient  Continue atorvastatin, PRN SL nitro.  Will hold off on initiation of beta-blocker due to severe COPD.   Recommend cardiac rehab once patient has been evaluated for his multiple noncardiac concerns and would be better able to tolerate cardiac rehab. Referral has previously been placed.   Recommend follow-up with Dr. Noland.   He was advised to report anginal symptoms to our office.    2.  Episodic lightheadedness/Palpitations  Event monitor ordered     4.  Anemia s/p cardiac catheterization  CBC ordered    5. Hyperlipidemia  Continue statin  FLP ordered to be completed in 3 months    6.  S/p left nephrectomy as kidney donor (10/2009)    7.  IFG (A1c 6.0)    8.  Moderate to Severe COPD  Follows with pulmonology       Given patient's ongoing symptoms of bladder spasms and right groin pain s/p cardiac catheterization, I messaged urology and vascular clerical teams to see if there is availability for an earlier appointment for this patient. Patient was advised to proceed to the ER for any worsening of his symptoms.       Patient was additionally seen and examined by Dr. Rivas.      Please return for follow-up in 2 months or sooner as needed.       1. Coronary artery disease involving native coronary artery of native heart without angina pectoris        2. S/P drug eluting coronary stent placement  aspirin 81 mg chewable tablet    nitroglycerin (NITROSTAT) 0.4 mg SL tablet      3. Episodic  lightheadedness  POCT ECG    AMB event recorder      4. Anemia, unspecified type  CBC and differential      5. Hyperlipidemia  Lipid Panel with Direct LDL reflex          History of Present Illness:     Danie Liao is a 69 y.o. male with PMHx of CAD s/p PCI, hyperlipidemia, s/p left nephrectomy as kidney donor (10/2009), LEATHA, COPD, IFG, former smoker who presents for cardiac catheterization follow-up.  This patient is known to Dr. Villegas.    He was evaluated with dobutamine MPI, which revealed fixed inferior defect and TID.  He subsequently underwent cardiac catheterization at [facility] 6/17/24 which revealed 65% stenosis of D1 (iFR Positive) s/p IVUS guided PCI with SHELL placement; LCx with one 50% and one 60% lesion both IFR negative; RCA with minimal luminal irregularities. R radial access was attempted and unsuccessful, right femoral access was obtained.  He did have significant blood loss after cardiac catheterization and hemoglobin was noted to decrease. No hematoma noted prior to discharge after catheterization per chart review.     He was seen in the ED 6/19/2024 with complaints of shortness of breath, urinary retention, suprapubic abdominal pain, chronic pain. He was noted to have ecchymosis of the R groin. Troponin negative x2. CT abdomen/pelvis with no evidence of dissection or pseudoaneurysm.  He again presented to the ED 6/21/2024 with urinary retention, abdominal/groin pain.  Troponins negative x 2.  He underwent repeat CT abdomen pelvis which revealed small volume retroperitoneal hemorrhage not significantly changed from previous study.  7 mm branching focus of linear enhancement arising from the common femoral artery suspicious for tiny pseudoaneurysm.  Subsequently underwent pseudoaneurysm study 6/20/2024 with no evidence of pseudoaneurysm or AV fistula.    He is doing well from a cardiac standpoint s/p catheterization and denies chest pain/pressure or worsening of baseline dyspnea on exertion  "associated with his COPD.  He states he has been compliant with his medications including aspirin and Plavix.  States he has not missed any doses of aspirin and Plavix.  States he has not needed sublingual nitro since his procedure.     He presents with multiple noncardiac concerns, all of which she reports have been stable since cardiac catheterization with no worsening since.  He is complaining of bladder \"attacks\" which he describes as sensation of spasm and associated alternating urinary retention and urgency.  He is additionally experiencing pain in the right groin surrounding the cardiac catheterization site which is sometimes associated with bladder spasms but not consistently.  He denies numbness/tingling of the right lower extremity.  He states he did have significant bruising in the right groin region after his cardiac catheterization, which has since significantly improved.    He was seen by urology for his bladder symptoms and has an upcoming appointment with them on 7/12.  He states he will not see a female or PA in the urology office.  He also has an upcoming appointment with vascular for evaluation of right groin pain and follow-up on recent imaging studies.    He is experiencing episodes of trouble with deep breathing on a daily basis.  He has baseline dyspnea on exertion due to COPD, and states his dyspnea on exertion is at his baseline.  He states the trouble with deep breathing is new since the cardiac catheterization.  He describes it as occasional intermittent difficulty taking a deep breath. He states other sometimes when trying to take a deep breath, he is at his baseline level of dyspnea. He states it does occasionally wake him up at night, but predominantly he has difficulties sleeping due to pain and bladder spasms at night rather than breathing.  He denies lower extremity edema or weight gain.    He is also experiencing episodic lightheadedness which is occasionally associated with a " "sensation of fluttering and shortness of breath.  He denies syncopal episodes.  He has additionally noted episodes of sensation of heart racing associated with bladder spasms.      Recent Cardiac Work-Up:  Cardiac catheterization 6/17/2024 at [facility]: IFR +65% stenosis of D1 s/p IVUS guided SHELL placement; LCx with one 50% and one 60% IFR negative lesions; RCA with minimal luminal irregularities  Dobutamine MPI 4/5/2024: Occasional PVCs, EKG negative for ischemia.  Dyspnea and chest pain occurred during testing.  Fixed small inferior defect. + TID (ratio 1.57).      Review of Systems:   Review of Systems   Constitutional:  Negative for diaphoresis, fever and unexpected weight change.   HENT:  Negative for ear pain and sore throat.    Eyes:  Negative for pain and redness.   Respiratory:  Positive for shortness of breath. Negative for cough.    Cardiovascular:  Positive for palpitations. Negative for chest pain and leg swelling.   Gastrointestinal:  Negative for blood in stool and vomiting.   Genitourinary:  Positive for difficulty urinating. Negative for hematuria.   Skin:  Negative for color change and rash.   Neurological:  Positive for light-headedness. Negative for syncope.   Psychiatric/Behavioral:  Negative for agitation and behavioral problems.          Vitals:  Vitals:    07/02/24 0809   BP: 130/70   BP Location: Left arm   Patient Position: Sitting   Cuff Size: Standard   Pulse: 71   Resp: 16   SpO2: 97%   Weight: 94.8 kg (209 lb)   Height: 5' 6\" (1.676 m)        Body mass index is 33.73 kg/m².    Weight (last 2 days)       Date/Time Weight    07/02/24 0809 94.8 (209)              Physical Exam:   GEN: Alert and oriented x 3, in no acute distress.  Well appearing and well nourished.   HEENT: Sclera anicteric, conjunctivae pink, mucous membranes moist. Oropharynx clear.   NECK: Supple, no carotid bruits, no significant JVD. Trachea midline, no thyromegaly.   HEART: Regular rhythm, normal S1 and S2, no murmurs, " clicks, gallops or rubs. PMI nondisplaced, no thrills.   LUNGS: Clear to auscultation bilaterally; no wheezes, rales, or rhonchi. No increased work of breathing or signs of respiratory distress.   ABDOMEN: Soft, nontender, nondistended.   EXTREMITIES: Skin warm and well perfused, no clubbing, cyanosis, or edema. Cardiac catheterization attempted access site at right radial examined -no evidence of bleeding or hematoma,  strength 5/5 to bilateral hands, sensation grossly intact bilateral hands, radial pulse 2+ B/L, capillary refill less than 2 seconds to distal right fingertips.  Cardiac catheterization access site (right femoral) examined-benign exam with no evidence of bleeding or hematoma.  NEURO: No focal findings. Normal speech. Mood and affect normal.   SKIN: Normal without suspicious lesions on exposed skin.      EKG Reviewed Personally: 7/2/2024: Sinus rhythm, incomplete RBBB        Active Problems:  Patient Active Problem List   Diagnosis    Chronic obstructive pulmonary disease (HCC)    Localized osteoporosis without current pathological fracture    Lumbar disc disease    Intractable migraine without status migrainosus    Kidney donor    Psoriasis    Polyp of colon    Morbid (severe) obesity due to excess calories ([facility])    Obesity, Class II, BMI 35-39.9    Prediabetes    LEATHA (obstructive sleep apnea)    Hyperlipidemia    Chronic pain    GERD (gastroesophageal reflux disease)    Encysted hydrocele    Single kidney    Iron deficiency    Coronary artery disease of native heart with stable angina pectoris ([facility])       Past Medical History:  Past Medical History:   Diagnosis Date    Arthritis     Asthma     Chronic pain 01/30/2024    cervic and lumbar    Colon polyp     COPD (chronic obstructive pulmonary disease) ([facility])     Emphysema of lung (HCC)     GERD (gastroesophageal reflux disease) 01/30/2024    Headache(784.0)     Hyperlipidemia 01/30/2024    Migraine     Obesity     Sleep apnea          Past  Surgical History:  Past Surgical History:   Procedure Laterality Date    APPENDECTOMY      CARDIAC CATHETERIZATION  6/17/2024    Procedure: Cardiac catheterization;  Surgeon: Sarah Noland DO;  Location: [facility];  Service: Cardiology    CARDIAC CATHETERIZATION N/A 6/17/2024    Procedure: Cardiac Coronary Angiogram;  Surgeon: Sarah Noland DO;  Location: [facility];  Service: Cardiology    CARDIAC CATHETERIZATION N/A 6/17/2024    Procedure: Cardiac FFR/IFR;  Surgeon: Sarah Noland DO;  Location: [facility];  Service: Cardiology    CARDIAC CATHETERIZATION N/A 6/17/2024    Procedure: Cardiac pci;  Surgeon: Sarah Noland DO;  Location: [facility];  Service: Cardiology    CARDIAC CATHETERIZATION N/A 6/17/2024    Procedure: Cardiac IVUS;  Surgeon: Sarah Noland DO;  Location: [facility];  Service: Cardiology    COLONOSCOPY      HERNIA REPAIR      4 times    NEPHRECTOMY LIVING DONOR Left 10/2009    SD EXCISION HYDROCELE UNILATERAL Left 01/30/2024    Procedure: HYDROCELECTOMY;  Surgeon: Evan Salmeron MD;  Location: [facility] OR;  Service: Urology         Family History:  Family History   Problem Relation Age of Onset    Breast cancer Mother     Heart disease Father     Breast cancer Maternal Grandmother     Heart disease Paternal Grandmother          Social History:  Social History     Socioeconomic History    Marital status: /Civil Union     Spouse name: None    Number of children: None    Years of education: None    Highest education level: None   Occupational History    None   Tobacco Use    Smoking status: Former     Current packs/day: 0.00     Average packs/day: 2.0 packs/day for 39.0 years (78.0 ttl pk-yrs)     Types: Cigarettes     Start date: 1/1/1970     Quit date: 1/1/2009     Years since quitting: 15.5    Smokeless tobacco: Never   Vaping Use    Vaping status: Former   Substance and Sexual Activity    Alcohol use: Yes     Comment:  "Occasional    Drug use: No    Sexual activity: Yes     Partners: Female   Other Topics Concern    None   Social History Narrative    None     Social Determinants of Health     Financial Resource Strain: Low Risk  (8/24/2023)    Overall Financial Resource Strain (CARDIA)     Difficulty of Paying Living Expenses: Not hard at all   Food Insecurity: Not on file   Transportation Needs: No Transportation Needs (8/24/2023)    PRAPARE - Transportation     Lack of Transportation (Medical): No     Lack of Transportation (Non-Medical): No   Physical Activity: Not on file   Stress: Not on file   Social Connections: Not on file   Intimate Partner Violence: Not on file   Housing Stability: Not on file           The following portions of the patient's history were reviewed and updated as appropriate: past medical history, past surgical history, past family history,  past social history, current medications, allergies and problem list.      Laboratory Results:  CBC with diff:   Lab Results   Component Value Date    WBC 9.96 06/21/2024    RBC 4.55 06/21/2024    HGB 10.4 (L) 06/21/2024    HCT 33.1 (L) 06/21/2024    MCV 73 (L) 06/21/2024    MCH 22.9 (L) 06/21/2024    RDW 16.4 (H) 06/21/2024     06/21/2024       CMP:  Lab Results   Component Value Date    CREATININE 0.81 06/21/2024    BUN 9 06/21/2024    K 3.7 06/21/2024     06/21/2024    CO2 24 06/21/2024    ALKPHOS 90 06/21/2024    ALT 23 06/21/2024    AST 25 06/21/2024    BILIDIR 0.17 06/19/2024       Lab Results   Component Value Date    HGBA1C 6.0 (H) 05/18/2024    MG 2.2 11/30/2023       Lab Results   Component Value Date    TROPONINI <0.02 07/14/2021    TROPONINI <0.02 07/12/2018       Lipid Profile:   No results found for: \"CHOL\"  Lab Results   Component Value Date    HDL 64 05/18/2024     Lab Results   Component Value Date    LDLCALC 107 (H) 05/18/2024     Lab Results   Component Value Date    TRIG 94 05/18/2024       Cardiac testing:   No results found for this " or any previous visit.    No results found for this or any previous visit.    No results found for this or any previous visit.    No results found for this or any previous visit.    Results for orders placed during the hospital encounter of 04/05/24    NM myocardial perfusion spect (rx stress and/or rest)    Interpretation Summary    Resting ECG: The ECG shows normal sinus rhythm.    Stress ECG: A pharmacological stress test was performed using dobutamine. The patient had a maximal HR of 150 bpm (98% of MPHR) . The patient achieved the target heart rate. The patient reported dyspnea and chest pain during the stress test. Symptoms began during stress and ended during recovery.    Stress ECG: Arrhythmias during stress: occasional PVCs. The ECG was negative for ischemia. The stress ECG is negative for ischemia after pharmacologic stress, with reproduction of symptoms.    Perfusion: There is a left ventricular perfusion defect that is small in size present in the inferior location(s) that is fixed.    Perfusion Defect Conclusion: The stress/rest perfusion ratio is 1.57. There is evidence of transient ischemic dilation (TID). TID was appreciated visually and quantitatively.    Stress Function: Left ventricular function post-stress is normal. Stress ejection fraction is 68%. There is a defect in the basal inferior location(s). The defect has mildly reduced function.    The stress ECG revealed no diagnostic evidence of ischemia.  Occasional PVCs were noted.  He had chest discomfort following dobutamine infusion.  There was a fixed inferior defect suggestive of infarct.  There was also evidence of transient ischemic dilation.  TID ratio was 1.57. Balanced ischemia could not be excluded.  Clinical correlation advised.    No results found for this or any previous visit.    No results found for this or any previous visit.    No results found for this or any previous visit.        Medications:    Current Outpatient Medications:      albuterol (PROVENTIL HFA,VENTOLIN HFA) 90 mcg/act inhaler, INHALE 1-2 PUFFS EVERY 4-6 HOURS AS NEEDED AND AS DIRECTED., Disp: 36 g, Rfl: 10    aspirin 81 mg chewable tablet, Chew 1 tablet (81 mg total) daily, Disp: 90 tablet, Rfl: 3    atorvastatin (LIPITOR) 80 mg tablet, Take 1 tablet (80 mg total) by mouth every evening, Disp: 30 tablet, Rfl: 4    budesonide (PULMICORT) 0.5 mg/2 mL nebulizer solution, TAKE 2 ML (0.5 MG TOTAL) BY NEBULIZATION TWICE A DAY RINSE MOUTH AFTER USE, Disp: 360 mL, Rfl: 1    clopidogrel (PLAVIX) 75 mg tablet, Take 1 tablet (75 mg total) by mouth daily, Disp: 30 tablet, Rfl: 11    famotidine (PEPCID) 20 mg tablet, Take 1 tablet (20 mg total) by mouth 2 (two) times a day, Disp: 180 tablet, Rfl: 0    ipratropium-albuterol (DUO-NEB) 0.5-2.5 mg/3 mL nebulizer solution, TAKE THREE ML BY NEBULIZATION 4 (FOUR) TIMES A DAY, Disp: 360 mL, Rfl: 2    nitroglycerin (NITROSTAT) 0.4 mg SL tablet, Place 1 tablet (0.4 mg total) under the tongue every 5 (five) minutes as needed for chest pain, Disp: 30 tablet, Rfl: 3    Georgina 1 MG TBEC, Take 1 tablet (1 mg total) by mouth in the morning, Disp: 90 tablet, Rfl: 2    Tapinarof (Vtama) 1 % CREA, Apply 1 Application topically in the morning, Disp: 60 g, Rfl: 0    traMADol (Ultram) 50 mg tablet, Take 1 tablet (50 mg total) by mouth every 6 (six) hours as needed for severe pain, Disp: 40 tablet, Rfl: 0    Trelegy Ellipta 100-62.5-25 MCG/ACT inhaler, Inhale 1 puff daily, Disp: 60 blister, Rfl: 0    Ubrelvy 100 MG tablet, Take 1 tablet (100 mg total) by mouth daily as needed (migraine), Disp: 30 tablet, Rfl: 1      Allergies:  Allergies   Allergen Reactions    Other Other (See Comments)     Green Pepper           Recommend aggressive risk factor modification and therapeutic lifestyle changes.  Low-salt, low-calorie, low-fat, low-cholesterol diet with regular exercise and to optimize weight.    Discussed concepts of cardiovascular disease, including signs and  symptoms of heart disease.    Previous studies were reviewed.    Safety measures were reviewed.  Questions were entertained and answered.  Patient was advised to report any problems requiring medical attention.    Follow-up with PCP and appropriate specialist and lab work/testing as discussed.    Return for follow up visit as scheduled or earlier, if needed.  Thank you for allowing me to participate in the care and evaluation of your patient.  Should you have any questions, please feel free to contact me.    Joe Daily PA-C  7/2/2024,12:59 PM

## 2024-07-03 ENCOUNTER — OFFICE VISIT (OUTPATIENT)
Dept: UROLOGY | Facility: CLINIC | Age: 69
End: 2024-07-03
Payer: COMMERCIAL

## 2024-07-03 VITALS
HEART RATE: 89 BPM | WEIGHT: 210 LBS | OXYGEN SATURATION: 96 % | SYSTOLIC BLOOD PRESSURE: 120 MMHG | BODY MASS INDEX: 33.75 KG/M2 | DIASTOLIC BLOOD PRESSURE: 80 MMHG | HEIGHT: 66 IN

## 2024-07-03 DIAGNOSIS — N32.89 BLADDER SPASM: Primary | ICD-10-CM

## 2024-07-03 PROCEDURE — 99213 OFFICE O/P EST LOW 20 MIN: CPT | Performed by: PHYSICIAN ASSISTANT

## 2024-07-03 RX ORDER — OXYBUTYNIN CHLORIDE 5 MG/1
5 TABLET ORAL 3 TIMES DAILY PRN
Qty: 60 TABLET | Refills: 3 | Status: SHIPPED | OUTPATIENT
Start: 2024-07-03

## 2024-07-03 RX ORDER — TAMSULOSIN HYDROCHLORIDE 0.4 MG/1
0.4 CAPSULE ORAL
Qty: 30 CAPSULE | Refills: 6 | Status: SHIPPED | OUTPATIENT
Start: 2024-07-03

## 2024-07-04 NOTE — PROGRESS NOTES
UROLOGY PROGRESS NOTE   Patient Identifiers: Danie Liao (MRN [id number])  Date of Service: 7/4/2024    Subjective:   69-year-old man seen previously for hydrocele surgery.  He recently had a cardiac cath and has had severe bladder spasms since that time.  His bladder is empty and has not been placed on any medications.  PVR was 36 mL.  Reviewed his CAT scan showed normal kidneys.  He does have a small herniation of his bladder down the right inguinal canal.  This is essentially unchanged over several years.  He has had several hernia surgeries.  No UTI.  No significant incontinence.    Reason for visit: Bladder spasm follow-up    Objective:     VITALS:    Vitals:    07/03/24 1440   BP: 120/80   Pulse: 89   SpO2: 96%     AUA SYMPTOM SCORE      Flowsheet Row Most Recent Value   AUA SYMPTOM SCORE    How often have you had a sensation of not emptying your bladder completely after you finished urinating? 5 (P)     How often have you had to urinate again less than two hours after you finished urinating? 4 (P)     How often have you found you stopped and started again several times when you urinate? 5 (P)     How often have you found it difficult to postpone urination? 4 (P)     How often have you had a weak urinary stream? 3 (P)     How often have you had to push or strain to begin urination? 5 (P)     How many times did you most typically get up to urinate from the time you went to bed at night until the time you got up in the morning? 4 (P)     Quality of Life: If you were to spend the rest of your life with your urinary condition just the way it is now, how would you feel about that? 6 (P)     AUA SYMPTOM SCORE 30 (P)                LABS:  Lab Results   Component Value Date    HGB 10.4 (L) 06/21/2024    HCT 33.1 (L) 06/21/2024    WBC 9.96 06/21/2024     06/21/2024   ]    Lab Results   Component Value Date    K 3.7 06/21/2024     06/21/2024    CO2 24 06/21/2024    BUN 9 06/21/2024    CREATININE 0.81  06/21/2024    CALCIUM 9.0 06/21/2024   ]        INPATIENT MEDS:    Current Outpatient Medications:     albuterol (PROVENTIL HFA,VENTOLIN HFA) 90 mcg/act inhaler, INHALE 1-2 PUFFS EVERY 4-6 HOURS AS NEEDED AND AS DIRECTED., Disp: 36 g, Rfl: 10    aspirin 81 mg chewable tablet, Chew 1 tablet (81 mg total) daily, Disp: 90 tablet, Rfl: 3    atorvastatin (LIPITOR) 80 mg tablet, Take 1 tablet (80 mg total) by mouth every evening, Disp: 30 tablet, Rfl: 4    budesonide (PULMICORT) 0.5 mg/2 mL nebulizer solution, TAKE 2 ML (0.5 MG TOTAL) BY NEBULIZATION TWICE A DAY RINSE MOUTH AFTER USE, Disp: 360 mL, Rfl: 1    clopidogrel (PLAVIX) 75 mg tablet, Take 1 tablet (75 mg total) by mouth daily, Disp: 30 tablet, Rfl: 11    famotidine (PEPCID) 20 mg tablet, Take 1 tablet (20 mg total) by mouth 2 (two) times a day, Disp: 180 tablet, Rfl: 0    ipratropium-albuterol (DUO-NEB) 0.5-2.5 mg/3 mL nebulizer solution, TAKE THREE ML BY NEBULIZATION 4 (FOUR) TIMES A DAY, Disp: 360 mL, Rfl: 2    nitroglycerin (NITROSTAT) 0.4 mg SL tablet, Place 1 tablet (0.4 mg total) under the tongue every 5 (five) minutes as needed for chest pain, Disp: 30 tablet, Rfl: 3    oxybutynin (DITROPAN) 5 mg tablet, Take 1 tablet (5 mg total) by mouth 3 (three) times a day as needed (spasm), Disp: 60 tablet, Rfl: 3    Georgina 1 MG TBEC, Take 1 tablet (1 mg total) by mouth in the morning, Disp: 90 tablet, Rfl: 2    tamsulosin (FLOMAX) 0.4 mg, Take 1 capsule (0.4 mg total) by mouth daily with dinner, Disp: 30 capsule, Rfl: 6    Tapinarof (Vtama) 1 % CREA, Apply 1 Application topically in the morning, Disp: 60 g, Rfl: 0    traMADol (Ultram) 50 mg tablet, Take 1 tablet (50 mg total) by mouth every 6 (six) hours as needed for severe pain, Disp: 40 tablet, Rfl: 0    Trelegy Ellipta 100-62.5-25 MCG/ACT inhaler, Inhale 1 puff daily, Disp: 60 blister, Rfl: 0    Ubrelvy 100 MG tablet, Take 1 tablet (100 mg total) by mouth daily as needed (migraine), Disp: 30 tablet, Rfl:  "1      Physical Exam:   /80 (BP Location: Left arm, Patient Position: Sitting, Cuff Size: Standard)   Pulse 89   Ht 5' 6\" (1.676 m)   Wt 95.3 kg (210 lb)   SpO2 96%   BMI 33.89 kg/m²   GEN: no acute distress    RESP: breathing comfortably with no accessory muscle use    ABD: soft, non-tender, non-distended   INCISION:    EXT: no significant peripheral edema   (Male): Penis circumcised, phallus normal, meatus patent.  Testicles descended into scrotum bilaterally without masses nor tenderness.  No inguinal hernias bilaterally.      RADIOLOGY:   IMPRESSION:     7 mm branching focus of enhancement arising from the anterior aspect of the right common femoral artery (2:146), slightly increased in size and more conspicuous from prior recent study, suspicious for tiny pseudoaneurysm. Similar adjacent subcutaneous   infiltrative changes in the groin region from of recent access/intervention without discrete hematoma. Vascular consultation advised.     Similar small volume retroperitoneal hemorrhage and fatty stranding in the lower abdomen and pelvis, right greater than left.    Assessment:   #1.  Severe bladder spasms  #2.  BPH  #3.  Recent cardiac cath and PCI    Plan:   -I started him on tamsulosin and he can use oxybutynin as needed for spasm  -He should call with worsening symptoms and with an update next week  -If okay I will see him back in the office in about 4 months  -          "

## 2024-07-09 ENCOUNTER — TELEPHONE (OUTPATIENT)
Dept: VASCULAR SURGERY | Facility: CLINIC | Age: 69
End: 2024-07-09

## 2024-07-10 DIAGNOSIS — Z95.5 S/P DRUG ELUTING CORONARY STENT PLACEMENT: ICD-10-CM

## 2024-07-10 DIAGNOSIS — E78.5 HYPERLIPIDEMIA: ICD-10-CM

## 2024-07-10 RX ORDER — ATORVASTATIN CALCIUM 80 MG/1
80 TABLET, FILM COATED ORAL EVERY EVENING
Qty: 100 TABLET | Refills: 1 | Status: SHIPPED | OUTPATIENT
Start: 2024-07-10

## 2024-07-10 RX ORDER — CLOPIDOGREL BISULFATE 75 MG/1
75 TABLET ORAL DAILY
Qty: 100 TABLET | Refills: 1 | Status: SHIPPED | OUTPATIENT
Start: 2024-07-10

## 2024-07-11 ENCOUNTER — OFFICE VISIT (OUTPATIENT)
Dept: HEMATOLOGY ONCOLOGY | Facility: CLINIC | Age: 69
End: 2024-07-11
Payer: COMMERCIAL

## 2024-07-11 VITALS
TEMPERATURE: 97.9 F | RESPIRATION RATE: 18 BRPM | WEIGHT: 210 LBS | HEIGHT: 66 IN | HEART RATE: 82 BPM | BODY MASS INDEX: 33.75 KG/M2 | DIASTOLIC BLOOD PRESSURE: 82 MMHG | SYSTOLIC BLOOD PRESSURE: 124 MMHG | OXYGEN SATURATION: 98 %

## 2024-07-11 DIAGNOSIS — E61.1 IRON DEFICIENCY: ICD-10-CM

## 2024-07-11 DIAGNOSIS — D50.9 MICROCYTIC ANEMIA: Primary | ICD-10-CM

## 2024-07-11 PROCEDURE — 99203 OFFICE O/P NEW LOW 30 MIN: CPT | Performed by: PHYSICIAN ASSISTANT

## 2024-07-11 NOTE — PROGRESS NOTES
[facility] [place]  [address] [address]  Hematology Ambulatory Consult  Danie Liao, 1955, [phone]  7/11/2024      Assessment and Plan   1. Microcytic anemia   2. Iron deficiency  - Ambulatory Referral to Hematology / Oncology  - Iron Panel (Includes Ferritin, Iron Sat%, Iron, and TIBC); Future  - Hemoglobin Electrophoresis; Future  - MISCELLANEOUS LAB TEST; Future  - CBC and differential; Future  - Reticulocytes; Future  - CBC and differential; Future    69-year-old male who presents today in consultation for iron deficiency anemia.  Patient close noted to be anemic following cardiac catheterization and PCI on June 17.  Hemoglobin baseline prior to this was around 12-30 g/dL chronically low MCV.  Patient does recall having bilateral ecchymosis ecchymosis on bilateral thighs.  He denies any history of anemia prior to this.  He had an iron panel from May which showed mildly low ferritin stores.  His brother has thalassemia.  Denies any bleeding.    Discussion/plan  Suspect patient may have underlying thalassemia trait.  We will obtain hemoglobin electrophoresis and alpha DNA testing to confirm this.  Recommend to repeat CBCD today as blood counts have not been repeated since his surgery.  May begin oral iron 65 mg daily while awaiting further workup  RTC 6 weeks with CBCd prior        Patient voiced agreement and understanding to the above.   Patient knows to call the Hematology/Oncology office with any questions and concerns regarding the above.    Barrier(s) to care: None.    Megan Dick PA-C  Medical Oncology/Hematology  [facility]    Subjective     Chief Complaint   Patient presents with    Consult     iron deficiency       Referring provider    Viola Farr PA-C  [address]  [facility] [address] [place] [address]    History of present illness: 69-year-old male who was referred by  his bariatric provider for evaluation of iron deficiency anemia.    On chart review, the patient has had a microcytosis at least since 2018 with normal hemoglobin.  His twin brother has history of thalassemia.  The patient himself has never been tested for thalassemia.  He has no history of anemia in the past and has never had a blood transfusion previously.  He was recently found to have microcytic anemia with hemoglobin around 10 g/dL on June 18/2024.  His prior labs from June 11 showed hemoglobin 13.5.    Last iron panel was from 05/2024: Iron 95, sat 23%, TIBC normal, ferritin 12. On further discussion with the patient, he underwent cardiac catheterization and had 2 stents placed on June 17.  Postoperatively he remembers having significant ecchymosis of bilateral lower extremities.    Patient denies any hematochezia, melena or hematuria.  No shortness of breath, chest pain, fatigue or pica.  He has no known history of any malabsorption conditions.  Currently not on any oral iron.  He reports regular diet.  He underwent colonoscopy 04/2024 that showed 8 polyps s/p polypectomy which showed tubular adenomas and hyperplastic polyp. Negative for high-grade dysplasia.     Review of Systems   All other systems reviewed and are negative.      Past Medical History:   Diagnosis Date    Arthritis     Asthma     Chronic pain 01/30/2024    cervic and lumbar    Colon polyp     COPD (chronic obstructive pulmonary disease) (HCC)     Emphysema of lung (HCC)     GERD (gastroesophageal reflux disease) 01/30/2024    Headache(784.0)     Hyperlipidemia 01/30/2024    Migraine     Obesity     Sleep apnea      Past Surgical History:   Procedure Laterality Date    APPENDECTOMY      CARDIAC CATHETERIZATION  06/17/2024    Procedure: Cardiac catheterization;  Surgeon: Sarah Noland DO;  Location: [facility];  Service: Cardiology    CARDIAC CATHETERIZATION N/A 06/17/2024    Procedure: Cardiac Coronary Angiogram;  Surgeon:  Sarah Noland DO;  Location: [facility];  Service: Cardiology    CARDIAC CATHETERIZATION N/A 06/17/2024    Procedure: Cardiac FFR/IFR;  Surgeon: Sarah Noland DO;  Location: [facility];  Service: Cardiology    CARDIAC CATHETERIZATION N/A 06/17/2024    Procedure: Cardiac pci;  Surgeon: Sarah Noland DO;  Location: [facility];  Service: Cardiology    CARDIAC CATHETERIZATION N/A 06/17/2024    Procedure: Cardiac IVUS;  Surgeon: Sarah Noland DO;  Location: [facility];  Service: Cardiology    COLONOSCOPY      COLONOSCOPY      HERNIA REPAIR      4 times    NEPHRECTOMY LIVING DONOR Left 10/2009    MS EXCISION HYDROCELE UNILATERAL Left 01/30/2024    Procedure: HYDROCELECTOMY;  Surgeon: Evan Salmeron MD;  Location: [facility];  Service: Urology     Family History   Problem Relation Age of Onset    Breast cancer Mother     Heart disease Father     Heart disease Brother     Stroke Brother     Lung disease Brother     Breast cancer Maternal Grandmother     Heart disease Paternal Grandmother      Social History     Socioeconomic History    Marital status: /Civil Union     Spouse name: None    Number of children: None    Years of education: None    Highest education level: None   Occupational History    None   Tobacco Use    Smoking status: Former     Current packs/day: 0.00     Average packs/day: 2.0 packs/day for 39.0 years (78.0 ttl pk-yrs)     Types: Cigarettes     Start date: 1/1/1970     Quit date: 1/1/2009     Years since quitting: 15.5    Smokeless tobacco: Never   Vaping Use    Vaping status: Former   Substance and Sexual Activity    Alcohol use: Not Currently     Comment: Occasional    Drug use: No    Sexual activity: Yes     Partners: Female   Other Topics Concern    None   Social History Narrative    None     Social Determinants of Health     Financial Resource Strain: Low Risk  (8/24/2023)    Overall Financial Resource Strain (CARDIA)     Difficulty of  Paying Living Expenses: Not hard at all   Food Insecurity: Not on file   Transportation Needs: No Transportation Needs (8/24/2023)    PRAPARE - Transportation     Lack of Transportation (Medical): No     Lack of Transportation (Non-Medical): No   Physical Activity: Not on file   Stress: Not on file   Social Connections: Not on file   Intimate Partner Violence: Not on file   Housing Stability: Not on file         Current Outpatient Medications:     albuterol (PROVENTIL HFA,VENTOLIN HFA) 90 mcg/act inhaler, INHALE 1-2 PUFFS EVERY 4-6 HOURS AS NEEDED AND AS DIRECTED., Disp: 36 g, Rfl: 10    aspirin 81 mg chewable tablet, Chew 1 tablet (81 mg total) daily, Disp: 90 tablet, Rfl: 3    atorvastatin (LIPITOR) 80 mg tablet, TAKE 1 TABLET BY MOUTH EVERY EVENING, Disp: 100 tablet, Rfl: 1    budesonide (PULMICORT) 0.5 mg/2 mL nebulizer solution, TAKE 2 ML (0.5 MG TOTAL) BY NEBULIZATION TWICE A DAY RINSE MOUTH AFTER USE, Disp: 360 mL, Rfl: 1    clopidogrel (PLAVIX) 75 mg tablet, TAKE 1 TABLET BY MOUTH EVERY DAY, Disp: 100 tablet, Rfl: 1    famotidine (PEPCID) 20 mg tablet, Take 1 tablet (20 mg total) by mouth 2 (two) times a day, Disp: 180 tablet, Rfl: 0    ipratropium-albuterol (DUO-NEB) 0.5-2.5 mg/3 mL nebulizer solution, TAKE THREE ML BY NEBULIZATION 4 (FOUR) TIMES A DAY, Disp: 360 mL, Rfl: 2    nitroglycerin (NITROSTAT) 0.4 mg SL tablet, Place 1 tablet (0.4 mg total) under the tongue every 5 (five) minutes as needed for chest pain, Disp: 30 tablet, Rfl: 3    oxybutynin (DITROPAN) 5 mg tablet, Take 1 tablet (5 mg total) by mouth 3 (three) times a day as needed (spasm), Disp: 60 tablet, Rfl: 3    Georgina 1 MG TBEC, Take 1 tablet (1 mg total) by mouth in the morning, Disp: 90 tablet, Rfl: 2    tamsulosin (FLOMAX) 0.4 mg, Take 1 capsule (0.4 mg total) by mouth daily with dinner, Disp: 30 capsule, Rfl: 6    Tapinarof (Vtama) 1 % CREA, Apply 1 Application topically in the morning, Disp: 60 g, Rfl: 0    traMADol (Ultram) 50 mg  "tablet, Take 1 tablet (50 mg total) by mouth every 6 (six) hours as needed for severe pain, Disp: 40 tablet, Rfl: 0    Trelegy Ellipta 100-62.5-25 MCG/ACT inhaler, Inhale 1 puff daily, Disp: 60 blister, Rfl: 0    Ubrelvy 100 MG tablet, Take 1 tablet (100 mg total) by mouth daily as needed (migraine), Disp: 30 tablet, Rfl: 1  Allergies   Allergen Reactions    Other Other (See Comments)     Green Pepper       Objective   /82   Pulse 82   Temp 97.9 °F (36.6 °C) (Temporal)   Resp 18   Ht 5' 6\" (1.676 m)   Wt 95.3 kg (210 lb)   SpO2 98%   BMI 33.89 kg/m²   Physical Exam  Vitals reviewed.   HENT:      Head: Normocephalic.   Cardiovascular:      Rate and Rhythm: Normal rate and regular rhythm.   Pulmonary:      Effort: Pulmonary effort is normal.      Breath sounds: Normal breath sounds.   Abdominal:      Palpations: Abdomen is soft.      Tenderness: There is no abdominal tenderness.   Musculoskeletal:      Cervical back: Neck supple.   Lymphadenopathy:      Cervical: No cervical adenopathy.      Upper Body:      Right upper body: No supraclavicular or axillary adenopathy.      Left upper body: No supraclavicular or axillary adenopathy.   Skin:     Coloration: Skin is not jaundiced.      Findings: No bruising or rash.   Neurological:      Mental Status: He is alert.         Result Review  Labs:  Admission on 06/21/2024, Discharged on 06/21/2024   Component Date Value Ref Range Status    WBC 06/21/2024 9.96  4.31 - 10.16 Thousand/uL Final    RBC 06/21/2024 4.55  3.88 - 5.62 Million/uL Final    Hemoglobin 06/21/2024 10.4 (L)  12.0 - 17.0 g/dL Final    Hematocrit 06/21/2024 33.1 (L)  36.5 - 49.3 % Final    MCV 06/21/2024 73 (L)  82 - 98 fL Final    MCH 06/21/2024 22.9 (L)  26.8 - 34.3 pg Final    MCHC 06/21/2024 31.4  31.4 - 37.4 g/dL Final    RDW 06/21/2024 16.4 (H)  11.6 - 15.1 % Final    MPV 06/21/2024 10.6  8.9 - 12.7 fL Final    Platelets 06/21/2024 324  149 - 390 Thousands/uL Final    nRBC 06/21/2024 0  " /100 WBCs Final    Segmented % 06/21/2024 71  43 - 75 % Final    Immature Grans % 06/21/2024 1  0 - 2 % Final    Lymphocytes % 06/21/2024 16  14 - 44 % Final    Monocytes % 06/21/2024 10  4 - 12 % Final    Eosinophils Relative 06/21/2024 2  0 - 6 % Final    Basophils Relative 06/21/2024 0  0 - 1 % Final    Absolute Neutrophils 06/21/2024 7.13  1.85 - 7.62 Thousands/µL Final    Absolute Immature Grans 06/21/2024 0.06  0.00 - 0.20 Thousand/uL Final    Absolute Lymphocytes 06/21/2024 1.56  0.60 - 4.47 Thousands/µL Final    Absolute Monocytes 06/21/2024 0.95  0.17 - 1.22 Thousand/µL Final    Eosinophils Absolute 06/21/2024 0.23  0.00 - 0.61 Thousand/µL Final    Basophils Absolute 06/21/2024 0.03  0.00 - 0.10 Thousands/µL Final    Sodium 06/21/2024 135  135 - 147 mmol/L Final    Potassium 06/21/2024 3.7  3.5 - 5.3 mmol/L Final    Chloride 06/21/2024 104  96 - 108 mmol/L Final    CO2 06/21/2024 24  21 - 32 mmol/L Final    ANION GAP 06/21/2024 7  4 - 13 mmol/L Final    BUN 06/21/2024 9  5 - 25 mg/dL Final    Creatinine 06/21/2024 0.81  0.60 - 1.30 mg/dL Final    Standardized to IDMS reference method    Glucose 06/21/2024 107  65 - 140 mg/dL Final    If the patient is fasting, the ADA then defines impaired fasting glucose as > 100 mg/dL and diabetes as > or equal to 123 mg/dL.    Calcium 06/21/2024 9.0  8.4 - 10.2 mg/dL Final    AST 06/21/2024 25  13 - 39 U/L Final    ALT 06/21/2024 23  7 - 52 U/L Final    Specimen collection should occur prior to Sulfasalazine administration due to the potential for falsely depressed results.     Alkaline Phosphatase 06/21/2024 90  34 - 104 U/L Final    Total Protein 06/21/2024 6.7  6.4 - 8.4 g/dL Final    Albumin 06/21/2024 3.7  3.5 - 5.0 g/dL Final    Total Bilirubin 06/21/2024 1.02 (H)  0.20 - 1.00 mg/dL Final    Use of this assay is not recommended for patients undergoing treatment with eltrombopag due to the potential for falsely elevated results.  N-acetyl-p-benzoquinone imine  "(metabolite of Acetaminophen) will generate erroneously low results in samples for patients that have taken an overdose of Acetaminophen.    eGFR 06/21/2024 91  ml/min/1.73sq m Final    Protime 06/21/2024 12.8  11.6 - 14.5 seconds Final    INR 06/21/2024 0.91  0.84 - 1.19 Final    PTT 06/21/2024 28  23 - 37 seconds Final    Therapeutic Heparin Range =  60-90 seconds    Color, UA 06/21/2024 Light Yellow   Final    Clarity, UA 06/21/2024 Clear   Final    Specific Gravity, UA 06/21/2024 1.015  1.003 - 1.030 Final    pH, UA 06/21/2024 7.0  4.5, 5.0, 5.5, 6.0, 6.5, 7.0, 7.5, 8.0 Final    Leukocytes, UA 06/21/2024 Negative  Negative Final    Nitrite, UA 06/21/2024 Negative  Negative Final    Protein, UA 06/21/2024 Negative  Negative mg/dl Final    Glucose, UA 06/21/2024 Negative  Negative mg/dl Final    Ketones, UA 06/21/2024 Trace (A)  Negative mg/dl Final    Urobilinogen, UA 06/21/2024 2.0 (A)  <2.0 mg/dl mg/dl Final    Bilirubin, UA 06/21/2024 Negative  Negative Final    Occult Blood, UA 06/21/2024 Negative  Negative Final    hs TnI 0hr 06/21/2024 19  \"Refer to ACS Flowchart\"- see link ng/L Final    Comment:                                              Initial (time 0) result  If >=50 ng/L, Myocardial injury suggested ;  Type of myocardial injury and treatment strategy  to be determined.  If 5-49 ng/L, a delta result at 2 hours and or 4 hours will be needed to further evaluate.  If <4 ng/L, and chest pain has been >3 hours since onset, patient may qualify for discharge based on the HEART score in the ED.  If <5 ng/L and <3hours since onset of chest pain, a delta result at 2 hours will be needed to further evaluate.    HS Troponin 99th Percentile URL of a Health Population=12 ng/L with a 95% Confidence Interval of 8-18 ng/L.    Second Troponin (time 2 hours)  If calculated delta >= 20 ng/L,  Myocardial injury suggested ; Type of myocardial injury and treatment strategy to be determined.  If 5-49 ng/L and the calculated " "delta is 5-19 ng/L, consult medical service for evaluation.  Continue evaluation for ischemia on ecg and other possible etiology and repeat hs troponin at 4 hours.  If delta                            is <5 ng/L at 2 hours, consider discharge based on risk stratification via the HEART score (if in ED), or BEBO risk score in IP/Observation.    HS Troponin 99th Percentile URL of a Health Population=12 ng/L with a 95% Confidence Interval of 8-18 ng/L.    BNP 06/21/2024 86  0 - 100 pg/mL Final    Ventricular Rate 06/21/2024 76  BPM Final    Atrial Rate 06/21/2024 76  BPM Final    HI Interval 06/21/2024 164  ms Final    QRSD Interval 06/21/2024 96  ms Final    QT Interval 06/21/2024 388  ms Final    QTC Interval 06/21/2024 436  ms Final    P Axis 06/21/2024 46  degrees Final    QRS Axis 06/21/2024 -9  degrees Final    T Wave Axis 06/21/2024 14  degrees Final    hs TnI 2hr 06/21/2024 16  \"Refer to ACS Flowchart\"- see link ng/L Final    Comment:                                              Initial (time 0) result  If >=50 ng/L, Myocardial injury suggested ;  Type of myocardial injury and treatment strategy  to be determined.  If 5-49 ng/L, a delta result at 2 hours and or 4 hours will be needed to further evaluate.  If <4 ng/L, and chest pain has been >3 hours since onset, patient may qualify for discharge based on the HEART score in the ED.  If <5 ng/L and <3hours since onset of chest pain, a delta result at 2 hours will be needed to further evaluate.    HS Troponin 99th Percentile URL of a Health Population=12 ng/L with a 95% Confidence Interval of 8-18 ng/L.    Second Troponin (time 2 hours)  If calculated delta >= 20 ng/L,  Myocardial injury suggested ; Type of myocardial injury and treatment strategy to be determined.  If 5-49 ng/L and the calculated delta is 5-19 ng/L, consult medical service for evaluation.  Continue evaluation for ischemia on ecg and other possible etiology and repeat hs troponin at 4 hours.  If " delta                            is <5 ng/L at 2 hours, consider discharge based on risk stratification via the HEART score (if in ED), or BEBO risk score in IP/Observation.    HS Troponin 99th Percentile URL of a Health Population=12 ng/L with a 95% Confidence Interval of 8-18 ng/L.    Delta 2hr hsTnI 06/21/2024 -3  <20 ng/L Final   Procedure visit on 06/20/2024   Component Date Value Ref Range Status    Urine Culture 06/20/2024 No Growth <1000 cfu/mL   Final    Color, UA 06/20/2024 Light Yellow   Final    Clarity, UA 06/20/2024 Clear   Final    Specific Gravity, UA 06/20/2024 1.018  1.003 - 1.030 Final    pH, UA 06/20/2024 6.0  4.5, 5.0, 5.5, 6.0, 6.5, 7.0, 7.5, 8.0 Final    Leukocytes, UA 06/20/2024 Negative  Negative Final    Nitrite, UA 06/20/2024 Negative  Negative Final    Protein, UA 06/20/2024 Trace (A)  Negative mg/dl Final    Glucose, UA 06/20/2024 Negative  Negative mg/dl Final    Ketones, UA 06/20/2024 10 (1+) (A)  Negative mg/dl Final    Urobilinogen, UA 06/20/2024 <2.0  <2.0 mg/dl mg/dl Final    Bilirubin, UA 06/20/2024 Negative  Negative Final    Occult Blood, UA 06/20/2024 Negative  Negative Final    RBC, UA 06/20/2024 None Seen  None Seen, 1-2 /hpf Final    WBC, UA 06/20/2024 1-2  None Seen, 1-2 /hpf Final    Epithelial Cells 06/20/2024 Occasional  None Seen, Occasional /hpf Final    Bacteria, UA 06/20/2024 None Seen  None Seen, Occasional /hpf Final    POST-VOID RESIDUAL VOLUME, ML POC 06/20/2024 36  mL Final   Admission on 06/19/2024, Discharged on 06/19/2024   Component Date Value Ref Range Status    WBC 06/19/2024 9.79  4.31 - 10.16 Thousand/uL Final    RBC 06/19/2024 4.53  3.88 - 5.62 Million/uL Final    Hemoglobin 06/19/2024 10.3 (L)  12.0 - 17.0 g/dL Final    Hematocrit 06/19/2024 33.1 (L)  36.5 - 49.3 % Final    MCV 06/19/2024 73 (L)  82 - 98 fL Final    MCH 06/19/2024 22.7 (L)  26.8 - 34.3 pg Final    MCHC 06/19/2024 31.1 (L)  31.4 - 37.4 g/dL Final    RDW 06/19/2024 16.5 (H)  11.6 - 15.1  % Final    MPV 06/19/2024 10.7  8.9 - 12.7 fL Final    Platelets 06/19/2024 329  149 - 390 Thousands/uL Final    nRBC 06/19/2024 0  /100 WBCs Final    Segmented % 06/19/2024 68  43 - 75 % Final    Immature Grans % 06/19/2024 1  0 - 2 % Final    Lymphocytes % 06/19/2024 18  14 - 44 % Final    Monocytes % 06/19/2024 11  4 - 12 % Final    Eosinophils Relative 06/19/2024 1  0 - 6 % Final    Basophils Relative 06/19/2024 1  0 - 1 % Final    Absolute Neutrophils 06/19/2024 6.77  1.85 - 7.62 Thousands/µL Final    Absolute Immature Grans 06/19/2024 0.05  0.00 - 0.20 Thousand/uL Final    Absolute Lymphocytes 06/19/2024 1.76  0.60 - 4.47 Thousands/µL Final    Absolute Monocytes 06/19/2024 1.07  0.17 - 1.22 Thousand/µL Final    Eosinophils Absolute 06/19/2024 0.09  0.00 - 0.61 Thousand/µL Final    Basophils Absolute 06/19/2024 0.05  0.00 - 0.10 Thousands/µL Final    Sodium 06/19/2024 136  135 - 147 mmol/L Final    Potassium 06/19/2024 3.9  3.5 - 5.3 mmol/L Final    Chloride 06/19/2024 106  96 - 108 mmol/L Final    CO2 06/19/2024 24  21 - 32 mmol/L Final    ANION GAP 06/19/2024 6  4 - 13 mmol/L Final    BUN 06/19/2024 18  5 - 25 mg/dL Final    Creatinine 06/19/2024 0.92  0.60 - 1.30 mg/dL Final    Standardized to IDMS reference method    Glucose 06/19/2024 108  65 - 140 mg/dL Final    If the patient is fasting, the ADA then defines impaired fasting glucose as > 100 mg/dL and diabetes as > or equal to 123 mg/dL.    Calcium 06/19/2024 8.7  8.4 - 10.2 mg/dL Final    eGFR 06/19/2024 85  ml/min/1.73sq m Final    Total Bilirubin 06/19/2024 0.62  0.20 - 1.00 mg/dL Final    Use of this assay is not recommended for patients undergoing treatment with eltrombopag due to the potential for falsely elevated results.  N-acetyl-p-benzoquinone imine (metabolite of Acetaminophen) will generate erroneously low results in samples for patients that have taken an overdose of Acetaminophen.    Bilirubin, Direct 06/19/2024 0.17  0.00 - 0.20 mg/dL  "Final    Alkaline Phosphatase 06/19/2024 86  34 - 104 U/L Final    AST 06/19/2024 21  13 - 39 U/L Final    ALT 06/19/2024 21  7 - 52 U/L Final    Specimen collection should occur prior to Sulfasalazine administration due to the potential for falsely depressed results.     Total Protein 06/19/2024 6.8  6.4 - 8.4 g/dL Final    Albumin 06/19/2024 3.7  3.5 - 5.0 g/dL Final    hs TnI 0hr 06/19/2024 23  \"Refer to ACS Flowchart\"- see link ng/L Final    Comment:                                              Initial (time 0) result  If >=50 ng/L, Myocardial injury suggested ;  Type of myocardial injury and treatment strategy  to be determined.  If 5-49 ng/L, a delta result at 2 hours and or 4 hours will be needed to further evaluate.  If <4 ng/L, and chest pain has been >3 hours since onset, patient may qualify for discharge based on the HEART score in the ED.  If <5 ng/L and <3hours since onset of chest pain, a delta result at 2 hours will be needed to further evaluate.    HS Troponin 99th Percentile URL of a Health Population=12 ng/L with a 95% Confidence Interval of 8-18 ng/L.    Second Troponin (time 2 hours)  If calculated delta >= 20 ng/L,  Myocardial injury suggested ; Type of myocardial injury and treatment strategy to be determined.  If 5-49 ng/L and the calculated delta is 5-19 ng/L, consult medical service for evaluation.  Continue evaluation for ischemia on ecg and other possible etiology and repeat hs troponin at 4 hours.  If delta                            is <5 ng/L at 2 hours, consider discharge based on risk stratification via the HEART score (if in ED), or BEBO risk score in IP/Observation.    HS Troponin 99th Percentile URL of a Health Population=12 ng/L with a 95% Confidence Interval of 8-18 ng/L.    BNP 06/19/2024 49  0 - 100 pg/mL Final    Protime 06/19/2024 12.5  11.6 - 14.5 seconds Final    INR 06/19/2024 0.88  0.84 - 1.19 Final    PTT 06/19/2024 28  23 - 37 seconds Final    Therapeutic Heparin Range " "=  60-90 seconds    SARS-CoV-2 06/19/2024 Negative  Negative Final    INFLUENZA A PCR 06/19/2024 Negative  Negative Final    INFLUENZA B PCR 06/19/2024 Negative  Negative Final    RSV PCR 06/19/2024 Negative  Negative Final    Ventricular Rate 06/19/2024 95  BPM Final    Atrial Rate 06/19/2024 95  BPM Final    CA Interval 06/19/2024 168  ms Final    QRSD Interval 06/19/2024 90  ms Final    QT Interval 06/19/2024 338  ms Final    QTC Interval 06/19/2024 424  ms Final    P Axis 06/19/2024 70  degrees Final    QRS Axis 06/19/2024 1  degrees Final    T Wave Axis 06/19/2024 38  degrees Final    hs TnI 2hr 06/19/2024 25  \"Refer to ACS Flowchart\"- see link ng/L Final    Comment:                                              Initial (time 0) result  If >=50 ng/L, Myocardial injury suggested ;  Type of myocardial injury and treatment strategy  to be determined.  If 5-49 ng/L, a delta result at 2 hours and or 4 hours will be needed to further evaluate.  If <4 ng/L, and chest pain has been >3 hours since onset, patient may qualify for discharge based on the HEART score in the ED.  If <5 ng/L and <3hours since onset of chest pain, a delta result at 2 hours will be needed to further evaluate.    HS Troponin 99th Percentile URL of a Health Population=12 ng/L with a 95% Confidence Interval of 8-18 ng/L.    Second Troponin (time 2 hours)  If calculated delta >= 20 ng/L,  Myocardial injury suggested ; Type of myocardial injury and treatment strategy to be determined.  If 5-49 ng/L and the calculated delta is 5-19 ng/L, consult medical service for evaluation.  Continue evaluation for ischemia on ecg and other possible etiology and repeat hs troponin at 4 hours.  If delta                            is <5 ng/L at 2 hours, consider discharge based on risk stratification via the HEART score (if in ED), or BEBO risk score in IP/Observation.    HS Troponin 99th Percentile URL of a Health Population=12 ng/L with a 95% Confidence Interval of " 8-18 ng/L.    Delta 2hr hsTnI 06/19/2024 2  <20 ng/L Final   Admission on 06/17/2024, Discharged on 06/18/2024   Component Date Value Ref Range Status    Ventricular Rate 06/17/2024 83  BPM Final    Atrial Rate 06/17/2024 83  BPM Final    VA Interval 06/17/2024 160  ms Final    QRSD Interval 06/17/2024 96  ms Final    QT Interval 06/17/2024 364  ms Final    QTC Interval 06/17/2024 427  ms Final    P Axis 06/17/2024 47  degrees Final    QRS Axis 06/17/2024 -23  degrees Final    T Wave Axis 06/17/2024 11  degrees Final    Activated Clotting Time, i-STAT 06/17/2024 223 (H)  89 - 137 sec Final    Specimen Type 06/17/2024 VENOUS   Final    Activated Clotting Time, i-STAT 06/17/2024 347 (H)  89 - 137 sec Final    Specimen Type 06/17/2024 VENOUS   Final    Ventricular Rate 06/17/2024 108  BPM Final    Atrial Rate 06/17/2024 108  BPM Final    VA Interval 06/17/2024 196  ms Final    QRSD Interval 06/17/2024 54  ms Final    QT Interval 06/17/2024 346  ms Final    QTC Interval 06/17/2024 464  ms Final    P Axis 06/17/2024 90  degrees Final    QRS Axis 06/17/2024 -27  degrees Final    T Wave Axis 06/17/2024 -34  degrees Final    Ventricular Rate 06/17/2024 85  BPM Final    Atrial Rate 06/17/2024 85  BPM Final    VA Interval 06/17/2024 167  ms Final    QRSD Interval 06/17/2024 96  ms Final    QT Interval 06/17/2024 392  ms Final    QTC Interval 06/17/2024 467  ms Final    P Axis 06/17/2024 60  degrees Final    QRS Axis 06/17/2024 -29  degrees Final    T Wave Axis 06/17/2024 45  degrees Final    Sodium 06/18/2024 136  135 - 147 mmol/L Final    Potassium 06/18/2024 4.5  3.5 - 5.3 mmol/L Final    Chloride 06/18/2024 106  96 - 108 mmol/L Final    CO2 06/18/2024 21  21 - 32 mmol/L Final    ANION GAP 06/18/2024 9  4 - 13 mmol/L Final    BUN 06/18/2024 22  5 - 25 mg/dL Final    Creatinine 06/18/2024 0.92  0.60 - 1.30 mg/dL Final    Standardized to IDMS reference method    Glucose 06/18/2024 134  65 - 140 mg/dL Final    If the patient  is fasting, the ADA then defines impaired fasting glucose as > 100 mg/dL and diabetes as > or equal to 123 mg/dL.    Glucose, Fasting 06/18/2024 134 (H)  65 - 99 mg/dL Final    Calcium 06/18/2024 8.1 (L)  8.4 - 10.2 mg/dL Final    eGFR 06/18/2024 85  ml/min/1.73sq m Final    WBC 06/18/2024 15.73 (H)  4.31 - 10.16 Thousand/uL Final    RBC 06/18/2024 4.73  3.88 - 5.62 Million/uL Final    Hemoglobin 06/18/2024 10.8 (L)  12.0 - 17.0 g/dL Final    Hematocrit 06/18/2024 35.2 (L)  36.5 - 49.3 % Final    MCV 06/18/2024 74 (L)  82 - 98 fL Final    MCH 06/18/2024 22.8 (L)  26.8 - 34.3 pg Final    MCHC 06/18/2024 30.7 (L)  31.4 - 37.4 g/dL Final    RDW 06/18/2024 17.1 (H)  11.6 - 15.1 % Final    Platelets 06/18/2024 336  149 - 390 Thousands/uL Final    MPV 06/18/2024 10.7  8.9 - 12.7 fL Final       Imaging:   Relative imaging reviewed  Please note:  This report has been generated by a voice recognition software system. Therefore there may be syntax, spelling, and/or grammatical errors. Please call if you have any questions.

## 2024-07-11 NOTE — TELEPHONE ENCOUNTER
Patient called today to ask about the 7/12 appointment that he had scheduled. Pt states it is no longer on his appt desk.    I explained to the patient that the 7/12 appt was cancelled because he was seen on a sooner date of 7/3 for the same reason.    Pt verbalized understanding and was glad that he called to verify.    No other action is needed at this time.

## 2024-07-12 ENCOUNTER — PREP FOR PROCEDURE (OUTPATIENT)
Dept: BARIATRICS | Facility: CLINIC | Age: 69
End: 2024-07-12

## 2024-07-12 DIAGNOSIS — R73.03 DIABETES MELLITUS, LATENT: ICD-10-CM

## 2024-07-12 DIAGNOSIS — G47.33 OBSTRUCTIVE SLEEP APNEA SYNDROME: ICD-10-CM

## 2024-07-12 DIAGNOSIS — J44.9 CHRONIC OBSTRUCTIVE PULMONARY DISEASE, UNSPECIFIED COPD TYPE (HCC): ICD-10-CM

## 2024-07-12 DIAGNOSIS — E66.9 OBESITY, UNSPECIFIED: ICD-10-CM

## 2024-07-12 DIAGNOSIS — E66.01 MORBID OBESITY (HCC): Primary | ICD-10-CM

## 2024-07-13 DIAGNOSIS — J44.9 CHRONIC OBSTRUCTIVE PULMONARY DISEASE, UNSPECIFIED COPD TYPE (HCC): ICD-10-CM

## 2024-07-13 DIAGNOSIS — G43.919 INTRACTABLE MIGRAINE WITHOUT STATUS MIGRAINOSUS, UNSPECIFIED MIGRAINE TYPE: ICD-10-CM

## 2024-07-13 RX ORDER — IPRATROPIUM BROMIDE AND ALBUTEROL SULFATE 2.5; .5 MG/3ML; MG/3ML
SOLUTION RESPIRATORY (INHALATION)
Qty: 360 ML | Refills: 1 | Status: SHIPPED | OUTPATIENT
Start: 2024-07-13

## 2024-07-13 RX ORDER — UBROGEPANT 100 MG/1
100 TABLET ORAL DAILY PRN
Qty: 30 TABLET | Refills: 0 | Status: SHIPPED | OUTPATIENT
Start: 2024-07-13

## 2024-07-15 ENCOUNTER — CLINICAL SUPPORT (OUTPATIENT)
Dept: CARDIOLOGY CLINIC | Facility: CLINIC | Age: 69
End: 2024-07-15
Payer: COMMERCIAL

## 2024-07-15 ENCOUNTER — APPOINTMENT (OUTPATIENT)
Age: 69
End: 2024-07-15
Payer: COMMERCIAL

## 2024-07-15 DIAGNOSIS — D64.9 ANEMIA, UNSPECIFIED TYPE: ICD-10-CM

## 2024-07-15 DIAGNOSIS — R42 EPISODIC LIGHTHEADEDNESS: ICD-10-CM

## 2024-07-15 DIAGNOSIS — E61.1 IRON DEFICIENCY: ICD-10-CM

## 2024-07-15 DIAGNOSIS — D50.9 MICROCYTIC ANEMIA: ICD-10-CM

## 2024-07-15 LAB
BASOPHILS # BLD AUTO: 0.05 THOUSANDS/ÂΜL (ref 0–0.1)
BASOPHILS NFR BLD AUTO: 1 % (ref 0–1)
EOSINOPHIL # BLD AUTO: 0.42 THOUSAND/ÂΜL (ref 0–0.61)
EOSINOPHIL NFR BLD AUTO: 5 % (ref 0–6)
ERYTHROCYTE [DISTWIDTH] IN BLOOD BY AUTOMATED COUNT: 17.1 % (ref 11.6–15.1)
FERRITIN SERPL-MCNC: 40 NG/ML (ref 24–336)
HCT VFR BLD AUTO: 39.8 % (ref 36.5–49.3)
HGB BLD-MCNC: 11.9 G/DL (ref 12–17)
IMM GRANULOCYTES # BLD AUTO: 0.04 THOUSAND/UL (ref 0–0.2)
IMM GRANULOCYTES NFR BLD AUTO: 1 % (ref 0–2)
IRON SATN MFR SERPL: 31 % (ref 15–50)
IRON SERPL-MCNC: 112 UG/DL (ref 50–212)
LYMPHOCYTES # BLD AUTO: 1.67 THOUSANDS/ÂΜL (ref 0.6–4.47)
LYMPHOCYTES NFR BLD AUTO: 19 % (ref 14–44)
MCH RBC QN AUTO: 23.1 PG (ref 26.8–34.3)
MCHC RBC AUTO-ENTMCNC: 29.9 G/DL (ref 31.4–37.4)
MCV RBC AUTO: 77 FL (ref 82–98)
MONOCYTES # BLD AUTO: 0.57 THOUSAND/ÂΜL (ref 0.17–1.22)
MONOCYTES NFR BLD AUTO: 7 % (ref 4–12)
NEUTROPHILS # BLD AUTO: 5.95 THOUSANDS/ÂΜL (ref 1.85–7.62)
NEUTS SEG NFR BLD AUTO: 67 % (ref 43–75)
NRBC BLD AUTO-RTO: 0 /100 WBCS
PLATELET # BLD AUTO: 369 THOUSANDS/UL (ref 149–390)
PMV BLD AUTO: 11.2 FL (ref 8.9–12.7)
RBC # BLD AUTO: 5.16 MILLION/UL (ref 3.88–5.62)
RETICS # AUTO: NORMAL 10*3/UL (ref 14356–105094)
RETICS # CALC: 1.77 % (ref 0.37–1.87)
TIBC SERPL-MCNC: 364 UG/DL (ref 250–450)
UIBC SERPL-MCNC: 252 UG/DL (ref 155–355)
WBC # BLD AUTO: 8.7 THOUSAND/UL (ref 4.31–10.16)

## 2024-07-15 PROCEDURE — 36415 COLL VENOUS BLD VENIPUNCTURE: CPT

## 2024-07-15 PROCEDURE — 85025 COMPLETE CBC W/AUTO DIFF WBC: CPT

## 2024-07-15 PROCEDURE — 93244 EXT ECG>48HR<7D REV&INTERPJ: CPT | Performed by: INTERNAL MEDICINE

## 2024-07-15 PROCEDURE — 83550 IRON BINDING TEST: CPT

## 2024-07-15 PROCEDURE — 83540 ASSAY OF IRON: CPT

## 2024-07-15 PROCEDURE — 83020 HEMOGLOBIN ELECTROPHORESIS: CPT

## 2024-07-15 PROCEDURE — 82728 ASSAY OF FERRITIN: CPT

## 2024-07-15 PROCEDURE — 85045 AUTOMATED RETICULOCYTE COUNT: CPT

## 2024-07-17 ENCOUNTER — TELEPHONE (OUTPATIENT)
Dept: CARDIOLOGY CLINIC | Facility: CLINIC | Age: 69
End: 2024-07-17

## 2024-07-17 NOTE — TELEPHONE ENCOUNTER
Spoke with pt. Patient verbally understood the result  of his CBC and differential / Annelyse's message.

## 2024-07-17 NOTE — TELEPHONE ENCOUNTER
----- Message from Joe Daily PA-C sent at 7/17/2024 12:03 PM EDT -----  Please advise patient his heart monitor revealed extra beats from the top chamber of the heart and 2 runs of SVT without associated symptoms recorded.  No need to adjust medications at this time.  Results can be further discussed at his next appointment.

## 2024-07-17 NOTE — TELEPHONE ENCOUNTER
----- Message from Joe Daily PA-C sent at 7/16/2024  4:46 PM EDT -----  Please let patient know his hemoglobin has improved, however is still borderline low.  He should reach out to his PCP or hematologist to discuss these findings further.

## 2024-07-23 ENCOUNTER — OFFICE VISIT (OUTPATIENT)
Age: 69
End: 2024-07-23
Payer: COMMERCIAL

## 2024-07-23 VITALS
RESPIRATION RATE: 18 BRPM | TEMPERATURE: 98.3 F | HEIGHT: 66 IN | SYSTOLIC BLOOD PRESSURE: 122 MMHG | DIASTOLIC BLOOD PRESSURE: 84 MMHG | HEART RATE: 102 BPM | BODY MASS INDEX: 34.07 KG/M2 | OXYGEN SATURATION: 96 % | WEIGHT: 212 LBS

## 2024-07-23 DIAGNOSIS — J44.9 CHRONIC OBSTRUCTIVE PULMONARY DISEASE, UNSPECIFIED COPD TYPE (HCC): ICD-10-CM

## 2024-07-23 DIAGNOSIS — L40.9 PSORIASIS: ICD-10-CM

## 2024-07-23 DIAGNOSIS — F17.211 CIGARETTE NICOTINE DEPENDENCE IN REMISSION: Primary | ICD-10-CM

## 2024-07-23 DIAGNOSIS — R06.1 STRIDOR: ICD-10-CM

## 2024-07-23 DIAGNOSIS — J44.9 COPD (CHRONIC OBSTRUCTIVE PULMONARY DISEASE) (HCC): ICD-10-CM

## 2024-07-23 DIAGNOSIS — N32.89 BLADDER SPASM: ICD-10-CM

## 2024-07-23 DIAGNOSIS — G43.919 INTRACTABLE MIGRAINE WITHOUT STATUS MIGRAINOSUS, UNSPECIFIED MIGRAINE TYPE: ICD-10-CM

## 2024-07-23 DIAGNOSIS — J44.1 COPD EXACERBATION (HCC): ICD-10-CM

## 2024-07-23 DIAGNOSIS — I25.118 CORONARY ARTERY DISEASE OF NATIVE HEART WITH STABLE ANGINA PECTORIS, UNSPECIFIED VESSEL OR LESION TYPE (HCC): ICD-10-CM

## 2024-07-23 PROCEDURE — G2211 COMPLEX E/M VISIT ADD ON: HCPCS | Performed by: INTERNAL MEDICINE

## 2024-07-23 PROCEDURE — 99215 OFFICE O/P EST HI 40 MIN: CPT | Performed by: INTERNAL MEDICINE

## 2024-07-23 RX ORDER — ALBUTEROL SULFATE 90 UG/1
AEROSOL, METERED RESPIRATORY (INHALATION)
Qty: 36 G | Refills: 3 | Status: SHIPPED | OUTPATIENT
Start: 2024-07-23 | End: 2024-07-23 | Stop reason: SDUPTHER

## 2024-07-23 RX ORDER — PREDNISONE 1 MG/1
1 TABLET, DELAYED RELEASE ORAL DAILY
Qty: 90 TABLET | Refills: 1 | Status: SHIPPED | OUTPATIENT
Start: 2024-07-23 | End: 2024-07-29 | Stop reason: SDUPTHER

## 2024-07-23 RX ORDER — TRAMADOL HYDROCHLORIDE 50 MG/1
50 TABLET ORAL EVERY 6 HOURS PRN
Qty: 40 TABLET | Refills: 0 | OUTPATIENT
Start: 2024-07-23

## 2024-07-23 RX ORDER — TAPINAROF 10 MG/1000MG
1 CREAM TOPICAL DAILY
Qty: 60 G | Refills: 0 | Status: SHIPPED | OUTPATIENT
Start: 2024-07-23

## 2024-07-23 RX ORDER — UBROGEPANT 100 MG/1
100 TABLET ORAL DAILY PRN
Qty: 100 TABLET | Refills: 1 | Status: SHIPPED | OUTPATIENT
Start: 2024-07-23

## 2024-07-23 RX ORDER — TAMSULOSIN HYDROCHLORIDE 0.4 MG/1
0.4 CAPSULE ORAL
Qty: 30 CAPSULE | Refills: 5 | Status: SHIPPED | OUTPATIENT
Start: 2024-07-23

## 2024-07-23 RX ORDER — FLUTICASONE FUROATE, UMECLIDINIUM BROMIDE AND VILANTEROL TRIFENATATE 100; 62.5; 25 UG/1; UG/1; UG/1
1 POWDER RESPIRATORY (INHALATION) DAILY
Qty: 180 BLISTER | Refills: 1 | Status: SHIPPED | OUTPATIENT
Start: 2024-07-23 | End: 2024-07-29 | Stop reason: SDUPTHER

## 2024-07-23 RX ORDER — OXYBUTYNIN CHLORIDE 5 MG/1
5 TABLET ORAL 3 TIMES DAILY PRN
Qty: 60 TABLET | Refills: 3 | Status: SHIPPED | OUTPATIENT
Start: 2024-07-23

## 2024-07-23 RX ORDER — IPRATROPIUM BROMIDE AND ALBUTEROL SULFATE 2.5; .5 MG/3ML; MG/3ML
SOLUTION RESPIRATORY (INHALATION)
Qty: 360 ML | Refills: 3 | Status: SHIPPED | OUTPATIENT
Start: 2024-07-23 | End: 2024-07-23 | Stop reason: SDUPTHER

## 2024-07-23 RX ORDER — ALBUTEROL SULFATE 90 UG/1
AEROSOL, METERED RESPIRATORY (INHALATION)
Qty: 36 G | Refills: 1 | Status: SHIPPED | OUTPATIENT
Start: 2024-07-23 | End: 2024-07-23 | Stop reason: SDUPTHER

## 2024-07-23 RX ORDER — IPRATROPIUM BROMIDE AND ALBUTEROL SULFATE 2.5; .5 MG/3ML; MG/3ML
SOLUTION RESPIRATORY (INHALATION)
Qty: 360 ML | Refills: 1 | Status: SHIPPED | OUTPATIENT
Start: 2024-07-23 | End: 2024-07-23 | Stop reason: SDUPTHER

## 2024-07-23 NOTE — PROGRESS NOTES
"    Follow Up - Pulmonary Medicine   Danie Liao 69 y.o. male MRN: [id number]    Physician Requesting Consult: Dr Gosia Mcgill  Reason for Consult: COPD    Danie Liao is a 69 y.o. male who presents for follow up of COPD.    COPD, mod-severe  Mod Emphysema  Patient has a chart diagnosis of COPD due to former smoker + dyspnea.  PFTs 2023 with moderately severe obstruction (52% post BD).  CT with mod upper lobe emphysema  Highest eos 460, benefit from ICS included in his triple therapy inhaler.  Has very frequent exacerbations requiring steroids 5+ times a year when lived in [place]. Was started on Georgina (prednisone) 1mg by other pulmonologist and reports he has decreased his exacerbations.   No hyperinflation or significant air trapping on PFTs, does not a cadidate fro EBV  No exacerbations since last visit in April 2024    - continue Trelegy  - duonebs, pulmicort prn  - continue for now Georgina 1mg (brand name prednisone)   - follow up in 3 months  - could not tolerate azithromycin of roflulmilast due to GI side effects    Stridor, abnormal vocal cord  ZENA/LPR  Pt with diffuse wheezing that appears to be coming from upper airway. Ddx includes EDAC (excessive dynamic airway collapse) vs TBM (tracheobronchomalacis) vs vocal cord disease. Pt has had many steroid courses putting him at risk for TBM and EDAC CT neck does show \"abnormal appearance of laryngeal vestibule.\"   - ENT following: abnormal false vocal cord  - continue PPI per ENT, re-refer    PreOP Eval  Pt planned for bariatric surgery in August  ARISCAT score low (18, 1.6% risk of post op pulm complication) however with hs mod-sever COPD and LEATHA, he would be overall intermediate risk for surgery  No pulmonary contraindications  - Pt Medically optimized for surgery  - consider PAP therapy in the [facility]-op setting given hx LEATHA (untreated, unable to tolerate long term PAP)    LEATHA  Does have nighttime symptoms, daytime fatigue, snoring, obesity  Severe LEATHA on " PSG, AHI 38, rec BiPAP study given severe COPD  - declined autopap, tried for months and could not tolerate even with different masks and pressures  - does not wantot do further testing     Pleural plaque  Asbestos Exposure  Pt with prior asbestos exposure as . Was told by [place] pulmonologist he has asbestos in his lung. On review of CT with thin pleural calcification on right? Left, consistent with very thin pleural plaque from asbestos exposure.   No pleural thickening, no evidence of cancer  - reviewed CT with radiologist  - asbestos plaques do not need routine follow up    Heterogeneous Pulm Nodule, resolved  Former smoker  78 PY, quit 2010, < 15 yrs  RUL 7mm nodule/area of GGO/thickening around a cystic air space. In correlated from 2022 CT neck appears to have more thickening, possibly evolving scar but cannot rule out malignancy. Not seen on repeat CT chest in Sept 2023.   - low dose CT for lung ca screening     Vaccines      Immunization History   Administered Date(s) Administered    COVID-19 Moderna mRNA Vaccine 12 Yr+ 50 mcg/0.5 mL (Spikevax) 09/24/2023    INFLUENZA 09/24/2023        I have spent a total time of 40 minutes on 07/23/24 in caring for this patient including Diagnostic results, Risks and benefits of tx options, Instructions for management, Risk factor reductions, Impressions, Counseling / Coordination of care, Documenting in the medical record, Reviewing / ordering tests, medicine, procedures  , Obtaining or reviewing history   and Communicating with other healthcare professionals .   ______________________________________________________________    HPI:    Danie Liao is a 69 y.o. male who presents for evaluation of COPD.    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   HENT:  Positive for rhinorrhea, sneezing, sore throat and trouble swallowing. Negative for ear pain and postnasal drip.    Respiratory:  Positive for cough, shortness of breath and wheezing.    Cardiovascular:  Negative for chest pain.   Musculoskeletal:  Positive for myalgias.   Neurological:  Positive for headaches.     Aside from what is mentioned in the HPI, the review of systems otherwise negative.    Current Medications:    Current Outpatient Medications:     albuterol (PROVENTIL HFA,VENTOLIN HFA) 90 mcg/act inhaler, INHALE 1-2 PUFFS EVERY 4-6 HOURS AS NEEDED AND AS DIRECTED, Disp: 36 g, Rfl: 1    aspirin 81 mg chewable tablet, Chew 1 tablet (81 mg total) daily, Disp: 90 tablet, Rfl: 3    atorvastatin (LIPITOR) 80 mg tablet, TAKE 1 TABLET BY MOUTH EVERY EVENING, Disp: 100 tablet, Rfl: 1    budesonide (PULMICORT) 0.5 mg/2 mL nebulizer solution, TAKE 2 ML (0.5 MG TOTAL) BY NEBULIZATION TWICE A DAY RINSE MOUTH AFTER USE, Disp: 360 mL, Rfl: 1    clopidogrel (PLAVIX) 75 mg tablet, TAKE 1 TABLET BY MOUTH EVERY DAY, Disp: 100 tablet, Rfl: 1    famotidine (PEPCID) 20 mg tablet, Take 1 tablet (20 mg total) by mouth 2 (two) times a day, Disp: 180 tablet, Rfl: 0    ipratropium-albuterol (DUO-NEB) 0.5-2.5 mg/3 mL nebulizer solution, TAKE THREE ML BY NEBULIZATION 4 (FOUR) TIMES A DAY Strength: 0.5-2.5 (3) MG/3ML, Disp: 360 mL, Rfl: 1    nitroglycerin (NITROSTAT) 0.4 mg SL tablet, Place 1 tablet (0.4 mg total) under the tongue every 5 (five) minutes as needed for chest pain, Disp: 30 tablet, Rfl: 3    oxybutynin (DITROPAN) 5 mg  tablet, Take 1 tablet (5 mg total) by mouth 3 (three) times a day as needed (spasm), Disp: 60 tablet, Rfl: 3    Georgina 1 MG TBEC, Take 1 tablet (1 mg total) by mouth in the morning, Disp: 90 tablet, Rfl: 2    tamsulosin (FLOMAX) 0.4 mg, Take 1 capsule (0.4 mg total) by mouth daily with dinner, Disp: 30 capsule, Rfl: 6    Tapinarof (Vtama) 1 % CREA, Apply 1 Application topically in the morning, Disp: 60 g, Rfl: 0    traMADol (Ultram) 50 mg tablet, Take 1 tablet (50 mg total) by mouth every 6 (six) hours as needed for severe pain, Disp: 40 tablet, Rfl: 0    Trelegy Ellipta 100-62.5-25 MCG/ACT inhaler, Inhale 1 puff daily, Disp: 60 blister, Rfl: 0    Ubrelvy 100 MG tablet, Take 1 tablet (100 mg total) by mouth daily as needed (migraine), Disp: 100 tablet, Rfl: 1    Historical Information   Past Medical History:   Diagnosis Date    Arthritis     Asthma     Chronic pain 01/30/2024    cervic and lumbar    Colon polyp     COPD (chronic obstructive pulmonary disease) (HCC)     Emphysema of lung (HCC)     GERD (gastroesophageal reflux disease) 01/30/2024    Headache(784.0)     Hyperlipidemia 01/30/2024    Migraine     Obesity     Sleep apnea      Past Surgical History:   Procedure Laterality Date    APPENDECTOMY      CARDIAC CATHETERIZATION  06/17/2024    Procedure: Cardiac catheterization;  Surgeon: Sarah Noland DO;  Location: [facility];  Service: Cardiology    CARDIAC CATHETERIZATION N/A 06/17/2024    Procedure: Cardiac Coronary Angiogram;  Surgeon: Sarah Noland DO;  [facility];  Service: Cardiology    CARDIAC CATHETERIZATION N/A 06/17/2024    Procedure: Cardiac FFR/IFR;  Surgeon: Sarah Noland DO;  [facility];  Service: Cardiology    CARDIAC CATHETERIZATION N/A 06/17/2024    Procedure: Cardiac pci;  Surgeon: Sarah Noland DO;  [facility];  Service: Cardiology    CARDIAC CATHETERIZATION N/A 06/17/2024    Procedure: Cardiac IVUS;   "Surgeon: Sarah Noland DO;  Location:  CARDI[facility] CATH LAB;  Service: Cardiology    COLONOSCOPY      COLONOSCOPY      HERNIA REPAIR      4 times    NEPHRECTOMY LIVING DONOR Left 10/2009    RI EXCISION HYDROCELE UNILATERAL Left 01/30/2024    Procedure: HYDROCELECTOMY;  Surgeon: Evan Salmeron MD;  Location: [facility];  Service: Urology     Social History   Social History     Tobacco Use   Smoking Status Former    Current packs/day: 0.00    Average packs/day: 2.0 packs/day for 39.0 years (78.0 ttl pk-yrs)    Types: Cigarettes    Start date: 1/1/1970    Quit date: 1/1/2009    Years since quitting: 15.5   Smokeless Tobacco Never       Family History:   Family History   Problem Relation Age of Onset    Breast cancer Mother     Heart disease Father     Heart disease Brother     Stroke Brother     Lung disease Brother     Breast cancer Maternal Grandmother     Heart disease Paternal Grandmother          PhysicalExamination:  Vitals:   /84 (BP Location: Left arm, Patient Position: Sitting, Cuff Size: Large)   Pulse 102   Temp 98.3 °F (36.8 °C)   Resp 18   Ht 5' 6\" (1.676 m)   Wt 96.2 kg (212 lb)   SpO2 96%   BMI 34.22 kg/m²     Appearance -- NAD, speaking full sentences  HEENT -- anicteric sclera, clear OP, MMM  Neck -- no JVD, expiratory wheeze in upper airway  Heart -- RRR, no murmurs  Lungs -- no wheezing in lungs but mild upper airway wheeze  Abdomen -- soft, NTND, +bs  Extremities -- WWP, no LE edema  Skin -- no rash  Neuro -- A&Ox3, wnl  Psych -- no obvious depression or hallucination        Diagnostic Data:  Labs:  I personally reviewed the most recent laboratory data pertinent to today's visit    Lab Results   Component Value Date    WBC 8.70 07/15/2024    HGB 11.9 (L) 07/15/2024    HCT 39.8 07/15/2024    MCV 77 (L) 07/15/2024     07/15/2024     Lab Results   Component Value Date    CALCIUM 9.0 06/21/2024    K 3.7 06/21/2024    CO2 24 06/21/2024     06/21/2024    BUN 9 06/21/2024    " "CREATININE 0.81 06/21/2024     No results found for: \"IGE\"  Lab Results   Component Value Date    ALT 23 06/21/2024    AST 25 06/21/2024    ALKPHOS 90 06/21/2024       PFT results:  The most recent pulmonary function tests were reviewed.  [facility] PFTs 2020: No obstruction (FEV1/FVC 71%, FEV1 72%) no BD change, normal DLCO  9/2023: Severe obstruction (37%) + BD response (mod-severe  post BD 52%), some air trapping (NO hyperinflation as per report), mild restriction and mod decrease in DLCO  4/2024: Mod-severe obstruction (FE1 52-56%), no significant BD response, mild restriciton (with low ERV), mod decrease in DLCO    Imaging:  I personally reviewed the images on the PAC system pertinent to today's visit    CT Neck 2022: (my read) RUL w ill defined GGO around a possible emphesematous cystic area.  \"Abnormal appearance of laryngeal vestibule with mucosal apposition of aryepiglottic folds, effacement of bilateral piriform sinuses, and slight irregularity of bilateral true vocal cords.  Consider direct visualization by ENT for further evaluation.\"    CT Chest 5/2023  There is a new 7 mm heterogeneous nodular density right upper lobe, indeterminate may be inflammatory/ infectious or neoplastic  Short interval follow-up at 3 months is suggested.  Emphysema  No acute inflammatory stranding  No bowel obstruction  No hydronephrosis  Right inguinal hernia containing a small segment of the right lateral bladder wall  S/p left nephrectomy with no mass in the nephrectomy bed    CT Chest 9/2023:  1. The previously described nodule in the right upper lobe is not seen on today's exam however there are few ill-defined opacities in this region which may be due to scarring.  2. Moderate centrilobular emphysema.    CXR 6/2024: no acute lung disease, low volumes      Other:  PSG 10/203: low sleep efficiency, Sever LEATHA with some central sleep apnea, BiPAP titration recommended    Maryan Gillette MD  [facility]  "

## 2024-07-24 LAB
HGB A MFR BLD: 4.4 % (ref 1.8–3.2)
HGB A MFR BLD: 95.6 % (ref 96.4–98.8)
HGB F MFR BLD: 0 % (ref 0–2)
HGB FRACT BLD-IMP: ABNORMAL
HGB S MFR BLD: 0 %

## 2024-07-29 DIAGNOSIS — J44.9 COPD (CHRONIC OBSTRUCTIVE PULMONARY DISEASE) (HCC): ICD-10-CM

## 2024-07-29 NOTE — TELEPHONE ENCOUNTER
Reason for call:   [x] Refill   [] Prior Auth  [x] Other: NOT DUPLICATE. Change in pharmacy    Office:   [] PCP/Provider -   [x] Specialty/Provider - tyson/ Maryan Gillette MD     Medication:     Does the patient have enough for 3 days?   [] Yes   [x] No - Send as HP to POD

## 2024-07-30 ENCOUNTER — OFFICE VISIT (OUTPATIENT)
Dept: VASCULAR SURGERY | Facility: CLINIC | Age: 69
End: 2024-07-30
Payer: COMMERCIAL

## 2024-07-30 VITALS
HEART RATE: 96 BPM | SYSTOLIC BLOOD PRESSURE: 110 MMHG | BODY MASS INDEX: 33.91 KG/M2 | DIASTOLIC BLOOD PRESSURE: 76 MMHG | OXYGEN SATURATION: 97 % | HEIGHT: 66 IN | WEIGHT: 211 LBS

## 2024-07-30 DIAGNOSIS — R10.30 GROIN PAIN: ICD-10-CM

## 2024-07-30 DIAGNOSIS — R93.89 ABNORMAL CT SCAN: ICD-10-CM

## 2024-07-30 DIAGNOSIS — R10.31 CHRONIC GROIN PAIN, RIGHT: Primary | ICD-10-CM

## 2024-07-30 DIAGNOSIS — G89.29 CHRONIC GROIN PAIN, RIGHT: Primary | ICD-10-CM

## 2024-07-30 PROCEDURE — 1160F RVW MEDS BY RX/DR IN RCRD: CPT | Performed by: SURGERY

## 2024-07-30 PROCEDURE — 1159F MED LIST DOCD IN RCRD: CPT | Performed by: SURGERY

## 2024-07-30 PROCEDURE — 99205 OFFICE O/P NEW HI 60 MIN: CPT | Performed by: SURGERY

## 2024-07-30 RX ORDER — PREDNISONE 1 MG/1
1 TABLET, DELAYED RELEASE ORAL DAILY
Qty: 90 TABLET | Refills: 0 | Status: SHIPPED | OUTPATIENT
Start: 2024-07-30 | End: 2025-01-26

## 2024-07-30 RX ORDER — FLUTICASONE FUROATE, UMECLIDINIUM BROMIDE AND VILANTEROL TRIFENATATE 100; 62.5; 25 UG/1; UG/1; UG/1
1 POWDER RESPIRATORY (INHALATION) DAILY
Qty: 180 BLISTER | Refills: 0 | Status: SHIPPED | OUTPATIENT
Start: 2024-07-30 | End: 2025-01-26

## 2024-07-30 NOTE — ASSESSMENT & PLAN NOTE
69-year-old male presents for evaluation for right groin pain after heart cath.  He had a heart graft cath via right femoral access 6 weeks ago.  Since that time he had a lot of pain and discomfort in his right groin and also issues with what he describes as bladder spasms.  He was seen in the emergency room he initially had a CAT scan that was done which was read as there being a 7 mm potential right common femoral pseudoaneurysm.  When I review these images there was no evidence of a clear pseudoaneurysm and after reviewing the procedure note from cardiology a Angio-Seal closure device was used which is I believe what they are visualizing on the CAT scan.  There was also a subsequent pseudo check duplex that was done about a week later this was negative for pseudoaneurysm.  On my exam he is still somewhat tender over the right common femoral artery but there is no palpable mass or bleeding the skin is intact he complains also quite a bit about bladder spasms and he is already been seen by urology who is treating him medically.  I reassured him that there is no pseudoaneurysm nor was there ever and no further testing is needed for this.  He can follow-up with me as needed.  
Yes

## 2024-07-30 NOTE — PROGRESS NOTES
Assessment/Plan:      Diagnoses and all orders for this visit:    Groin pain  -     Ambulatory Referral to Vascular Surgery    Abnormal CT scan  -     Ambulatory Referral to Vascular Surgery        Chronic groin pain, right  69-year-old male presents for evaluation for right groin pain after heart cath.  He had a heart graft cath via right femoral access 6 weeks ago.  Since that time he had a lot of pain and discomfort in his right groin and also issues with what he describes as bladder spasms.  He was seen in the emergency room he initially had a CAT scan that was done which was read as there being a 7 mm potential right common femoral pseudoaneurysm.  When I review these images there was no evidence of a clear pseudoaneurysm and after reviewing the procedure note from cardiology a Angio-Seal closure device was used which is I believe what they are visualizing on the CAT scan.  There was also a subsequent pseudo check duplex that was done about a week later this was negative for pseudoaneurysm.  On my exam he is still somewhat tender over the right common femoral artery but there is no palpable mass or bleeding the skin is intact he complains also quite a bit about bladder spasms and he is already been seen by urology who is treating him medically.  I reassured him that there is no pseudoaneurysm nor was there ever and no further testing is needed for this.  He can follow-up with me as needed.      Subjective:     Patient ID: Danie Liao is a 69 y.o. male.    Pt is new to our practice and here for a groin pain consultation. Pt had a PSA on 6/28/24. Pt c/o pain near bladder on Rt side of groin. Pt c/o tingling in Rt groin area. Pain is mostly internal.     HPI    Review of Systems   Constitutional: Negative.    HENT: Negative.     Eyes: Negative.    Respiratory:  Positive for shortness of breath and wheezing.    Cardiovascular:  Positive for chest pain.   Gastrointestinal:  Positive for constipation.    Endocrine: Negative.    Genitourinary:  Positive for frequency.   Musculoskeletal:  Positive for back pain.   Skin: Negative.    Allergic/Immunologic: Negative.    Neurological:  Positive for dizziness and light-headedness.   Hematological: Negative.    Psychiatric/Behavioral: Negative.         I have reviewed the ROS above and made changes as needed.      Objective:     Physical Exam      General  Exam: alert, awake, oriented, no distress, consistent with stated age    Integumentary  Exam: warm, dry, no gross lesions, no bruises and normal color    Head and Neck  Exam: supple, no bruits, trachea midline, no JVD, no mass or palpable nodes      Chest and Lung  Exam: chest normal without deformity, bilaterally expansive, clear to auscultation    Cardiovascular  Exam: regular rate, regular rhythm, no murmurs, no rubs or gallops    Adbomen  Exam: soft, non-tender, non-distended, no pulsatile abdominal masses, no abdominal bruit    Peripheral Vascular  Exam: no clubbing of the digits of the upper extremity, no cyanosis, no edema, both feet are warm, radial pulses 2+ bilaterally, skin well perfused, without and no varicosities.        Upper Extremity:  Palpation: Radial pulse- Bilateral 2+    Lower Extremity:  Palpation: Femoral pulse- Bilateral 2+         Popliteal pulse - Bilateral 2+        Dorsalis Pedis - Bilateral 2+  Right groin soft, no palpable mass, skin intact       Neurologic  Exam:alert, non-focal, oriented x 3, cranial nerves II-XII grossly intact

## 2024-07-30 NOTE — TELEPHONE ENCOUNTER
Patient called the RX Refill Line. Message is being forwarded to the office.     These meds are not a duplicate.  They were sent to the wrong pharm.  Meds were sent to [facility].  He wants them sent to [facility].  Please resend ASAP!!!    Please contact patient at [phone]

## 2024-07-31 DIAGNOSIS — I25.118 CORONARY ARTERY DISEASE OF NATIVE HEART WITH STABLE ANGINA PECTORIS, UNSPECIFIED VESSEL OR LESION TYPE (HCC): ICD-10-CM

## 2024-07-31 RX ORDER — TRAMADOL HYDROCHLORIDE 50 MG/1
50 TABLET ORAL EVERY 6 HOURS PRN
Qty: 20 TABLET | Refills: 0 | Status: SHIPPED | OUTPATIENT
Start: 2024-07-31

## 2024-07-31 NOTE — TELEPHONE ENCOUNTER
Medication: tramadol (Ultram)    Dose/Frequency: 50 mg, take 1 tablet by mouth every 6 hours as needed for severe pain    Quantity: 40    Pharmacy: [facility], [place], [place]    Office:   [x] PCP/Provider -   [] Speciality/Provider -     Does the patient have enough for 3 days?   [] Yes   [] No - Send as HP to POD

## 2024-08-02 LAB — MISCELLANEOUS LAB TEST RESULT: NORMAL

## 2024-08-05 ENCOUNTER — APPOINTMENT (OUTPATIENT)
Dept: RADIOLOGY | Facility: AMBULARY SURGERY CENTER | Age: 69
End: 2024-08-05
Payer: COMMERCIAL

## 2024-08-05 ENCOUNTER — OFFICE VISIT (OUTPATIENT)
Dept: OBGYN CLINIC | Facility: CLINIC | Age: 69
End: 2024-08-05
Payer: COMMERCIAL

## 2024-08-05 VITALS
HEART RATE: 74 BPM | BODY MASS INDEX: 33.09 KG/M2 | DIASTOLIC BLOOD PRESSURE: 79 MMHG | WEIGHT: 205 LBS | SYSTOLIC BLOOD PRESSURE: 119 MMHG

## 2024-08-05 DIAGNOSIS — M25.561 CHRONIC PAIN OF RIGHT KNEE: ICD-10-CM

## 2024-08-05 DIAGNOSIS — G89.29 CHRONIC PAIN OF RIGHT KNEE: Primary | ICD-10-CM

## 2024-08-05 DIAGNOSIS — G89.29 CHRONIC PAIN OF RIGHT KNEE: ICD-10-CM

## 2024-08-05 DIAGNOSIS — M25.561 CHRONIC PAIN OF RIGHT KNEE: Primary | ICD-10-CM

## 2024-08-05 PROCEDURE — 73562 X-RAY EXAM OF KNEE 3: CPT

## 2024-08-05 PROCEDURE — 20610 DRAIN/INJ JOINT/BURSA W/O US: CPT | Performed by: PHYSICAL MEDICINE & REHABILITATION

## 2024-08-05 PROCEDURE — 99204 OFFICE O/P NEW MOD 45 MIN: CPT | Performed by: PHYSICAL MEDICINE & REHABILITATION

## 2024-08-05 RX ORDER — ROPIVACAINE HYDROCHLORIDE 5 MG/ML
10 INJECTION, SOLUTION EPIDURAL; INFILTRATION; PERINEURAL
Status: COMPLETED | OUTPATIENT
Start: 2024-08-05 | End: 2024-08-05

## 2024-08-05 RX ORDER — TRIAMCINOLONE ACETONIDE 40 MG/ML
80 INJECTION, SUSPENSION INTRA-ARTICULAR; INTRAMUSCULAR
Status: COMPLETED | OUTPATIENT
Start: 2024-08-05 | End: 2024-08-05

## 2024-08-05 RX ADMIN — ROPIVACAINE HYDROCHLORIDE 10 ML: 5 INJECTION, SOLUTION EPIDURAL; INFILTRATION; PERINEURAL at 10:00

## 2024-08-05 RX ADMIN — TRIAMCINOLONE ACETONIDE 80 MG: 40 INJECTION, SUSPENSION INTRA-ARTICULAR; INTRAMUSCULAR at 10:00

## 2024-08-05 NOTE — PROGRESS NOTES
1. Chronic pain of right knee      Orders Placed This Encounter   Procedures    Large joint arthrocentesis: R knee    XR knee 3 vw right non injury     No orders of the defined types were placed in this encounter.    Impression:  Right knee pain likely secondary to mild osteoarthritis/degenerative meniscus tear.  We discussed different treatment options and decided to proceed with an intra-articular steroid injection.  We will see him back if needed.    Imaging Studies (I personally reviewed images in PACS and report):  Right knee x-rays most recent to this encounter reviewed.  These images show mild medial tibiofemoral osteoarthritis.  No joint effusion.  No acute osseous abnormalities.  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    Patient seen with resident physician, Dr. Petersen.    No follow-ups on file.    Patient is in agreement with the above plan.    HPI:  Danie Liao is a 69 y.o. male  who presents for evaluation of   Chief Complaint   Patient presents with    Right Knee - Pain     Pt presents with complaints of chronic right knee pain        Onset/Mechanism: started 40 years ago. States he always had arthritis. Pain is slowly getting worse   Location: behind the right knee cap.  Radiation: none.  Provocative: walking, movement in the morning, going up stairs .  Severity: impacting daily life with movement.  Associated Symptoms: grinding/popping. Denies numbness or tingling or weakness   Treatment so far: steroid shots that helped for a long time.    Following history reviewed and updated:  Past Medical History:   Diagnosis Date    Arthritis     Asthma     Chronic pain 01/30/2024    cervic and lumbar    Colon polyp     COPD (chronic obstructive pulmonary disease) (HCC)     Emphysema of lung (HCC)     GERD (gastroesophageal reflux disease) 01/30/2024    Headache(784.0)     Hyperlipidemia 01/30/2024    Migraine     Obesity     Sleep apnea      Past Surgical History:    Procedure Laterality Date    APPENDECTOMY      CARDIAC CATHETERIZATION  2024    Procedure: Cardiac catheterization;  Surgeon: Sarah Noland DO;  [facility];  Service: Cardiology    CARDIAC CATHETERIZATION N/A 2024    Procedure: Cardiac Coronary Angiogram;  Surgeon: Sarah Noland DO;  [facility];  Service: Cardiology    CARDIAC CATHETERIZATION N/A 2024    Procedure: Cardiac FFR/IFR;  Surgeon: Sarah Noland DO;  [facility];  Service: Cardiology    CARDIAC CATHETERIZATION N/A 2024    Procedure: Cardiac pci;  Surgeon: Sarah Noland DO;  [facility];  Service: Cardiology    CARDIAC CATHETERIZATION N/A 2024    Procedure: Cardiac IVUS;  Surgeon: Sarah Noland DO;  [facility];  Service: Cardiology    COLONOSCOPY      COLONOSCOPY      HERNIA REPAIR      4 times    NEPHRECTOMY LIVING DONOR Left 10/2009    [place] EXCISION HYDROCELE UNILATERAL Left 2024    Procedure: HYDROCELECTOMY;  Surgeon: Evan Salmeron MD;  Location: [facility];  Service: Urology     Social History   Social History     Substance and Sexual Activity   Alcohol Use Not Currently    Comment: Occasional     Social History     Substance and Sexual Activity   Drug Use No     Social History     Tobacco Use   Smoking Status Former    Current packs/day: 0.00    Average packs/day: 2.0 packs/day for 40.0 years (80.0 ttl pk-yrs)    Types: Cigarettes    Start date: 1970    Quit date: 2010    Years since quittin.6   Smokeless Tobacco Never     Family History   Problem Relation Age of Onset    Breast cancer Mother     Heart disease Father     Heart disease Brother     Stroke Brother     Lung disease Brother     Breast cancer Maternal Grandmother     Heart disease Paternal Grandmother      Allergies   Allergen Reactions    Other Other (See Comments)     Green Pepper        Constitutional:  /79   Pulse  74   Wt 93 kg (205 lb)   BMI 33.09 kg/m²    General: NAD.  Eyes: Anicteric sclerae.  Neck: Supple.  Lungs: Unlabored breathing.  Cardiovascular: No lower extremity edema.  Skin: Intact without erythema.  Neurologic: Sensation intact to light touch.  Psychiatric: Mood and affect are appropriate.    Right Knee Exam     Muscle Strength   The patient has normal right knee strength.    Tenderness   The patient is experiencing tenderness in the patella.    Range of Motion   The patient has normal right knee ROM.    Tests   Corrina:  Medial - positive (mild tenderness/no crepitis) Lateral - negative  Varus: negative Valgus: negative  Lachman:  Anterior - negative    Posterior - negative  Drawer:  Anterior - negative    Posterior - negative  Patellar apprehension: trace    Other   Erythema: absent  Scars: absent  Sensation: normal  Pulse: present  Swelling: none  Effusion: no effusion present      Left Knee Exam     Muscle Strength   The patient has normal left knee strength.    Range of Motion   The patient has normal left knee ROM.    Tests   Corrina:  Medial - negative Lateral - negative  Varus: negative Valgus: negative  Lachman:  Anterior - negative    Posterior - negative  Drawer:  Anterior - negative     Posterior - negative  Patellar apprehension: negative    Other   Erythema: absent  Scars: absent  Sensation: normal  Pulse: present  Swelling: none  Effusion: no effusion present             Large joint arthrocentesis: R knee  Universal Protocol:  Consent: Verbal consent obtained. Written consent not obtained.  Risks and benefits: risks, benefits and alternatives were discussed  Consent given by: patient  Timeout called at: 8/5/2024 10:25 AM.  Patient understanding: patient states understanding of the procedure being performed  Patient consent: the patient's understanding of the procedure matches consent given  Site marked: the operative site was marked  Radiology Images displayed and confirmed. If images not  available, report reviewed: imaging studies available  Patient identity confirmed: verbally with patient  Supporting Documentation  Indications: pain   Procedure Details  Location: knee - R knee  Needle size: 22 G  Ultrasound guidance: no  Approach: Inferolateral to patella.  Medications administered: 80 mg triamcinolone acetonide 40 mg/mL; 10 mL ropivacaine 0.5 %    Patient tolerance: patient tolerated the procedure well with no immediate complications  Dressing:  Sterile dressing applied    There was little to no resistance encountered during the injection.    Risks of this procedure include:    - Risk of bleeding since a needle is involved.  - Risk of infection (1/10,000 chance as per recent studies).  Signs/symptoms were discussed and they would prompt an urgent evaluation at an emergency department.  - Risk of pigmentation or skin dimpling in the skin (2-3% chance as per recent studies) from the steroid.  - Risk of increased pain from steroid flare (1% chance as per recent studies) that typically lasts 24-48 hours.  - Risk of increased blood sugars from the steroid medication that can last for a few weeks.  If the patient is a diabetic or pre-diabetic, they were encouraged to closely monitor their blood sugars and discuss with PCP if elevated more than usual or if having symptoms.    The benefits outweigh the risks and so the procedure was completed.

## 2024-08-12 ENCOUNTER — OFFICE VISIT (OUTPATIENT)
Dept: UROLOGY | Facility: CLINIC | Age: 69
End: 2024-08-12
Payer: COMMERCIAL

## 2024-08-12 ENCOUNTER — TELEPHONE (OUTPATIENT)
Dept: SURGERY | Facility: CLINIC | Age: 69
End: 2024-08-12

## 2024-08-12 VITALS
WEIGHT: 203 LBS | DIASTOLIC BLOOD PRESSURE: 70 MMHG | SYSTOLIC BLOOD PRESSURE: 108 MMHG | HEIGHT: 66 IN | BODY MASS INDEX: 32.62 KG/M2

## 2024-08-12 DIAGNOSIS — N32.89 BLADDER SPASM: ICD-10-CM

## 2024-08-12 DIAGNOSIS — K40.20 BILATERAL INGUINAL HERNIA WITHOUT OBSTRUCTION OR GANGRENE, RECURRENCE NOT SPECIFIED: Primary | ICD-10-CM

## 2024-08-12 LAB

## 2024-08-12 PROCEDURE — 51798 US URINE CAPACITY MEASURE: CPT | Performed by: UROLOGY

## 2024-08-12 PROCEDURE — 81002 URINALYSIS NONAUTO W/O SCOPE: CPT | Performed by: UROLOGY

## 2024-08-12 PROCEDURE — 99214 OFFICE O/P EST MOD 30 MIN: CPT | Performed by: UROLOGY

## 2024-08-12 RX ORDER — OXYBUTYNIN CHLORIDE 5 MG/1
5 TABLET ORAL 3 TIMES DAILY PRN
Qty: 60 TABLET | Refills: 3 | Status: CANCELLED | OUTPATIENT
Start: 2024-08-12

## 2024-08-12 RX ORDER — OXYCODONE HYDROCHLORIDE 5 MG/1
5 TABLET ORAL EVERY 4 HOURS PRN
Qty: 5 TABLET | Refills: 0 | Status: SHIPPED | OUTPATIENT
Start: 2024-08-12

## 2024-08-12 RX ORDER — OXYBUTYNIN CHLORIDE 10 MG/1
10 TABLET, EXTENDED RELEASE ORAL
Qty: 60 TABLET | Refills: 6 | Status: SHIPPED | OUTPATIENT
Start: 2024-08-12

## 2024-08-12 NOTE — TELEPHONE ENCOUNTER
----- Message from Evan Rolle DO sent at 8/12/2024 12:31 PM EDT -----  Hello. Please see below. The referral is in for us so he just needs an appt.  Thank you  ----- Message -----  From: Dennis Stone DO  Sent: 8/12/2024  11:46 AM EDT  To: Evan Rolle, DO    Hey this shannon has BL hernias. Do you think your team could call him and set up follow up appointment?

## 2024-08-12 NOTE — Clinical Note
Hey this shannon has BL hernias. Do you think your team could call him and set up follow up appointment?

## 2024-08-12 NOTE — PROGRESS NOTES
UROLOGY FOLLOW-UP ENCOUNTER    Danie Liao is a 69 y.o. 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 Had pseudoaneurysm.  Admitted to bladder spasms starting around this time.    CT abdomen pelvis with contrast 6/21/2024: 7 mm branching focus of enhancement arising from anterior aspect of right common femoral artery slightly increased in size and more conspicuous from previous study suspicious for tiny pseudoaneurysm; redemonstrated bilateral fat-containing inguinal hernias also containing small portion of lateral right bladder    He was last seen in July 2024 with bothersome bladder spasms.  Started on Flomax, oxybutynin at that time.    Urine dip negative on 8/12/2024    PVR 40 cc on 8/12/2024    Assessment and plan:     Bladder spasms, inguinal hernia    Patient with history as detailed above.  In summary, he had a cardiac catheterization in June 2024.  On follow-up imaging he was found to have pseudoaneurysm.  Since around this time, he has had very bothersome bladder spasms.    He had CT abdomen pelvis with contrast on 6/21/2024.  He had findings of the pseudoaneurysm.  Additionally, it demonstrated bilateral fat-containing inguinal hernias that also contained small portion of the lateral right bladder.    He states that his bladder spasms are focused in his right lower quadrant and are intermittent but very sharp.  These did not respond to oxybutynin 5 mg and Flomax.    I explained to him that oftentimes patients with hernias that include bladder can have bothersome bladder spasms due to abnormal positioning of the bladder.  It sounds like he has had multiple hernia surgeries in the past including incisional hernias  "for his donor laparoscopic nephrectomy.  He believes he has had inguinal hernia repairs as well.    I am referring him to my general surgery colleague, Dr. Rolle, for discussion of hernia repair.  I explained to him that I was not sure that repair of the hernia would completely resolve his bladder spasms, but it could make it more straightforward to manage once the bladder was in a proper anatomic position.    In the meantime, I am switching him from oxybutynin 5 mg to oxybutynin 10 mg extended release.  I told to take this daily for bladder spasms.  He understands side effects of medication including but not limited to dry eyes, dry mouth, constipation, urinary retention.    He also states that he is needed oxycodone in the past due to his pain and he is out of the medication.  I told him I can only prescribe 5 tabs and he would need to reach out to his PCP or pain medicine for further discussion of pain medications.  He verbalized understanding.          PLAN  -Oxybutynin 10 mg ER daily. Told him to stop his 5 mg dosing.  -Oxycodone 5 mg x5 written PRN  -Referral to general surgery for consideration of inguinal hernia repair  -He will see me in about 6 months for symptom check        Patient's wife, Larissa, present in office today and assisted with history      Portions of the above record have been created with voice recognition software.  Occasional wrong word or \"sound alike\" substitution may have occurred due to the inherent limitations of voice recognition software.  Read the chart carefully and recognize, using context, where substitution may have occurred.      Dennis Stone,         Chief Complaint     Bladder spasms    History of Present Illness     See summary above    No fevers or chills        The following portions of the patient's history were reviewed and updated as appropriate: allergies, current medications, past family history, past medical history, past social history, past surgical " history and problem list.        AUA SYMPTOM SCORE      Flowsheet Row Most Recent Value   AUA SYMPTOM SCORE    How often have you had a sensation of not emptying your bladder completely after you finished urinating? 5 (P)     How often have you had to urinate again less than two hours after you finished urinating? 4 (P)     How often have you found you stopped and started again several times when you urinate? 4 (P)     How often have you found it difficult to postpone urination? 5 (P)     How often have you had a weak urinary stream? 5 (P)     How often have you had to push or strain to begin urination? 5 (P)     How many times did you most typically get up to urinate from the time you went to bed at night until the time you got up in the morning? 4 (P)     Quality of Life: If you were to spend the rest of your life with your urinary condition just the way it is now, how would you feel about that? 6 (P)     AUA SYMPTOM SCORE 32 (P)              Review of Systems     Review of Systems   Constitutional:  Negative for chills and fever.   Respiratory:  Negative for cough and shortness of breath.    Genitourinary:  Negative for dysuria and hematuria.   Neurological:  Negative for dizziness and headaches.   Psychiatric/Behavioral:  Negative for agitation and behavioral problems.        Allergies     Allergies   Allergen Reactions    Other Other (See Comments)     Green Pepper       Physical Exam     Physical Exam  Constitutional:       General: He is not in acute distress.  HENT:      Head: Normocephalic and atraumatic.   Pulmonary:      Effort: Pulmonary effort is normal. No respiratory distress.   Abdominal:      General: Abdomen is flat.      Tenderness: There is no right CVA tenderness or left CVA tenderness.   Skin:     General: Skin is warm and dry.   Neurological:      General: No focal deficit present.      Mental Status: He is alert and oriented to person, place, and time.   Psychiatric:         Mood and Affect:  "Mood normal.         Behavior: Behavior normal.           Vital Signs  Vitals:    08/12/24 1051   BP: 108/70   BP Location: Left arm   Patient Position: Sitting   Cuff Size: Standard   Weight: 92.1 kg (203 lb)   Height: 5' 6\" (1.676 m)         Current Medications       Current Outpatient Medications:     albuterol (PROVENTIL HFA,VENTOLIN HFA) 90 mcg/act inhaler, INHALE 1-2 PUFFS EVERY 4-6 HOURS AS NEEDED AND AS DIRECTED, Disp: 36 g, Rfl: 1    aspirin 81 mg chewable tablet, Chew 1 tablet (81 mg total) daily, Disp: 90 tablet, Rfl: 3    atorvastatin (LIPITOR) 80 mg tablet, TAKE 1 TABLET BY MOUTH EVERY EVENING, Disp: 100 tablet, Rfl: 1    budesonide (PULMICORT) 0.5 mg/2 mL nebulizer solution, TAKE 2 ML (0.5 MG TOTAL) BY NEBULIZATION TWICE A DAY RINSE MOUTH AFTER USE, Disp: 360 mL, Rfl: 1    clopidogrel (PLAVIX) 75 mg tablet, TAKE 1 TABLET BY MOUTH EVERY DAY, Disp: 100 tablet, Rfl: 1    famotidine (PEPCID) 20 mg tablet, Take 1 tablet (20 mg total) by mouth 2 (two) times a day, Disp: 180 tablet, Rfl: 0    ipratropium-albuterol (DUO-NEB) 0.5-2.5 mg/3 mL nebulizer solution, TAKE THREE ML BY NEBULIZATION 4 (FOUR) TIMES A DAY Strength: 0.5-2.5 (3) MG/3ML, Disp: 360 mL, Rfl: 1    nitroglycerin (NITROSTAT) 0.4 mg SL tablet, Place 1 tablet (0.4 mg total) under the tongue every 5 (five) minutes as needed for chest pain, Disp: 30 tablet, Rfl: 3    oxybutynin (DITROPAN) 5 mg tablet, Take 1 tablet (5 mg total) by mouth 3 (three) times a day as needed (spasm), Disp: 60 tablet, Rfl: 3    Georgina 1 MG TBEC, Take 1 tablet (1 mg total) by mouth in the morning, Disp: 90 tablet, Rfl: 0    tamsulosin (FLOMAX) 0.4 mg, Take 1 capsule (0.4 mg total) by mouth daily with dinner, Disp: 30 capsule, Rfl: 5    Tapinarof (Vtama) 1 % CREA, Apply 1 Application topically in the morning, Disp: 60 g, Rfl: 0    traMADol (Ultram) 50 mg tablet, Take 1 tablet (50 mg total) by mouth every 6 (six) hours as needed for severe pain, Disp: 20 tablet, Rfl: 0    " Trelegy Ellipta 100-62.5-25 MCG/ACT inhaler, Inhale 1 puff daily, Disp: 180 blister, Rfl: 0    Ubrelvy 100 MG tablet, Take 1 tablet (100 mg total) by mouth daily as needed (migraine), Disp: 100 tablet, Rfl: 1    Active Problems     Patient Active Problem List   Diagnosis    Chronic obstructive pulmonary disease (HCC)    Localized osteoporosis without current pathological fracture    Lumbar disc disease    Intractable migraine without status migrainosus    Kidney donor    Psoriasis    Polyp of colon    Morbid (severe) obesity due to excess calories ([facility])    Obesity, Class II, BMI 35-39.9    Prediabetes    LEATHA (obstructive sleep apnea)    Hyperlipidemia    Chronic pain    GERD (gastroesophageal reflux disease)    Encysted hydrocele    Single kidney    Iron deficiency    Coronary artery disease of native heart with stable angina pectoris ([facility])    Chronic groin pain, right       Past Medical History     Past Medical History:   Diagnosis Date    Arthritis     Asthma     Chronic pain 01/30/2024    cervic and lumbar    Colon polyp     COPD (chronic obstructive pulmonary disease) ([facility])     Emphysema of lung ([facility])     GERD (gastroesophageal reflux disease) 01/30/2024    Headache(784.0)     Hyperlipidemia 01/30/2024    Migraine     Obesity     Sleep apnea        Surgical History     Past Surgical History:   Procedure Laterality Date    APPENDECTOMY      CARDIAC CATHETERIZATION  06/17/2024    Procedure: Cardiac catheterization;  Surgeon: Sarah Noland DO;  [facility];  Service: Cardiology    CARDIAC CATHETERIZATION N/A 06/17/2024    Procedure: Cardiac Coronary Angiogram;  Surgeon: Sarah Noland DO;  [facility];  Service: Cardiology    CARDIAC CATHETERIZATION N/A 06/17/2024    Procedure: Cardiac FFR/IFR;  Surgeon: Sarah Noland DO;  [facility];  Service: Cardiology    CARDIAC CATHETERIZATION N/A 06/17/2024    Procedure: Cardiac pci;  Surgeon: Sarah CARPENTER  DO Ludin;  [facility];  Service: Cardiology    CARDIAC CATHETERIZATION N/A 2024    Procedure: Cardiac IVUS;  Surgeon: Sarah Noland DO;  [facility];  Service: Cardiology    COLONOSCOPY      COLONOSCOPY      HERNIA REPAIR      4 times    NEPHRECTOMY LIVING DONOR Left 10/2009    [place] EXCISION HYDROCELE UNILATERAL Left 2024    Procedure: HYDROCELECTOMY;  Surgeon: Evan Salmeron MD;  [facility];  Service: Urology         Family History     Family History   Problem Relation Age of Onset    Breast cancer Mother     Heart disease Father     Heart disease Brother     Stroke Brother     Lung disease Brother     Breast cancer Maternal Grandmother     Heart disease Paternal Grandmother        Social History     Social History     Social History     Tobacco Use   Smoking Status Former    Current packs/day: 0.00    Average packs/day: 2.0 packs/day for 40.0 years (80.0 ttl pk-yrs)    Types: Cigarettes    Start date: 1970    Quit date: 2010    Years since quittin.6   Smokeless Tobacco Never       Pertinent Lab Values     Lab Results   Component Value Date    CREATININE 0.81 2024       Lab Results   Component Value Date    PSA 1.50 2023         Pertinent Imaging     The patient's images were reviewed by me personally and also in real time with them in the examination room using our PACS imaging system.      CT abdomen pelvis with contrast 2024: 7 mm branching focus of enhancement arising from anterior aspect of right common femoral artery slightly increased in size and more conspicuous from previous study suspicious for tiny pseudoaneurysm; redemonstrated bilateral fat-containing inguinal hernias also containing small portion of lateral right bladder    Pertinent Pathology     N/A      I have spent 30 minutes with Patient and family today in which greater than 50% of this time was spent in counseling/coordination of care regarding  Diagnostic results, Prognosis, Risks and benefits of tx options, Instructions for management, Patient and family education, Importance of tx compliance, Impressions, Counseling / Coordination of care, Documenting in the medical record, Reviewing / ordering tests, medicine, procedures  , and Obtaining or reviewing history  .    Please note this time includes cumulative time on the day of encounter, including reviewing medical records and/or coordinating care among the patient's other specialists.

## 2024-08-21 ENCOUNTER — OFFICE VISIT (OUTPATIENT)
Dept: CARDIOLOGY CLINIC | Facility: MEDICAL CENTER | Age: 69
End: 2024-08-21
Payer: COMMERCIAL

## 2024-08-21 VITALS
DIASTOLIC BLOOD PRESSURE: 82 MMHG | SYSTOLIC BLOOD PRESSURE: 124 MMHG | HEART RATE: 82 BPM | BODY MASS INDEX: 32.62 KG/M2 | OXYGEN SATURATION: 95 % | HEIGHT: 66 IN | WEIGHT: 203 LBS

## 2024-08-21 DIAGNOSIS — Z82.49 FAMILY HISTORY OF ASCVD: ICD-10-CM

## 2024-08-21 DIAGNOSIS — E66.9 OBESITY (BMI 30-39.9): ICD-10-CM

## 2024-08-21 DIAGNOSIS — R07.9 CHEST PAIN, UNSPECIFIED TYPE: ICD-10-CM

## 2024-08-21 DIAGNOSIS — Z95.5 S/P DRUG ELUTING CORONARY STENT PLACEMENT: ICD-10-CM

## 2024-08-21 DIAGNOSIS — D50.9 IRON DEFICIENCY ANEMIA, UNSPECIFIED IRON DEFICIENCY ANEMIA TYPE: ICD-10-CM

## 2024-08-21 DIAGNOSIS — Z87.891 PERSONAL HISTORY OF SMOKING: ICD-10-CM

## 2024-08-21 DIAGNOSIS — N32.89 BLADDER HERNIA IN MALE: ICD-10-CM

## 2024-08-21 DIAGNOSIS — E78.5 HYPERLIPIDEMIA: ICD-10-CM

## 2024-08-21 DIAGNOSIS — Z01.810 PREOPERATIVE CARDIOVASCULAR EXAMINATION: ICD-10-CM

## 2024-08-21 DIAGNOSIS — R06.02 SOB (SHORTNESS OF BREATH): ICD-10-CM

## 2024-08-21 DIAGNOSIS — J44.9 CHRONIC OBSTRUCTIVE PULMONARY DISEASE, UNSPECIFIED COPD TYPE (HCC): ICD-10-CM

## 2024-08-21 DIAGNOSIS — G47.33 OSA (OBSTRUCTIVE SLEEP APNEA): ICD-10-CM

## 2024-08-21 DIAGNOSIS — I25.118 CORONARY ARTERY DISEASE OF NATIVE ARTERY OF NATIVE HEART WITH STABLE ANGINA PECTORIS (HCC): Primary | ICD-10-CM

## 2024-08-21 DIAGNOSIS — R73.03 PREDIABETES: ICD-10-CM

## 2024-08-21 DIAGNOSIS — K21.9 GASTROESOPHAGEAL REFLUX DISEASE, UNSPECIFIED WHETHER ESOPHAGITIS PRESENT: ICD-10-CM

## 2024-08-21 PROCEDURE — 99214 OFFICE O/P EST MOD 30 MIN: CPT | Performed by: INTERNAL MEDICINE

## 2024-08-21 RX ORDER — ASPIRIN 81 MG/1
81 TABLET, CHEWABLE ORAL DAILY
Qty: 90 TABLET | Refills: 3 | Status: SHIPPED | OUTPATIENT
Start: 2024-08-21

## 2024-08-21 RX ORDER — CLOPIDOGREL BISULFATE 75 MG/1
75 TABLET ORAL DAILY
Qty: 90 TABLET | Refills: 3 | Status: SHIPPED | OUTPATIENT
Start: 2024-08-21

## 2024-08-21 RX ORDER — ATORVASTATIN CALCIUM 80 MG/1
80 TABLET, FILM COATED ORAL EVERY EVENING
Qty: 90 TABLET | Refills: 3 | Status: SHIPPED | OUTPATIENT
Start: 2024-08-21

## 2024-08-21 NOTE — PROGRESS NOTES
[facility]   [facility] [place] [id number] [address] [place] [address]  p: [phone] p: [phone]  f: [phone] f: [phone]         Cardiology Consultation Visit    [facility]  Patient Identification:  Danie Liao  1955     [id number] Care Team:  Gosia Mcgill MD (PCP)  No ref. provider found (Referring Provider)         ASSESSMENT & PLAN   Assessment & Plan     Discussion & Summary: Mr. Danie Liao was seen and examined today for acute assessment of SOB/CP and monitoring of chronic conditions noted below. Precautions and reasons to call our office or proceed to ER were discussed in detail. Patient expressed understanding and questions were answered.     Follow Up & Next Steps: Plan on follow up in-clinic in 4 months.    1. Coronary artery disease of native artery of native heart with stable angina pectoris (HCC)  Assessment & Plan:  CAD, s/p PCI with SHELL x 1 to DONALD, DFR negative LCX disease, Nondominant RCA with congenitally diminutive RCA in comparison to Conus  Patient is asymptomatic of anginal complaints, Notes ongoing SOB/CP with at rest however, Clinical presentation of symptoms today in clinic improved the MDI and resolution of difficult but fruitful discussion  Cont GDMT for CAD on dapt/statin, Aggressive risk factor reduction, Cont diet/lifestyle optimization, Proceed with already scheduled cardiac rehab, Follow FLP already ordered by PCP  Monitor closely for symptomatic changes, ED/Clinic precautions reviewed  Orders:  -     atorvastatin (LIPITOR) 80 mg tablet; Take 1 tablet (80 mg total) by mouth every evening  -     clopidogrel (PLAVIX) 75 mg tablet; Take 1 tablet (75 mg total) by mouth daily  -     aspirin 81 mg chewable tablet; Chew 1 tablet (81 mg total) daily  2. Chest pain, unspecified type  Overview:  Likely noncardiac given clinical presentation and  resolution on exam today at rest, Will follow cardiac rehab clinical course, Plan as above as well  3. SOB (shortness of breath)  Overview:  Likely noncardiac given clinical findings, Compliant with Pulm med mgt, Will follow routinely  4. Preoperative cardiovascular examination  Assessment & Plan:  Preoperative Cardiac Risk Stratification for Bladder Hernia Repair planned for 2024 (yet to be scheduled)  Functionally, the patient is able to perform adls and can achieve at least 4 METs without symptoms  Clinical exam is unremarkable for acute cv instability or illness, Recent EKG on 07/02/24 shows no pathological Q waves or new BBB , Review of the patient's prior cardiac testing shows no new findings requiring further workup  The patient's Brantley preoperative analysis yields a 0.2% risk of myocardial infarction or cardiac arrest, intraoperatively or up to 30 days post-op  At this time, the patient is deemed a low risk and may proceed to surgery without further cardiac testing , Given PCI with SHELL on 06/17 would ideally treat with 6 months of DAPT though given patient's desire to proceed with surgery can treat with at least 3 months of DAPT prior to surgery, Recommend to cont ASA and hold P2Y12i 5 days prior to surgery and then restart P2Y12i thereafter  5. Bladder hernia in male  6. S/P drug eluting coronary stent placement  7. Hyperlipidemia  8. Prediabetes  9. Personal history of smoking  10. Family history of ASCVD  11. Chronic obstructive pulmonary disease, unspecified COPD type (HCC)  12. LEATHA (obstructive sleep apnea)  13. Gastroesophageal reflux disease, unspecified whether esophagitis present  14. Iron deficiency anemia, unspecified iron deficiency anemia type  15. Obesity (BMI 30-39.9)        SUBJECTIVE   Subjective   Chief Complaint: Mr. Liao is a 69 y.o. male and former smoker with a PMH significant for but not limited to CAD, HLD, Prediabetes, JANEE, GERD, Bladder Hernia, and Obesity.  He presents to clinic  for Hospital Follow Up.    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  He'd been seen initially in clinic by Dr. Villegas for preoperative evaluation for bariatric surgery.  For is Anginal Chest Pain, he completed a NM MPI was positive for infarct and transient ischemic dilation.  He was scheduled for LHC at [facility] but the procedure was cancelled due to his discomfort with conscious sedation. At hospitals his procedure was completed with Anesthesia.  He was found to have a DFR positive DONALD lesion that was treated with SHELL x 1.  His intermediate LCX lesion was DFR negative and his nondominant RCA that was smaller in caliber than his conus.  His case was complicated by post-anesthetic agitation and combativeness, requiring several staff members to restrain his arms and legs. During his stay in PACU, it was difficult for him to maintain bedrest and his uop was diminshed requiring an order for straight cath.  He was discharged to home the following day with notable drop in Hgb and his post discharge course was complicated by urinary retention and abdominal pain.    We followed up by phone calls, text exchanges, and by [employer] messaging upon discharge.  He was seen in the ED twice for Abdominal Pain with notable ecchymosis on exam and without gross evidence of pseudoaneurysm or active bleeding on imaging.  He was seen by Vascular as well with follow up imaging that was negative for active psudoaneruysm or av fistula. His PCP was able to help him with pain management.  He was also able to encourage him to be compliant with DAPT after the patient had stopped taking clopidogrel.  He was evaluated by Urology for ongoing urinary retention, bladder spasm and pain.  His PVR volume was normal by bladder scan, but he was found to have a bladder herniation down  his right inguinal canal.  He was treated medically for bladder spasm.  For his anemia, he was seen by Hematology as well.  His chronic microcytosis was presumptively attributed to thalassemia trait given his family history and prior diagnosis of JANEE.  His post-procedural anemia has been trending back to his baseline since June.  He was seen for cardiology follow up by Dr. Peralta with no new changes in management.    He was most recently seen by Urology on 08/12/24, and was referred to General Surgery for Bladder Hernia repair.  Mr. Liao and his wife are hoping to have the surgery completed sooner than later.  He currently denies any anginal cp or vaca.  He does however note CP and SOB that presented during our discussion of his clinical course.  His symptoms abated during the course of our conversation and questions were answered with TI Zapata present.  On clinical review there are no recent hospitalizations, family history of early cad, or family history of scd.  His father and paternal grandmother had histories of CAD, however.  He notes less than ideal control of his diet and exercise habits. He reports no dedicated exercise.  He is, however, eager to initiate cardiac rehab.  He is otherwise compliant with medications but does not maintain a detailed BP log.  Of note, the patient's cardiac risk factor(s) include: advanced age, hypertension, dyslipidemia, obesity, sedentary lifestyle, and tobacco exposure.    Review of Systems: This was a comprehensive review of symptoms with problem focused details as note above.  Review of Systems   Constitutional: Positive for malaise/fatigue and weight loss.   Cardiovascular:  Positive for chest pain.   Respiratory:  Positive for shortness of breath.    Hematologic/Lymphatic: Bruises/bleeds easily.   Skin:  Positive for color change.   Musculoskeletal:  Positive for joint pain (rue).   Gastrointestinal:  Positive for abdominal pain and change in bowel habit.    Genitourinary:  Positive for dysuria.   Neurological:  Positive for paresthesias (rue).   Psychiatric/Behavioral:  The patient is nervous/anxious.         Past Hx: The following portions of the patient's history were reviewed and updated as appropriate: past medical history, past social history, past family history, past surgical history, current medications, allergies, past surgical history and problem list.  Social History     Socioeconomic History   • Marital status: /Civil Union     Spouse name: Not on file   • Number of children: Not on file   • Years of education: Not on file   • Highest education level: Not on file   Occupational History   • Not on file   Tobacco Use   • Smoking status: Former     Current packs/day: 0.00     Average packs/day: 2.0 packs/day for 40.0 years (80.0 ttl pk-yrs)     Types: Cigarettes     Start date: 1970     Quit date: 2010     Years since quittin.6   • Smokeless tobacco: Never   Vaping Use   • Vaping status: Former   Substance and Sexual Activity   • Alcohol use: Not Currently     Comment: Occasional   • Drug use: No   • Sexual activity: Yes     Partners: Female   Other Topics Concern   • Not on file   Social History Narrative   • Not on file     Social Determinants of Health     Financial Resource Strain: Low Risk  (2023)    Overall Financial Resource Strain (CARDIA)    • Difficulty of Paying Living Expenses: Not hard at all   Food Insecurity: Not on file   Transportation Needs: No Transportation Needs (2023)    PRAPARE - Transportation    • Lack of Transportation (Medical): No    • Lack of Transportation (Non-Medical): No   Physical Activity: Not on file   Stress: Not on file   Social Connections: Not on file   Intimate Partner Violence: Not on file   Housing Stability: Not on file        Family History   Problem Relation Age of Onset   • Breast cancer Mother    • Heart disease Father    • Heart disease Brother    • Stroke Brother    • Lung  disease Brother    • Breast cancer Maternal Grandmother    • Heart disease Paternal Grandmother         Patient Active Problem List   Diagnosis   • Chronic obstructive pulmonary disease (HCC)   • Localized osteoporosis without current pathological fracture   • Lumbar disc disease   • Intractable migraine without status migrainosus   • Kidney donor   • Psoriasis   • Polyp of colon   • Morbid (severe) obesity due to excess calories ([facility])   • Obesity, Class II, BMI 35-39.9   • Prediabetes   • LEATHA (obstructive sleep apnea)   • Hyperlipidemia   • Chronic pain   • GERD (gastroesophageal reflux disease)   • Encysted hydrocele   • Single kidney   • Iron deficiency   • Coronary artery disease of native heart with stable angina pectoris ([facility])   • Chronic groin pain, right   • SOB (shortness of breath)   • Chest pain   • Preoperative cardiovascular examination        Past Surgical History:   Procedure Laterality Date   • APPENDECTOMY     • CARDIAC CATHETERIZATION  06/17/2024    Procedure: Cardiac catheterization;  Surgeon: Sarah Noland DO;  Location: [facility];  Service: Cardiology   • CARDIAC CATHETERIZATION N/A 06/17/2024    Procedure: Cardiac Coronary Angiogram;  Surgeon: Sarah Noland DO;  Location: [facility];  Service: Cardiology   • CARDIAC CATHETERIZATION N/A 06/17/2024    Procedure: Cardiac FFR/IFR;  Surgeon: Sarah Noland DO;  [facility]: [facility];  Service: Cardiology   • CARDIAC CATHETERIZATION N/A 06/17/2024    Procedure: Cardiac pci;  Surgeon: Sarah Noland DO;  [facility]: [facility];  Service: Cardiology   • CARDIAC CATHETERIZATION N/A 06/17/2024    Procedure: Cardiac IVUS;  Surgeon: Sarah Noland DO;  Location: [facility];  Service: Cardiology   • COLONOSCOPY     • COLONOSCOPY     • HERNIA REPAIR      4 times   • NEPHRECTOMY LIVING DONOR Left 10/2009   • NV EXCISION HYDROCELE UNILATERAL Left 01/30/2024    Procedure: HYDROCELECTOMY;  Surgeon:  Evan Salmeron MD;  Location: [facility] OR;  Service: Urology        Current Outpatient Medications   Medication Instructions   • albuterol (PROVENTIL HFA,VENTOLIN HFA) 90 mcg/act inhaler INHALE 1-2 PUFFS EVERY 4-6 HOURS AS NEEDED AND AS DIRECTED   • aspirin 81 mg, Oral, Daily   • atorvastatin (LIPITOR) 80 mg, Oral, Every evening   • budesonide (PULMICORT) 0.5 mg/2 mL nebulizer solution TAKE 2 ML (0.5 MG TOTAL) BY NEBULIZATION TWICE A DAY RINSE MOUTH AFTER USE   • clopidogrel (PLAVIX) 75 mg, Oral, Daily   • famotidine (PEPCID) 20 mg, Oral, 2 times daily   • ipratropium-albuterol (DUO-NEB) 0.5-2.5 mg/3 mL nebulizer solution TAKE THREE ML BY NEBULIZATION 4 (FOUR) TIMES A DAY Strength: 0.5-2.5 (3) MG/3ML   • naloxone (NARCAN) 4 mg/0.1 mL nasal spray Administer 1 spray into a nostril. If no response after 2-3 minutes, give another dose in the other nostril using a new spray.   • nitroglycerin (NITROSTAT) 0.4 mg, Sublingual, Every 5 minutes PRN   • oxybutynin (DITROPAN-XL) 10 mg, Oral, Daily at bedtime   • oxyCODONE (ROXICODONE) 5 mg, Oral, Every 4 hours PRN   • Georgina 1 mg, Oral, Daily   • tamsulosin (FLOMAX) 0.4 mg, Oral, Daily with dinner   • Tapinarof (Vtama) 1 % CREA 1 Application, Apply externally, Daily   • traMADol (ULTRAM) 50 mg, Oral, Every 6 hours PRN   • Trelegy Ellipta 100-62.5-25 MCG/ACT inhaler 1 puff, Inhalation, Daily   • Ubrelvy 100 mg, Oral, Daily PRN        Allergies   Allergen Reactions   • Other Other (See Comments)     Green Pepper         OBJECTIVE   Objective     Physical Exam: Patient seen and examined today, accompanied by spouse, Anabell.  VITALS   Vitals:    08/21/24 1313   BP: 124/82   Pulse: 82   SpO2: 95%     BMI   Body mass index is 32.77 kg/m².  Physical Exam  Constitutional:       General: He is not in acute distress.     Appearance: He is obese. He is not toxic-appearing.   HENT:      Head: Normocephalic and atraumatic.      Right Ear: External ear normal.      Left Ear: External ear  normal.      Nose: Nose normal.   Eyes:      General: No scleral icterus.        Right eye: No discharge.         Left eye: No discharge.   Neck:      Vascular: No carotid bruit.   Cardiovascular:      Rate and Rhythm: Normal rate and regular rhythm.      Pulses: Normal pulses.      Heart sounds: No murmur heard.     No gallop.   Pulmonary:      Effort: Pulmonary effort is normal. No respiratory distress.      Breath sounds: No wheezing or rales.   Musculoskeletal:      Cervical back: Neck supple.      Right lower leg: No edema.      Left lower leg: No edema.   Skin:     General: Skin is warm.      Capillary Refill: Capillary refill takes less than 2 seconds.   Neurological:      Mental Status: He is alert and oriented to person, place, and time. Mental status is at baseline.   Psychiatric:         Mood and Affect: Mood normal.         Behavior: Behavior normal.          Recent CV Testin24  EKG  NSR, LAD, IRBB, similar to prior  24  PCI  SHELL to DFR positive DONALD, DFR negative LCX lesion  24  NM MPI  Inf infarct, Positive TID 1.57    For historical clinical findings, see the Supplemental Documentation section.     CLOSING   Administrative Statements     Coordination of Care: During our visit, I spent 40 minutes with the patient, and greater than 50% of this time was spent on counseling and coordination of care, including addressing diagnostic results, prognosis, risks and benefits of treatment options, instructions for management, patient/family education, importance of treatment compliance, along with risk factor reductions. During today's visit, the patient was accompanied by his spouse, and there was no need for interpretive services.     Dictation Disclaimer: This note was completed in part utilizing direct voice recognition software. Grammatical errors, random word insertion, spelling mistakes, and incomplete sentences may be an occasional consequence of the system secondary to software  limitations, ambient noise and hardware issues. At the time of dictation, efforts were made to edit, clarify and /or correct errors.  Please read the chart carefully and recognize, using context, where substitutions have occurred.  If you have any questions or concerns about the context, text or information contained within the body of this dictation, please contact me, the provider, for further clarification.     Sarah Noland, DO 08/21/24                    SUPPLEMENTAL DOCUMENTATION     Thoracic History   ======================  PRIOR VISIT INTERVALS  ======================  Date: 08/21/24, Consultation Visit    =====================  PRIOR DIAGNOSTIC TESTS  =====================  IMAGING   I have personally reviewed pertinent reports.  XR knee 3 vw right non injury    Result Date: 8/5/2024  Narrative: RIGHT KNEE INDICATION:   Pain in right knee. Other chronic pain.   COMPARISON:  None. VIEWS:  XR KNEE 3 VW RIGHT NON INJURY FINDINGS: There is no acute fracture or dislocation. There is no joint effusion. No significant degenerative changes. No lytic or blastic osseous lesion. Soft tissues are unremarkable.     Impression: No acute osseous abnormality. Electronically signed: 08/05/2024 12:42 PM Kendall Kwong MD      EKG   Date: 06/21/24, sinus rhythm with sinus arrhythmia, irbbb  Date: 06/19/24, sinus rhythm with sinus arrhythmia, RSR' pattern in V1 suggestive of right ventricular conduction delay  Date: 06/17/24, normal sinus rhythm, prwp, irbbb  Date: 06/07/24, sinus rhythm with sinus arrhythmia, irbbb  Date: 01/15/24, normal sinus rhythm, irbbb  Date: 12/12/23, sinus rhythm with sinus arrhythmia, RSR' pattern in V1 suggestive of right ventricular conduction delay  Date: 11/30/23, sinus rhythm with sinus arrhythmia, irbbb  Date: 03/29/23, normal sinus rhythm, sinus rhythm with sinus arrhythmia, left axis deviation, irbbb  Date: 06/12/18, normal sinus rhythm, left axis deviation, possible inf infarct age  undetermined    TELE   No results found for this or any recent visit.     HOLTER   No results found for this or any previous visit.  Extended Holter Study Date: 07/15/24  IMPRESSION:  Patient had a min HR of 56 bpm, max HR of 174 bpm, and avg HR of 82 bpm. Predominant underlying rhythm was Sinus Rhythm.  2 Supraventricular Tachycardia runs occurred, the run with the fastest interval lasting 11.7 secs with a max rate of 174 bpm (avg 155 bpm); the run with the fastest interval was also the longest.  Isolated SVEs were frequent (5.2%, 8814), SVE Couplets were rare (<1.0%, 12), and no SVE Triplets were present. Isolated VEs were rare (<1.0%), and no VE Couplets or VE Triplets were present. Ventricular Trigeminy was present.    DEVICE   No results found for this or any previous visit.    EP   No results found for this or any previous visit.    CT   Results for orders placed during the hospital encounter of 09/29/23  CT chest without contrast  Narrative  FINDINGS:  LUNGS: Moderate centrilobular emphysema. Bandlike opacities in the lingula and right lower lobe. No tracheal or endobronchial lesion.  Few ill-defined opacities in the previously described area of the heterogeneous nodule.  PLEURA:  Unremarkable.  HEART/GREAT VESSELS: Mild to moderate coronary vascular calcifications. No thoracic aortic aneurysm.  MEDIASTINUM AND RUBI:  Unremarkable.  CHEST WALL AND LOWER NECK:  Unremarkable.  VISUALIZED STRUCTURES IN THE UPPER ABDOMEN: Left kidney is surgically absent.  OSSEOUS STRUCTURES:  No acute fracture or destructive osseous lesion.  Impression  1. The previously described nodule in the right upper lobe is not seen on today's exam however there are few ill-defined opacities in this region which may be due to scarring.  2. Moderate centrilobular emphysema.    CATH   Results for testing completed on the encounter of 06/07/24  Study: LHC / PCI  Interpreted Summary  •  LCX with two intermediate lesions that were DFR Negative  on Spot Check and by Pullback  •  S/P Successful IVUS Guided PCI with SHELL x 1 to the DFR Positive 65% DONALD lesion with Post Dilation up to 16 park at 3.07 mm  •  RCA is a nondominant vessel with a conus larger than the RCA proper  •  Exceptionally tortuous innominate and very short root necessitating transition from RRA to RFA  •  1st Diag lesion is 65% stenosed.    Results for testing completed on the encounter of 06/07/24  Study: Left Heart Catheterization  Interpreted Summary  Case aborted on patient request for Anesthesia at a facility with surgery back up    STRESS   Results for orders placed during the hospital encounter of 04/05/24  NM myocardial perfusion spect (rx stress and/or rest)  Interpretation Summary  •  Resting ECG: The ECG shows normal sinus rhythm.  •  Stress ECG: A pharmacological stress test was performed using dobutamine. The patient had a maximal HR of 150 bpm (98% of MPHR) . The patient achieved the target heart rate. The patient reported dyspnea and chest pain during the stress test. Symptoms began during stress and ended during recovery.  •  Stress ECG: Arrhythmias during stress: occasional PVCs. The ECG was negative for ischemia. The stress ECG is negative for ischemia after pharmacologic stress, with reproduction of symptoms.  •  Perfusion: There is a left ventricular perfusion defect that is small in size present in the inferior location(s) that is fixed.  •  Perfusion Defect Conclusion: The stress/rest perfusion ratio is 1.57. There is evidence of transient ischemic dilation (TID). TID was appreciated visually and quantitatively.  •  Stress Function: Left ventricular function post-stress is normal. Stress ejection fraction is 68%. There is a defect in the basal inferior location(s). The defect has mildly reduced function.  The stress ECG revealed no diagnostic evidence of ischemia.  Occasional PVCs were noted.  He had chest discomfort following dobutamine infusion.  There was a fixed  inferior defect suggestive of infarct.  There was also evidence of transient ischemic dilation.  TID ratio was 1.57. Balanced ischemia could not be excluded.  Clinical correlation advised.    TTE   No results found for this or any previous visit.      AZAM   No results found for this or any previous visit.      CMR   No results found for this or any previous visit.      LABS     Lab Results   Component Value Date    K 3.7 06/21/2024    K 3.9 06/19/2024    K 4.5 06/18/2024     06/21/2024     06/19/2024     06/18/2024    CO2 24 06/21/2024    CO2 24 06/19/2024    CO2 21 06/18/2024    BUN 9 06/21/2024    BUN 18 06/19/2024    BUN 22 06/18/2024    CREATININE 0.81 06/21/2024    CREATININE 0.92 06/19/2024    CREATININE 0.92 06/18/2024    EGFR 91 06/21/2024    EGFR 85 06/19/2024    EGFR 85 06/18/2024    GLUC 107 06/21/2024    GLUC 108 06/19/2024    GLUC 134 06/18/2024    AST 25 06/21/2024    AST 21 06/19/2024    AST 16 05/18/2024    ALT 23 06/21/2024    ALT 21 06/19/2024    ALT 18 05/18/2024    TBILI 1.02 (H) 06/21/2024    TBILI 0.62 06/19/2024    TBILI 0.57 05/18/2024    ALB 3.7 06/21/2024    ALB 3.7 06/19/2024    ALB 4.0 05/18/2024    MG 2.2 11/30/2023    CALCIUM 9.0 06/21/2024    CALCIUM 8.7 06/19/2024    CALCIUM 8.1 (L) 06/18/2024      Lab Results   Component Value Date    WBC 8.70 07/15/2024    WBC 9.96 06/21/2024    WBC 9.79 06/19/2024    HGB 11.9 (L) 07/15/2024    HGB 10.4 (L) 06/21/2024    HGB 10.3 (L) 06/19/2024    HCT 39.8 07/15/2024    HCT 33.1 (L) 06/21/2024    HCT 33.1 (L) 06/19/2024     07/15/2024     06/21/2024     06/19/2024    INR 0.91 06/21/2024    INR 0.88 06/19/2024    INR 1.02 06/11/2024    IRON 112 07/15/2024    IRON 95 05/21/2024    FERRITIN 40 07/15/2024    FERRITIN 12 (L) 05/21/2024    TIBC 364 07/15/2024    TIBC 413 05/21/2024      Lab Results   Component Value Date    CHOLESTEROL 190 05/18/2024    TRIG 94 05/18/2024    HDL 64 05/18/2024    LDLCALC 107 (H)  05/18/2024     Lab Results   Component Value Date    HGBA1C 6.0 (H) 05/18/2024    HGBA1C 6.0 (H) 09/23/2023    GLUF 134 (H) 06/18/2024    GLUF 109 (H) 05/18/2024    GLUF 84 01/25/2024     Lab Results   Component Value Date    VEO9JPDNBYAO 1.275 05/18/2024    RIY9QFHOBSSX 1.538 09/23/2023     Lab Results   Component Value Date    HSTNI0 19 06/21/2024    HSTNI0 23 06/19/2024    HSTNI0 4 01/15/2024    HSTNI2 16 06/21/2024    HSTNI2 25 06/19/2024    HSTNI2 2 01/15/2024    TROPONINI <0.02 07/14/2021    TROPONINI <0.02 07/12/2018     Lab Results   Component Value Date    BNP 86 06/21/2024    BNP 49 06/19/2024    BNP 17 11/30/2023

## 2024-08-22 ENCOUNTER — OFFICE VISIT (OUTPATIENT)
Dept: HEMATOLOGY ONCOLOGY | Facility: CLINIC | Age: 69
End: 2024-08-22
Payer: COMMERCIAL

## 2024-08-22 VITALS
BODY MASS INDEX: 32.95 KG/M2 | WEIGHT: 205 LBS | DIASTOLIC BLOOD PRESSURE: 90 MMHG | OXYGEN SATURATION: 97 % | HEART RATE: 71 BPM | SYSTOLIC BLOOD PRESSURE: 124 MMHG | HEIGHT: 66 IN

## 2024-08-22 DIAGNOSIS — D56.3 ALPHA THALASSEMIA TRAIT: ICD-10-CM

## 2024-08-22 DIAGNOSIS — D56.3 BETA THALASSEMIA MINOR: Primary | ICD-10-CM

## 2024-08-22 DIAGNOSIS — D50.8 OTHER IRON DEFICIENCY ANEMIA: ICD-10-CM

## 2024-08-22 PROCEDURE — 99212 OFFICE O/P EST SF 10 MIN: CPT | Performed by: PHYSICIAN ASSISTANT

## 2024-08-22 NOTE — ASSESSMENT & PLAN NOTE
Preoperative Cardiac Risk Stratification for Bladder Hernia Repair planned for 2024 (yet to be scheduled)  Functionally, the patient is able to perform adls and can achieve at least 4 METs without symptoms  Clinical exam is unremarkable for acute cv instability or illness, Recent EKG on 07/02/24 shows no pathological Q waves or new BBB , Review of the patient's prior cardiac testing shows no new findings requiring further workup  The patient's Brantley preoperative analysis yields a 0.2% risk of myocardial infarction or cardiac arrest, intraoperatively or up to 30 days post-op  At this time, the patient is deemed a low risk and may proceed to surgery without further cardiac testing , Given PCI with SHELL on 06/17 would ideally treat with 6 months of DAPT though given patient's desire to proceed with surgery can treat with at least 3 months of DAPT prior to surgery, Recommend to cont ASA and hold P2Y12i 5 days prior to surgery and then restart P2Y12i thereafter

## 2024-08-22 NOTE — PROGRESS NOTES
[facility] [address] [address]  Hematology Ambulatory Follow-Up  Danie Liao, 1955, [id number]  8/22/2024      Assessment and Plan   1. Beta thalassemia minor  2. Alpha thalassemia trait  3. Other iron deficiency anemia    69-year-old male who presents today in follow up for iron deficiency anemia.   Hgb electrophoresis and alpha DNA test revealed beta- thalassemia minor AND alpha thalassemia trait. He has one child who is not having any children.   He had JANEE, colonoscopy 04/2024 showed multiple polyps without any bleeding. He was taking oral iron with improvement in iron stores and anemia. He is having constipation with iron so he now is only taking on occasion. He is not interest in any IV iron. Had cardiac stent placed in June and is now on Plavix/asa. Denies any bleeding. Most recent labs -> hemoglobin 11.9, MCV 77, platelets 369,000.  Iron saturation 31%, iron 112, TIBC normal, ferritin 40.     Discussion/Plan   Persistent microcytosis, JANEE due to beta- thalassemia minor/alpha thalassemia trait.   His anemia is improving. Pt is not interested in IV iron therapy. Continue oral iron as tolerated with laxative support. Can consider Slow Fe to help with constipation.   Repeat CBC, Iron panel can be repeated and monitored by PCP in 3-6m. Will send communication to PCP.   Consider PRBC transfusion for hemoglobin less than 8 given his history of cardiac disease.  Genetic testing recommended for his child if she is considering pregnancy in future.   RTC prn   Patient should be referred back to our clinic if he has any worsening anemia or development of other cytopenias.     Patient voiced agreement and understanding to the above.   Patient advised to call the Hematology/Oncology office with any questions and concerns regarding the above.    Barrier(s) to care: None  The patient is able to self care.    Megan  Kapil SAHNI   Medical Oncology/Hematology  [facility]    Subjective     Chief Complaint   Patient presents with    Follow-up     6 Weeks Follow Up       History of present illness: 69-year-old male who was referred by his bariatric provider for evaluation of iron deficiency anemia.     On chart review, the patient has had a microcytosis at least since 2018 with normal hemoglobin.  His twin brother has history of thalassemia.  The patient himself has never been tested for thalassemia.  He has no history of anemia in the past and has never had a blood transfusion previously.  He was recently found to have microcytic anemia with hemoglobin around 10 g/dL on June 18/2024.  His prior labs from June 11 showed hemoglobin 13.5.    Last iron panel was from 05/2024: Iron 95, sat 23%, TIBC normal, ferritin 12. On further discussion with the patient, he underwent cardiac catheterization and had 2 stents placed on June 17.  Postoperatively he remembers having significant ecchymosis of bilateral lower extremities.     Patient denies any hematochezia, melena or hematuria.  No shortness of breath, chest pain, fatigue or pica.  He has no known history of any malabsorption conditions.  Currently not on any oral iron.  He reports regular diet.  He underwent colonoscopy 04/2024 that showed 8 polyps s/p polypectomy which showed tubular adenomas and hyperplastic polyp. Negative for high-grade dysplasia.     08/22/24 :  Lab Results   Component Value Date    IRON 112 07/15/2024    TIBC 364 07/15/2024    FERRITIN 40 07/15/2024     Lab Results   Component Value Date    WBC 8.70 07/15/2024    HGB 11.9 (L) 07/15/2024    HCT 39.8 07/15/2024    MCV 77 (L) 07/15/2024     07/15/2024       Interval history: feeling well. No hematochezia, melena or hematuria. Was taking oral iron regularly, now only taking as needed due to constipation.     Review of Systems   All other systems reviewed and are  negative.      Patient Active Problem List   Diagnosis    Chronic obstructive pulmonary disease ([facility])    Localized osteoporosis without current pathological fracture    Lumbar disc disease    Intractable migraine without status migrainosus    Kidney donor    Psoriasis    Polyp of colon    Morbid (severe) obesity due to excess calories ([facility])    Obesity, Class II, BMI 35-39.9    Prediabetes    LEATHA (obstructive sleep apnea)    Hyperlipidemia    Chronic pain    GERD (gastroesophageal reflux disease)    Encysted hydrocele    Single kidney    Iron deficiency    Coronary artery disease of native heart with stable angina pectoris ([facility])    Chronic groin pain, right    SOB (shortness of breath)    Chest pain    Preoperative cardiovascular examination     Past Medical History:   Diagnosis Date    Arthritis     Asthma     Chronic pain 01/30/2024    cervic and lumbar    Colon polyp     COPD (chronic obstructive pulmonary disease) ([facility])     Emphysema of lung ([facility])     GERD (gastroesophageal reflux disease) 01/30/2024    Headache(784.0)     Hyperlipidemia 01/30/2024    Migraine     Obesity     Sleep apnea      Past Surgical History:   Procedure Laterality Date    APPENDECTOMY      CARDIAC CATHETERIZATION  06/17/2024    Procedure: Cardiac catheterization;  Surgeon: Sarah Noland DO;  Location: [facility];  Service: Cardiology    CARDIAC CATHETERIZATION N/A 06/17/2024    Procedure: Cardiac Coronary Angiogram;  Surgeon: Sarah Noland DO;  [facility]: [facility];  Service: Cardiology    CARDIAC CATHETERIZATION N/A 06/17/2024    Procedure: Cardiac FFR/IFR;  Surgeon: Sarah Noland DO;  [facility]: [facility];  Service: Cardiology    CARDIAC CATHETERIZATION N/A 06/17/2024    Procedure: Cardiac pci;  Surgeon: Sarah Noland DO;  [facility]: [facility];  Service: Cardiology    CARDIAC CATHETERIZATION N/A 06/17/2024    Procedure: Cardiac IVUS;  Surgeon: Sarah Noland DO;  Location: [facility];  Service: Cardiology    COLONOSCOPY      COLONOSCOPY      HERNIA REPAIR      4 times    NEPHRECTOMY LIVING DONOR Left 10/2009    ME EXCISION HYDROCELE UNILATERAL Left 2024    Procedure: HYDROCELECTOMY;  Surgeon: Evan Salmeron MD;  Location: [facility];  Service: Urology     Family History   Problem Relation Age of Onset    Breast cancer Mother     Heart disease Father     Heart disease Brother     Stroke Brother     Lung disease Brother     Breast cancer Maternal Grandmother     Heart disease Paternal Grandmother      Social History     Socioeconomic History    Marital status: /Civil Union     Spouse name: None    Number of children: None    Years of education: None    Highest education level: None   Occupational History    None   Tobacco Use    Smoking status: Former     Current packs/day: 0.00     Average packs/day: 2.0 packs/day for 40.0 years (80.0 ttl pk-yrs)     Types: Cigarettes     Start date: 1970     Quit date: 2010     Years since quittin.6    Smokeless tobacco: Never   Vaping Use    Vaping status: Former   Substance and Sexual Activity    Alcohol use: Not Currently     Comment: Occasional    Drug use: No    Sexual activity: Yes     Partners: Female   Other Topics Concern    None   Social History Narrative    None     Social Determinants of Health     Financial Resource Strain: Low Risk  (2023)    Overall Financial Resource Strain (CARDIA)     Difficulty of Paying Living Expenses: Not hard at all   Food Insecurity: Not on file   Transportation Needs: No Transportation Needs (2023)    PRAPARE - Transportation     Lack of Transportation (Medical): No     Lack of Transportation (Non-Medical): No   Physical Activity: Not on file   Stress: Not on file   Social Connections: Not on file   Intimate Partner Violence: Not on file   Housing Stability: Not on file       Current Outpatient Medications:     albuterol (PROVENTIL HFA,VENTOLIN HFA) 90 mcg/act  inhaler, INHALE 1-2 PUFFS EVERY 4-6 HOURS AS NEEDED AND AS DIRECTED, Disp: 36 g, Rfl: 1    aspirin 81 mg chewable tablet, Chew 1 tablet (81 mg total) daily, Disp: 90 tablet, Rfl: 3    atorvastatin (LIPITOR) 80 mg tablet, Take 1 tablet (80 mg total) by mouth every evening, Disp: 90 tablet, Rfl: 3    budesonide (PULMICORT) 0.5 mg/2 mL nebulizer solution, TAKE 2 ML (0.5 MG TOTAL) BY NEBULIZATION TWICE A DAY RINSE MOUTH AFTER USE, Disp: 360 mL, Rfl: 1    clopidogrel (PLAVIX) 75 mg tablet, Take 1 tablet (75 mg total) by mouth daily, Disp: 90 tablet, Rfl: 3    famotidine (PEPCID) 20 mg tablet, Take 1 tablet (20 mg total) by mouth 2 (two) times a day, Disp: 180 tablet, Rfl: 0    ipratropium-albuterol (DUO-NEB) 0.5-2.5 mg/3 mL nebulizer solution, TAKE THREE ML BY NEBULIZATION 4 (FOUR) TIMES A DAY Strength: 0.5-2.5 (3) MG/3ML, Disp: 360 mL, Rfl: 1    naloxone (NARCAN) 4 mg/0.1 mL nasal spray, Administer 1 spray into a nostril. If no response after 2-3 minutes, give another dose in the other nostril using a new spray., Disp: 1 each, Rfl: 1    nitroglycerin (NITROSTAT) 0.4 mg SL tablet, Place 1 tablet (0.4 mg total) under the tongue every 5 (five) minutes as needed for chest pain, Disp: 30 tablet, Rfl: 3    oxybutynin (DITROPAN-XL) 10 MG 24 hr tablet, Take 1 tablet (10 mg total) by mouth daily at bedtime, Disp: 60 tablet, Rfl: 6    oxyCODONE (Roxicodone) 5 immediate release tablet, Take 1 tablet (5 mg total) by mouth every 4 (four) hours as needed for severe pain Max Daily Amount: 30 mg, Disp: 5 tablet, Rfl: 0    Georgina 1 MG TBEC, Take 1 tablet (1 mg total) by mouth in the morning, Disp: 90 tablet, Rfl: 0    tamsulosin (FLOMAX) 0.4 mg, Take 1 capsule (0.4 mg total) by mouth daily with dinner, Disp: 30 capsule, Rfl: 5    Tapinarof (Vtama) 1 % CREA, Apply 1 Application topically in the morning, Disp: 60 g, Rfl: 0    traMADol (Ultram) 50 mg tablet, Take 1 tablet (50 mg total) by mouth every 6 (six) hours as needed for severe pain,  "Disp: 20 tablet, Rfl: 0    Trelegy Ellipta 100-62.5-25 MCG/ACT inhaler, Inhale 1 puff daily, Disp: 180 blister, Rfl: 0    Ubrelvy 100 MG tablet, Take 1 tablet (100 mg total) by mouth daily as needed (migraine), Disp: 100 tablet, Rfl: 1  Allergies   Allergen Reactions    Other Other (See Comments)     Green Pepper       Objective   /90 (BP Location: Right arm, Patient Position: Sitting)   Pulse 71   Ht 5' 6\" (1.676 m)   Wt 93 kg (205 lb)   SpO2 97%   BMI 33.09 kg/m²    Physical Exam  Vitals reviewed.   HENT:      Head: Normocephalic.   Cardiovascular:      Rate and Rhythm: Normal rate and regular rhythm.   Pulmonary:      Effort: Pulmonary effort is normal.      Breath sounds: Normal breath sounds.   Musculoskeletal:      Cervical back: Neck supple.   Lymphadenopathy:      Cervical: No cervical adenopathy.   Skin:     Findings: No rash.   Neurological:      Mental Status: He is alert.         Result Review  Labs:  Office Visit on 08/12/2024   Component Date Value Ref Range Status    POST-VOID RESIDUAL VOLUME, ML POC 08/12/2024 40  mL Final    LEUKOCYTE ESTERASE,UA 08/12/2024 -   Final    NITRITE,UA 08/12/2024 -   Final    SL AMB POCT UROBILINOGEN 08/12/2024 0.2   Final    POCT URINE PROTEIN 08/12/2024 trace   Final     PH,UA 08/12/2024 5.0   Final    BLOOD,UA 08/12/2024 -   Final    SPECIFIC GRAVITY,UA 08/12/2024 1.020   Final    KETONES,UA 08/12/2024 -   Final    BILIRUBIN,UA 08/12/2024 +   Final    GLUCOSE, UA 08/12/2024 -   Final     COLOR,UA 08/12/2024 yellow   Final    CLARITY,UA 08/12/2024 clear   Final       Imaging:   I reviewed relevant imaging    Please note:  This report has been generated by a voice recognition software system. Therefore there may be syntax, spelling, and/or grammatical errors. Please call if you have any questions.  "

## 2024-08-22 NOTE — ASSESSMENT & PLAN NOTE
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

## 2024-08-23 ENCOUNTER — CONSULT (OUTPATIENT)
Dept: SURGERY | Facility: CLINIC | Age: 69
End: 2024-08-23
Payer: COMMERCIAL

## 2024-08-23 VITALS
RESPIRATION RATE: 18 BRPM | HEIGHT: 66 IN | HEART RATE: 90 BPM | WEIGHT: 204.6 LBS | BODY MASS INDEX: 32.88 KG/M2 | OXYGEN SATURATION: 97 % | DIASTOLIC BLOOD PRESSURE: 74 MMHG | TEMPERATURE: 97.5 F | SYSTOLIC BLOOD PRESSURE: 122 MMHG

## 2024-08-23 DIAGNOSIS — K40.21 BILATERAL RECURRENT INGUINAL HERNIA WITHOUT OBSTRUCTION OR GANGRENE: Primary | ICD-10-CM

## 2024-08-23 PROCEDURE — 99203 OFFICE O/P NEW LOW 30 MIN: CPT | Performed by: STUDENT IN AN ORGANIZED HEALTH CARE EDUCATION/TRAINING PROGRAM

## 2024-08-23 RX ORDER — HEPARIN SODIUM 5000 [USP'U]/ML
5000 INJECTION, SOLUTION INTRAVENOUS; SUBCUTANEOUS ONCE
OUTPATIENT
Start: 2024-08-23 | End: 2024-08-23

## 2024-08-23 RX ORDER — ALBUTEROL SULFATE AND BUDESONIDE 90; 80 UG/1; UG/1
AEROSOL, METERED RESPIRATORY (INHALATION)
COMMUNITY
Start: 2024-05-03 | End: 2024-08-26

## 2024-08-23 RX ORDER — CHLORHEXIDINE GLUCONATE 40 MG/ML
SOLUTION TOPICAL DAILY PRN
OUTPATIENT
Start: 2024-08-23

## 2024-08-23 NOTE — PROGRESS NOTES
Ambulatory Visit  Name: Danie Liao      : 1955      MRN: [id number]  Encounter Provider: Evan Rolle DO  Encounter Date: 2024   Encounter department: [facility] [place]    Assessment & Plan   1. Bilateral recurrent inguinal hernia without obstruction or gangrene  Assessment & Plan:  69-year-old male with bilateral reducible inguinal hernias  -Patient states that he has had inguinal hernia surgery twice on both sides of his groin, both were done open  -He has been having bladder spasms and groin discomfort  -When he does have the groin discomfort is mostly on the right side  -Does note a bulge on the right side of his groin  -On exam the patient has bilateral reducible inguinal hernias  -CT scan of the abdomen and pelvis from 2024 report and images reviewed, this was also reviewed with the patient  --Bilateral fat-containing inguinal hernias, part of the bladder is in the right sided hernia  -CBC and CMP from 2024 reviewed  -Hematology note from 2024 reviewed  -Urology note from 2024 reviewed  -Cardiology note from 2024 reviewed  --Patient deemed low risk for surgery, needs to continue dual antiplatelet therapy for at least 3 months after cardiac stent placement  -Plan for laparoscopic bilateral inguinal hernia repair with mesh, possible open  -CBC, BMP ordered  -Patient to hold Plavix 5 days prior to operative intervention, plan to restart day after surgery  -Patient to continue 81 mg of aspirin perioperatively    A visual was used to display the anatomy and the proposed incision, procedure, steps, and mesh placement. The risks were explained at length and outlined, not limited to bleeding, infection, recurrence, pain, testicular loss, and damage to other structures. The planned approximately one hour procedure was discussed along with 1 - 2 week recovery, resolution of pain and swelling timeline, and 4 weeks of light duty without lifting.  Questions were answered to satisfaction and consent was signed.  Orders:  -     Ambulatory Referral to General Surgery  -     Case request operating room: REPAIR HERNIA INGUINAL, LAPAROSCOPIC, Possible Open; Standing  -     CBC and Platelet; Future  -     Basic metabolic panel; Future  -     Case request operating room: REPAIR HERNIA INGUINAL, LAPAROSCOPIC, Possible Open      History of Present Illness     Danie Liao is a 69 y.o. male who presents for evaluation of groin pain.  Patient states he has had inguinal hernia surgery on both sides of his groin twice.  These were all done open.  He has been having groin pain and bladder spasms.  Notes a bulge in his right groin.  Has been tolerating a diet and having bowel function.    Review of Systems   Constitutional:  Negative for chills, fatigue and fever.   HENT:  Negative for congestion, hearing loss, rhinorrhea and sore throat.    Eyes:  Negative for pain and discharge.   Respiratory:  Negative for cough, chest tightness and shortness of breath.    Cardiovascular:  Negative for chest pain and palpitations.   Gastrointestinal:  Negative for abdominal pain, constipation, diarrhea, nausea and vomiting.        Positive right groin discomfort   Endocrine: Negative for cold intolerance and heat intolerance.   Genitourinary:         Positive bladder spasms   Musculoskeletal:  Positive for back pain. Negative for neck pain.   Skin:  Negative for color change and rash.   Allergic/Immunologic: Positive for food allergies. Negative for environmental allergies.   Neurological:  Negative for seizures and headaches.   Hematological:  Negative for adenopathy. Does not bruise/bleed easily.   Psychiatric/Behavioral:  Negative for confusion and hallucinations.      Medical History Reviewed by provider this encounter:  Tobacco  Allergies  Meds  Problems  Med Hx  Surg Hx  Fam Hx       Past Medical History   Past Medical History:   Diagnosis Date    Arthritis     Asthma      Chronic pain 01/30/2024    cervic and lumbar    Colon polyp     COPD (chronic obstructive pulmonary disease) (HCC)     Emphysema of lung (HCC)     GERD (gastroesophageal reflux disease) 01/30/2024    Headache(784.0)     Hyperlipidemia 01/30/2024    Migraine     Obesity     Sleep apnea      Past Surgical History:   Procedure Laterality Date    APPENDECTOMY      CARDIAC CATHETERIZATION  06/17/2024    Procedure: Cardiac catheterization;  Surgeon: Sarah Noland DO;  Location: [facility];  Service: Cardiology    CARDIAC CATHETERIZATION N/A 06/17/2024    Procedure: Cardiac Coronary Angiogram;  Surgeon: Sarah Noland DO;  Location: [facility];  Service: Cardiology    CARDIAC CATHETERIZATION N/A 06/17/2024    Procedure: Cardiac FFR/IFR;  Surgeon: Sarah Noland DO;  [facility]: [facility];  Service: Cardiology    CARDIAC CATHETERIZATION N/A 06/17/2024    Procedure: Cardiac pci;  Surgeon: Sarha Noland DO;  Location: [facility];  Service: Cardiology    CARDIAC CATHETERIZATION N/A 06/17/2024    Procedure: Cardiac IVUS;  Surgeon: Sarah Noland DO;  Location: [facility];  Service: Cardiology    COLONOSCOPY      COLONOSCOPY      HERNIA REPAIR      4 times    NEPHRECTOMY LIVING DONOR Left 10/2009    NM EXCISION HYDROCELE UNILATERAL Left 01/30/2024    Procedure: HYDROCELECTOMY;  Surgeon: Evan Salmeron MD;  Location: [facility];  Service: Urology     Family History   Problem Relation Age of Onset    Breast cancer Mother     Heart disease Father     Heart disease Brother     Stroke Brother     Lung disease Brother     Breast cancer Maternal Grandmother     Heart disease Paternal Grandmother      Current Outpatient Medications on File Prior to Visit   Medication Sig Dispense Refill    albuterol (PROVENTIL HFA,VENTOLIN HFA) 90 mcg/act inhaler INHALE 1-2 PUFFS EVERY 4-6 HOURS AS NEEDED AND AS DIRECTED 36 g 1    Albuterol-Budesonide (Airsupra) 90-80 MCG/ACT AERO        aspirin 81 mg chewable tablet Chew 1 tablet (81 mg total) daily 90 tablet 3    atorvastatin (LIPITOR) 80 mg tablet Take 1 tablet (80 mg total) by mouth every evening 90 tablet 3    budesonide (PULMICORT) 0.5 mg/2 mL nebulizer solution TAKE 2 ML (0.5 MG TOTAL) BY NEBULIZATION TWICE A DAY RINSE MOUTH AFTER  mL 1    clopidogrel (PLAVIX) 75 mg tablet Take 1 tablet (75 mg total) by mouth daily 90 tablet 3    famotidine (PEPCID) 20 mg tablet Take 1 tablet (20 mg total) by mouth 2 (two) times a day 180 tablet 0    ipratropium-albuterol (DUO-NEB) 0.5-2.5 mg/3 mL nebulizer solution TAKE THREE ML BY NEBULIZATION 4 (FOUR) TIMES A DAY Strength: 0.5-2.5 (3) MG/3ML 360 mL 1    naloxone (NARCAN) 4 mg/0.1 mL nasal spray Administer 1 spray into a nostril. If no response after 2-3 minutes, give another dose in the other nostril using a new spray. 1 each 1    nitroglycerin (NITROSTAT) 0.4 mg SL tablet Place 1 tablet (0.4 mg total) under the tongue every 5 (five) minutes as needed for chest pain 30 tablet 3    oxybutynin (DITROPAN-XL) 10 MG 24 hr tablet Take 1 tablet (10 mg total) by mouth daily at bedtime 60 tablet 6    oxyCODONE (Roxicodone) 5 immediate release tablet Take 1 tablet (5 mg total) by mouth every 4 (four) hours as needed for severe pain Max Daily Amount: 30 mg 5 tablet 0    Georgina 1 MG TBEC Take 1 tablet (1 mg total) by mouth in the morning 90 tablet 0    tamsulosin (FLOMAX) 0.4 mg Take 1 capsule (0.4 mg total) by mouth daily with dinner 30 capsule 5    Tapinarof (Vtama) 1 % CREA Apply 1 Application topically in the morning 60 g 0    traMADol (Ultram) 50 mg tablet Take 1 tablet (50 mg total) by mouth every 6 (six) hours as needed for severe pain 20 tablet 0    Trelegy Ellipta 100-62.5-25 MCG/ACT inhaler Inhale 1 puff daily 180 blister 0    Ubrelvy 100 MG tablet Take 1 tablet (100 mg total) by mouth daily as needed (migraine) 100 tablet 1     No current facility-administered medications on file prior to visit.      Allergies   Allergen Reactions    Other Other (See Comments)     Ian Franco      Current Outpatient Medications on File Prior to Visit   Medication Sig Dispense Refill    albuterol (PROVENTIL HFA,VENTOLIN HFA) 90 mcg/act inhaler INHALE 1-2 PUFFS EVERY 4-6 HOURS AS NEEDED AND AS DIRECTED 36 g 1    Albuterol-Budesonide (Airsupra) 90-80 MCG/ACT AERO       aspirin 81 mg chewable tablet Chew 1 tablet (81 mg total) daily 90 tablet 3    atorvastatin (LIPITOR) 80 mg tablet Take 1 tablet (80 mg total) by mouth every evening 90 tablet 3    budesonide (PULMICORT) 0.5 mg/2 mL nebulizer solution TAKE 2 ML (0.5 MG TOTAL) BY NEBULIZATION TWICE A DAY RINSE MOUTH AFTER  mL 1    clopidogrel (PLAVIX) 75 mg tablet Take 1 tablet (75 mg total) by mouth daily 90 tablet 3    famotidine (PEPCID) 20 mg tablet Take 1 tablet (20 mg total) by mouth 2 (two) times a day 180 tablet 0    ipratropium-albuterol (DUO-NEB) 0.5-2.5 mg/3 mL nebulizer solution TAKE THREE ML BY NEBULIZATION 4 (FOUR) TIMES A DAY Strength: 0.5-2.5 (3) MG/3ML 360 mL 1    naloxone (NARCAN) 4 mg/0.1 mL nasal spray Administer 1 spray into a nostril. If no response after 2-3 minutes, give another dose in the other nostril using a new spray. 1 each 1    nitroglycerin (NITROSTAT) 0.4 mg SL tablet Place 1 tablet (0.4 mg total) under the tongue every 5 (five) minutes as needed for chest pain 30 tablet 3    oxybutynin (DITROPAN-XL) 10 MG 24 hr tablet Take 1 tablet (10 mg total) by mouth daily at bedtime 60 tablet 6    oxyCODONE (Roxicodone) 5 immediate release tablet Take 1 tablet (5 mg total) by mouth every 4 (four) hours as needed for severe pain Max Daily Amount: 30 mg 5 tablet 0    Georgina 1 MG TBEC Take 1 tablet (1 mg total) by mouth in the morning 90 tablet 0    tamsulosin (FLOMAX) 0.4 mg Take 1 capsule (0.4 mg total) by mouth daily with dinner 30 capsule 5    Tapinarof (Vtama) 1 % CREA Apply 1 Application topically in the morning 60 g 0    traMADol (Ultram) 50 mg  "tablet Take 1 tablet (50 mg total) by mouth every 6 (six) hours as needed for severe pain 20 tablet 0    Trelegy Ellipta 100-62.5-25 MCG/ACT inhaler Inhale 1 puff daily 180 blister 0    Ubrelvy 100 MG tablet Take 1 tablet (100 mg total) by mouth daily as needed (migraine) 100 tablet 1     No current facility-administered medications on file prior to visit.      Social History     Tobacco Use    Smoking status: Former     Current packs/day: 0.00     Average packs/day: 2.0 packs/day for 40.0 years (80.0 ttl pk-yrs)     Types: Cigarettes, Pipe     Start date: 1970     Quit date: 2010     Years since quittin.6     Passive exposure: Past    Smokeless tobacco: Never   Vaping Use    Vaping status: Former   Substance and Sexual Activity    Alcohol use: Not Currently     Comment: Occasional    Drug use: No    Sexual activity: Yes     Partners: Female     Objective     /74   Pulse 90   Temp 97.5 °F (36.4 °C) (Temporal)   Resp 18   Ht 5' 6\" (1.676 m)   Wt 92.8 kg (204 lb 9.6 oz)   SpO2 97%   BMI 33.02 kg/m²     Physical Exam  Constitutional:       Appearance: Normal appearance.   HENT:      Head: Normocephalic and atraumatic.      Nose: Nose normal.   Eyes:      General: No scleral icterus.     Conjunctiva/sclera: Conjunctivae normal.   Cardiovascular:      Rate and Rhythm: Normal rate and regular rhythm.      Heart sounds: Normal heart sounds.   Pulmonary:      Effort: Pulmonary effort is normal.      Breath sounds: Normal breath sounds.   Abdominal:      General: There is no distension.      Palpations: Abdomen is soft.      Tenderness: There is no abdominal tenderness.      Comments: Bilateral reducible inguinal hernias   Musculoskeletal:         General: No signs of injury.   Skin:     General: Skin is warm.      Coloration: Skin is not jaundiced.   Neurological:      General: No focal deficit present.      Mental Status: He is alert and oriented to person, place, and time.   Psychiatric:         " Mood and Affect: Mood normal.         Behavior: Behavior normal.       Administrative Statements

## 2024-08-23 NOTE — ASSESSMENT & PLAN NOTE
69-year-old male with bilateral reducible inguinal hernias  -Patient states that he has had inguinal hernia surgery twice on both sides of his groin, both were done open  -He has been having bladder spasms and groin discomfort  -When he does have the groin discomfort is mostly on the right side  -Does note a bulge on the right side of his groin  -On exam the patient has bilateral reducible inguinal hernias  -CT scan of the abdomen and pelvis from 6/21/2024 report and images reviewed, this was also reviewed with the patient  --Bilateral fat-containing inguinal hernias, part of the bladder is in the right sided hernia  -CBC and CMP from 6/21/2024 reviewed  -Hematology note from 8/22/2024 reviewed  -Urology note from 8/12/2024 reviewed  -Cardiology note from 8/21/2024 reviewed  --Patient deemed low risk for surgery, needs to continue dual antiplatelet therapy for at least 3 months after cardiac stent placement  -Plan for laparoscopic bilateral inguinal hernia repair with mesh, possible open  -CBC, BMP ordered  -Patient to hold Plavix 5 days prior to operative intervention, plan to restart day after surgery  -Patient to continue 81 mg of aspirin perioperatively    A visual was used to display the anatomy and the proposed incision, procedure, steps, and mesh placement. The risks were explained at length and outlined, not limited to bleeding, infection, recurrence, pain, testicular loss, and damage to other structures. The planned approximately one hour procedure was discussed along with 1 - 2 week recovery, resolution of pain and swelling timeline, and 4 weeks of light duty without lifting. Questions were answered to satisfaction and consent was signed.

## 2024-08-26 ENCOUNTER — OFFICE VISIT (OUTPATIENT)
Dept: FAMILY MEDICINE CLINIC | Facility: CLINIC | Age: 69
End: 2024-08-26
Payer: COMMERCIAL

## 2024-08-26 VITALS
OXYGEN SATURATION: 97 % | TEMPERATURE: 97.6 F | BODY MASS INDEX: 33.37 KG/M2 | HEIGHT: 66 IN | DIASTOLIC BLOOD PRESSURE: 72 MMHG | SYSTOLIC BLOOD PRESSURE: 120 MMHG | WEIGHT: 207.6 LBS | HEART RATE: 94 BPM

## 2024-08-26 DIAGNOSIS — I25.118 CORONARY ARTERY DISEASE OF NATIVE ARTERY OF NATIVE HEART WITH STABLE ANGINA PECTORIS (HCC): ICD-10-CM

## 2024-08-26 DIAGNOSIS — Z52.4 KIDNEY DONOR: ICD-10-CM

## 2024-08-26 DIAGNOSIS — R73.03 PREDIABETES: ICD-10-CM

## 2024-08-26 DIAGNOSIS — E78.5 HYPERLIPIDEMIA, UNSPECIFIED HYPERLIPIDEMIA TYPE: ICD-10-CM

## 2024-08-26 DIAGNOSIS — G43.919 INTRACTABLE MIGRAINE WITHOUT STATUS MIGRAINOSUS, UNSPECIFIED MIGRAINE TYPE: ICD-10-CM

## 2024-08-26 DIAGNOSIS — K40.21 BILATERAL RECURRENT INGUINAL HERNIA WITHOUT OBSTRUCTION OR GANGRENE: ICD-10-CM

## 2024-08-26 DIAGNOSIS — Z00.00 ENCOUNTER FOR ANNUAL WELLNESS VISIT (AWV) IN MEDICARE PATIENT: Primary | ICD-10-CM

## 2024-08-26 DIAGNOSIS — I25.118 CORONARY ARTERY DISEASE OF NATIVE HEART WITH STABLE ANGINA PECTORIS, UNSPECIFIED VESSEL OR LESION TYPE (HCC): ICD-10-CM

## 2024-08-26 DIAGNOSIS — J44.9 CHRONIC OBSTRUCTIVE PULMONARY DISEASE, UNSPECIFIED COPD TYPE (HCC): ICD-10-CM

## 2024-08-26 PROBLEM — E66.9 OBESITY, CLASS II, BMI 35-39.9: Status: RESOLVED | Noted: 2023-10-26 | Resolved: 2024-08-26

## 2024-08-26 PROBLEM — M51.9 LUMBAR DISC DISEASE: Status: RESOLVED | Noted: 2023-08-24 | Resolved: 2024-08-26

## 2024-08-26 PROBLEM — E66.812 OBESITY, CLASS II, BMI 35-39.9: Status: RESOLVED | Noted: 2023-10-26 | Resolved: 2024-08-26

## 2024-08-26 PROBLEM — R06.02 SOB (SHORTNESS OF BREATH): Status: RESOLVED | Noted: 2024-08-21 | Resolved: 2024-08-26

## 2024-08-26 PROBLEM — G89.29 CHRONIC PAIN: Status: RESOLVED | Noted: 2024-01-30 | Resolved: 2024-08-26

## 2024-08-26 PROBLEM — R07.9 CHEST PAIN: Status: RESOLVED | Noted: 2024-08-21 | Resolved: 2024-08-26

## 2024-08-26 PROBLEM — R10.31 CHRONIC GROIN PAIN, RIGHT: Status: RESOLVED | Noted: 2024-07-30 | Resolved: 2024-08-26

## 2024-08-26 PROBLEM — G89.29 CHRONIC GROIN PAIN, RIGHT: Status: RESOLVED | Noted: 2024-07-30 | Resolved: 2024-08-26

## 2024-08-26 PROCEDURE — G0439 PPPS, SUBSEQ VISIT: HCPCS | Performed by: STUDENT IN AN ORGANIZED HEALTH CARE EDUCATION/TRAINING PROGRAM

## 2024-08-26 PROCEDURE — 99214 OFFICE O/P EST MOD 30 MIN: CPT | Performed by: STUDENT IN AN ORGANIZED HEALTH CARE EDUCATION/TRAINING PROGRAM

## 2024-08-26 RX ORDER — TRAMADOL HYDROCHLORIDE 50 MG/1
50 TABLET ORAL EVERY 6 HOURS PRN
Qty: 20 TABLET | Refills: 0 | Status: SHIPPED | OUTPATIENT
Start: 2024-08-26

## 2024-08-26 NOTE — PROGRESS NOTES
Ambulatory Visit  Name: Danie Liao      : 1955      MRN: [id number]  Encounter Provider: Gosia Mcgill MD  Encounter Date: 2024   Encounter department: [place] [id number] [address]    Assessment & Plan   1. Encounter for annual wellness visit (AWV) in Medicare patient  2. Prediabetes  3. Chronic obstructive pulmonary disease, unspecified COPD type (HCC)  Assessment & Plan:  Stable on Trelegy  4. Coronary artery disease of native artery of native heart with stable angina pectoris (HCC)  Assessment & Plan:  Reviewed cardio note with Dr. Noland.  On statins, aspirin, Plavix.  5. Intractable migraine without status migrainosus, unspecified migraine type  Assessment & Plan:  Uses Ubrelvy  6. Coronary artery disease of native heart with stable angina pectoris, unspecified vessel or lesion type (HCC)  Assessment & Plan:  Reviewed cardio note with Dr. Noland.  On statins, aspirin, Plavix.  Orders:  -     traMADol (Ultram) 50 mg tablet; Take 1 tablet (50 mg total) by mouth every 6 (six) hours as needed for severe pain  7. Bilateral recurrent inguinal hernia without obstruction or gangrene  8. Hyperlipidemia, unspecified hyperlipidemia type  9. Kidney donor    Depression Screening and Follow-up Plan: Patient was screened for depression during today's encounter. They screened negative with a PHQ-2 score of 0.      Preventive health issues were discussed with patient, and age appropriate screening tests were ordered as noted in patient's After Visit Summary. Personalized health advice and appropriate referrals for health education or preventive services given if needed, as noted in patient's After Visit Summary.    History of Present Illness     HPI     Bladder spasms. Oxybutynin not always providing relief or if it does only the spasm but not the pain    Getting hernia surgery    Is down about 20 pounds.      Patient Care Team:  Gosia Mcgill MD as PCP - General (Family  Medicine)  Mrayan Gillette MD (Pulmonology)  Tirso Lombardo MD (Cardiology)  Lexi Arzola PA-C as Physician Assistant (Urology)  Megan Dick PA-C as Physician Assistant (Hematology and Oncology)  Evan Rolle DO (General Surgery)    Review of Systems   Constitutional:  Negative for chills, fatigue and fever.   HENT:  Negative for rhinorrhea and sore throat.    Eyes:  Negative for visual disturbance.   Respiratory:  Negative for cough and shortness of breath.    Cardiovascular:  Negative for chest pain and palpitations.   Gastrointestinal:  Negative for abdominal pain, constipation, diarrhea, nausea and vomiting.   Genitourinary:  Negative for difficulty urinating, dysuria and frequency.   Musculoskeletal:  Negative for arthralgias and myalgias.   Skin:  Negative for color change and rash.   Neurological:  Negative for weakness and headaches.     Medical History Reviewed by provider this encounter:  Tobacco  Allergies  Meds  Problems  Med Hx  Surg Hx  Fam Hx       Annual Wellness Visit Questionnaire   Danie is here for his Subsequent Wellness visit.     Health Risk Assessment:   Patient rates overall health as fair. Patient feels that their physical health rating is slightly worse. Patient is satisfied with their life. Eyesight was rated as same. Hearing was rated as same. Patient feels that their emotional and mental health rating is same. Patients states they are never, rarely angry. Patient states they are often unusually tired/fatigued. Pain experienced in the last 7 days has been a lot. Patient's pain rating has been 7/10. Patient states that he has experienced weight loss or gain in last 6 months.     Depression Screening:   PHQ-2 Score: 0      Fall Risk Screening:   In the past year, patient has experienced: no history of falling in past year      Home Safety:  Patient has trouble with stairs inside or outside of their home. Patient has working smoke alarms and has  working carbon monoxide detector. Home safety hazards include: none.     Nutrition:   Current diet is Regular and Limited junk food.     Medications:   Patient is currently taking over-the-counter supplements. OTC medications include: see medication list. Patient is able to manage medications.     Activities of Daily Living (ADLs)/Instrumental Activities of Daily Living (IADLs):   Walk and transfer into and out of bed and chair?: Yes  Dress and groom yourself?: Yes    Bathe or shower yourself?: Yes    Feed yourself? Yes  Do your laundry/housekeeping?: Yes  Manage your money, pay your bills and track your expenses?: Yes  Make your own meals?: Yes    Do your own shopping?: Yes    Previous Hospitalizations:   Any hospitalizations or ED visits within the last 12 months?: Yes    How many hospitalizations have you had in the last year?: more than 4    Advance Care Planning:   Living will: No    Durable POA for healthcare: No    Advanced directive: No      PREVENTIVE SCREENINGS      Cardiovascular Screening:    General: Screening Not Indicated and History Lipid Disorder      Diabetes Screening:     General: Screening Current      Colorectal Cancer Screening:     General: Screening Current      Prostate Cancer Screening:    General: Screening Current      Osteoporosis Screening:    General: Screening Not Indicated and History Osteoporosis      Abdominal Aortic Aneurysm (AAA) Screening:    Risk factors include: age between 65-74 yo and tobacco use        Lung Cancer Screening:     General: Screening Current    Screening, Brief Intervention, and Referral to Treatment (SBIRT)    Screening  Typical number of drinks in a day: 0  Typical number of drinks in a week: 0  Interpretation: Low risk drinking behavior.    AUDIT-C Screenin) How often did you have a drink containing alcohol in the past year? monthly or less  2) How many drinks did you have on a typical day when you were drinking in the past year? 1 to 2  3) How often  "did you have 6 or more drinks on one occasion in the past year? never    AUDIT-C Score: 1  Interpretation: Score 0-3 (male): Negative screen for alcohol misuse    Single Item Drug Screening:  How often have you used an illegal drug (including marijuana) or a prescription medication for non-medical reasons in the past year? never    Single Item Drug Screen Score: 0  Interpretation: Negative screen for possible drug use disorder    Social Determinants of Health     Financial Resource Strain: Low Risk  (8/24/2023)    Overall Financial Resource Strain (CARDIA)    • Difficulty of Paying Living Expenses: Not hard at all   Food Insecurity: No Food Insecurity (8/26/2024)    Hunger Vital Sign    • Worried About Running Out of Food in the Last Year: Never true    • Ran Out of Food in the Last Year: Never true   Transportation Needs: No Transportation Needs (8/26/2024)    PRAPARE - Transportation    • Lack of Transportation (Medical): No    • Lack of Transportation (Non-Medical): No   Housing Stability: Low Risk  (8/26/2024)    Housing Stability Vital Sign    • Unable to Pay for Housing in the Last Year: No    • Number of Times Moved in the Last Year: 1    • Homeless in the Last Year: No   Utilities: Not At Risk (8/26/2024)    [facility] Utilities    • Threatened with loss of utilities: No     No results found.    Objective     /72   Pulse 94   Temp 97.6 °F (36.4 °C) (Tympanic)   Ht 5' 6\" (1.676 m)   Wt 94.2 kg (207 lb 9.6 oz)   SpO2 97%   BMI 33.51 kg/m²     Physical Exam  Constitutional:       General: He is not in acute distress.     Appearance: Normal appearance. He is not ill-appearing.   HENT:      Head: Normocephalic and atraumatic.      Right Ear: Tympanic membrane, ear canal and external ear normal.      Left Ear: Tympanic membrane, ear canal and external ear normal.      Nose: Nose normal.      Mouth/Throat:      Mouth: Mucous membranes are moist.      Pharynx: Oropharynx is clear. No oropharyngeal exudate or " posterior oropharyngeal erythema.   Eyes:      General: No scleral icterus.        Right eye: No discharge.         Left eye: No discharge.      Extraocular Movements: Extraocular movements intact.      Conjunctiva/sclera: Conjunctivae normal.      Pupils: Pupils are equal, round, and reactive to light.   Cardiovascular:      Rate and Rhythm: Normal rate and regular rhythm.      Pulses: Normal pulses.      Heart sounds: Normal heart sounds. No murmur heard.  Pulmonary:      Effort: Pulmonary effort is normal. No respiratory distress.      Breath sounds: Normal breath sounds.   Abdominal:      General: Bowel sounds are normal.      Palpations: Abdomen is soft.      Tenderness: There is no abdominal tenderness.      Hernia: A hernia is present.   Musculoskeletal:         General: Normal range of motion.      Cervical back: Normal range of motion and neck supple.   Lymphadenopathy:      Cervical: No cervical adenopathy.   Skin:     General: Skin is warm and dry.      Capillary Refill: Capillary refill takes less than 2 seconds.   Neurological:      General: No focal deficit present.      Mental Status: He is alert and oriented to person, place, and time. Mental status is at baseline.      Cranial Nerves: No cranial nerve deficit.   Psychiatric:         Mood and Affect: Mood normal.

## 2024-08-26 NOTE — PATIENT INSTRUCTIONS
Medicare Preventive Visit Patient Instructions  Thank you for completing your Welcome to Medicare Visit or Medicare Annual Wellness Visit today. Your next wellness visit will be due in one year (8/27/2025).  The screening/preventive services that you may require over the next 5-10 years are detailed below. Some tests may not apply to you based off risk factors and/or age. Screening tests ordered at today's visit but not completed yet may show as past due. Also, please note that scanned in results may not display below.  Preventive Screenings:  Service Recommendations Previous Testing/Comments   Colorectal Cancer Screening  Colonoscopy    Fecal Occult Blood Test (FOBT)/Fecal Immunochemical Test (FIT)  Fecal DNA/Cologuard Test  Flexible Sigmoidoscopy Age: 45-75 years old   Colonoscopy: every 10 years (May be performed more frequently if at higher risk)  OR  FOBT/FIT: every 1 year  OR  Cologuard: every 3 years  OR  Sigmoidoscopy: every 5 years  Screening may be recommended earlier than age 45 if at higher risk for colorectal cancer. Also, an individualized decision between you and your healthcare provider will decide whether screening between the ages of 76-85 would be appropriate. Colonoscopy: 04/29/2024  FOBT/FIT: Not on file  Cologuard: Not on file  Sigmoidoscopy: Not on file    Screening Current     Prostate Cancer Screening Individualized decision between patient and health care provider in men between ages of 55-69   Medicare will cover every 12 months beginning on the day after your 50th birthday PSA: 1.50 ng/mL     Screening Current     Hepatitis C Screening Once for adults born between 1945 and 1965  More frequently in patients at high risk for Hepatitis C Hep C Antibody: Not on file        Diabetes Screening 1-2 times per year if you're at risk for diabetes or have pre-diabetes Fasting glucose: 134 mg/dL (6/18/2024)  A1C: 6.0 % (5/18/2024)  Screening Current   Cholesterol Screening Once every 5 years if you  don't have a lipid disorder. May order more often based on risk factors. Lipid panel: 05/18/2024  Screening Not Indicated  History Lipid Disorder      Other Preventive Screenings Covered by Medicare:  Abdominal Aortic Aneurysm (AAA) Screening: covered once if your at risk. You're considered to be at risk if you have a family history of AAA or a male between the age of 65-75 who smoking at least 100 cigarettes in your lifetime.  Lung Cancer Screening: covers low dose CT scan once per year if you meet all of the following conditions: (1) Age 55-77; (2) No signs or symptoms of lung cancer; (3) Current smoker or have quit smoking within the last 15 years; (4) You have a tobacco smoking history of at least 20 pack years (packs per day x number of years you smoked); (5) You get a written order from a healthcare provider.  Glaucoma Screening: covered annually if you're considered high risk: (1) You have diabetes OR (2) Family history of glaucoma OR (3)  aged 50 and older OR (4)  American aged 65 and older  Osteoporosis Screening: covered every 2 years if you meet one of the following conditions: (1) Have a vertebral abnormality; (2) On glucocorticoid therapy for more than 3 months; (3) Have primary hyperparathyroidism; (4) On osteoporosis medications and need to assess response to drug therapy.  HIV Screening: covered annually if you're between the age of 15-65. Also covered annually if you are younger than 15 and older than 65 with risk factors for HIV infection. For pregnant patients, it is covered up to 3 times per pregnancy.    Immunizations:  Immunization Recommendations   Influenza Vaccine Annual influenza vaccination during flu season is recommended for all persons aged >= 6 months who do not have contraindications   Pneumococcal Vaccine   * Pneumococcal conjugate vaccine = PCV13 (Prevnar 13), PCV15 (Vaxneuvance), PCV20 (Prevnar 20)  * Pneumococcal polysaccharide vaccine = PPSV23 (Pneumovax)  Adults 19-63 yo with certain risk factors or if 65+ yo  If never received any pneumonia vaccine: recommend Prevnar 20 (PCV20)  Give PCV20 if previously received 1 dose of PCV13 or PPSV23   Hepatitis B Vaccine 3 dose series if at intermediate or high risk (ex: diabetes, end stage renal disease, liver disease)   Respiratory syncytial virus (RSV) Vaccine - COVERED BY MEDICARE PART D  * RSVPreF3 (Arexvy) CDC recommends that adults 60 years of age and older may receive a single dose of RSV vaccine using shared clinical decision-making (SCDM)   Tetanus (Td) Vaccine - COST NOT COVERED BY MEDICARE PART B Following completion of primary series, a booster dose should be given every 10 years to maintain immunity against tetanus. Td may also be given as tetanus wound prophylaxis.   Tdap Vaccine - COST NOT COVERED BY MEDICARE PART B Recommended at least once for all adults. For pregnant patients, recommended with each pregnancy.   Shingles Vaccine (Shingrix) - COST NOT COVERED BY MEDICARE PART B  2 shot series recommended in those 19 years and older who have or will have weakened immune systems or those 50 years and older     Health Maintenance Due:      Topic Date Due   • Hepatitis C Screening  Never done   • Lung Cancer Screening  09/29/2024   • Colorectal Cancer Screening  04/29/2027     Immunizations Due:      Topic Date Due   • Pneumococcal Vaccine: 65+ Years (1 of 2 - PCV) Never done   • Hepatitis A Vaccine (1 of 2 - Risk 2-dose series) Never done   • Influenza Vaccine (1) 09/01/2024     Advance Directives   What are advance directives?  Advance directives are legal documents that state your wishes and plans for medical care. These plans are made ahead of time in case you lose your ability to make decisions for yourself. Advance directives can apply to any medical decision, such as the treatments you want, and if you want to donate organs.   What are the types of advance directives?  There are many types of advance  directives, and each state has rules about how to use them. You may choose a combination of any of the following:  Living will:  This is a written record of the treatment you want. You can also choose which treatments you do not want, which to limit, and which to stop at a certain time. This includes surgery, medicine, IV fluid, and tube feedings.   Durable power of  for healthcare (DPAHC):  This is a written record that states who you want to make healthcare choices for you when you are unable to make them for yourself. This person, called a proxy, is usually a family member or a friend. You may choose more than 1 proxy.  Do not resuscitate (DNR) order:  A DNR order is used in case your heart stops beating or you stop breathing. It is a request not to have certain forms of treatment, such as CPR. A DNR order may be included in other types of advance directives.  Medical directive:  This covers the care that you want if you are in a coma, near death, or unable to make decisions for yourself. You can list the treatments you want for each condition. Treatment may include pain medicine, surgery, blood transfusions, dialysis, IV or tube feedings, and a ventilator (breathing machine).  Values history:  This document has questions about your views, beliefs, and how you feel and think about life. This information can help others choose the care that you would choose.  Why are advance directives important?  An advance directive helps you control your care. Although spoken wishes may be used, it is better to have your wishes written down. Spoken wishes can be misunderstood, or not followed. Treatments may be given even if you do not want them. An advance directive may make it easier for your family to make difficult choices about your care.   Weight Management   Why it is important to manage your weight:  Being overweight increases your risk of health conditions such as heart disease, high blood pressure, type 2  diabetes, and certain types of cancer. It can also increase your risk for osteoarthritis, sleep apnea, and other respiratory problems. Aim for a slow, steady weight loss. Even a small amount of weight loss can lower your risk of health problems.  How to lose weight safely:  A safe and healthy way to lose weight is to eat fewer calories and get regular exercise. You can lose up about 1 pound a week by decreasing the number of calories you eat by 500 calories each day.   Healthy meal plan for weight management:  A healthy meal plan includes a variety of foods, contains fewer calories, and helps you stay healthy. A healthy meal plan includes the following:  Eat whole-grain foods more often.  A healthy meal plan should contain fiber. Fiber is the part of grains, fruits, and vegetables that is not broken down by your body. Whole-grain foods are healthy and provide extra fiber in your diet. Some examples of whole-grain foods are whole-wheat breads and pastas, oatmeal, brown rice, and bulgur.  Eat a variety of vegetables every day.  Include dark, leafy greens such as spinach, kale, anh greens, and mustard greens. Eat yellow and orange vegetables such as carrots, sweet potatoes, and winter squash.   Eat a variety of fruits every day.  Choose fresh or canned fruit (canned in its own juice or light syrup) instead of juice. Fruit juice has very little or no fiber.  Eat low-fat dairy foods.  Drink fat-free (skim) milk or 1% milk. Eat fat-free yogurt and low-fat cottage cheese. Try low-fat cheeses such as mozzarella and other reduced-fat cheeses.  Choose meat and other protein foods that are low in fat.  Choose beans or other legumes such as split peas or lentils. Choose fish, skinless poultry (chicken or turkey), or lean cuts of red meat (beef or pork). Before you cook meat or poultry, cut off any visible fat.   Use less fat and oil.  Try baking foods instead of frying them. Add less fat, such as margarine, sour cream,  regular salad dressing and mayonnaise to foods. Eat fewer high-fat foods. Some examples of high-fat foods include french fries, doughnuts, ice cream, and cakes.  Eat fewer sweets.  Limit foods and drinks that are high in sugar. This includes candy, cookies, regular soda, and sweetened drinks.  Exercise:  Exercise at least 30 minutes per day on most days of the week. Some examples of exercise include walking, biking, dancing, and swimming. You can also fit in more physical activity by taking the stairs instead of the elevator or parking farther away from stores. Ask your healthcare provider about the best exercise plan for you.      © Copyright [employer] 2018 Information is for End User's use only and may not be sold, redistributed or otherwise used for commercial purposes. All illustrations and images included in CareNotes® are the copyrighted property of [employer].A[employer]M., Inc. or [employer]

## 2024-09-08 DIAGNOSIS — J44.1 COPD EXACERBATION (HCC): ICD-10-CM

## 2024-09-08 DIAGNOSIS — L40.9 PSORIASIS: ICD-10-CM

## 2024-09-09 RX ORDER — ALBUTEROL SULFATE 90 UG/1
AEROSOL, METERED RESPIRATORY (INHALATION)
Qty: 36 G | Refills: 1 | Status: SHIPPED | OUTPATIENT
Start: 2024-09-09

## 2024-09-09 RX ORDER — TAPINAROF 10 MG/1000MG
1 CREAM TOPICAL DAILY
Qty: 60 G | Refills: 0 | Status: SHIPPED | OUTPATIENT
Start: 2024-09-09

## 2024-09-13 ENCOUNTER — TELEPHONE (OUTPATIENT)
Dept: SURGERY | Facility: CLINIC | Age: 69
End: 2024-09-13

## 2024-09-15 DIAGNOSIS — J44.9 CHRONIC OBSTRUCTIVE PULMONARY DISEASE, UNSPECIFIED COPD TYPE (HCC): Primary | ICD-10-CM

## 2024-09-17 RX ORDER — ROFLUMILAST 250 UG/1
250 TABLET ORAL DAILY
Qty: 30 TABLET | Refills: 3 | Status: SHIPPED | OUTPATIENT
Start: 2024-09-17

## 2024-09-18 ENCOUNTER — APPOINTMENT (OUTPATIENT)
Age: 69
End: 2024-09-18
Payer: COMMERCIAL

## 2024-09-18 DIAGNOSIS — K40.21 BILATERAL RECURRENT INGUINAL HERNIA WITHOUT OBSTRUCTION OR GANGRENE: ICD-10-CM

## 2024-09-18 DIAGNOSIS — D50.9 MICROCYTIC ANEMIA: ICD-10-CM

## 2024-09-18 DIAGNOSIS — E61.1 IRON DEFICIENCY: ICD-10-CM

## 2024-09-18 DIAGNOSIS — E78.5 HYPERLIPIDEMIA: ICD-10-CM

## 2024-09-18 LAB
ANION GAP SERPL CALCULATED.3IONS-SCNC: 6 MMOL/L (ref 4–13)
BUN SERPL-MCNC: 17 MG/DL (ref 5–25)
CALCIUM SERPL-MCNC: 9.2 MG/DL (ref 8.4–10.2)
CHLORIDE SERPL-SCNC: 106 MMOL/L (ref 96–108)
CO2 SERPL-SCNC: 25 MMOL/L (ref 21–32)
CREAT SERPL-MCNC: 0.98 MG/DL (ref 0.6–1.3)
ERYTHROCYTE [DISTWIDTH] IN BLOOD BY AUTOMATED COUNT: 14.7 % (ref 11.6–15.1)
GFR SERPL CREATININE-BSD FRML MDRD: 79 ML/MIN/1.73SQ M
GLUCOSE SERPL-MCNC: 81 MG/DL (ref 65–140)
HCT VFR BLD AUTO: 39.1 % (ref 36.5–49.3)
HGB BLD-MCNC: 12 G/DL (ref 12–17)
MCH RBC QN AUTO: 22.9 PG (ref 26.8–34.3)
MCHC RBC AUTO-ENTMCNC: 30.7 G/DL (ref 31.4–37.4)
MCV RBC AUTO: 75 FL (ref 82–98)
PLATELET # BLD AUTO: 426 THOUSANDS/UL (ref 149–390)
PMV BLD AUTO: 11.1 FL (ref 8.9–12.7)
POTASSIUM SERPL-SCNC: 4.3 MMOL/L (ref 3.5–5.3)
RBC # BLD AUTO: 5.25 MILLION/UL (ref 3.88–5.62)
SODIUM SERPL-SCNC: 137 MMOL/L (ref 135–147)
WBC # BLD AUTO: 9.49 THOUSAND/UL (ref 4.31–10.16)

## 2024-09-18 PROCEDURE — 85027 COMPLETE CBC AUTOMATED: CPT

## 2024-09-18 PROCEDURE — 36415 COLL VENOUS BLD VENIPUNCTURE: CPT

## 2024-09-18 PROCEDURE — 80048 BASIC METABOLIC PNL TOTAL CA: CPT

## 2024-09-19 ENCOUNTER — OFFICE VISIT (OUTPATIENT)
Dept: OBGYN CLINIC | Facility: MEDICAL CENTER | Age: 69
End: 2024-09-19
Payer: COMMERCIAL

## 2024-09-19 ENCOUNTER — TELEPHONE (OUTPATIENT)
Age: 69
End: 2024-09-19

## 2024-09-19 VITALS — BODY MASS INDEX: 33.27 KG/M2 | HEIGHT: 66 IN | WEIGHT: 207 LBS

## 2024-09-19 DIAGNOSIS — M25.561 ACUTE PAIN OF RIGHT KNEE: Primary | ICD-10-CM

## 2024-09-19 DIAGNOSIS — N32.89 BLADDER SPASM: ICD-10-CM

## 2024-09-19 PROCEDURE — 99214 OFFICE O/P EST MOD 30 MIN: CPT | Performed by: PHYSICAL MEDICINE & REHABILITATION

## 2024-09-19 PROCEDURE — 20610 DRAIN/INJ JOINT/BURSA W/O US: CPT | Performed by: PHYSICAL MEDICINE & REHABILITATION

## 2024-09-19 RX ORDER — KETOROLAC TROMETHAMINE 30 MG/ML
30 INJECTION, SOLUTION INTRAMUSCULAR; INTRAVENOUS
Status: COMPLETED | OUTPATIENT
Start: 2024-09-19 | End: 2024-09-19

## 2024-09-19 RX ORDER — ROPIVACAINE HYDROCHLORIDE 5 MG/ML
10 INJECTION, SOLUTION EPIDURAL; INFILTRATION; PERINEURAL
Status: COMPLETED | OUTPATIENT
Start: 2024-09-19 | End: 2024-09-19

## 2024-09-19 RX ORDER — PREDNISONE 20 MG/1
20 TABLET ORAL
Qty: 5 TABLET | Refills: 0 | Status: SHIPPED | OUTPATIENT
Start: 2024-09-19 | End: 2024-09-24

## 2024-09-19 RX ADMIN — ROPIVACAINE HYDROCHLORIDE 10 ML: 5 INJECTION, SOLUTION EPIDURAL; INFILTRATION; PERINEURAL at 14:15

## 2024-09-19 RX ADMIN — KETOROLAC TROMETHAMINE 30 MG: 30 INJECTION, SOLUTION INTRAMUSCULAR; INTRAVENOUS at 14:15

## 2024-09-19 NOTE — PROGRESS NOTES
1. Acute pain of right knee  Cane    predniSONE 20 mg tablet        Orders Placed This Encounter   Procedures    Large joint arthrocentesis    Cane      Impression:  Patient is here in follow up of right knee pain likely secondary to mild osteoarthritis/degenerative meniscus tear.  Treatment has included intra-articular steroid injection (8/5/2024).  Patient has significant pain that started just yesterday without an injury.  This is likely due to meniscal derangement versus a gout flare.  He has no history of gout.  He does have a solitary kidney.  We injected his knee with ketorolac today.  I sent in low-dose prednisone burst for him.  We also provided him with a straight cane to offload.  I will see him back in 2 weeks to reassess.  If still symptomatic, would consider advanced imaging.     Imaging Studies (I personally reviewed images in PACS and report):  Right knee x-rays most recent to this encounter reviewed.  These images show mild medial tibiofemoral osteoarthritis.  No joint effusion.  No acute osseous abnormalities.  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    No follow-ups on file.    Patient is in agreement with the above plan.    HPI:  Danie Liao is a 69 y.o. male  who presents in follow up.  Here for   Chief Complaint   Patient presents with    Right Knee - Pain     Since last visit: See above.  Pain started yesterday.  He has pain in the front of the knee and up the quadriceps muscles.  The injection was helpful.      Following history reviewed and updated:  Past Medical History:   Diagnosis Date    Arthritis     Asthma     Chronic pain 01/30/2024    cervic and lumbar    Colon polyp     COPD (chronic obstructive pulmonary disease) (HCC)     Emphysema of lung (HCC)     GERD (gastroesophageal reflux disease) 01/30/2024    Headache(784.0)     Hyperlipidemia 01/30/2024    Migraine     Obesity     Sleep apnea      Past Surgical History:   Procedure Laterality  "Date    APPENDECTOMY      CARDIAC CATHETERIZATION  2024    Procedure: Cardiac catheterization;  Surgeon: Sarah Noland DO;  Location: [facility];  Service: Cardiology    CARDIAC CATHETERIZATION N/A 2024    Procedure: Cardiac Coronary Angiogram;  Surgeon: Sarah Noland DO;  Location: [facility];  Service: Cardiology    CARDIAC CATHETERIZATION N/A 2024    Procedure: Cardiac FFR/IFR;  Surgeon: Sarah Noland DO;  [facility]: [facility];  Service: Cardiology    CARDIAC CATHETERIZATION N/A 2024    Procedure: Cardiac pci;  Surgeon: Sarah Noland DO;  [facility]: [facility];  Service: Cardiology    CARDIAC CATHETERIZATION N/A 2024    Procedure: Cardiac IVUS;  Surgeon: Sarah Noland DO;  [facility]: [facility];  Service: Cardiology    COLONOSCOPY      COLONOSCOPY      HERNIA REPAIR      4 times    NEPHRECTOMY LIVING DONOR Left 10/2009    NE EXCISION HYDROCELE UNILATERAL Left 2024    Procedure: HYDROCELECTOMY;  Surgeon: Evan Salmeron MD;  Location: [facility];  Service: Urology     Social History   Social History     Substance and Sexual Activity   Alcohol Use Not Currently    Comment: Occasional     Social History     Substance and Sexual Activity   Drug Use No     Social History     Tobacco Use   Smoking Status Former    Current packs/day: 0.00    Average packs/day: 2.0 packs/day for 40.0 years (80.0 ttl pk-yrs)    Types: Cigarettes, Pipe    Start date: 1970    Quit date: 2010    Years since quittin.7    Passive exposure: Past   Smokeless Tobacco Never     Family History   Problem Relation Age of Onset    Breast cancer Mother     Heart disease Father     Heart disease Brother     Stroke Brother     Lung disease Brother     Breast cancer Maternal Grandmother     Heart disease Paternal Grandmother      No Known Allergies     Constitutional:  Ht 5' 6\" (1.676 m)   Wt 93.9 kg (207 lb)   BMI 33.41 kg/m²  "   General: NAD.  Eyes: Clear sclerae.  ENT: No inflammation, lesion, or mass of lips.  No tracheal deviation.  Musculoskeletal: As mentioned below.  Integumentary: No visible rashes or skin lesions.  Pulmonary/Chest: Effort normal. No respiratory distress.   Neuro: CN's grossly intact, FRAGA.  Psych: Normal affect and judgement.  Vascular: WWP.    Right Knee Exam     Range of Motion   Extension:  normal     Other   Erythema: absent  Scars: absent  Sensation: normal  Pulse: present  Swelling: none  Effusion: no effusion present             Large joint arthrocentesis: R knee  [place] Protocol:  procedure performed by consultantConsent: Verbal consent obtained. Written consent not obtained.  Risks and benefits: risks, benefits and alternatives were discussed  Consent given by: patient  Timeout called at: 9/19/2024 2:09 PM.  Patient understanding: patient states understanding of the procedure being performed  Patient consent: the patient's understanding of the procedure matches consent given  Site marked: the operative site was marked  Radiology Images displayed and confirmed. If images not available, report reviewed: imaging studies available  Patient identity confirmed: verbally with patient  Supporting Documentation  Indications: pain   Procedure Details  Location: knee - R knee  Needle size: 22 G  Ultrasound guidance: no  Approach: Inferolateral to patella.  Medications administered: 10 mL ropivacaine 0.5 %; 30 mg ketorolac 30 mg/mL    Patient tolerance: patient tolerated the procedure well with no immediate complications  Dressing:  Sterile dressing applied    There was little to no resistance encountered during the injection.    Risks of this procedure include:    - Risk of bleeding since a needle is involved.  - Risk of infection (1/10,000 chance as per recent studies).  Signs/symptoms were discussed and they would prompt an urgent evaluation at an emergency department.  - Risk of pigmentation or skin dimpling in  the skin (2-3% chance as per recent studies) from the steroid.  - Risk of increased pain from steroid flare (1% chance as per recent studies) that typically lasts 24-48 hours.  - Risk of increased blood sugars from the steroid medication that can last for a few weeks.  If the patient is a diabetic or pre-diabetic, they were encouraged to closely monitor their blood sugars and discuss with PCP if elevated more than usual or if having symptoms.    The benefits outweigh the risks and so the procedure was completed.

## 2024-09-19 NOTE — PRE-PROCEDURE INSTRUCTIONS
Pre-Surgery Instructions:   Medication Instructions    albuterol (PROVENTIL HFA,VENTOLIN HFA) 90 mcg/act inhaler Uses PRN- OK to take day of surgery    aspirin 81 mg chewable tablet Instructions provided by MD    atorvastatin (LIPITOR) 80 mg tablet Take night before surgery    clopidogrel (PLAVIX) 75 mg tablet Instructions provided by MD    famotidine (PEPCID) 20 mg tablet Take day of surgery.    ipratropium-albuterol (DUO-NEB) 0.5-2.5 mg/3 mL nebulizer solution Uses PRN- OK to take day of surgery    naloxone (NARCAN) 4 mg/0.1 mL nasal spray PRN    nitroglycerin (NITROSTAT) 0.4 mg SL tablet PRN    oxybutynin (DITROPAN-XL) 10 MG 24 hr tablet Take night before surgery do not take morning DOS    Georgina 1 MG TBEC Take day of surgery.    tamsulosin (FLOMAX) 0.4 mg Take night before surgery    Tapinarof (Vtama) 1 % CREA Hold day of surgery.    Trelegy Ellipta 100-62.5-25 MCG/ACT inhaler Continue to take these inhaler medications on your normal schedule up to and including the day of surgery.     Ubrelvy 100 MG tablet Uses PRN- OK to take day of surgery   Medication instructions for day surgery reviewed. If you take Prednisone ok to take morning day of surgery. Please use only a sip of water to take your instructed medications. Avoid all over the counter vitamins, supplements and NSAIDS for one week prior to surgery per anesthesia guidelines. Tylenol is ok to take as needed.     You will receive a call one business day prior to surgery with an arrival time and hospital directions. If your surgery is scheduled on a Monday, the hospital will be calling you on the Friday prior to your surgery. If you have not heard from anyone by 8pm, please call the hospital supervisor through the hospital  at [phone]. ([place] [phone] or [place] [phone]).    Do not eat or drink anything after midnight the night before your surgery, including candy, mints, lifesavers, or chewing gum. Do not drink alcohol 24hrs before  your surgery. Try not to smoke at least 24hrs before your surgery.       Follow the pre surgery showering instructions as listed in the “My Surgical Experience Booklet” or otherwise provided by your surgeon's office. Do not use a blade to shave the surgical area 1 week before surgery. It is okay to use a clean electric clippers up to 24 hours before surgery. Do not apply any lotions, creams, including makeup, cologne, deodorant, or perfumes after showering on the day of your surgery. Do not use dry shampoo, hair spray, hair gel, or any type of hair products.     No contact lenses, eye make-up, or artificial eyelashes. Remove nail polish, including gel polish, and any artificial, gel, or acrylic nails if possible. Remove all jewelry including rings and body piercing jewelry.     Wear causal clothing that is easy to take on and off. Consider your type of surgery.    Keep any valuables, jewelry, piercings at home. Please bring any specially ordered equipment (sling, braces) if indicated.    Arrange for a responsible person to drive you to and from the hospital on the day of your surgery. Please confirm the visitor policy for the day of your procedure when you receive your phone call with an arrival time.     Call the surgeon's office with any new illnesses, exposures, or additional questions prior to surgery.    Please reference your “My Surgical Experience Booklet” for additional information to prepare for your upcoming surgery.

## 2024-09-19 NOTE — TELEPHONE ENCOUNTER
Caller: Patient     Doctor: Jairo    Reason for call: Patient called stating he had a rt knee injection on 8/5/24 and is in a lot of pain. He was up all night and can't walk. He was requesting to be seen today and stated he does not want to go to the ED.     Call back#: [phone]

## 2024-09-20 RX ORDER — TAMSULOSIN HYDROCHLORIDE 0.4 MG/1
0.4 CAPSULE ORAL
Qty: 30 CAPSULE | Refills: 5 | Status: SHIPPED | OUTPATIENT
Start: 2024-09-20

## 2024-09-24 ENCOUNTER — TELEPHONE (OUTPATIENT)
Dept: FAMILY MEDICINE CLINIC | Facility: CLINIC | Age: 69
End: 2024-09-24

## 2024-09-24 NOTE — TELEPHONE ENCOUNTER
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    Placed in provider's bin.    Please call patient when completed.

## 2024-09-28 DIAGNOSIS — J44.1 COPD EXACERBATION (HCC): ICD-10-CM

## 2024-09-28 DIAGNOSIS — J44.9 CHRONIC OBSTRUCTIVE PULMONARY DISEASE, UNSPECIFIED COPD TYPE (HCC): ICD-10-CM

## 2024-09-28 DIAGNOSIS — J44.9 COPD (CHRONIC OBSTRUCTIVE PULMONARY DISEASE) (HCC): ICD-10-CM

## 2024-09-28 DIAGNOSIS — L40.9 PSORIASIS: ICD-10-CM

## 2024-09-30 DIAGNOSIS — I25.118 CORONARY ARTERY DISEASE OF NATIVE ARTERY OF NATIVE HEART WITH STABLE ANGINA PECTORIS (HCC): ICD-10-CM

## 2024-09-30 RX ORDER — IPRATROPIUM BROMIDE AND ALBUTEROL SULFATE 2.5; .5 MG/3ML; MG/3ML
SOLUTION RESPIRATORY (INHALATION)
Qty: 360 ML | Refills: 3 | Status: SHIPPED | OUTPATIENT
Start: 2024-09-30

## 2024-09-30 RX ORDER — TAPINAROF 10 MG/1000MG
1 CREAM TOPICAL DAILY
Qty: 60 G | Refills: 0 | Status: SHIPPED | OUTPATIENT
Start: 2024-09-30

## 2024-09-30 RX ORDER — ALBUTEROL SULFATE 90 UG/1
INHALANT RESPIRATORY (INHALATION)
Qty: 36 G | Refills: 5 | Status: SHIPPED | OUTPATIENT
Start: 2024-09-30

## 2024-09-30 RX ORDER — ASPIRIN 81 MG/1
81 TABLET, CHEWABLE ORAL DAILY
Qty: 90 TABLET | Refills: 1 | Status: SHIPPED | OUTPATIENT
Start: 2024-09-30

## 2024-09-30 RX ORDER — PREDNISONE 1 MG/1
1 TABLET, DELAYED RELEASE ORAL DAILY
Qty: 90 TABLET | Refills: 0 | Status: SHIPPED | OUTPATIENT
Start: 2024-09-30 | End: 2025-03-29

## 2024-10-01 ENCOUNTER — ANESTHESIA EVENT (OUTPATIENT)
Dept: PERIOP | Facility: HOSPITAL | Age: 69
End: 2024-10-01
Payer: COMMERCIAL

## 2024-10-02 ENCOUNTER — TELEPHONE (OUTPATIENT)
Dept: FAMILY MEDICINE CLINIC | Facility: CLINIC | Age: 69
End: 2024-10-02

## 2024-10-02 ENCOUNTER — HOSPITAL ENCOUNTER (OUTPATIENT)
Facility: HOSPITAL | Age: 69
Setting detail: OUTPATIENT SURGERY
Discharge: HOME | End: 2024-10-02
Attending: STUDENT IN AN ORGANIZED HEALTH CARE EDUCATION/TRAINING PROGRAM | Admitting: STUDENT IN AN ORGANIZED HEALTH CARE EDUCATION/TRAINING PROGRAM
Payer: COMMERCIAL

## 2024-10-02 ENCOUNTER — ANESTHESIA (OUTPATIENT)
Dept: PERIOP | Facility: HOSPITAL | Age: 69
End: 2024-10-02
Payer: COMMERCIAL

## 2024-10-02 VITALS
TEMPERATURE: 98.4 F | BODY MASS INDEX: 31.29 KG/M2 | RESPIRATION RATE: 18 BRPM | HEIGHT: 66 IN | WEIGHT: 194.67 LBS | SYSTOLIC BLOOD PRESSURE: 123 MMHG | HEART RATE: 73 BPM | DIASTOLIC BLOOD PRESSURE: 85 MMHG | OXYGEN SATURATION: 97 %

## 2024-10-02 PROCEDURE — NC001 PR NO CHARGE

## 2024-10-02 RX ORDER — CHLORHEXIDINE GLUCONATE 40 MG/ML
SOLUTION TOPICAL DAILY PRN
Status: DISCONTINUED | OUTPATIENT
Start: 2024-10-02 | End: 2024-10-02 | Stop reason: HOSPADM

## 2024-10-02 RX ORDER — HEPARIN SODIUM 5000 [USP'U]/ML
5000 INJECTION, SOLUTION INTRAVENOUS; SUBCUTANEOUS ONCE
Status: COMPLETED | OUTPATIENT
Start: 2024-10-02 | End: 2024-10-02

## 2024-10-02 RX ORDER — CEFAZOLIN SODIUM 2 G/50ML
2000 SOLUTION INTRAVENOUS ONCE
Status: DISCONTINUED | OUTPATIENT
Start: 2024-10-02 | End: 2024-10-02 | Stop reason: HOSPADM

## 2024-10-02 RX ADMIN — HEPARIN SODIUM 5000 UNITS: 5000 INJECTION INTRAVENOUS; SUBCUTANEOUS at 10:57

## 2024-10-02 NOTE — ANESTHESIA PREPROCEDURE EVALUATION
Procedure:  REPAIR HERNIA INGUINAL, LAPAROSCOPIC, Possible Open (Bilateral: Groin)    Relevant Problems   CARDIO   (+) Coronary artery disease of native heart with stable angina pectoris (HCC)   (+) Hyperlipidemia   (+) Intractable migraine without status migrainosus      GI/HEPATIC   (+) GERD (gastroesophageal reflux disease)      /RENAL   (+) Single kidney      NEURO/PSYCH   (+) Intractable migraine without status migrainosus      PULMONARY   (+) Chronic obstructive pulmonary disease (HCC)   (+) LEATHA (obstructive sleep apnea)        Physical Exam    Airway    Mallampati score: III  TM Distance: >3 FB  Neck ROM: full     Dental   No notable dental hx     Cardiovascular  Rhythm: regular, Rate: normal, Cardiovascular exam normal    Pulmonary   Decreased breath sounds    Other Findings  6/17/2024:     ·  LCX with two intermediate lesions that were DFR Negative on Spot Check and by Pullback  ·  S/P Successful IVUS Guided PCI with SHELL x 1 to the DFR Positive 65% DONALD lesion with Post Dilation up to 16 park at 3.07 mm  ·  RCA is a nondominant vessel with a conus larger than the RCA proper  ·  Exceptionally tortuous innominate and very short root necessitating transition from RRA to RFA  ·  1st Diag lesion is 65% stenosed.           Anesthesia Plan  ASA Score- 3     Anesthesia Type- general with ASA Monitors.         Additional Monitors:     Airway Plan: ETT.           Plan Factors-Exercise tolerance (METS): >4 METS.    Chart reviewed. EKG reviewed. Imaging results reviewed. Existing labs reviewed. Patient summary reviewed.    Patient is not a current smoker.  Patient did not smoke on day of surgery.        Intended use of anticholinesterase.    Induction- intravenous.    Postoperative Plan- Plan for postoperative opioid use. Planned trial extubation    Perioperative Resuscitation Plan - Level 1 - Full Code.       Informed Consent- Anesthetic plan and risks discussed with patient.  I personally reviewed this patient with  the CRNA. Discussed and agreed on the Anesthesia Plan with the CRNA..

## 2024-10-02 NOTE — TELEPHONE ENCOUNTER
Dr Mcgill completed ppwrk // pt notified through [employer], scanned // original left in  Folder on Side 2.

## 2024-10-02 NOTE — TELEPHONE ENCOUNTER
Completed the paperwork, this must be a new program from the [facility] as I have not seen this paperwork before. The requirements are pretty significant objective findings that at this time he does nto qualify based on the [facility] requirements.

## 2024-10-02 NOTE — QUICK NOTE
Patient's case discussed with Dr. Acevedo.  Dr. Acevedo discussed with the patient that since he had a drug-eluting cardiac stent that he should ideally be on dual antiplatelet therapy for 6 months after surgery.  He is at an increased risk for heart attack or other cardiac issues if he is not on the dual antiplatelet therapy at this time.  Patient stated that if there is increased risk for heart attack or other cardiac issues prior to this time he would like to delay surgical intervention.  I was unable to speak with the patient while he was in the hospital but did call him at [phone].  I discussed with the patient that there was a miscommunication/misunderstanding.  Patient states it was not fully explained to him and he was not fully aware of his increased cardiac risk if he underwent the procedure prior to the 6 months of antiplatelet therapy.  He will plan to see me in office early next year to discuss surgical intervention and scheduling.  If there is any other questions or concerns that, prior to this he can call the office and I will be happy to discuss.

## 2024-10-03 DIAGNOSIS — N32.89 BLADDER SPASM: ICD-10-CM

## 2024-10-03 RX ORDER — OXYBUTYNIN CHLORIDE 10 MG/1
10 TABLET, EXTENDED RELEASE ORAL
Qty: 90 TABLET | Refills: 5 | Status: SHIPPED | OUTPATIENT
Start: 2024-10-03

## 2024-10-04 ENCOUNTER — TELEPHONE (OUTPATIENT)
Dept: SURGERY | Facility: CLINIC | Age: 69
End: 2024-10-04

## 2024-10-04 NOTE — TELEPHONE ENCOUNTER
Spoke with pt. He agreed  going to come in on 12/18/24 to discuss surgery with Dr. Rolle and schedule for January.

## 2024-10-04 NOTE — TELEPHONE ENCOUNTER
----- Message from Evan Rolle DO sent at 10/2/2024  2:57 PM EDT -----  Regarding: RE: Reschedule  Thank you  ----- Message -----  From: Dalila Cao  Sent: 10/2/2024   2:53 PM EDT  To: Evan Rolle DO; Jewell Mcleod MA  Subject: RE: Reschedule                                   I will reach out to patient. We do not have the schedules out yet for January. Patient would have to call around December. I will discuss this with him.      Daniel  ----- Message -----  From: Evan Rolle DO  Sent: 10/2/2024   1:37 PM EDT  To: Dalila Cao; Jewell Mcleod MA  Subject: Reschedule                                       Patient's case was canceled today because he did not want to take the risk to undergo surgery prior to the 6 months of antiplatelet therapy for his cardiac stent.  There was a miscommunication between myself, the patient, and cardiology.  The case was discussed with anesthesia and the patient and the decision was made to defer surgery.  Patient states that he would like to be seen in January in the office and then we can reschedule his surgery from there.  He is upset.  I just got off the phone with him discussing everything above.  So we have a plan moving forward and are all in agreement at this time.  Any issues let me know.

## 2024-10-09 ENCOUNTER — OFFICE VISIT (OUTPATIENT)
Dept: OBGYN CLINIC | Facility: MEDICAL CENTER | Age: 69
End: 2024-10-09
Payer: COMMERCIAL

## 2024-10-09 VITALS
HEART RATE: 84 BPM | BODY MASS INDEX: 32.14 KG/M2 | SYSTOLIC BLOOD PRESSURE: 127 MMHG | WEIGHT: 200 LBS | DIASTOLIC BLOOD PRESSURE: 80 MMHG | HEIGHT: 66 IN

## 2024-10-09 DIAGNOSIS — M84.361A STRESS FRACTURE OF RIGHT TIBIA, INITIAL ENCOUNTER: ICD-10-CM

## 2024-10-09 DIAGNOSIS — M17.11 PRIMARY OSTEOARTHRITIS OF RIGHT KNEE: Primary | ICD-10-CM

## 2024-10-09 DIAGNOSIS — M25.561 ACUTE PAIN OF RIGHT KNEE: ICD-10-CM

## 2024-10-09 PROCEDURE — 99214 OFFICE O/P EST MOD 30 MIN: CPT | Performed by: PHYSICAL MEDICINE & REHABILITATION

## 2024-10-09 RX ORDER — METHOCARBAMOL 500 MG/1
500 TABLET, FILM COATED ORAL
Qty: 20 TABLET | Refills: 0 | Status: SHIPPED | OUTPATIENT
Start: 2024-10-09

## 2024-10-09 NOTE — PROGRESS NOTES
1. Primary osteoarthritis of right knee  methocarbamol (ROBAXIN) 500 mg tablet      2. Acute pain of right knee  methocarbamol (ROBAXIN) 500 mg tablet      3. Stress fracture of right tibia, initial encounter  MRI knee right  wo contrast    methocarbamol (ROBAXIN) 500 mg tablet        Orders Placed This Encounter   Procedures    MRI knee right  wo contrast     Impression:  Patient is here in follow up of right knee pain likely secondary to stress fracture versus medial meniscus tear.  Treatment has included intra-articular steroid injection, oral steroids, cane and ketorolac injection.  Patient continues to have severe pain affecting his activities of daily living.  Due to continued symptoms with exquisite tenderness along his proximal tibia, we will obtain an MRI of his right knee.  We are limited in terms of pain medications due to his comorbidities that include a solitary kidney along with aspirin and Plavix use for CAD.  I have prescribed him low-dose muscle relaxant to be used at night to help with sleep.  His kidney function has been in within normal limits.  We will also obtain a medial  brace for him.  I will see him back for MRI review.     Imaging Studies (I personally reviewed images in PACS and report):  Right knee x-rays most recent to this encounter reviewed.  These images show mild medial tibiofemoral osteoarthritis.  No joint effusion.  No acute osseous abnormalities.  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    No follow-ups on file.    Patient is in agreement with the above plan.    HPI:  Danie Liao is a 69 y.o. male  who presents in follow up.  Here for   Chief Complaint   Patient presents with    Right Knee - Pain, Follow-up       Since last visit: See above.    Following history reviewed and updated:  Past Medical History:   Diagnosis Date    Arthritis     Asthma     Chronic pain 01/30/2024    cervic and lumbar    Colon polyp     COPD (chronic  obstructive pulmonary disease) (HCC)     Emphysema of lung (HCC)     GERD (gastroesophageal reflux disease) 2024    Headache(784.0)     Hyperlipidemia 2024    Migraine     Obesity     Sleep apnea      Past Surgical History:   Procedure Laterality Date    APPENDECTOMY      CARDIAC CATHETERIZATION  2024    Procedure: Cardiac catheterization;  Surgeon: Sarah Noland DO;  [facility];  Service: Cardiology    CARDIAC CATHETERIZATION N/A 2024    Procedure: Cardiac Coronary Angiogram;  Surgeon: Sarah Noland DO;  [facility];  Service: Cardiology    CARDIAC CATHETERIZATION N/A 2024    Procedure: Cardiac FFR/IFR;  Surgeon: Sarah Noland DO;  [facility];  Service: Cardiology    CARDIAC CATHETERIZATION N/A 2024    Procedure: Cardiac pci;  Surgeon: Sarah Noland DO;  [facility];  Service: Cardiology    CARDIAC CATHETERIZATION N/A 2024    Procedure: Cardiac IVUS;  Surgeon: Sarah Noland DO;  [facility];  Service: Cardiology    COLONOSCOPY      COLONOSCOPY      HERNIA REPAIR      4 times    NEPHRECTOMY LIVING DONOR Left 10/2009    CA EXCISION HYDROCELE UNILATERAL Left 2024    Procedure: HYDROCELECTOMY;  Surgeon: Evan Salmeron MD;  Location: [facility];  Service: Urology     Social History   Social History     Substance and Sexual Activity   Alcohol Use Yes    Comment: Occasional     Social History     Substance and Sexual Activity   Drug Use Not Currently    Types: Marijuana    Comment: long time ago     Social History     Tobacco Use   Smoking Status Former    Current packs/day: 0.00    Average packs/day: 2.0 packs/day for 40.0 years (80.0 ttl pk-yrs)    Types: Cigarettes, Pipe    Start date: 1970    Quit date: 2010    Years since quittin.7    Passive exposure: Past   Smokeless Tobacco Never     Family History   Problem Relation Age of Onset     "Breast cancer Mother     Heart disease Father     Heart disease Brother     Stroke Brother     Lung disease Brother     Breast cancer Maternal Grandmother     Heart disease Paternal Grandmother      No Known Allergies     Constitutional:  /80   Pulse 84   Ht 5' 6\" (1.676 m)   Wt 90.7 kg (200 lb)   BMI 32.28 kg/m²    General: NAD.  Eyes: Clear sclerae.  ENT: No inflammation, lesion, or mass of lips.  No tracheal deviation.  Musculoskeletal: As mentioned below.  Integumentary: No visible rashes or skin lesions.  Pulmonary/Chest: Effort normal. No respiratory distress.   Neuro: CN's grossly intact, FRAGA.  Psych: Normal affect and judgement.  Vascular: WWP.    Right Knee Exam     Tenderness   The patient is experiencing tenderness in the medial joint line.    Range of Motion   Extension:  normal     Other   Erythema: absent  Scars: absent  Sensation: normal  Pulse: present  Swelling: none  Effusion: no effusion present             Procedures  "

## 2024-10-15 ENCOUNTER — OFFICE VISIT (OUTPATIENT)
Dept: FAMILY MEDICINE CLINIC | Facility: CLINIC | Age: 69
End: 2024-10-15
Payer: COMMERCIAL

## 2024-10-15 VITALS
WEIGHT: 200 LBS | SYSTOLIC BLOOD PRESSURE: 118 MMHG | BODY MASS INDEX: 32.14 KG/M2 | HEIGHT: 66 IN | HEART RATE: 93 BPM | OXYGEN SATURATION: 100 % | TEMPERATURE: 98.4 F | DIASTOLIC BLOOD PRESSURE: 82 MMHG

## 2024-10-15 DIAGNOSIS — I25.118 CORONARY ARTERY DISEASE OF NATIVE ARTERY OF NATIVE HEART WITH STABLE ANGINA PECTORIS (HCC): Primary | ICD-10-CM

## 2024-10-15 DIAGNOSIS — M17.11 PRIMARY OSTEOARTHRITIS OF RIGHT KNEE: ICD-10-CM

## 2024-10-15 DIAGNOSIS — K40.21 BILATERAL RECURRENT INGUINAL HERNIA WITHOUT OBSTRUCTION OR GANGRENE: ICD-10-CM

## 2024-10-15 PROBLEM — M25.561 ACUTE PAIN OF RIGHT KNEE: Status: RESOLVED | Noted: 2024-09-19 | Resolved: 2024-10-15

## 2024-10-15 PROCEDURE — G2211 COMPLEX E/M VISIT ADD ON: HCPCS | Performed by: STUDENT IN AN ORGANIZED HEALTH CARE EDUCATION/TRAINING PROGRAM

## 2024-10-15 PROCEDURE — 99214 OFFICE O/P EST MOD 30 MIN: CPT | Performed by: STUDENT IN AN ORGANIZED HEALTH CARE EDUCATION/TRAINING PROGRAM

## 2024-10-16 DIAGNOSIS — M17.11 PRIMARY OSTEOARTHRITIS OF RIGHT KNEE: Primary | ICD-10-CM

## 2024-10-17 ENCOUNTER — HOSPITAL ENCOUNTER (OUTPATIENT)
Dept: RADIOLOGY | Facility: IMAGING CENTER | Age: 69
End: 2024-10-17
Payer: COMMERCIAL

## 2024-10-17 DIAGNOSIS — G43.919 INTRACTABLE MIGRAINE WITHOUT STATUS MIGRAINOSUS, UNSPECIFIED MIGRAINE TYPE: ICD-10-CM

## 2024-10-17 DIAGNOSIS — S05.42XA FOREIGN BODY BEHIND THE EYE, BILATERAL: ICD-10-CM

## 2024-10-17 DIAGNOSIS — M84.361A STRESS FRACTURE OF RIGHT TIBIA, INITIAL ENCOUNTER: ICD-10-CM

## 2024-10-17 DIAGNOSIS — J44.9 COPD (CHRONIC OBSTRUCTIVE PULMONARY DISEASE) (HCC): ICD-10-CM

## 2024-10-17 DIAGNOSIS — S05.41XA FOREIGN BODY BEHIND THE EYE, BILATERAL: ICD-10-CM

## 2024-10-17 PROCEDURE — 73721 MRI JNT OF LWR EXTRE W/O DYE: CPT

## 2024-10-18 RX ORDER — UBROGEPANT 100 MG/1
100 TABLET ORAL DAILY PRN
Qty: 30 TABLET | Refills: 5 | Status: SHIPPED | OUTPATIENT
Start: 2024-10-18

## 2024-10-21 RX ORDER — FLUTICASONE FUROATE, UMECLIDINIUM BROMIDE AND VILANTEROL TRIFENATATE 100; 62.5; 25 UG/1; UG/1; UG/1
1 POWDER RESPIRATORY (INHALATION) DAILY
Qty: 180 BLISTER | Refills: 0 | Status: SHIPPED | OUTPATIENT
Start: 2024-10-21 | End: 2025-04-19

## 2024-10-22 DIAGNOSIS — L40.9 PSORIASIS: ICD-10-CM

## 2024-10-23 ENCOUNTER — OFFICE VISIT (OUTPATIENT)
Dept: OBGYN CLINIC | Facility: MEDICAL CENTER | Age: 69
End: 2024-10-23
Payer: COMMERCIAL

## 2024-10-23 VITALS — HEIGHT: 66 IN | BODY MASS INDEX: 32.14 KG/M2 | WEIGHT: 200 LBS

## 2024-10-23 DIAGNOSIS — M17.11 PRIMARY OSTEOARTHRITIS OF RIGHT KNEE: ICD-10-CM

## 2024-10-23 DIAGNOSIS — M84.361D STRESS FRACTURE OF RIGHT TIBIA WITH ROUTINE HEALING, SUBSEQUENT ENCOUNTER: Primary | ICD-10-CM

## 2024-10-23 PROCEDURE — 99213 OFFICE O/P EST LOW 20 MIN: CPT | Performed by: PHYSICAL MEDICINE & REHABILITATION

## 2024-10-23 RX ORDER — TAPINAROF 10 MG/1000MG
1 CREAM TOPICAL DAILY
Qty: 60 G | Refills: 0 | Status: SHIPPED | OUTPATIENT
Start: 2024-10-23

## 2024-10-23 NOTE — PROGRESS NOTES
"1. Stress fracture of right tibia with routine healing, subsequent encounter        2. Primary osteoarthritis of right knee          No orders of the defined types were placed in this encounter.       Impression:  Patient is here in follow up of right knee pain likely secondary to stress fracture with medial meniscus tear and MCL sprain.  Treatment has included intra-articular steroid injection, oral steroids, cane and ketorolac injection.  Patient continues to have severe pain affecting his activities of daily living.  Reviewed his MRI today.  We are limited in terms of pain medications due to his comorbidities that include a solitary kidney along with aspirin and Plavix use for CAD.  I had prescribed him low-dose muscle relaxant to be used at night to help with sleep.  His kidney function has been in within normal limits.  We will continue with the medial  brace and we have provided him with crutches.  He needs to offload his right lower extremity for this to heal.  He also has a scooter at home that he received for COPD.  I will see him back in 4 weeks to reassess.  Can consider viscosupplementation.     Imaging Studies (I personally reviewed images in PACS and report):  Right knee x-rays most recent to this encounter reviewed.  These images show mild medial tibiofemoral osteoarthritis.  No joint effusion.  No acute osseous abnormalities.  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    Right knee MRI:  \"SUBCUTANEOUS TISSUES: Within normal limits.     JOINT EFFUSION: Small. No loose bodies.     BAKER'S CYST: Small. Surrounding edema may indicate rupture. Mild synovial thickening.     MENISCI:  Lateral: Intact.  Medial: Degenerative-type posterior horn increased signal intensity. Does not meet criteria for tear, compatible with degenerative change. Extends to the posterior root, with root attenuation and mild irregularity. Extrusion may be secondary to cartilage   loss. No " "displacement or cyst.     CRUCIATE LIGAMENTS: Intact.     EXTENSOR APPARATUS: Intact.     COLLATERAL LIGAMENTS:  Lateral collateral ligament complex: Intact.  Medial collateral ligament (MCL): Bowed and surrounding edema. Intact.     ARTICULAR SURFACES:  Mild heterogeneity and irregularity of superior patella median ridge cartilage. Diffuse thinning of lateral compartment cartilage without defects.     Reciprocal, full-thickness, weightbearing medial compartment cartilage loss, larger at the tibia, approximately 1.5 x 1.5 cm AP by TRV. Undermining at the femur.     BONES:  Reciprocal, degenerative medial compartment and patella median ridge subchondral marrow edema. Mild trochlea subchondral cystic change. Small patellofemoral marginal osteophytes.     Femoral marrow edema extends to the medial metaphysis, surrounds a 0.7 x 0.6 cm AP by TRV, weightbearing subchondral fracture. Mild subchondral bone plate depression. Overlying cartilage is intact.     1.3 cm benign femoral metaphyseal enchondroma.     Normal alignment. Patella well seated.     MUSCULATURE: Mild fatty infiltration, greatest involving the hamstrings. Preserved bulk.     IMPRESSION:     Medial meniscus degeneration.     Grade 1 MCL sprain pattern.     Osteoarthritis. Cartilage loss greatest in the medial compartment. Small medial femoral subchondral insufficiency fracture.\"    No follow-ups on file.    Patient is in agreement with the above plan.    HPI:  Danie Liao is a 69 y.o. male  who presents in follow up.  Here for   Chief Complaint   Patient presents with    Right Knee - Follow-up, Pain     MRI Review - Pt reports no improvement in symptoms since last visit. Received  yesterday, has not noticed any change in symptoms with or without brace.        Since last visit: See above.    Following history reviewed and updated:  Past Medical History:   Diagnosis Date    Arthritis     Asthma     Chronic pain 01/30/2024    cervic and lumbar    " Colon polyp     COPD (chronic obstructive pulmonary disease) (HCC)     Emphysema of lung (HCC)     GERD (gastroesophageal reflux disease) 2024    Headache(784.0)     Hyperlipidemia 2024    Migraine     Obesity     Sleep apnea      Past Surgical History:   Procedure Laterality Date    APPENDECTOMY      CARDIAC CATHETERIZATION  2024    Procedure: Cardiac catheterization;  Surgeon: Sarah Noland DO;  [facility];  Service: Cardiology    CARDIAC CATHETERIZATION N/A 2024    Procedure: Cardiac Coronary Angiogram;  Surgeon: Sarah Noland DO;  [facility];  Service: Cardiology    CARDIAC CATHETERIZATION N/A 2024    Procedure: Cardiac FFR/IFR;  Surgeon: Sarah Noland DO;  [facility];  Service: Cardiology    CARDIAC CATHETERIZATION N/A 2024    Procedure: Cardiac pci;  Surgeon: Sarah Noland DO;  [facility];  Service: Cardiology    CARDIAC CATHETERIZATION N/A 2024    Procedure: Cardiac IVUS;  Surgeon: Sarah Noland DO;  [facility];  Service: Cardiology    COLONOSCOPY      COLONOSCOPY      HERNIA REPAIR      4 times    NEPHRECTOMY LIVING DONOR Left 10/2009    KS EXCISION HYDROCELE UNILATERAL Left 2024    Procedure: HYDROCELECTOMY;  Surgeon: Evan Salmeron MD;  Location: [facility];  Service: Urology     Social History   Social History     Substance and Sexual Activity   Alcohol Use Yes    Comment: Occasional     Social History     Substance and Sexual Activity   Drug Use Not Currently    Types: Marijuana    Comment: long time ago     Social History     Tobacco Use   Smoking Status Former    Current packs/day: 0.00    Average packs/day: 2.0 packs/day for 40.0 years (80.0 ttl pk-yrs)    Types: Cigarettes, Pipe    Start date: 1970    Quit date: 2010    Years since quittin.8    Passive exposure: Past   Smokeless Tobacco Never     Family History   Problem  "Relation Age of Onset    Breast cancer Mother     Heart disease Father     Heart disease Brother     Stroke Brother     Lung disease Brother     Breast cancer Maternal Grandmother     Heart disease Paternal Grandmother      No Known Allergies     Constitutional:  Ht 5' 6\" (1.676 m)   Wt 90.7 kg (200 lb)   BMI 32.28 kg/m²    General: NAD.  Eyes: Clear sclerae.  ENT: No inflammation, lesion, or mass of lips.  No tracheal deviation.  Musculoskeletal: As mentioned below.  Integumentary: No visible rashes or skin lesions.  Pulmonary/Chest: Effort normal. No respiratory distress.   Neuro: CN's grossly intact, FRAGA.  Psych: Normal affect and judgement.  Vascular: WWP.    Right Knee Exam     Tenderness   The patient is experiencing tenderness in the medial joint line.    Range of Motion   Extension:  normal   Flexion:  abnormal     Tests   Varus: negative Valgus: positive    Other   Erythema: absent  Scars: absent  Sensation: normal  Pulse: present  Swelling: none  Effusion: no effusion present             Procedures  "

## 2024-10-27 DIAGNOSIS — N32.89 BLADDER SPASM: ICD-10-CM

## 2024-10-28 RX ORDER — TAMSULOSIN HYDROCHLORIDE 0.4 MG/1
0.4 CAPSULE ORAL
Qty: 30 CAPSULE | Refills: 5 | Status: SHIPPED | OUTPATIENT
Start: 2024-10-28

## 2024-11-14 DIAGNOSIS — I25.118 CORONARY ARTERY DISEASE OF NATIVE ARTERY OF NATIVE HEART WITH STABLE ANGINA PECTORIS (HCC): ICD-10-CM

## 2024-11-14 RX ORDER — CLOPIDOGREL BISULFATE 75 MG/1
75 TABLET ORAL DAILY
Qty: 90 TABLET | Refills: 0 | Status: SHIPPED | OUTPATIENT
Start: 2024-11-14

## 2024-11-18 DIAGNOSIS — J44.9 COPD (CHRONIC OBSTRUCTIVE PULMONARY DISEASE) (HCC): ICD-10-CM

## 2024-11-18 DIAGNOSIS — L40.9 PSORIASIS: ICD-10-CM

## 2024-11-19 RX ORDER — TAPINAROF 10 MG/1000MG
1 CREAM TOPICAL DAILY
Qty: 60 G | Refills: 0 | Status: SHIPPED | OUTPATIENT
Start: 2024-11-19

## 2024-11-19 RX ORDER — PREDNISONE 1 MG/1
1 TABLET, DELAYED RELEASE ORAL DAILY
Qty: 90 TABLET | Refills: 0 | Status: SHIPPED | OUTPATIENT
Start: 2024-11-19 | End: 2025-05-18

## 2024-11-22 DIAGNOSIS — N32.89 BLADDER SPASM: ICD-10-CM

## 2024-11-22 RX ORDER — TAMSULOSIN HYDROCHLORIDE 0.4 MG/1
0.4 CAPSULE ORAL
Qty: 90 CAPSULE | Refills: 1 | Status: SHIPPED | OUTPATIENT
Start: 2024-11-22

## 2024-12-07 DIAGNOSIS — J44.1 COPD EXACERBATION (HCC): ICD-10-CM

## 2024-12-07 DIAGNOSIS — L40.9 PSORIASIS: ICD-10-CM

## 2024-12-07 DIAGNOSIS — N32.89 BLADDER SPASM: ICD-10-CM

## 2024-12-07 DIAGNOSIS — I25.118 CORONARY ARTERY DISEASE OF NATIVE ARTERY OF NATIVE HEART WITH STABLE ANGINA PECTORIS (HCC): ICD-10-CM

## 2024-12-07 DIAGNOSIS — G43.919 INTRACTABLE MIGRAINE WITHOUT STATUS MIGRAINOSUS, UNSPECIFIED MIGRAINE TYPE: ICD-10-CM

## 2024-12-07 DIAGNOSIS — J44.9 CHRONIC OBSTRUCTIVE PULMONARY DISEASE, UNSPECIFIED COPD TYPE (HCC): ICD-10-CM

## 2024-12-09 RX ORDER — TAMSULOSIN HYDROCHLORIDE 0.4 MG/1
0.4 CAPSULE ORAL
Qty: 90 CAPSULE | Refills: 1 | Status: SHIPPED | OUTPATIENT
Start: 2024-12-09

## 2024-12-09 RX ORDER — TAPINAROF 10 MG/1000MG
1 CREAM TOPICAL DAILY
Qty: 60 G | Refills: 0 | Status: SHIPPED | OUTPATIENT
Start: 2024-12-09

## 2024-12-09 RX ORDER — UBROGEPANT 100 MG/1
100 TABLET ORAL DAILY PRN
Qty: 30 TABLET | Refills: 5 | Status: SHIPPED | OUTPATIENT
Start: 2024-12-09

## 2024-12-09 RX ORDER — ALBUTEROL SULFATE 90 UG/1
INHALANT RESPIRATORY (INHALATION)
Qty: 36 G | Refills: 5 | Status: SHIPPED | OUTPATIENT
Start: 2024-12-09

## 2024-12-09 RX ORDER — IPRATROPIUM BROMIDE AND ALBUTEROL SULFATE 2.5; .5 MG/3ML; MG/3ML
SOLUTION RESPIRATORY (INHALATION)
Qty: 360 ML | Refills: 4 | Status: SHIPPED | OUTPATIENT
Start: 2024-12-09

## 2024-12-09 RX ORDER — ATORVASTATIN CALCIUM 80 MG/1
80 TABLET, FILM COATED ORAL EVERY EVENING
Qty: 90 TABLET | Refills: 0 | Status: SHIPPED | OUTPATIENT
Start: 2024-12-09

## 2024-12-17 NOTE — ASSESSMENT & PLAN NOTE
Preoperative Cardiac Risk Stratification for Bladder Hernia Repair planned for 2024 (yet to be scheduled)  Functionally, the patient is able to perform adls and can achieve at least 4 METs without symptoms  Clinical exam is unremarkable for acute cv instability or illness, Recent EKG on 07/02/24 shows no pathological Q waves or new BBB , Review of the patient's prior cardiac testing shows no new findings requiring further workup  The patient's Brantley preoperative analysis yields a 0.2% risk of myocardial infarction or cardiac arrest, intraoperatively or up to 30 days post-op  At this time, the patient is deemed a low risk and may proceed to surgery without further cardiac testing , Given his PCI with SHELL for SIHD on 06/17 he has completed 6 months of DAPT as of today, Recommend to discontinue [id number] today and cont ASA, Also okay to hold 5 days prior to surgery and then restart ASA thereafter

## 2024-12-17 NOTE — PROGRESS NOTES
[facility]   [facility] [address] [place] [address] [place] [address]  p: [phone] p: [phone]  f: [phone] f: [phone]         Cardiology Follow Up Visit    Keavy  Patient Identification:  Danie Liao  1955     [id number] Care Team:  Gosia Mcgill MD (PCP)  No ref. provider found (Referring Provider)         ASSESSMENT & PLAN   Assessment & Plan     Discussion & Summary: Mr. Danie Liao was seen and examined today for routine follow up of the conditions noted below. Precautions and reasons to call our office or proceed to ER were discussed in detail. Patient expressed understanding and questions were answered.     Follow Up & Next Steps: Plan on follow up in-clinic in 6 months.    1. Preoperative cardiovascular examination  Assessment & Plan:  Preoperative Cardiac Risk Stratification for Bladder Hernia Repair planned for 2024 (yet to be scheduled)  Functionally, the patient is able to perform adls and can achieve at least 4 METs without symptoms  Clinical exam is unremarkable for acute cv instability or illness, Recent EKG on 07/02/24 shows no pathological Q waves or new BBB , Review of the patient's prior cardiac testing shows no new findings requiring further workup  The patient's Brantley preoperative analysis yields a 0.2% risk of myocardial infarction or cardiac arrest, intraoperatively or up to 30 days post-op  At this time, the patient is deemed a low risk and may proceed to surgery without further cardiac testing , Given his PCI with SHELL for SIHD on 06/17 he has completed 6 months of DAPT as of today, Recommend to discontinue P2Y12i today and cont ASA, Also okay to hold 5 days prior to surgery and then restart ASA thereafter  2. 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 S/P drug eluting coronary stent placement  4. Chronic obstructive pulmonary disease, unspecified COPD type (HCC)  5. LEATHA (obstructive sleep apnea)  6. Gastroesophageal reflux disease, unspecified whether esophagitis present  7. Hyperlipidemia, unspecified hyperlipidemia type          SUBJECTIVE   Subjective   Chief Complaint: Mr. Liao is a 69 y.o. male and former smoker with a PMH significant for but not limited to CAD, HLD, Prediabetes, JANEE, GERD, Bladder Hernia, OA and Obesity.  He presents to clinic for Hospital Follow Up.    HPI / Interval Hx: He was originally seen in clinic on 08/21/24 for Hospital Follow Up. 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  He'd been seen initially in clinic by Dr. Villegas for preoperative evaluation for bariatric surgery.  For is Anginal Chest Pain, he completed a NM MPI was positive for infarct and transient ischemic dilation.  He was scheduled for LHC at [facility] but the procedure was cancelled due to his discomfort with conscious sedation. At [facility] his procedure was completed with Anesthesia.  He was found to have a DFR positive DONALD lesion that was treated with SHELL x 1.  His intermediate LCX lesion was DFR negative and his  nondominant RCA was smaller in caliber than his conus. His case was complicated by post-anesthetic agitation and combativeness, requiring several staff members to restrain his arms and legs. During his stay in PACU, it was difficult for him to maintain bedrest and his uop was diminshed requiring an order for straight cath.  He was discharged to home the following day with notable drop in Hgb and his post discharge course was complicated by urinary retention and abdominal pain.    We followed up by phone calls, text exchanges, and by [employer] messaging upon discharge.  He was seen in the ED twice for Abdominal Pain with notable ecchymosis on exam and without gross evidence of pseudoaneurysm or active bleeding on imaging.  He was seen by Vascular as well with follow up imaging that was negative for active psudoaneruysm or av fistula. His PCP was able to help him with pain management, and he was also able to encourage him to be compliant with DAPT after the patient had stopped taking clopidogrel.  He was evaluated by Urology for ongoing urinary retention, bladder spasm and pain.  His PVR volume was normal by bladder scan, but he was found to have a bladder herniation down his right inguinal canal.  He was treated medically for bladder spasm.  For his anemia, he was seen by Hematology as well.  His chronic microcytosis was presumptively attributed to thalassemia trait given his family history and prior diagnosis of JANEE.  His post-procedural anemia had been trending back to his baseline since June.  He was seen for cardiology follow up by Dr. Peralta with no new changes in management.    Prior to our visit in August, he'd been sen by Urology on 08/12/24, and was referred to General Surgery for Bladder Hernia repair.  Mr. Liao and his wife are hoping to have the surgery completed sooner than later.  We discussed DAPT for PCI in [facility] with recommendations for 6 months of DAPT, however given the patient's ongoing abdominal  pain, I offered clearance for 3 months if the procedure could be completed on ASA SAPT.    Since our last visit, he was seen by Dr. Greene with a desire to perform the procedure after 6 months of therapy, once DAPT can be discontinued without need for ASA SAPT.  He is currently being evalauted for surgical intervention in January, but he is reconsidering the procedure since his symtpoms have improved with his ongoing weight loss. Also of note, he has been undergoing Dental Implant treatments with ongoing complications.    He currently denies any anginal cp or vaca.  He does however note CP and SOB that presented during our discussion of his clinical course.  His symptoms abated during the course of our conversation and questions were answered with TI Zapata present.  On clinical review there are no recent hospitalizations, family history of early cad, or family history of scd.  His father and paternal grandmother had histories of CAD, however.  He notes inconsistent control of his diet and exercise habits. He reports a diet that is somewhat balanced but has room for improvement and no dedicated exercise.  He is, however, eager to initiate cardiac rehab.  He is otherwise compliant with medications but does not maintain a detailed BP log.  Of note, the patient's cardiac risk factor(s) include: advanced age, hypertension, dyslipidemia, obesity, sedentary lifestyle, and tobacco exposure.    Review of Systems: This was a comprehensive review of symptoms with problem focused details as note above.  Review of Systems   Constitutional: Positive for weight loss (25 lbs down so far). Negative for chills, fever and malaise/fatigue.   Eyes:  Negative for blurred vision.   Cardiovascular:  Positive for chest pain (no anginial cp, but at rest occasional episodes, fleeting), dyspnea on exertion, orthopnea (sleeps  on three pillows) and paroxysmal nocturnal dyspnea. Negative for claudication, leg swelling, near-syncope, palpitations  and syncope.   Respiratory:  Positive for shortness of breath (noted with his copd, improved with nebulizer). Negative for cough, snoring (previously tried CPAP, could not tolerate) and wheezing.    Hematologic/Lymphatic: Negative for bleeding problem. Bruises/bleeds easily.   Skin:  Positive for color change (brusing). Negative for itching and rash.   Musculoskeletal:  Positive for arthritis, back pain and joint pain (rue, right shoulder, rle knee).   Gastrointestinal:  Negative for abdominal pain, change in bowel habit, constipation, diarrhea, nausea and vomiting.   Genitourinary:  Positive for nocturia. Negative for dysuria.   Neurological:  Negative for numbness and paresthesias (rue).   Psychiatric/Behavioral:  Negative for depression. The patient is nervous/anxious.         Past Hx: The following portions of the patient's history were reviewed and updated as appropriate: past medical history, past social history, past family history, past surgical history, current medications, allergies, past surgical history and problem list.  Social History     Socioeconomic History    Marital status: /Civil Union     Spouse name: Not on file    Number of children: Not on file    Years of education: Not on file    Highest education level: Not on file   Occupational History    Not on file   Tobacco Use    Smoking status: Former     Current packs/day: 0.00     Average packs/day: 2.0 packs/day for 40.0 years (80.0 ttl pk-yrs)     Types: Cigarettes, Pipe     Start date: 1970     Quit date: 2010     Years since quittin.9     Passive exposure: Past    Smokeless tobacco: Never   Vaping Use    Vaping status: Former   Substance and Sexual Activity    Alcohol use: Yes     Comment: Occasional    Drug use: Not Currently     Types: Marijuana     Comment: long time ago    Sexual activity: Yes     Partners: Female   Other Topics Concern    Not on file   Social History Narrative    Not on file     Social Drivers of  Health     Financial Resource Strain: Low Risk  (8/24/2023)    Overall Financial Resource Strain (CARDIA)     Difficulty of Paying Living Expenses: Not hard at all   Food Insecurity: No Food Insecurity (8/26/2024)    Nursing - Inadequate Food Risk Classification     Worried About Running Out of Food in the Last Year: Never true     Ran Out of Food in the Last Year: Never true     Ran Out of Food in the Last Year: Not on file   Transportation Needs: No Transportation Needs (8/26/2024)    PRAPARE - Transportation     Lack of Transportation (Medical): No     Lack of Transportation (Non-Medical): No   Physical Activity: Not on file   Stress: Not on file   Social Connections: Not on file   Intimate Partner Violence: Not on file   Housing Stability: Low Risk  (8/26/2024)    Housing Stability Vital Sign     Unable to Pay for Housing in the Last Year: No     Number of Times Moved in the Last Year: 1     Homeless in the Last Year: No        Family History   Problem Relation Age of Onset    Breast cancer Mother     Heart disease Father     Heart disease Brother     Stroke Brother     Lung disease Brother     Breast cancer Maternal Grandmother     Heart disease Paternal Grandmother         Patient Active Problem List   Diagnosis    Chronic obstructive pulmonary disease (HCC)    Localized osteoporosis without current pathological fracture    Intractable migraine without status migrainosus    Kidney donor    Psoriasis    Polyp of colon    Morbid (severe) obesity due to excess calories (HCC)    Prediabetes    LEATHA (obstructive sleep apnea)    Hyperlipidemia    GERD (gastroesophageal reflux disease)    Encysted hydrocele    Single kidney    Iron deficiency    Coronary artery disease of native heart with stable angina pectoris (HCC)    Preoperative cardiovascular examination    Bilateral recurrent inguinal hernia without obstruction or gangrene    Primary osteoarthritis of right knee    Stress fracture of right tibia    Stress  fracture of right tibia with routine healing, subsequent encounter        Past Surgical History:   Procedure Laterality Date    APPENDECTOMY      CARDIAC CATHETERIZATION  06/17/2024    Procedure: Cardiac catheterization;  Surgeon: Sarah Noland DO;  Location: [facility];  Service: Cardiology    CARDIAC CATHETERIZATION N/A 06/17/2024    Procedure: Cardiac Coronary Angiogram;  Surgeon: Sarah Noland DO;  Location: [facility];  Service: Cardiology    CARDIAC CATHETERIZATION N/A 06/17/2024    Procedure: Cardiac FFR/IFR;  Surgeon: Sarah Noland DO;  Location: [facility];  Service: Cardiology    CARDIAC CATHETERIZATION N/A 06/17/2024    Procedure: Cardiac pci;  Surgeon: Sarah Noland DO;  Location: [facility];  Service: Cardiology    CARDIAC CATHETERIZATION N/A 06/17/2024    Procedure: Cardiac IVUS;  Surgeon: Sarah Noland DO;  Location: [facility];  Service: Cardiology    COLONOSCOPY      COLONOSCOPY      HERNIA REPAIR      4 times    NEPHRECTOMY LIVING DONOR Left 10/2009    VA EXCISION HYDROCELE UNILATERAL Left 01/30/2024    Procedure: HYDROCELECTOMY;  Surgeon: Evan Salmeron MD;  Location: [facility] OR;  Service: Urology        Current Outpatient Medications   Medication Instructions    albuterol (PROVENTIL HFA,VENTOLIN HFA) 90 mcg/act inhaler INHALE 1-2 PUFFS EVERY 4-6 HOURS AS NEEDED AND AS DIRECTED    aspirin 81 mg, Oral, Daily    atorvastatin (LIPITOR) 80 mg, Oral, Every evening    budesonide (PULMICORT) 0.5 mg/2 mL nebulizer solution TAKE 2 ML (0.5 MG TOTAL) BY NEBULIZATION TWICE A DAY RINSE MOUTH AFTER USE    clopidogrel (PLAVIX) 75 mg, Oral, Daily    famotidine (PEPCID) 20 mg, Oral, 2 times daily    ipratropium-albuterol (DUO-NEB) 0.5-2.5 mg/3 mL nebulizer solution TAKE THREE ML BY NEBULIZATION 4 (FOUR) TIMES A DAY Strength: 0.5-2.5 (3) MG/3ML    methocarbamol (ROBAXIN) 500 mg, Oral, Daily at bedtime PRN    naloxone (NARCAN) 4 mg/0.1 mL nasal  spray Administer 1 spray into a nostril. If no response after 2-3 minutes, give another dose in the other nostril using a new spray.    nitroglycerin (NITROSTAT) 0.4 mg, Sublingual, Every 5 minutes PRN    oxybutynin (DITROPAN-XL) 10 mg, Oral, Daily at bedtime    Georgina 1 mg, Oral, Daily    roflumilast (DALIRESP) 250 mcg, Oral, Daily    tamsulosin (FLOMAX) 0.4 mg, Oral, Daily with dinner    Tapinarof (Vtama) 1 % CREA 1 Application, Apply externally, Daily    Trelegy Ellipta 100-62.5-25 MCG/ACT inhaler 1 puff, Inhalation, Daily    Ubrelvy 100 mg, Oral, Daily PRN        No Known Allergies        OBJECTIVE   Objective     Physical Exam: Patient seen and examined today, accompanied by spouse, Anabell.  VITALS   There were no vitals filed for this visit.    BMI   There is no height or weight on file to calculate BMI.  Physical Exam  Constitutional:       General: He is not in acute distress.     Appearance: He is obese. He is not toxic-appearing.   HENT:      Head: Normocephalic and atraumatic.      Right Ear: External ear normal.      Left Ear: External ear normal.      Nose: Nose normal.   Eyes:      General: No scleral icterus.        Right eye: No discharge.         Left eye: No discharge.   Neck:      Vascular: No carotid bruit.   Cardiovascular:      Rate and Rhythm: Normal rate and regular rhythm.      Pulses: Normal pulses.      Heart sounds: No murmur heard.     No gallop.   Pulmonary:      Effort: Pulmonary effort is normal. No respiratory distress.      Breath sounds: No wheezing or rales.   Musculoskeletal:      Cervical back: Neck supple.      Right lower leg: No edema.      Left lower leg: No edema.   Skin:     General: Skin is warm.      Capillary Refill: Capillary refill takes less than 2 seconds.   Neurological:      Mental Status: He is alert and oriented to person, place, and time. Mental status is at baseline.   Psychiatric:         Mood and Affect: Mood normal.         Behavior: Behavior normal.           Recent CV Testin24  EKG  NSR, LAD, IRBB, similar to prior  24  PCI  SHELL to DFR positive DONALD, DFR negative LCX lesion  24  NM MPI  Inf infarct, Positive TID 1.57    For historical clinical findings, see the Supplemental Documentation section.     CLOSING   Administrative Statements     Coordination of Care: During our visit, I spent 20 minutes with the patient, and greater than 55% of this time was spent on counseling and coordination of care, including addressing diagnostic results, prognosis, risks and benefits of treatment options, instructions for management, patient/family education, importance of treatment compliance, along with risk factor reductions. During today's visit, the patient was accompanied by his spouse, and there was no need for interpretive services.     Dictation Disclaimer: This note was completed in part utilizing direct voice recognition software. Grammatical errors, random word insertion, spelling mistakes, and incomplete sentences may be an occasional consequence of the system secondary to software limitations, ambient noise and hardware issues. At the time of dictation, efforts were made to edit, clarify and /or correct errors.  Please read the chart carefully and recognize, using context, where substitutions have occurred.  If you have any questions or concerns about the context, text or information contained within the body of this dictation, please contact me, the provider, for further clarification.     Sarah Noland, DO 24                    SUPPLEMENTAL DOCUMENTATION     Thoracic History   ======================  PRIOR VISIT INTERVALS  ======================  Date: 24, Consultation Visit  Date: 24, Follow Up Visit    =====================  PRIOR DIAGNOSTIC TESTS  =====================  IMAGING   I have personally reviewed pertinent reports.  XR knee 3 vw right non injury    Result Date: 2024  Narrative: RIGHT KNEE INDICATION:    Pain in right knee. Other chronic pain.   COMPARISON:  None. VIEWS:  XR KNEE 3 VW RIGHT NON INJURY FINDINGS: There is no acute fracture or dislocation. There is no joint effusion. No significant degenerative changes. No lytic or blastic osseous lesion. Soft tissues are unremarkable.     Impression: No acute osseous abnormality. Electronically signed: 08/05/2024 12:42 PM Kendall Kwong MD      EKG   Date: 06/21/24, sinus rhythm with sinus arrhythmia, irbbb  Date: 06/19/24, sinus rhythm with sinus arrhythmia, RSR' pattern in V1 suggestive of right ventricular conduction delay  Date: 06/17/24, normal sinus rhythm, prwp, irbbb  Date: 06/07/24, sinus rhythm with sinus arrhythmia, irbbb  Date: 01/15/24, normal sinus rhythm, irbbb  Date: 12/12/23, sinus rhythm with sinus arrhythmia, RSR' pattern in V1 suggestive of right ventricular conduction delay  Date: 11/30/23, sinus rhythm with sinus arrhythmia, irbbb  Date: 03/29/23, normal sinus rhythm, sinus rhythm with sinus arrhythmia, left axis deviation, irbbb  Date: 06/12/18, normal sinus rhythm, left axis deviation, possible inf infarct age undetermined    TELE   No results found for this or any recent visit.     HOLTER   No results found for this or any previous visit.  Extended Holter Study Date: 07/15/24  IMPRESSION:  Patient had a min HR of 56 bpm, max HR of 174 bpm, and avg HR of 82 bpm. Predominant underlying rhythm was Sinus Rhythm.  2 Supraventricular Tachycardia runs occurred, the run with the fastest interval lasting 11.7 secs with a max rate of 174 bpm (avg 155 bpm); the run with the fastest interval was also the longest.  Isolated SVEs were frequent (5.2%, 8814), SVE Couplets were rare (<1.0%, 12), and no SVE Triplets were present. Isolated VEs were rare (<1.0%), and no VE Couplets or VE Triplets were present. Ventricular Trigeminy was present.    DEVICE   No results found for this or any previous visit.    EP   No results found for this or any previous  visit.    CT   Results for orders placed during the hospital encounter of 09/29/23  CT chest without contrast  Narrative  FINDINGS:  LUNGS: Moderate centrilobular emphysema. Bandlike opacities in the lingula and right lower lobe. No tracheal or endobronchial lesion.  Few ill-defined opacities in the previously described area of the heterogeneous nodule.  PLEURA:  Unremarkable.  HEART/GREAT VESSELS: Mild to moderate coronary vascular calcifications. No thoracic aortic aneurysm.  MEDIASTINUM AND RUBI:  Unremarkable.  CHEST WALL AND LOWER NECK:  Unremarkable.  VISUALIZED STRUCTURES IN THE UPPER ABDOMEN: Left kidney is surgically absent.  OSSEOUS STRUCTURES:  No acute fracture or destructive osseous lesion.  Impression  1. The previously described nodule in the right upper lobe is not seen on today's exam however there are few ill-defined opacities in this region which may be due to scarring.  2. Moderate centrilobular emphysema.    CATH   Results for testing completed on the encounter of 06/07/24  Study: LHC / PCI  Interpreted Summary    LCX with two intermediate lesions that were DFR Negative on Spot Check and by Pullback    S/P Successful IVUS Guided PCI with SHELL x 1 to the DFR Positive 65% DONALD lesion with Post Dilation up to 16 park at 3.07 mm    RCA is a nondominant vessel with a conus larger than the RCA proper    Exceptionally tortuous innominate and very short root necessitating transition from RRA to RFA    1st Diag lesion is 65% stenosed.    Results for testing completed on the encounter of 06/07/24  Study: Left Heart Catheterization  Interpreted Summary  Case aborted on patient request for Anesthesia at a facility with surgery back up    STRESS   Results for orders placed during the hospital encounter of 04/05/24  NM myocardial perfusion spect (rx stress and/or rest)  Interpretation Summary    Resting ECG: The ECG shows normal sinus rhythm.    Stress ECG: A pharmacological stress test was performed using  dobutamine. The patient had a maximal HR of 150 bpm (98% of MPHR) . The patient achieved the target heart rate. The patient reported dyspnea and chest pain during the stress test. Symptoms began during stress and ended during recovery.    Stress ECG: Arrhythmias during stress: occasional PVCs. The ECG was negative for ischemia. The stress ECG is negative for ischemia after pharmacologic stress, with reproduction of symptoms.    Perfusion: There is a left ventricular perfusion defect that is small in size present in the inferior location(s) that is fixed.    Perfusion Defect Conclusion: The stress/rest perfusion ratio is 1.57. There is evidence of transient ischemic dilation (TID). TID was appreciated visually and quantitatively.    Stress Function: Left ventricular function post-stress is normal. Stress ejection fraction is 68%. There is a defect in the basal inferior location(s). The defect has mildly reduced function.  The stress ECG revealed no diagnostic evidence of ischemia.  Occasional PVCs were noted.  He had chest discomfort following dobutamine infusion.  There was a fixed inferior defect suggestive of infarct.  There was also evidence of transient ischemic dilation.  TID ratio was 1.57. Balanced ischemia could not be excluded.  Clinical correlation advised.    TTE   No results found for this or any previous visit.      AZAM   No results found for this or any previous visit.      CMR   No results found for this or any previous visit.      LABS     Lab Results   Component Value Date    K 4.3 09/18/2024    K 3.7 06/21/2024    K 3.9 06/19/2024     09/18/2024     06/21/2024     06/19/2024    CO2 25 09/18/2024    CO2 24 06/21/2024    CO2 24 06/19/2024    BUN 17 09/18/2024    BUN 9 06/21/2024    BUN 18 06/19/2024    CREATININE 0.98 09/18/2024    CREATININE 0.81 06/21/2024    CREATININE 0.92 06/19/2024    EGFR 79 09/18/2024    EGFR 91 06/21/2024    EGFR 85 06/19/2024    GLUC 81 09/18/2024    GLUC  107 06/21/2024    GLUC 108 06/19/2024    AST 25 06/21/2024    AST 21 06/19/2024    AST 16 05/18/2024    ALT 23 06/21/2024    ALT 21 06/19/2024    ALT 18 05/18/2024    TBILI 1.02 (H) 06/21/2024    TBILI 0.62 06/19/2024    TBILI 0.57 05/18/2024    ALB 3.7 06/21/2024    ALB 3.7 06/19/2024    ALB 4.0 05/18/2024    MG 2.2 11/30/2023    CALCIUM 9.2 09/18/2024    CALCIUM 9.0 06/21/2024    CALCIUM 8.7 06/19/2024      Lab Results   Component Value Date    WBC 9.49 09/18/2024    WBC 8.70 07/15/2024    WBC 9.96 06/21/2024    HGB 12.0 09/18/2024    HGB 11.9 (L) 07/15/2024    HGB 10.4 (L) 06/21/2024    HCT 39.1 09/18/2024    HCT 39.8 07/15/2024    HCT 33.1 (L) 06/21/2024     (H) 09/18/2024     07/15/2024     06/21/2024    INR 0.91 06/21/2024    INR 0.88 06/19/2024    INR 1.02 06/11/2024    IRON 112 07/15/2024    IRON 95 05/21/2024    FERRITIN 40 07/15/2024    FERRITIN 12 (L) 05/21/2024    TIBC 364 07/15/2024    TIBC 413 05/21/2024      Lab Results   Component Value Date    CHOLESTEROL 190 05/18/2024    TRIG 94 05/18/2024    HDL 64 05/18/2024    LDLCALC 107 (H) 05/18/2024     Lab Results   Component Value Date    HGBA1C 6.0 (H) 05/18/2024    HGBA1C 6.0 (H) 09/23/2023    GLUF 134 (H) 06/18/2024    GLUF 109 (H) 05/18/2024    GLUF 84 01/25/2024     Lab Results   Component Value Date    XBR8UPLASCRJ 1.275 05/18/2024    TEG2ZMRAEFXU 1.538 09/23/2023     Lab Results   Component Value Date    HSTNI0 19 06/21/2024    HSTNI0 23 06/19/2024    HSTNI0 4 01/15/2024    HSTNI2 16 06/21/2024    HSTNI2 25 06/19/2024    HSTNI2 2 01/15/2024    TROPONINI <0.02 07/14/2021    TROPONINI <0.02 07/12/2018     Lab Results   Component Value Date    BNP 86 06/21/2024    BNP 49 06/19/2024    BNP 17 11/30/2023

## 2024-12-17 NOTE — ASSESSMENT & PLAN NOTE
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

## 2024-12-18 ENCOUNTER — OFFICE VISIT (OUTPATIENT)
Dept: CARDIOLOGY CLINIC | Facility: MEDICAL CENTER | Age: 69
End: 2024-12-18
Payer: COMMERCIAL

## 2024-12-18 VITALS
HEART RATE: 96 BPM | BODY MASS INDEX: 31.82 KG/M2 | HEIGHT: 66 IN | WEIGHT: 198 LBS | OXYGEN SATURATION: 95 % | DIASTOLIC BLOOD PRESSURE: 70 MMHG | SYSTOLIC BLOOD PRESSURE: 116 MMHG

## 2024-12-18 DIAGNOSIS — E78.5 HYPERLIPIDEMIA, UNSPECIFIED HYPERLIPIDEMIA TYPE: ICD-10-CM

## 2024-12-18 DIAGNOSIS — Z95.5 S/P DRUG ELUTING CORONARY STENT PLACEMENT: ICD-10-CM

## 2024-12-18 DIAGNOSIS — J44.9 CHRONIC OBSTRUCTIVE PULMONARY DISEASE, UNSPECIFIED COPD TYPE (HCC): ICD-10-CM

## 2024-12-18 DIAGNOSIS — I25.118 CORONARY ARTERY DISEASE OF NATIVE ARTERY OF NATIVE HEART WITH STABLE ANGINA PECTORIS (HCC): ICD-10-CM

## 2024-12-18 DIAGNOSIS — G47.33 OSA (OBSTRUCTIVE SLEEP APNEA): ICD-10-CM

## 2024-12-18 DIAGNOSIS — Z01.810 PREOPERATIVE CARDIOVASCULAR EXAMINATION: Primary | ICD-10-CM

## 2024-12-18 DIAGNOSIS — K21.9 GASTROESOPHAGEAL REFLUX DISEASE, UNSPECIFIED WHETHER ESOPHAGITIS PRESENT: ICD-10-CM

## 2024-12-18 PROCEDURE — 99214 OFFICE O/P EST MOD 30 MIN: CPT | Performed by: INTERNAL MEDICINE

## 2025-01-08 DIAGNOSIS — J44.9 CHRONIC OBSTRUCTIVE PULMONARY DISEASE, UNSPECIFIED COPD TYPE (HCC): ICD-10-CM

## 2025-01-09 RX ORDER — IPRATROPIUM BROMIDE AND ALBUTEROL SULFATE 2.5; .5 MG/3ML; MG/3ML
SOLUTION RESPIRATORY (INHALATION)
Qty: 360 ML | Refills: 2 | Status: SHIPPED | OUTPATIENT
Start: 2025-01-09

## 2025-01-15 ENCOUNTER — OFFICE VISIT (OUTPATIENT)
Age: 70
End: 2025-01-15
Payer: COMMERCIAL

## 2025-01-15 VITALS
HEART RATE: 101 BPM | SYSTOLIC BLOOD PRESSURE: 118 MMHG | TEMPERATURE: 98.2 F | BODY MASS INDEX: 31.98 KG/M2 | OXYGEN SATURATION: 95 % | DIASTOLIC BLOOD PRESSURE: 73 MMHG | WEIGHT: 199 LBS | RESPIRATION RATE: 16 BRPM | HEIGHT: 66 IN

## 2025-01-15 DIAGNOSIS — J43.2 CENTRILOBULAR EMPHYSEMA (HCC): Primary | ICD-10-CM

## 2025-01-15 DIAGNOSIS — Z77.090 ASBESTOS EXPOSURE: ICD-10-CM

## 2025-01-15 DIAGNOSIS — Z87.891 FORMER SMOKER: ICD-10-CM

## 2025-01-15 DIAGNOSIS — J38.3 VOCAL FOLD DYSFUNCTION: ICD-10-CM

## 2025-01-15 DIAGNOSIS — G47.33 OSA (OBSTRUCTIVE SLEEP APNEA): ICD-10-CM

## 2025-01-15 PROCEDURE — 99214 OFFICE O/P EST MOD 30 MIN: CPT | Performed by: PHYSICIAN ASSISTANT

## 2025-01-15 NOTE — PROGRESS NOTES
Name: Danie Liao      : 1955      MRN: [id number]  Encounter Provider: Ruperto Curtis PA-C  Encounter Date: 1/15/2025   Encounter department: [facility] PULMONARY [facility]  :  Assessment & Plan  Centrilobular emphysema (HCC)  COPD is stable, patient uses his Trelegy daily, does use the nebulizer on a daily basis.  Feels his breathing some days is better than others, definitely cold weather worsens his breathing.  Most recent PFT in 2024 showed restriction, no significant air trapping or hyperinflation.  He has not had any exacerbations since his last visit with us, remains on Georgina 1 mg daily.  Could not tolerate azithromycin or Daliresp due to GI side effects.       Former smoker  Former smoker with 78-pack-year smoking history, quit in  overdue for CT lung screening which was due in 2024.  I did reorder the test for him today.    This would be his last CT lung screening as he would now be 15 years out from smoking.  As long as this is normal he will not need any further CT lung screening.  Orders:    CT lung screening program; Future    Asbestos exposure  Will reevaluate pleural plaques on his CT lung screening.  Orders:    CT lung screening program; Future    Vocal fold dysfunction  Vocal cord dysfunction with on and off stridor.  He has followed with ENT previously.       LEATHA (obstructive sleep apnea)  Patient with severe sleep apnea, unable to tolerate CPAP.  Has not wanted further intervention for this.       He will follow-up with us in about 6 months or sooner if necessary.    History of Present Illness   Danie Liao is a 69 y.o. male former smoker with past medical history of COPD, LEATHA, GERD, kidney donor, CAD, inguinal hernias, CAD who presents for routine follow-up.  He overall remained stable with his breathing.  Does note chronic dyspnea on exertion, some days worse than others.  He is using his Trelegy daily, does use his nebulizer regularly  at least once per day.    Review of Systems   Constitutional: Negative.    HENT: Negative.     Respiratory:  Positive for shortness of breath and wheezing.    Cardiovascular: Negative.    Gastrointestinal: Negative.    Genitourinary: Negative.    Musculoskeletal: Negative.    Skin: Negative.    Allergic/Immunologic: Negative.    Neurological: Negative.    Psychiatric/Behavioral: Negative.       Current Outpatient Medications on File Prior to Visit   Medication Sig Dispense Refill    albuterol (PROVENTIL HFA,VENTOLIN HFA) 90 mcg/act inhaler INHALE 1-2 PUFFS EVERY 4-6 HOURS AS NEEDED AND AS DIRECTED 36 g 5    aspirin 81 mg chewable tablet Chew 1 tablet (81 mg total) daily 90 tablet 1    atorvastatin (LIPITOR) 80 mg tablet Take 1 tablet (80 mg total) by mouth every evening 90 tablet 0    budesonide (PULMICORT) 0.5 mg/2 mL nebulizer solution TAKE 2 ML (0.5 MG TOTAL) BY NEBULIZATION TWICE A DAY RINSE MOUTH AFTER  mL 1    famotidine (PEPCID) 20 mg tablet Take 1 tablet (20 mg total) by mouth 2 (two) times a day 180 tablet 0    ipratropium-albuterol (DUO-NEB) 0.5-2.5 mg/3 mL nebulizer solution TAKE THREE ML BY NEBULIZATION 4 (FOUR) TIMES A DAY STRENGTH: 0.5-2.5 (3) MG/3ML 360 mL 2    methocarbamol (ROBAXIN) 500 mg tablet Take 1 tablet (500 mg total) by mouth daily at bedtime as needed for muscle spasms 20 tablet 0    naloxone (NARCAN) 4 mg/0.1 mL nasal spray Administer 1 spray into a nostril. If no response after 2-3 minutes, give another dose in the other nostril using a new spray. 1 each 1    nitroglycerin (NITROSTAT) 0.4 mg SL tablet Place 1 tablet (0.4 mg total) under the tongue every 5 (five) minutes as needed for chest pain 30 tablet 3    oxybutynin (DITROPAN-XL) 10 MG 24 hr tablet TAKE 1 TABLET BY MOUTH DAILY AT BEDTIME 90 tablet 5    Georgina 1 MG TBEC Take 1 tablet (1 mg total) by mouth in the morning 90 tablet 0    roflumilast (DALIRESP) 250 MCG tablet TAKE 1 TABLET BY MOUTH EVERY DAY (Patient not taking:  "Reported on 12/18/2024) 30 tablet 3    tamsulosin (FLOMAX) 0.4 mg Take 1 capsule (0.4 mg total) by mouth daily with dinner 90 capsule 1    Tapinarof (Vtama) 1 % CREA Apply 1 Application topically in the morning 60 g 0    Trelegy Ellipta 100-62.5-25 MCG/ACT inhaler Inhale 1 puff daily 180 blister 0    Ubrelvy 100 MG tablet Take 1 tablet (100 mg total) by mouth daily as needed (migraine) 30 tablet 5     No current facility-administered medications on file prior to visit.         Historical Information   Tobacco History: Quit in 2010  Occupational History: Asbestos exposure    Objective   /73 (BP Location: Right arm, Patient Position: Sitting, Cuff Size: Large)   Pulse 101   Temp 98.2 °F (36.8 °C) (Oral)   Resp 16   Ht 5' 6\" (1.676 m)   Wt 90.3 kg (199 lb)   SpO2 95%   BMI 32.12 kg/m²      Physical Exam  Vitals reviewed.   Constitutional:       General: He is not in acute distress.     Appearance: Normal appearance. He is well-developed. He is not ill-appearing.   HENT:      Head: Normocephalic and atraumatic.      Mouth/Throat:      Pharynx: Oropharynx is clear.   Eyes:      Pupils: Pupils are equal, round, and reactive to light.   Cardiovascular:      Rate and Rhythm: Normal rate and regular rhythm.   Pulmonary:      Effort: Pulmonary effort is normal. No respiratory distress.      Breath sounds: Normal breath sounds. Stridor and transmitted upper airway sounds present. No decreased breath sounds, wheezing, rhonchi or rales.   Abdominal:      General: Abdomen is flat. There is no distension.   Musculoskeletal:         General: Normal range of motion.      Cervical back: Normal range of motion.      Right lower leg: No edema.      Left lower leg: No edema.   Skin:     General: Skin is warm and dry.      Findings: No rash.   Neurological:      Mental Status: He is alert and oriented to person, place, and time.   Psychiatric:         Mood and Affect: Mood normal.         Behavior: Behavior normal. "           Lab Results: I have reviewed pertinent labs.    Radiology Results Review: I have reviewed radiology reports from September 2023 including: CT chest.  Other Study Results: Other Study Results Review : No additional pertinent studies reviewed.  PFT Results Reviewed: reviewed      Answers submitted by the patient for this visit:  Pulmonology Questionnaire (Submitted on 1/12/2025)  Chief Complaint: Primary symptoms  Do you have difficulty breathing?: Yes  Do you experience frequent throat clearing?: Yes  Do you have a wet cough?: Yes  Chronicity: recurrent  When did you first notice your symptoms?: more than 1 year ago  How often do your symptoms occur?: daily  Since you first noticed this problem, how has it changed?: unchanged

## 2025-01-15 NOTE — ASSESSMENT & PLAN NOTE
Patient with severe sleep apnea, unable to tolerate CPAP.  Has not wanted further intervention for this.

## 2025-01-15 NOTE — ASSESSMENT & PLAN NOTE
COPD is stable, patient uses his Trelegy daily, does use the nebulizer on a daily basis.  Feels his breathing some days is better than others, definitely cold weather worsens his breathing.  Most recent PFT in April 2024 showed restriction, no significant air trapping or hyperinflation.  He has not had any exacerbations since his last visit with us, remains on Georgina 1 mg daily.  Could not tolerate azithromycin or Daliresp due to GI side effects.

## 2025-01-29 DIAGNOSIS — G43.919 INTRACTABLE MIGRAINE WITHOUT STATUS MIGRAINOSUS, UNSPECIFIED MIGRAINE TYPE: ICD-10-CM

## 2025-01-29 DIAGNOSIS — J44.9 COPD (CHRONIC OBSTRUCTIVE PULMONARY DISEASE) (HCC): ICD-10-CM

## 2025-01-30 RX ORDER — UBROGEPANT 100 MG/1
100 TABLET ORAL DAILY PRN
Qty: 30 TABLET | Refills: 5 | Status: SHIPPED | OUTPATIENT
Start: 2025-01-30

## 2025-01-30 RX ORDER — FLUTICASONE FUROATE, UMECLIDINIUM BROMIDE AND VILANTEROL TRIFENATATE 100; 62.5; 25 UG/1; UG/1; UG/1
1 POWDER RESPIRATORY (INHALATION) DAILY
Qty: 180 BLISTER | Refills: 0 | Status: SHIPPED | OUTPATIENT
Start: 2025-01-30 | End: 2025-07-29

## 2025-01-31 ENCOUNTER — TELEPHONE (OUTPATIENT)
Dept: SURGERY | Facility: CLINIC | Age: 70
End: 2025-01-31

## 2025-01-31 NOTE — TELEPHONE ENCOUNTER
LM to patient's wife Anabell to make appointment for H&P on surgery gave office number to call back [phone].

## 2025-02-17 NOTE — PROGRESS NOTES
UROLOGY FOLLOW-UP ENCOUNTER    Danie Liao is a 69 y.o. 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 Had pseudoaneurysm.  Admitted to bladder spasms starting around this time.    CT abdomen pelvis with contrast 6/21/2024: 7 mm branching focus of enhancement arising from anterior aspect of right common femoral artery slightly increased in size and more conspicuous from previous study suspicious for tiny pseudoaneurysm; redemonstrated bilateral fat-containing inguinal hernias also containing small portion of lateral right bladder    He was last seen in July 2024 with bothersome bladder spasms.  Started on Flomax, oxybutynin at that time.    Urine dip negative on 8/12/2024    PVR 40 cc on 8/12/2024    Patient was initially scheduled for general surgery for hernia repair, but had to reschedule.  Has yet to be scheduled for the surgery.    U dip neg on 2/19/25    PVR 12 cc on 2/19/25    Office 2/19/25: stated that oxybutynin 10 mg was not working well, still having bothersome spasms    Assessment and plan:     Bladder spasms, inguinal hernia    Patient is following for bothersome bladder spasms.  His oxybutynin 5 mg extended release was increased to 10 mg at last visit.  This only mildly improved his symptoms.  I therefore went up to 15 mg today.  New prescription was sent to the pharmacy.  We again went over the side effects of the medication including but not limited to: Cognitive issues, urinary retention, dry mouth, dry eyes, constipation, etc.    Additionally, due to imaging demonstrating bilateral inguinal hernias with part of his bladder going into the hernia, I recommended that he get his hernias repaired with general  "surgery.  Referral was placed and he was scheduled for hernia repair, but he canceled the surgery because his bladder was bothering him.  I explained to the patient that the reason why his bladder could be bothering him is the fact that the bladder is going in and out of the hernia and he really needed to get this repaired before we could properly focus and treat his overactive bladder symptoms.  He verbalized understanding.    I will send another message to the general surgery team to be hopefully get him back on the schedule for his hernia repair.    I will additionally see him in 6 months for symptom check and we can adjust his medications at that time if need be.    Reviewed with the patient today that he is emptying his bladder well with PVR of 12 cc.  Reviewed with the patient that his urine dip was negative for infection today.    We will also have him continue his Flomax.      PLAN  -Cont Flomax  -Will go up on oxybutynin to 15 mg ER daily  -Referral back to surgery for hernia repair. Explained that until bladder is secured in proper position and hernia is fixed, we cannot properly focus on his OAB treatment.  -Will need to resume yearly PSA testing. Will discuss this more at the next visit.          Portions of the above record have been created with voice recognition software.  Occasional wrong word or \"sound alike\" substitution may have occurred due to the inherent limitations of voice recognition software.  Read the chart carefully and recognize, using context, where substitution may have occurred.      Dennis Stone DO        Chief Complaint     Bladder spasms    History of Present Illness     See summary above    No fevers or chills        The following portions of the patient's history were reviewed and updated as appropriate: allergies, current medications, past family history, past medical history, past social history, past surgical history and problem list.        AUA SYMPTOM SCORE  " "    Flowsheet Row Most Recent Value   AUA SYMPTOM SCORE    How often have you had a sensation of not emptying your bladder completely after you finished urinating? 5 (P)     How often have you had to urinate again less than two hours after you finished urinating? 4 (P)     How often have you found you stopped and started again several times when you urinate? 4 (P)     How often have you found it difficult to postpone urination? 5 (P)     How often have you had a weak urinary stream? 5 (P)     How often have you had to push or strain to begin urination? 5 (P)     How many times did you most typically get up to urinate from the time you went to bed at night until the time you got up in the morning? 4 (P)     Quality of Life: If you were to spend the rest of your life with your urinary condition just the way it is now, how would you feel about that? 6 (P)     AUA SYMPTOM SCORE 32 (P)              Review of Systems     Needed for fevers, chills, nausea, vomiting, shortness of breath, abdominal pain, painful urination, blood in urine    Allergies     No Known Allergies    Physical Exam     No acute distress, abdomen soft nontender, no CVA tenderness bilaterally, nonlabored breathing    Vital Signs  Vitals:    02/19/25 1114   BP: 122/70   BP Location: Left arm   Patient Position: Sitting   Cuff Size: Standard   Pulse: 87   Resp: 18   Temp: 97.5 °F (36.4 °C)   TempSrc: Tympanic   SpO2: 95%   Weight: 87.5 kg (193 lb)   Height: 5' 6\" (1.676 m)         Current Medications       Current Outpatient Medications:     albuterol (PROVENTIL HFA,VENTOLIN HFA) 90 mcg/act inhaler, INHALE 1-2 PUFFS EVERY 4-6 HOURS AS NEEDED AND AS DIRECTED, Disp: 36 g, Rfl: 5    aspirin 81 mg chewable tablet, Chew 1 tablet (81 mg total) daily, Disp: 90 tablet, Rfl: 1    atorvastatin (LIPITOR) 80 mg tablet, Take 1 tablet (80 mg total) by mouth every evening, Disp: 90 tablet, Rfl: 0    budesonide (PULMICORT) 0.5 mg/2 mL nebulizer solution, TAKE 2 ML (0.5 " MG TOTAL) BY NEBULIZATION TWICE A DAY RINSE MOUTH AFTER USE, Disp: 360 mL, Rfl: 1    famotidine (PEPCID) 20 mg tablet, Take 1 tablet (20 mg total) by mouth 2 (two) times a day, Disp: 180 tablet, Rfl: 0    ipratropium-albuterol (DUO-NEB) 0.5-2.5 mg/3 mL nebulizer solution, TAKE THREE ML BY NEBULIZATION 4 (FOUR) TIMES A DAY STRENGTH: 0.5-2.5 (3) MG/3ML, Disp: 360 mL, Rfl: 2    methocarbamol (ROBAXIN) 500 mg tablet, Take 1 tablet (500 mg total) by mouth daily at bedtime as needed for muscle spasms, Disp: 20 tablet, Rfl: 0    naloxone (NARCAN) 4 mg/0.1 mL nasal spray, Administer 1 spray into a nostril. If no response after 2-3 minutes, give another dose in the other nostril using a new spray., Disp: 1 each, Rfl: 1    nitroglycerin (NITROSTAT) 0.4 mg SL tablet, Place 1 tablet (0.4 mg total) under the tongue every 5 (five) minutes as needed for chest pain, Disp: 30 tablet, Rfl: 3    oxybutynin (DITROPAN XL) 15 MG 24 hr tablet, Take 1 tablet (15 mg total) by mouth daily at bedtime, Disp: 90 tablet, Rfl: 7    Georgina 1 MG TBEC, Take 1 tablet (1 mg total) by mouth in the morning, Disp: 90 tablet, Rfl: 0    roflumilast (DALIRESP) 250 MCG tablet, TAKE 1 TABLET BY MOUTH EVERY DAY, Disp: 30 tablet, Rfl: 3    tamsulosin (FLOMAX) 0.4 mg, Take 1 capsule (0.4 mg total) by mouth daily with dinner, Disp: 90 capsule, Rfl: 1    Tapinarof (Vtama) 1 % CREA, Apply 1 Application topically in the morning, Disp: 60 g, Rfl: 0    Trelegy Ellipta 100-62.5-25 MCG/ACT inhaler, Inhale 1 puff daily, Disp: 180 blister, Rfl: 0    Ubrelvy 100 MG tablet, Take 1 tablet (100 mg total) by mouth daily as needed (migraine), Disp: 30 tablet, Rfl: 5    Active Problems     Patient Active Problem List   Diagnosis    Chronic obstructive pulmonary disease (HCC)    Localized osteoporosis without current pathological fracture    Intractable migraine without status migrainosus    Kidney donor    Psoriasis    Polyp of colon    Morbid (severe) obesity due to excess  calories (HCC)    Prediabetes    LEATHA (obstructive sleep apnea)    Hyperlipidemia    GERD (gastroesophageal reflux disease)    Encysted hydrocele    Single kidney    Iron deficiency    Coronary artery disease of native heart with stable angina pectoris ([facility])    Preoperative cardiovascular examination    Bilateral recurrent inguinal hernia without obstruction or gangrene    Primary osteoarthritis of right knee    Stress fracture of right tibia    Stress fracture of right tibia with routine healing, subsequent encounter       Past Medical History     Past Medical History:   Diagnosis Date    Arthritis     Asthma     Chronic pain 01/30/2024    cervic and lumbar    Colon polyp     COPD (chronic obstructive pulmonary disease) ([facility])     Emphysema of lung (HCC)     GERD (gastroesophageal reflux disease) 01/30/2024    Headache(784.0)     Hyperlipidemia 01/30/2024    Migraine     Obesity     Sleep apnea        Surgical History     Past Surgical History:   Procedure Laterality Date    APPENDECTOMY      CARDIAC CATHETERIZATION  06/17/2024    Procedure: Cardiac catheterization;  Surgeon: Sarah Noland DO;  Location: [facility];  Service: Cardiology    CARDIAC CATHETERIZATION N/A 06/17/2024    Procedure: Cardiac Coronary Angiogram;  Surgeon: Sarah Noland DO;  Location: [facility];  Service: Cardiology    CARDIAC CATHETERIZATION N/A 06/17/2024    Procedure: Cardiac FFR/IFR;  Surgeon: Sarah Noland DO;  [facility]: [facility];  Service: Cardiology    CARDIAC CATHETERIZATION N/A 06/17/2024    Procedure: Cardiac pci;  Surgeon: Sarah Noland DO;  [facility]: [facility];  Service: Cardiology    CARDIAC CATHETERIZATION N/A 06/17/2024    Procedure: Cardiac IVUS;  Surgeon: Sarah Noland DO;  [facility]: [facility];  Service: Cardiology    COLONOSCOPY      COLONOSCOPY      HERNIA REPAIR      4 times    NEPHRECTOMY LIVING DONOR Left 10/2009    AL EXCISION HYDROCELE UNILATERAL  Left 01/30/2024    Procedure: HYDROCELECTOMY;  Surgeon: Evan Salmeron MD;  Location: [facility];  Service: Urology         Family History     Family History   Problem Relation Age of Onset    Breast cancer Mother     Heart disease Father     Heart disease Brother     Stroke Brother     Lung disease Brother     Breast cancer Maternal Grandmother     Heart disease Paternal Grandmother        Social History     Social History     Social History     Tobacco Use   Smoking Status Former    Current packs/day: 0.00    Average packs/day: 2.0 packs/day for 40.0 years (80.0 ttl pk-yrs)    Types: Cigarettes    Start date: 1/1/1970    Quit date: 1/1/2010    Years since quitting: 15.1    Passive exposure: Past   Smokeless Tobacco Never       Pertinent Lab Values     Lab Results   Component Value Date    CREATININE 0.98 09/18/2024       Lab Results   Component Value Date    PSA 1.50 09/23/2023         Pertinent Imaging     The patient's images were reviewed by me personally and also in real time with them in the examination room using our PACS imaging system.      CT abdomen pelvis with contrast 6/21/2024: 7 mm branching focus of enhancement arising from anterior aspect of right common femoral artery slightly increased in size and more conspicuous from previous study suspicious for tiny pseudoaneurysm; redemonstrated bilateral fat-containing inguinal hernias also containing small portion of lateral right bladder    Pertinent Pathology     N/A    I have spent a total time of 20 minutes in caring for this patient on the day of the visit/encounter including Diagnostic results, Prognosis, Risks and benefits of tx options, Instructions for management, Patient and family education, Importance of tx compliance, Impressions, Counseling / Coordination of care, Documenting in the medical record, Reviewing/placing orders in the medical record (including tests, medications, and/or procedures), and Obtaining or reviewing history  .

## 2025-02-19 ENCOUNTER — OFFICE VISIT (OUTPATIENT)
Dept: UROLOGY | Facility: CLINIC | Age: 70
End: 2025-02-19
Payer: COMMERCIAL

## 2025-02-19 VITALS
BODY MASS INDEX: 31.02 KG/M2 | TEMPERATURE: 97.5 F | WEIGHT: 193 LBS | HEART RATE: 87 BPM | HEIGHT: 66 IN | DIASTOLIC BLOOD PRESSURE: 70 MMHG | OXYGEN SATURATION: 95 % | RESPIRATION RATE: 18 BRPM | SYSTOLIC BLOOD PRESSURE: 122 MMHG

## 2025-02-19 DIAGNOSIS — N32.81 OAB (OVERACTIVE BLADDER): Primary | ICD-10-CM

## 2025-02-19 DIAGNOSIS — E66.01 MORBID (SEVERE) OBESITY DUE TO EXCESS CALORIES (HCC): ICD-10-CM

## 2025-02-19 DIAGNOSIS — K40.20 BILATERAL INGUINAL HERNIA WITHOUT OBSTRUCTION OR GANGRENE, RECURRENCE NOT SPECIFIED: ICD-10-CM

## 2025-02-19 LAB
POST-VOID RESIDUAL VOLUME, ML POC: 12 ML
SL AMB  POCT GLUCOSE, UA: NORMAL
SL AMB LEUKOCYTE ESTERASE,UA: NORMAL
SL AMB POCT BILIRUBIN,UA: NORMAL
SL AMB POCT BLOOD,UA: NORMAL
SL AMB POCT CLARITY,UA: YELLOW
SL AMB POCT COLOR,UA: NORMAL
SL AMB POCT KETONES,UA: NORMAL
SL AMB POCT NITRITE,UA: NORMAL
SL AMB POCT PH,UA: 5
SL AMB POCT SPECIFIC GRAVITY,UA: 1.01
SL AMB POCT URINE PROTEIN: NORMAL
SL AMB POCT UROBILINOGEN: 0.2

## 2025-02-19 PROCEDURE — 99213 OFFICE O/P EST LOW 20 MIN: CPT | Performed by: UROLOGY

## 2025-02-19 PROCEDURE — 51798 US URINE CAPACITY MEASURE: CPT | Performed by: UROLOGY

## 2025-02-19 PROCEDURE — 81002 URINALYSIS NONAUTO W/O SCOPE: CPT | Performed by: UROLOGY

## 2025-02-19 RX ORDER — OXYBUTYNIN CHLORIDE 15 MG/1
15 TABLET, EXTENDED RELEASE ORAL
Qty: 90 TABLET | Refills: 7 | Status: SHIPPED | OUTPATIENT
Start: 2025-02-19

## 2025-02-19 NOTE — Clinical Note
Elio downing This shannon was on the books for hernia repair then I think he cancelled He has been having bad bladder spasms and wanted to discuss it with me before the hernia surgery but I reminded him that his bladder is going in and out of the hernia so it is my continued recommendation that he get the hernia repair before we focus more on the bladder symptoms. Can your team get in touch with him again about rescheduling surgery? -Dennis

## 2025-02-22 DIAGNOSIS — L40.9 PSORIASIS: ICD-10-CM

## 2025-02-22 DIAGNOSIS — J44.1 COPD EXACERBATION (HCC): ICD-10-CM

## 2025-02-22 DIAGNOSIS — I25.118 CORONARY ARTERY DISEASE OF NATIVE ARTERY OF NATIVE HEART WITH STABLE ANGINA PECTORIS (HCC): ICD-10-CM

## 2025-02-22 DIAGNOSIS — G43.919 INTRACTABLE MIGRAINE WITHOUT STATUS MIGRAINOSUS, UNSPECIFIED MIGRAINE TYPE: ICD-10-CM

## 2025-02-22 DIAGNOSIS — J44.9 COPD (CHRONIC OBSTRUCTIVE PULMONARY DISEASE) (HCC): ICD-10-CM

## 2025-02-24 RX ORDER — TAPINAROF 10 MG/1000MG
1 CREAM TOPICAL DAILY
Qty: 60 G | Refills: 0 | Status: SHIPPED | OUTPATIENT
Start: 2025-02-24

## 2025-02-24 RX ORDER — PREDNISONE 1 MG/1
1 TABLET, DELAYED RELEASE ORAL DAILY
Qty: 90 TABLET | Refills: 2 | Status: SHIPPED | OUTPATIENT
Start: 2025-02-24 | End: 2025-08-23

## 2025-02-24 RX ORDER — ALBUTEROL SULFATE 90 UG/1
INHALANT RESPIRATORY (INHALATION)
Qty: 36 G | Refills: 5 | Status: SHIPPED | OUTPATIENT
Start: 2025-02-24

## 2025-02-24 RX ORDER — UBROGEPANT 100 MG/1
100 TABLET ORAL DAILY PRN
Qty: 30 TABLET | Refills: 0 | OUTPATIENT
Start: 2025-02-24

## 2025-02-24 RX ORDER — ASPIRIN 81 MG/1
81 TABLET, CHEWABLE ORAL DAILY
Qty: 90 TABLET | Refills: 0 | Status: SHIPPED | OUTPATIENT
Start: 2025-02-24

## 2025-02-26 ENCOUNTER — OFFICE VISIT (OUTPATIENT)
Dept: FAMILY MEDICINE CLINIC | Facility: CLINIC | Age: 70
End: 2025-02-26
Payer: COMMERCIAL

## 2025-02-26 VITALS
SYSTOLIC BLOOD PRESSURE: 122 MMHG | BODY MASS INDEX: 31.02 KG/M2 | HEIGHT: 66 IN | OXYGEN SATURATION: 96 % | HEART RATE: 92 BPM | RESPIRATION RATE: 18 BRPM | WEIGHT: 193 LBS | DIASTOLIC BLOOD PRESSURE: 76 MMHG

## 2025-02-26 DIAGNOSIS — J44.9 CHRONIC OBSTRUCTIVE PULMONARY DISEASE, UNSPECIFIED COPD TYPE (HCC): ICD-10-CM

## 2025-02-26 DIAGNOSIS — M24.412 RECURRENT SHOULDER DISLOCATION, LEFT: ICD-10-CM

## 2025-02-26 DIAGNOSIS — K21.9 LARYNGOPHARYNGEAL REFLUX (LPR): ICD-10-CM

## 2025-02-26 DIAGNOSIS — G43.919 INTRACTABLE MIGRAINE WITHOUT STATUS MIGRAINOSUS, UNSPECIFIED MIGRAINE TYPE: Primary | ICD-10-CM

## 2025-02-26 DIAGNOSIS — E78.5 HYPERLIPIDEMIA, UNSPECIFIED HYPERLIPIDEMIA TYPE: ICD-10-CM

## 2025-02-26 DIAGNOSIS — R29.898 XIPHOID PROMINENCE: ICD-10-CM

## 2025-02-26 DIAGNOSIS — K40.91 RECURRENT INGUINAL HERNIA WITHOUT OBSTRUCTION OR GANGRENE, UNSPECIFIED LATERALITY: ICD-10-CM

## 2025-02-26 PROCEDURE — G2211 COMPLEX E/M VISIT ADD ON: HCPCS | Performed by: STUDENT IN AN ORGANIZED HEALTH CARE EDUCATION/TRAINING PROGRAM

## 2025-02-26 PROCEDURE — 99214 OFFICE O/P EST MOD 30 MIN: CPT | Performed by: STUDENT IN AN ORGANIZED HEALTH CARE EDUCATION/TRAINING PROGRAM

## 2025-02-26 RX ORDER — FAMOTIDINE 20 MG/1
20 TABLET, FILM COATED ORAL 2 TIMES DAILY
Qty: 180 TABLET | Refills: 1 | Status: SHIPPED | OUTPATIENT
Start: 2025-02-26

## 2025-02-26 NOTE — PROGRESS NOTES
Name: Danie Liao      : 1955      MRN: [id number]  Encounter Provider: Gosia Mcgill MD  Encounter Date: 2025   Encounter department: [facility] [address]  :  Assessment & Plan  Laryngopharyngeal reflux (LPR)  Refill Pepcid 20 twice daily  Orders:  •  famotidine (PEPCID) 20 mg tablet; Take 1 tablet (20 mg total) by mouth 2 (two) times a day    Intractable migraine without status migrainosus, unspecified migraine type         Recurrent shoulder dislocation, left  Recommend seeing Ortho  Orders:  •  Ambulatory Referral to Orthopedic Surgery; Future    Xiphoid prominence    Orders:  •  US abdomen complete; Future    Recurrent inguinal hernia without obstruction or gangrene, unspecified laterality    Orders:  •  Ambulatory Referral to General Surgery; Future    Chronic obstructive pulmonary disease, unspecified COPD type (HCC)         Hyperlipidemia, unspecified hyperlipidemia type           Revieed urology note: XR oxybutynin 15mg       History of Present Illness   HPI    Hands are getting worse. Not diagnosed with rheumatoid or psoriatic. Was a  for his occupation     Mned refills    Left shoulder pops out, can occur when taking off a shirt. Believes he has dislocated the left shoulder when he was 20 years old.    Base of xyphiphoid process has grown and gotten slightly more to the R    Review of Systems   Constitutional:  Negative for chills, fatigue and fever.   HENT:  Negative for rhinorrhea and sore throat.    Eyes:  Negative for visual disturbance.   Respiratory:  Negative for cough and shortness of breath.    Cardiovascular:  Negative for chest pain and palpitations.   Gastrointestinal:  Negative for abdominal pain, constipation, diarrhea, nausea and vomiting.   Genitourinary:  Negative for difficulty urinating, dysuria and frequency.   Musculoskeletal:  Negative for arthralgias and myalgias.   Skin:  Negative for color change and rash.  "  Neurological:  Negative for weakness and headaches.       Objective   /76 (BP Location: Left arm, Patient Position: Sitting, Cuff Size: Large)   Pulse 92   Resp 18   Ht 5' 6\" (1.676 m)   Wt 87.5 kg (193 lb)   SpO2 96%   BMI 31.15 kg/m²      Physical Exam  Constitutional:       General: He is not in acute distress.     Appearance: Normal appearance. He is not ill-appearing.   HENT:      Head: Normocephalic and atraumatic.      Right Ear: Tympanic membrane, ear canal and external ear normal.      Left Ear: Tympanic membrane, ear canal and external ear normal.      Nose: Nose normal.      Mouth/Throat:      Mouth: Mucous membranes are moist.      Pharynx: Oropharynx is clear. No oropharyngeal exudate or posterior oropharyngeal erythema.   Eyes:      General: No scleral icterus.        Right eye: No discharge.         Left eye: No discharge.      Extraocular Movements: Extraocular movements intact.      Conjunctiva/sclera: Conjunctivae normal.      Pupils: Pupils are equal, round, and reactive to light.   Cardiovascular:      Rate and Rhythm: Normal rate and regular rhythm.      Pulses: Normal pulses.      Heart sounds: Normal heart sounds. No murmur heard.  Pulmonary:      Effort: Pulmonary effort is normal. No respiratory distress.      Breath sounds: Normal breath sounds.   Chest:      Comments: Xiphoid process mildly thickened with more appreciated on the right side of the midline  Abdominal:      General: Bowel sounds are normal.      Palpations: Abdomen is soft.      Tenderness: There is no abdominal tenderness.   Musculoskeletal:         General: Normal range of motion.      Cervical back: Normal range of motion and neck supple.   Lymphadenopathy:      Cervical: No cervical adenopathy.   Skin:     General: Skin is warm and dry.      Capillary Refill: Capillary refill takes less than 2 seconds.   Neurological:      General: No focal deficit present.      Mental Status: He is alert and oriented to " person, place, and time. Mental status is at baseline.      Cranial Nerves: No cranial nerve deficit.   Psychiatric:         Mood and Affect: Mood normal.

## 2025-03-04 DIAGNOSIS — J44.9 COPD (CHRONIC OBSTRUCTIVE PULMONARY DISEASE) (HCC): ICD-10-CM

## 2025-03-04 RX ORDER — FLUTICASONE FUROATE, UMECLIDINIUM BROMIDE AND VILANTEROL TRIFENATATE 100; 62.5; 25 UG/1; UG/1; UG/1
1 POWDER RESPIRATORY (INHALATION) DAILY
Qty: 180 BLISTER | Refills: 0 | Status: SHIPPED | OUTPATIENT
Start: 2025-03-04 | End: 2025-08-31

## 2025-03-07 ENCOUNTER — APPOINTMENT (OUTPATIENT)
Dept: RADIOLOGY | Facility: CLINIC | Age: 70
End: 2025-03-07
Payer: COMMERCIAL

## 2025-03-07 ENCOUNTER — OFFICE VISIT (OUTPATIENT)
Dept: OBGYN CLINIC | Facility: CLINIC | Age: 70
End: 2025-03-07
Payer: COMMERCIAL

## 2025-03-07 ENCOUNTER — TELEPHONE (OUTPATIENT)
Age: 70
End: 2025-03-07

## 2025-03-07 VITALS — HEIGHT: 66 IN | BODY MASS INDEX: 31.02 KG/M2 | WEIGHT: 193 LBS

## 2025-03-07 DIAGNOSIS — M24.412 RECURRENT SHOULDER DISLOCATION, LEFT: ICD-10-CM

## 2025-03-07 DIAGNOSIS — M19.012 ARTHRITIS OF LEFT GLENOHUMERAL JOINT: Primary | ICD-10-CM

## 2025-03-07 DIAGNOSIS — M54.12 RADICULOPATHY, CERVICAL REGION: ICD-10-CM

## 2025-03-07 DIAGNOSIS — M62.838 TRAPEZIUS MUSCLE SPASM: ICD-10-CM

## 2025-03-07 DIAGNOSIS — M75.42 SUBACROMIAL IMPINGEMENT OF LEFT SHOULDER: ICD-10-CM

## 2025-03-07 DIAGNOSIS — M47.812 FACET ARTHROPATHY, CERVICAL: ICD-10-CM

## 2025-03-07 DIAGNOSIS — M25.312 SHOULDER INSTABILITY, LEFT: ICD-10-CM

## 2025-03-07 DIAGNOSIS — M50.30 DEGENERATIVE DISC DISEASE, CERVICAL: ICD-10-CM

## 2025-03-07 PROCEDURE — 20610 DRAIN/INJ JOINT/BURSA W/O US: CPT | Performed by: FAMILY MEDICINE

## 2025-03-07 PROCEDURE — 72040 X-RAY EXAM NECK SPINE 2-3 VW: CPT

## 2025-03-07 PROCEDURE — 99214 OFFICE O/P EST MOD 30 MIN: CPT | Performed by: FAMILY MEDICINE

## 2025-03-07 PROCEDURE — 73030 X-RAY EXAM OF SHOULDER: CPT

## 2025-03-07 RX ORDER — BUPIVACAINE HYDROCHLORIDE 2.5 MG/ML
4 INJECTION, SOLUTION INFILTRATION; PERINEURAL
Status: COMPLETED | OUTPATIENT
Start: 2025-03-07 | End: 2025-03-07

## 2025-03-07 RX ORDER — TRIAMCINOLONE ACETONIDE 40 MG/ML
80 INJECTION, SUSPENSION INTRA-ARTICULAR; INTRAMUSCULAR
Status: COMPLETED | OUTPATIENT
Start: 2025-03-07 | End: 2025-03-07

## 2025-03-07 RX ORDER — BUPIVACAINE HYDROCHLORIDE 2.5 MG/ML
2 INJECTION, SOLUTION INFILTRATION; PERINEURAL
Status: COMPLETED | OUTPATIENT
Start: 2025-03-07 | End: 2025-03-07

## 2025-03-07 RX ADMIN — BUPIVACAINE HYDROCHLORIDE 2 ML: 2.5 INJECTION, SOLUTION INFILTRATION; PERINEURAL at 08:30

## 2025-03-07 RX ADMIN — BUPIVACAINE HYDROCHLORIDE 4 ML: 2.5 INJECTION, SOLUTION INFILTRATION; PERINEURAL at 08:30

## 2025-03-07 RX ADMIN — TRIAMCINOLONE ACETONIDE 80 MG: 40 INJECTION, SUSPENSION INTRA-ARTICULAR; INTRAMUSCULAR at 08:30

## 2025-03-07 NOTE — LETTER
2025     Gosia Mcgill MD  [address]  [facility] [address]    Patient: Danie Liao   YOB: 1955   Date of Visit: 3/7/2025       Dear Dr. Mcgill:    Thank you for referring Danie Liao to me for evaluation. Below are my notes for this consultation.    If you have questions, please do not hesitate to call me. I look forward to following your patient along with you.         Sincerely,        Vincenzo Barry DO        CC: No Recipients    Vincenzo Barry DO  3/7/2025 12:51 PM  Sign when Signing Visit  Name: Danie Liao      : 1955      MRN: [id number]  Encounter Provider: Vincenzo Barry DO  Encounter Date: 3/7/2025   Encounter department: [facility] [place]  :  Assessment & Plan  Arthritis of left glenohumeral joint  69-year-old right-hand-dominant male with left shoulder pain and instability many years duration.  X-rays left shoulder noted for glenohumeral joint degenerative changes.  Clinical impression is that he has symptoms from degenerative changes.  I discussed treatment regimen of steroid injection, supplements, and formal therapy.  Surgery not warranted.  I administered mixture of 3 cc 0.25% bupivacaine and 2 cc Kenalog to the left glenohumeral joint without complication.  I administered mixture of 3 cc 0.25% bupivacaine and 1 cc Kenalog to left subacromial space without complication  He is to start taking turmeric, tart cherry, and glucosamine supplements.  He is to start formal therapy as soon as possible and do home exercises as directed.  He will follow-up as needed.  Orders:  •  Ambulatory Referral to Orthopedic Surgery  •  XR shoulder 2+ vw left; Future  •  Large joint arthrocentesis: L glenohumeral  •  Ambulatory Referral to Physical Therapy; Future    Radiculopathy, cervical region  69-year-old right-handed male with left upper extremity pain.  X-ray cervical  "spine noted for multilevel degenerative change with disc space narrowing C4-C5 and C5-C6.  Clinical impression is cervical spine pathology contributing to radicular symptoms in his left upper extremity.  I will refer him to physical therapy which he is to start as soon as possible and do home exercises as directed.  He will follow-up as needed.  Orders:  •  XR spine cervical 2 or 3 vw injury; Future  •  Ambulatory Referral to Physical Therapy; Future    Degenerative disc disease, cervical    Orders:  •  Ambulatory Referral to Physical Therapy; Future    Facet arthropathy, cervical    Orders:  •  Ambulatory Referral to Physical Therapy; Future    Trapezius muscle spasm    Orders:  •  Ambulatory Referral to Physical Therapy; Future    Subacromial impingement of left shoulder    Orders:  •  Ambulatory Referral to Physical Therapy; Future    Shoulder instability, left    Orders:  •  Ambulatory Referral to Physical Therapy; Future      Large joint arthrocentesis: L glenohumeral  Universal Protocol:  procedure performed by consultantConsent: Verbal consent obtained.  Risks and benefits: risks, benefits and alternatives were discussed  Consent given by: patient  Time out: Immediately prior to procedure a \"time out\" was called to verify the correct patient, procedure, equipment, support staff and site/side marked as required.  Patient understanding: patient states understanding of the procedure being performed  Patient consent: the patient's understanding of the procedure matches consent given  Procedure consent: procedure consent matches procedure scheduled  Relevant documents: relevant documents present and verified  Test results: test results available and properly labeled  Site marked: the operative site was marked  Radiology Images displayed and confirmed. If images not available, report reviewed: imaging studies available  Required items: required blood products, implants, devices, and special equipment " "available  Patient identity confirmed: verbally with patient  Supporting Documentation  Indications: pain   Procedure Details  Location: shoulder - L glenohumeral  Preparation: Patient was prepped and draped in the usual sterile fashion  Needle gauge: 21G 2\"  Ultrasound guidance: no  Approach: posterolateral  Medications administered: 4 mL bupivacaine 0.25 %; 2 mL bupivacaine 0.25 %; 80 mg triamcinolone acetonide 40 mg/mL    Patient tolerance: patient tolerated the procedure well with no immediate complications  Dressing:  Sterile dressing applied          History of Present Illness  HPI  Danie Liao is a 69 y.o. male right-hand-dominant who presents for evaluation left shoulder pain many years duration.  He reports having sustained an injury many years ago (30+) in which his shoulder popped out.  He states the shoulder spontaneously reduced, however has since then has had issues of the shoulder.  He reports experiencing pain described as generalized to the shoulder but usually worse to the anterior and lateral aspects, radiating distally to the upper arm, worse with moving the arm, associated with clicking and popping, and improved with resting.  He reports sometimes experiencing numbness/tingling in the left upper extremity.  Symptoms persisted and became more frequent and he was seen by primary care physician and he was referred to orthopedic care.    History obtained from: patient    Review of Systems   Musculoskeletal:  Positive for arthralgias and neck pain.   Neurological:  Positive for numbness. Negative for weakness.          Objective  Ht 5' 6\" (1.676 m)   Wt 87.5 kg (193 lb)   BMI 31.15 kg/m²      Physical Exam  Vitals and nursing note reviewed.   Constitutional:       Appearance: Normal appearance. He is well-developed. He is not ill-appearing or diaphoretic.   HENT:      Head: Normocephalic and atraumatic.      Right Ear: External ear normal.      Left Ear: External ear normal.   Eyes:      " General:         Right eye: No discharge.         Left eye: No discharge.   Pulmonary:      Effort: Pulmonary effort is normal. No respiratory distress.   Abdominal:      General: There is no distension.   Skin:     General: Skin is warm and dry.      Coloration: Skin is not jaundiced or pale.   Neurological:      Mental Status: He is alert and oriented to person, place, and time.   Psychiatric:         Mood and Affect: Mood normal.         Behavior: Behavior normal.         Thought Content: Thought content normal.         Judgment: Judgment normal.         Back Exam     Comments:      Cervical spine  - Limited range of motion with extension, sidebending to both sides, rotating to left  - Positive axial load  - Negative Spurling's  - Normal strength and sensation both upper extremities      Right Hand Exam     Muscle Strength   The patient has normal right wrist strength.    Other   Sensation: normal  Pulse: present      Left Hand Exam     Muscle Strength   The patient has normal left wrist strength.    Other   Sensation: normal  Pulse: present      Right Elbow Exam     Other   Sensation: normal    Comments:  5/5 flexion and extension      Left Elbow Exam     Other   Sensation: normal    Comments:  5/5 flexion and extension      Right Shoulder Exam     Muscle Strength   Abduction: 5/5     Other   Sensation: normal      Left Shoulder Exam     Range of Motion   Active abduction:  120   Forward flexion:  140   Internal rotation 0 degrees:  Lumbar     Muscle Strength   Abduction: 5/5   Internal rotation: 5/5   External rotation: 5/5   Supraspinatus: 5/5     Tests   Charles test: positive    Other   Sensation: normal     Comments:  Negative empty can           I have personally reviewed pertinent films in PACS and my interpretation is  .   X-ray cervical spine noted for multilevel degenerative change with disc space narrowing C4-C5 and C5-C6.  X-rays left shoulder noted for glenohumeral joint degenerative  changes.    Administrative Statements  I have spent a total time of 40 minutes in caring for this patient on the day of the visit/encounter including Diagnostic results, Prognosis, Risks and benefits of tx options, Instructions for management, Patient and family education, Impressions, Counseling / Coordination of care, Documenting in the medical record, Reviewing/placing orders in the medical record (including tests, medications, and/or procedures), Obtaining or reviewing history  , and performing procedure .

## 2025-03-07 NOTE — TELEPHONE ENCOUNTER
Caller: Patient    Doctor: Dr. Barry / Jorge Luis    Reason for call: Patient was seen today and states he was told the Dr would be sending out a few medications for him; patient went to pharmacy and they did not have anything; please advise patient with CB.     Call back#: [phone]

## 2025-03-07 NOTE — PROGRESS NOTES
Name: Danie Liao      : 1955      MRN: [id number]  Encounter Provider: Vincenzo Barry DO  Encounter Date: 3/7/2025   Encounter department: [facility] [place]  :  Assessment & Plan  Arthritis of left glenohumeral joint  69-year-old right-hand-dominant male with left shoulder pain and instability many years duration.  X-rays left shoulder noted for glenohumeral joint degenerative changes.  Clinical impression is that he has symptoms from degenerative changes.  I discussed treatment regimen of steroid injection, supplements, and formal therapy.  Surgery not warranted.  I administered mixture of 3 cc 0.25% bupivacaine and 2 cc Kenalog to the left glenohumeral joint without complication.  I administered mixture of 3 cc 0.25% bupivacaine and 1 cc Kenalog to left subacromial space without complication  He is to start taking turmeric, tart cherry, and glucosamine supplements.  He is to start formal therapy as soon as possible and do home exercises as directed.  He will follow-up as needed.  Orders:    Ambulatory Referral to Orthopedic Surgery    XR shoulder 2+ vw left; Future    Large joint arthrocentesis: L glenohumeral    Ambulatory Referral to Physical Therapy; Future    Radiculopathy, cervical region  69-year-old right-handed male with left upper extremity pain.  X-ray cervical spine noted for multilevel degenerative change with disc space narrowing C4-C5 and C5-C6.  Clinical impression is cervical spine pathology contributing to radicular symptoms in his left upper extremity.  I will refer him to physical therapy which he is to start as soon as possible and do home exercises as directed.  He will follow-up as needed.  Orders:    XR spine cervical 2 or 3 vw injury; Future    Ambulatory Referral to Physical Therapy; Future    Degenerative disc disease, cervical    Orders:    Ambulatory Referral to Physical Therapy; Future    Facet arthropathy,  "cervical    Orders:    Ambulatory Referral to Physical Therapy; Future    Trapezius muscle spasm    Orders:    Ambulatory Referral to Physical Therapy; Future    Subacromial impingement of left shoulder    Orders:    Ambulatory Referral to Physical Therapy; Future    Shoulder instability, left    Orders:    Ambulatory Referral to Physical Therapy; Future      Large joint arthrocentesis: L glenohumeral  Universal Protocol:  procedure performed by consultantConsent: Verbal consent obtained.  Risks and benefits: risks, benefits and alternatives were discussed  Consent given by: patient  Time out: Immediately prior to procedure a \"time out\" was called to verify the correct patient, procedure, equipment, support staff and site/side marked as required.  Patient understanding: patient states understanding of the procedure being performed  Patient consent: the patient's understanding of the procedure matches consent given  Procedure consent: procedure consent matches procedure scheduled  Relevant documents: relevant documents present and verified  Test results: test results available and properly labeled  Site marked: the operative site was marked  Radiology Images displayed and confirmed. If images not available, report reviewed: imaging studies available  Required items: required blood products, implants, devices, and special equipment available  Patient identity confirmed: verbally with patient  Supporting Documentation  Indications: pain   Procedure Details  Location: shoulder - L glenohumeral  Preparation: Patient was prepped and draped in the usual sterile fashion  Needle gauge: 21G 2\"  Ultrasound guidance: no  Approach: posterolateral  Medications administered: 4 mL bupivacaine 0.25 %; 2 mL bupivacaine 0.25 %; 80 mg triamcinolone acetonide 40 mg/mL    Patient tolerance: patient tolerated the procedure well with no immediate complications  Dressing:  Sterile dressing applied          History of Present Illness " "  HPI  Danie Liao is a 69 y.o. male right-hand-dominant who presents for evaluation left shoulder pain many years duration.  He reports having sustained an injury many years ago (30+) in which his shoulder popped out.  He states the shoulder spontaneously reduced, however has since then has had issues of the shoulder.  He reports experiencing pain described as generalized to the shoulder but usually worse to the anterior and lateral aspects, radiating distally to the upper arm, worse with moving the arm, associated with clicking and popping, and improved with resting.  He reports sometimes experiencing numbness/tingling in the left upper extremity.  Symptoms persisted and became more frequent and he was seen by primary care physician and he was referred to orthopedic care.    History obtained from: patient    Review of Systems   Musculoskeletal:  Positive for arthralgias and neck pain.   Neurological:  Positive for numbness. Negative for weakness.          Objective   Ht 5' 6\" (1.676 m)   Wt 87.5 kg (193 lb)   BMI 31.15 kg/m²      Physical Exam  Vitals and nursing note reviewed.   Constitutional:       Appearance: Normal appearance. He is well-developed. He is not ill-appearing or diaphoretic.   HENT:      Head: Normocephalic and atraumatic.      Right Ear: External ear normal.      Left Ear: External ear normal.   Eyes:      General:         Right eye: No discharge.         Left eye: No discharge.   Pulmonary:      Effort: Pulmonary effort is normal. No respiratory distress.   Abdominal:      General: There is no distension.   Skin:     General: Skin is warm and dry.      Coloration: Skin is not jaundiced or pale.   Neurological:      Mental Status: He is alert and oriented to person, place, and time.   Psychiatric:         Mood and Affect: Mood normal.         Behavior: Behavior normal.         Thought Content: Thought content normal.         Judgment: Judgment normal.         Back Exam     Comments:  "     Cervical spine  - Limited range of motion with extension, sidebending to both sides, rotating to left  - Positive axial load  - Negative Spurling's  - Normal strength and sensation both upper extremities      Right Hand Exam     Muscle Strength   The patient has normal right wrist strength.    Other   Sensation: normal  Pulse: present      Left Hand Exam     Muscle Strength   The patient has normal left wrist strength.    Other   Sensation: normal  Pulse: present      Right Elbow Exam     Other   Sensation: normal    Comments:  5/5 flexion and extension      Left Elbow Exam     Other   Sensation: normal    Comments:  5/5 flexion and extension      Right Shoulder Exam     Muscle Strength   Abduction: 5/5     Other   Sensation: normal      Left Shoulder Exam     Range of Motion   Active abduction:  120   Forward flexion:  140   Internal rotation 0 degrees:  Lumbar     Muscle Strength   Abduction: 5/5   Internal rotation: 5/5   External rotation: 5/5   Supraspinatus: 5/5     Tests   Charles test: positive    Other   Sensation: normal     Comments:  Negative empty can           I have personally reviewed pertinent films in PACS and my interpretation is  .   X-ray cervical spine noted for multilevel degenerative change with disc space narrowing C4-C5 and C5-C6.  X-rays left shoulder noted for glenohumeral joint degenerative changes.    Administrative Statements   I have spent a total time of 40 minutes in caring for this patient on the day of the visit/encounter including Diagnostic results, Prognosis, Risks and benefits of tx options, Instructions for management, Patient and family education, Impressions, Counseling / Coordination of care, Documenting in the medical record, Reviewing/placing orders in the medical record (including tests, medications, and/or procedures), Obtaining or reviewing history  , and performing procedure .

## 2025-03-17 ENCOUNTER — OFFICE VISIT (OUTPATIENT)
Dept: SURGERY | Facility: CLINIC | Age: 70
End: 2025-03-17
Payer: COMMERCIAL

## 2025-03-17 VITALS
BODY MASS INDEX: 32.21 KG/M2 | SYSTOLIC BLOOD PRESSURE: 126 MMHG | TEMPERATURE: 97.9 F | HEART RATE: 65 BPM | RESPIRATION RATE: 18 BRPM | DIASTOLIC BLOOD PRESSURE: 70 MMHG | OXYGEN SATURATION: 96 % | WEIGHT: 200.4 LBS | HEIGHT: 66 IN

## 2025-03-17 DIAGNOSIS — K40.91 RECURRENT INGUINAL HERNIA WITHOUT OBSTRUCTION OR GANGRENE, UNSPECIFIED LATERALITY: ICD-10-CM

## 2025-03-17 DIAGNOSIS — K40.21 BILATERAL RECURRENT INGUINAL HERNIA WITHOUT OBSTRUCTION OR GANGRENE: Primary | ICD-10-CM

## 2025-03-17 PROCEDURE — 99214 OFFICE O/P EST MOD 30 MIN: CPT | Performed by: STUDENT IN AN ORGANIZED HEALTH CARE EDUCATION/TRAINING PROGRAM

## 2025-03-17 RX ORDER — HEPARIN SODIUM 5000 [USP'U]/ML
5000 INJECTION, SOLUTION INTRAVENOUS; SUBCUTANEOUS ONCE
OUTPATIENT
Start: 2025-03-17 | End: 2025-03-17

## 2025-03-17 RX ORDER — CHLORHEXIDINE GLUCONATE 40 MG/ML
SOLUTION TOPICAL DAILY PRN
OUTPATIENT
Start: 2025-03-17

## 2025-03-17 NOTE — ASSESSMENT & PLAN NOTE
69-year-old male with bilateral reducible inguinal hernias  -See HPI  -On exam bilateral reducible inguinal hernias  -CBC and BMP from 9/18/2024 reviewed  -Primary care physician note from 2/26/2025 reviewed  -Urology note from 2/19/2025 reviewed  -Pulmonology note from 1/15/2025 reviewed  -Cardiology note from 12/18/2024 reviewed  -Plan for laparoscopic bilateral inguinal hernia repair with mesh, possible open  -CBC, BMP ordered  -Patient to continue 81 mg of aspirin perioperatively    A visual was used to display the anatomy and the proposed incision, procedure, steps, and mesh placement. The risks were explained at length and outlined, not limited to bleeding, infection, recurrence, pain, testicular loss, and damage to other structures. The planned approximately one hour procedure was discussed along with 1 - 2 week recovery, resolution of pain and swelling timeline, and 4 weeks of light duty without lifting. Questions were answered to satisfaction.  Previously signed consent was reviewed with patient.    Orders:    Case request operating room: REPAIR HERNIA INGUINAL, LAPAROSCOPIC, Possible Open; Standing    Basic metabolic panel; Future    CBC and Platelet; Future

## 2025-03-17 NOTE — PROGRESS NOTES
Name: Danie Liao      : 1955      MRN: [id number]  Encounter Provider: Evan Rolle DO  Encounter Date: 3/17/2025   Encounter department: [facility]  :  Assessment & Plan  Bilateral recurrent inguinal hernia without obstruction or gangrene  69-year-old male with bilateral reducible inguinal hernias  -See HPI  -On exam bilateral reducible inguinal hernias  -CBC and BMP from 2024 reviewed  -Primary care physician note from 2025 reviewed  -Urology note from 2025 reviewed  -Pulmonology note from 1/15/2025 reviewed  -Cardiology note from 2024 reviewed  -Plan for laparoscopic bilateral inguinal hernia repair with mesh, possible open  -CBC, BMP ordered  -Patient to continue 81 mg of aspirin perioperatively    A visual was used to display the anatomy and the proposed incision, procedure, steps, and mesh placement. The risks were explained at length and outlined, not limited to bleeding, infection, recurrence, pain, testicular loss, and damage to other structures. The planned approximately one hour procedure was discussed along with 1 - 2 week recovery, resolution of pain and swelling timeline, and 4 weeks of light duty without lifting. Questions were answered to satisfaction.  Previously signed consent was reviewed with patient.    Orders:    Case request operating room: REPAIR HERNIA INGUINAL, LAPAROSCOPIC, Possible Open; Standing    Basic metabolic panel; Future    CBC and Platelet; Future    Recurrent inguinal hernia without obstruction or gangrene, unspecified laterality  See plan above    Orders:    Ambulatory Referral to General Surgery        History of Present Illness   HPI  Danie Liao is a 69 y.o. male who presents for evaluation of bilateral inguinal hernias.  Patient notes a continued bulge in the right groin and continued bladder spasms.  Notes he was constipated and had 2 days of mid abdominal pain, but this resolved after a large bowel  movement.  He is tolerating a diet and having bowel function.  History obtained from: patient    Review of Systems   Constitutional:  Negative for chills, fatigue and fever.   HENT:  Negative for congestion, hearing loss, rhinorrhea and sore throat.    Eyes:  Negative for pain and discharge.   Respiratory:  Negative for cough, chest tightness and shortness of breath.    Cardiovascular:  Negative for chest pain and palpitations.   Gastrointestinal:  Negative for abdominal pain, constipation, diarrhea, nausea and vomiting.        Positive right groin bulge and discomfort   Endocrine: Negative for cold intolerance and heat intolerance.   Genitourinary:  Negative for difficulty urinating and dysuria.        Positive bladder spasms   Musculoskeletal:  Positive for back pain. Negative for neck pain.   Skin:  Negative for color change and rash.   Allergic/Immunologic: Negative for environmental allergies and food allergies.   Neurological:  Negative for seizures and headaches.   Hematological:  Does not bruise/bleed easily.   Psychiatric/Behavioral:  Negative for confusion and hallucinations.      Medical History Reviewed by provider this encounter:  Tobacco  Allergies  Meds  Problems  Med Hx  Surg Hx  Fam Hx     .  Past Medical History   Past Medical History:   Diagnosis Date    Arthritis     Asthma     Chronic pain 01/30/2024    cervic and lumbar    Colon polyp     COPD (chronic obstructive pulmonary disease) (HCC)     Emphysema of lung (HCC)     GERD (gastroesophageal reflux disease) 01/30/2024    Headache(784.0)     Hyperlipidemia 01/30/2024    Migraine     Obesity     Sleep apnea      Past Surgical History:   Procedure Laterality Date    APPENDECTOMY      CARDIAC CATHETERIZATION  06/17/2024    Procedure: Cardiac catheterization;  Surgeon: Sarah Noland DO;  Location: [facility];  Service: Cardiology    CARDIAC CATHETERIZATION N/A 06/17/2024    Procedure: Cardiac Coronary Angiogram;  Surgeon:  Sarah Noland DO;  Location: [facility];  Service: Cardiology    CARDIAC CATHETERIZATION N/A 06/17/2024    Procedure: Cardiac FFR/IFR;  Surgeon: Sarah Noland DO;  Location: [facility];  Service: Cardiology    CARDIAC CATHETERIZATION N/A 06/17/2024    Procedure: Cardiac pci;  Surgeon: Sarah Noland DO;  Location: [facility];  Service: Cardiology    CARDIAC CATHETERIZATION N/A 06/17/2024    Procedure: Cardiac IVUS;  Surgeon: Sarah Noland DO;  Location: [facility];  Service: Cardiology    COLONOSCOPY      COLONOSCOPY      HERNIA REPAIR      4 times    NEPHRECTOMY LIVING DONOR Left 10/2009    CA EXCISION HYDROCELE UNILATERAL Left 01/30/2024    Procedure: HYDROCELECTOMY;  Surgeon: Evan Salmeron MD;  Location: [facility];  Service: Urology     Family History   Problem Relation Age of Onset    Breast cancer Mother     Heart disease Father     Heart disease Brother     Stroke Brother     Lung disease Brother     Breast cancer Maternal Grandmother     Heart disease Paternal Grandmother       reports that he quit smoking about 15 years ago. His smoking use included cigarettes. He started smoking about 55 years ago. He has a 80 pack-year smoking history. He has been exposed to tobacco smoke. He has never used smokeless tobacco. He reports that he does not currently use alcohol. He reports that he does not currently use drugs after having used the following drugs: Marijuana.  Current Outpatient Medications   Medication Instructions    albuterol (PROVENTIL HFA,VENTOLIN HFA) 90 mcg/act inhaler INHALE 1-2 PUFFS EVERY 4-6 HOURS AS NEEDED AND AS DIRECTED    aspirin 81 mg, Oral, Daily    atorvastatin (LIPITOR) 80 mg, Oral, Every evening    budesonide (PULMICORT) 0.5 mg/2 mL nebulizer solution TAKE 2 ML (0.5 MG TOTAL) BY NEBULIZATION TWICE A DAY RINSE MOUTH AFTER USE    famotidine (PEPCID) 20 mg, Oral, 2 times daily    ipratropium-albuterol (DUO-NEB) 0.5-2.5 mg/3 mL  nebulizer solution TAKE THREE ML BY NEBULIZATION 4 (FOUR) TIMES A DAY STRENGTH: 0.5-2.5 (3) MG/3ML    methocarbamol (ROBAXIN) 500 mg, Oral, Daily at bedtime PRN    naloxone (NARCAN) 4 mg/0.1 mL nasal spray Administer 1 spray into a nostril. If no response after 2-3 minutes, give another dose in the other nostril using a new spray.    nitroglycerin (NITROSTAT) 0.4 mg, Sublingual, Every 5 minutes PRN    oxybutynin (DITROPAN XL) 15 mg, Oral, Daily at bedtime    Georgina 1 mg, Oral, Daily    roflumilast (DALIRESP) 250 mcg, Oral, Daily    tamsulosin (FLOMAX) 0.4 mg, Oral, Daily with dinner    Tapinarof (Vtama) 1 % CREA 1 Application, Apply externally, Daily    Trelegy Ellipta 100-62.5-25 MCG/ACT inhaler 1 puff, Inhalation, Daily    Ubrelvy 100 mg, Oral, Daily PRN   No Known Allergies   Current Outpatient Medications on File Prior to Visit   Medication Sig Dispense Refill    albuterol (PROVENTIL HFA,VENTOLIN HFA) 90 mcg/act inhaler INHALE 1-2 PUFFS EVERY 4-6 HOURS AS NEEDED AND AS DIRECTED 36 g 5    aspirin 81 mg chewable tablet Chew 1 tablet (81 mg total) daily 90 tablet 0    atorvastatin (LIPITOR) 80 mg tablet Take 1 tablet (80 mg total) by mouth every evening 90 tablet 0    budesonide (PULMICORT) 0.5 mg/2 mL nebulizer solution TAKE 2 ML (0.5 MG TOTAL) BY NEBULIZATION TWICE A DAY RINSE MOUTH AFTER  mL 1    famotidine (PEPCID) 20 mg tablet Take 1 tablet (20 mg total) by mouth 2 (two) times a day 180 tablet 1    ipratropium-albuterol (DUO-NEB) 0.5-2.5 mg/3 mL nebulizer solution TAKE THREE ML BY NEBULIZATION 4 (FOUR) TIMES A DAY STRENGTH: 0.5-2.5 (3) MG/3ML 360 mL 2    methocarbamol (ROBAXIN) 500 mg tablet Take 1 tablet (500 mg total) by mouth daily at bedtime as needed for muscle spasms 20 tablet 0    naloxone (NARCAN) 4 mg/0.1 mL nasal spray Administer 1 spray into a nostril. If no response after 2-3 minutes, give another dose in the other nostril using a new spray. 1 each 1    nitroglycerin (NITROSTAT) 0.4 mg SL  "tablet Place 1 tablet (0.4 mg total) under the tongue every 5 (five) minutes as needed for chest pain 30 tablet 3    oxybutynin (DITROPAN XL) 15 MG 24 hr tablet Take 1 tablet (15 mg total) by mouth daily at bedtime 90 tablet 7    Georgina 1 MG TBEC Take 1 tablet (1 mg total) by mouth in the morning 90 tablet 2    tamsulosin (FLOMAX) 0.4 mg Take 1 capsule (0.4 mg total) by mouth daily with dinner 90 capsule 1    Tapinarof (Vtama) 1 % CREA Apply 1 Application topically in the morning 60 g 0    Trelegy Ellipta 100-62.5-25 MCG/ACT inhaler Inhale 1 puff daily 180 blister 0    Ubrelvy 100 MG tablet Take 1 tablet (100 mg total) by mouth daily as needed (migraine) 30 tablet 5    roflumilast (DALIRESP) 250 MCG tablet TAKE 1 TABLET BY MOUTH EVERY DAY (Patient not taking: Reported on 3/17/2025) 30 tablet 3     No current facility-administered medications on file prior to visit.      Social History     Tobacco Use    Smoking status: Former     Current packs/day: 0.00     Average packs/day: 2.0 packs/day for 40.0 years (80.0 ttl pk-yrs)     Types: Cigarettes     Start date: 1/1/1970     Quit date: 1/1/2010     Years since quitting: 15.2     Passive exposure: Past    Smokeless tobacco: Never   Vaping Use    Vaping status: Never Used   Substance and Sexual Activity    Alcohol use: Not Currently     Comment: Occasional    Drug use: Not Currently     Types: Marijuana     Comment: long time ago    Sexual activity: Yes     Partners: Female        Objective   /70 (BP Location: Left arm, Patient Position: Sitting, Cuff Size: Standard)   Pulse 65   Temp 97.9 °F (36.6 °C)   Resp 18   Ht 5' 6\" (1.676 m)   Wt 90.9 kg (200 lb 6.4 oz)   SpO2 96%   BMI 32.35 kg/m²      Physical Exam  Constitutional:       Appearance: Normal appearance.   HENT:      Head: Normocephalic and atraumatic.      Nose: Nose normal.   Eyes:      General: No scleral icterus.     Conjunctiva/sclera: Conjunctivae normal.   Cardiovascular:      Rate and Rhythm: " Normal rate and regular rhythm.      Heart sounds: Normal heart sounds.   Pulmonary:      Effort: Pulmonary effort is normal.      Breath sounds: Normal breath sounds.   Abdominal:      General: There is no distension.      Palpations: Abdomen is soft.      Tenderness: There is no abdominal tenderness.      Comments: Reducible bilateral inguinal hernias   Musculoskeletal:         General: No signs of injury.   Skin:     General: Skin is warm.      Coloration: Skin is not jaundiced.   Neurological:      General: No focal deficit present.      Mental Status: He is alert and oriented to person, place, and time.   Psychiatric:         Mood and Affect: Mood normal.         Behavior: Behavior normal.

## 2025-03-24 ENCOUNTER — APPOINTMENT (EMERGENCY)
Dept: RADIOLOGY | Facility: HOSPITAL | Age: 70
End: 2025-03-24
Payer: COMMERCIAL

## 2025-03-24 ENCOUNTER — HOSPITAL ENCOUNTER (EMERGENCY)
Facility: HOSPITAL | Age: 70
Discharge: HOME | End: 2025-03-24
Attending: EMERGENCY MEDICINE
Payer: COMMERCIAL

## 2025-03-24 VITALS
BODY MASS INDEX: 31.46 KG/M2 | DIASTOLIC BLOOD PRESSURE: 69 MMHG | TEMPERATURE: 100 F | OXYGEN SATURATION: 95 % | SYSTOLIC BLOOD PRESSURE: 136 MMHG | HEIGHT: 66 IN | HEART RATE: 104 BPM | RESPIRATION RATE: 26 BRPM | WEIGHT: 195.77 LBS

## 2025-03-24 DIAGNOSIS — J02.9 SORE THROAT: ICD-10-CM

## 2025-03-24 DIAGNOSIS — R11.2 NAUSEA VOMITING AND DIARRHEA: Primary | ICD-10-CM

## 2025-03-24 DIAGNOSIS — K20.90 ESOPHAGITIS: ICD-10-CM

## 2025-03-24 DIAGNOSIS — R19.7 NAUSEA VOMITING AND DIARRHEA: Primary | ICD-10-CM

## 2025-03-24 LAB
ALBUMIN SERPL BCG-MCNC: 3.7 G/DL (ref 3.5–5)
ALP SERPL-CCNC: 89 U/L (ref 34–104)
ALT SERPL W P-5'-P-CCNC: 20 U/L (ref 7–52)
ANION GAP SERPL CALCULATED.3IONS-SCNC: 8 MMOL/L (ref 4–13)
AST SERPL W P-5'-P-CCNC: 21 U/L (ref 13–39)
ATRIAL RATE: 94 BPM
BACTERIA UR QL AUTO: ABNORMAL /HPF
BASOPHILS # BLD AUTO: 0.02 THOUSANDS/ÂΜL (ref 0–0.1)
BASOPHILS NFR BLD AUTO: 0 % (ref 0–1)
BILIRUB SERPL-MCNC: 0.75 MG/DL (ref 0.2–1)
BILIRUB UR QL STRIP: NEGATIVE
BUN SERPL-MCNC: 27 MG/DL (ref 5–25)
CALCIUM SERPL-MCNC: 8.5 MG/DL (ref 8.4–10.2)
CHLORIDE SERPL-SCNC: 101 MMOL/L (ref 96–108)
CLARITY UR: CLEAR
CO2 SERPL-SCNC: 23 MMOL/L (ref 21–32)
COLOR UR: YELLOW
CREAT SERPL-MCNC: 1 MG/DL (ref 0.6–1.3)
EOSINOPHIL # BLD AUTO: 0.14 THOUSAND/ÂΜL (ref 0–0.61)
EOSINOPHIL NFR BLD AUTO: 1 % (ref 0–6)
ERYTHROCYTE [DISTWIDTH] IN BLOOD BY AUTOMATED COUNT: 23.6 % (ref 11.6–15.1)
FLUAV AG UPPER RESP QL IA.RAPID: NEGATIVE
FLUBV AG UPPER RESP QL IA.RAPID: NEGATIVE
GFR SERPL CREATININE-BSD FRML MDRD: 76 ML/MIN/1.73SQ M
GLUCOSE SERPL-MCNC: 108 MG/DL (ref 65–140)
GLUCOSE UR STRIP-MCNC: NEGATIVE MG/DL
HCT VFR BLD AUTO: 35.1 % (ref 36.5–49.3)
HGB BLD-MCNC: 10.5 G/DL (ref 12–17)
HGB UR QL STRIP.AUTO: NEGATIVE
IMM GRANULOCYTES # BLD AUTO: 0.06 THOUSAND/UL (ref 0–0.2)
IMM GRANULOCYTES NFR BLD AUTO: 0 % (ref 0–2)
KETONES UR STRIP-MCNC: NEGATIVE MG/DL
LEUKOCYTE ESTERASE UR QL STRIP: NEGATIVE
LYMPHOCYTES # BLD AUTO: 0.73 THOUSANDS/ÂΜL (ref 0.6–4.47)
LYMPHOCYTES NFR BLD AUTO: 5 % (ref 14–44)
MCH RBC QN AUTO: 18 PG (ref 26.8–34.3)
MCHC RBC AUTO-ENTMCNC: 29.9 G/DL (ref 31.4–37.4)
MCV RBC AUTO: 60 FL (ref 82–98)
MONOCYTES # BLD AUTO: 1.29 THOUSAND/ÂΜL (ref 0.17–1.22)
MONOCYTES NFR BLD AUTO: 8 % (ref 4–12)
MUCOUS THREADS UR QL AUTO: ABNORMAL
NEUTROPHILS # BLD AUTO: 13.4 THOUSANDS/ÂΜL (ref 1.85–7.62)
NEUTS SEG NFR BLD AUTO: 86 % (ref 43–75)
NITRITE UR QL STRIP: NEGATIVE
NON-SQ EPI CELLS URNS QL MICRO: ABNORMAL /HPF
NRBC BLD AUTO-RTO: 0 /100 WBCS
P AXIS: 61 DEGREES
PH UR STRIP.AUTO: 5.5 [PH]
PLATELET # BLD AUTO: 428 THOUSANDS/UL (ref 149–390)
POTASSIUM SERPL-SCNC: 3.9 MMOL/L (ref 3.5–5.3)
PR INTERVAL: 154 MS
PROT SERPL-MCNC: 7.4 G/DL (ref 6.4–8.4)
PROT UR STRIP-MCNC: ABNORMAL MG/DL
QRS AXIS: -51 DEGREES
QRSD INTERVAL: 88 MS
QT INTERVAL: 348 MS
QTC INTERVAL: 435 MS
RBC # BLD AUTO: 5.82 MILLION/UL (ref 3.88–5.62)
RBC #/AREA URNS AUTO: ABNORMAL /HPF
SARS-COV+SARS-COV-2 AG RESP QL IA.RAPID: NEGATIVE
SODIUM SERPL-SCNC: 132 MMOL/L (ref 135–147)
SP GR UR STRIP.AUTO: 1.03 (ref 1–1.03)
T WAVE AXIS: 50 DEGREES
UROBILINOGEN UR STRIP-ACNC: <2 MG/DL
VENTRICULAR RATE: 94 BPM
WBC # BLD AUTO: 15.64 THOUSAND/UL (ref 4.31–10.16)
WBC #/AREA URNS AUTO: ABNORMAL /HPF

## 2025-03-24 PROCEDURE — 94640 AIRWAY INHALATION TREATMENT: CPT

## 2025-03-24 PROCEDURE — 99285 EMERGENCY DEPT VISIT HI MDM: CPT | Performed by: EMERGENCY MEDICINE

## 2025-03-24 PROCEDURE — 36415 COLL VENOUS BLD VENIPUNCTURE: CPT | Performed by: EMERGENCY MEDICINE

## 2025-03-24 PROCEDURE — 80053 COMPREHEN METABOLIC PANEL: CPT | Performed by: EMERGENCY MEDICINE

## 2025-03-24 PROCEDURE — 96374 THER/PROPH/DIAG INJ IV PUSH: CPT

## 2025-03-24 PROCEDURE — 93005 ELECTROCARDIOGRAM TRACING: CPT

## 2025-03-24 PROCEDURE — 71045 X-RAY EXAM CHEST 1 VIEW: CPT

## 2025-03-24 PROCEDURE — 87804 INFLUENZA ASSAY W/OPTIC: CPT | Performed by: EMERGENCY MEDICINE

## 2025-03-24 PROCEDURE — 81001 URINALYSIS AUTO W/SCOPE: CPT | Performed by: EMERGENCY MEDICINE

## 2025-03-24 PROCEDURE — 85025 COMPLETE CBC W/AUTO DIFF WBC: CPT | Performed by: EMERGENCY MEDICINE

## 2025-03-24 PROCEDURE — 99284 EMERGENCY DEPT VISIT MOD MDM: CPT

## 2025-03-24 PROCEDURE — 93010 ELECTROCARDIOGRAM REPORT: CPT | Performed by: INTERNAL MEDICINE

## 2025-03-24 PROCEDURE — 87811 SARS-COV-2 COVID19 W/OPTIC: CPT | Performed by: EMERGENCY MEDICINE

## 2025-03-24 PROCEDURE — 96361 HYDRATE IV INFUSION ADD-ON: CPT

## 2025-03-24 RX ORDER — ONDANSETRON 2 MG/ML
4 INJECTION INTRAMUSCULAR; INTRAVENOUS ONCE
Status: COMPLETED | OUTPATIENT
Start: 2025-03-24 | End: 2025-03-24

## 2025-03-24 RX ORDER — ONDANSETRON 4 MG/1
4 TABLET, FILM COATED ORAL EVERY 8 HOURS PRN
Qty: 20 TABLET | Refills: 0 | Status: SHIPPED | OUTPATIENT
Start: 2025-03-24

## 2025-03-24 RX ORDER — LIDOCAINE HYDROCHLORIDE 20 MG/ML
15 SOLUTION OROPHARYNGEAL ONCE
Status: COMPLETED | OUTPATIENT
Start: 2025-03-24 | End: 2025-03-24

## 2025-03-24 RX ORDER — SUCRALFATE ORAL 1 G/10ML
1 SUSPENSION ORAL 3 TIMES DAILY
Qty: 900 ML | Refills: 0 | Status: SHIPPED | OUTPATIENT
Start: 2025-03-24 | End: 2025-04-23

## 2025-03-24 RX ORDER — IPRATROPIUM BROMIDE AND ALBUTEROL SULFATE 2.5; .5 MG/3ML; MG/3ML
3 SOLUTION RESPIRATORY (INHALATION) ONCE
Status: COMPLETED | OUTPATIENT
Start: 2025-03-24 | End: 2025-03-24

## 2025-03-24 RX ADMIN — SODIUM CHLORIDE 1000 ML: 0.9 INJECTION, SOLUTION INTRAVENOUS at 10:42

## 2025-03-24 RX ADMIN — ONDANSETRON 4 MG: 2 INJECTION INTRAMUSCULAR; INTRAVENOUS at 10:45

## 2025-03-24 RX ADMIN — LIDOCAINE HYDROCHLORIDE 15 ML: 20 SOLUTION ORAL; TOPICAL at 10:45

## 2025-03-24 RX ADMIN — IPRATROPIUM BROMIDE AND ALBUTEROL SULFATE 3 ML: 2.5; .5 SOLUTION RESPIRATORY (INHALATION) at 10:45

## 2025-03-24 NOTE — ED PROVIDER NOTES
"Time reflects when diagnosis was documented in both MDM as applicable and the Disposition within this note       Time User Action Codes Description Comment    3/24/2025  2:39 PM Sharon Quiles Add [R11.2,  R19.7] Nausea vomiting and diarrhea     3/24/2025  2:39 PM Sharon Quiles Add [J02.9] Sore throat     3/24/2025  2:39 PM Sharon Quiles Add [K20.90] Esophagitis           ED Disposition       ED Disposition   Discharge    Condition   Stable    Date/Time   Mon Mar 24, 2025  2:38 PM    Comment   Danie HURT Yanni discharge to home/self care.                   Assessment & Plan       Medical Decision Making  Patient is a 70-year-old male who presents to the emergency department with cough, congestion, nausea, vomiting and diarrhea.  He states that diarrhea woke him up from sleep several times.  He has since developed a bad taste in his mouth which she describes as \"tasting like poison.\"  He believes that he ate a spider while sleeping which caused this.  When breathing at rest, patient has quiet, nonlabored respirations when asked to participate in physical exam with deep inspiration he has forced wheezing on exhalation stating that he cannot clear his throat.  He is not stridorous, without hoarse voice.  No respiratory distress with normal oxygen saturations.  Suspect likely viral syndrome complicated by known GERD and hiatal hernia.  Chest x-ray ordered to evaluate for aspiration in the setting of persistent vomiting and GERD.  Viral swab ordered as well.  To treat symptomatically with nebulizer, viscous lidocaine, Pepcid, and Zofran.    Amount and/or Complexity of Data Reviewed  Labs: ordered. Decision-making details documented in ED Course.  Radiology: ordered and independent interpretation performed.     Details: No consolidation or infiltrate  ECG/medicine tests: independent interpretation performed.     Details: Sinus rhythm with sinus arrhythmia, rate of 94, left axis deviation, abnormal EKG, " similar to prior    Risk  Prescription drug management.  Decision regarding hospitalization.        ED Course as of 03/24/25 1442   Mon Mar 24, 2025   0920 EGD 2023:   FINDINGS:  · Regular Z-line 35 cm from the incisors  · Medium sliding hiatal hernia (type I hiatal hernia) without Isaac lesions present:  Hill classification: Grade IV  · Mild erythematous mucosa in the antrum; performed 4 cold forceps biopsies to rule out H. pylori     1213 FLU/COVID Rapid Antigen (30 min. TAT) - Preferred screening test in ED   1438 Patient declines additional workup.  CT scan had been ordered due to leukocytosis though this may still be due to underlying infection.  Discussed Carafate liquid for likely esophagitis following vomiting.   1438 Temperature: 100 °F (37.8 °C)       Medications   sodium chloride 0.9 % bolus 1,000 mL (0 mL Intravenous Stopped 3/24/25 1247)   ondansetron (ZOFRAN) injection 4 mg (4 mg Intravenous Given 3/24/25 1045)   ipratropium-albuterol (DUO-NEB) 0.5-2.5 mg/3 mL inhalation solution 3 mL (3 mL Nebulization Given 3/24/25 1045)   Lidocaine Viscous HCl (XYLOCAINE) 2 % mucosal solution 15 mL (15 mL Swish & Spit Given 3/24/25 1045)       ED Risk Strat Scores                            SBIRT 22yo+      Flowsheet Row Most Recent Value   Initial Alcohol Screen: US AUDIT-C     1. How often do you have a drink containing alcohol? 1 Filed at: 03/24/2025 0917   2. How many drinks containing alcohol do you have on a typical day you are drinking?  1 Filed at: 03/24/2025 0917   3a. Male UNDER 65: How often do you have five or more drinks on one occasion? 0 Filed at: 03/24/2025 0917   3b. FEMALE Any Age, or MALE 65+: How often do you have 4 or more drinks on one occassion? 0 Filed at: 03/24/2025 0917   Audit-C Score 2 Filed at: 03/24/2025 0917   JARON: How many times in the past year have you...    Used an illegal drug or used a prescription medication for non-medical reasons? Never Filed at: 03/24/2025 0917                             History of Present Illness       Chief Complaint   Patient presents with    Foreign Body in Throat     Pt states he thinks something is stuck in his throat from last night, unsure what it could be. Pt started with a cough this morning and SOB, swallowing with no difficulty.        Past Medical History:   Diagnosis Date    Arthritis     Asthma     Chronic pain 01/30/2024    cervic and lumbar    Colon polyp     COPD (chronic obstructive pulmonary disease) (HCC)     Emphysema of lung (HCC)     GERD (gastroesophageal reflux disease) 01/30/2024    Headache(784.0)     Hyperlipidemia 01/30/2024    Migraine     Obesity     Sleep apnea       Past Surgical History:   Procedure Laterality Date    APPENDECTOMY      CARDIAC CATHETERIZATION  06/17/2024    Procedure: Cardiac catheterization;  Surgeon: Sarah Noland DO;  Location: [facility];  Service: Cardiology    CARDIAC CATHETERIZATION N/A 06/17/2024    Procedure: Cardiac Coronary Angiogram;  Surgeon: Sarah Noland DO;  Location: [facility];  Service: Cardiology    CARDIAC CATHETERIZATION N/A 06/17/2024    Procedure: Cardiac FFR/IFR;  Surgeon: Sarah Noland DO;  [facility]: [facility];  Service: Cardiology    CARDIAC CATHETERIZATION N/A 06/17/2024    Procedure: Cardiac pci;  Surgeon: Sarah Noland DO;  [facility]: [facility];  Service: Cardiology    CARDIAC CATHETERIZATION N/A 06/17/2024    Procedure: Cardiac IVUS;  Surgeon: Sarah Noland DO;  [facility]: [facility];  Service: Cardiology    COLONOSCOPY      COLONOSCOPY      HERNIA REPAIR      4 times    NEPHRECTOMY LIVING DONOR Left 10/2009    [place] EXCISION HYDROCELE UNILATERAL Left 01/30/2024    Procedure: HYDROCELECTOMY;  Surgeon: Evan Salmeron MD;  Location: [facility];  Service: Urology      Family History   Problem Relation Age of Onset    Breast cancer Mother     Heart disease Father     Heart disease Brother     Stroke Brother     Lung  disease Brother     Breast cancer Maternal Grandmother     Heart disease Paternal Grandmother       Social History     Tobacco Use    Smoking status: Former     Current packs/day: 0.00     Average packs/day: 2.0 packs/day for 40.0 years (80.0 ttl pk-yrs)     Types: Cigarettes     Start date: 1/1/1970     Quit date: 1/1/2010     Years since quitting: 15.2     Passive exposure: Past    Smokeless tobacco: Never   Vaping Use    Vaping status: Never Used   Substance Use Topics    Alcohol use: Not Currently     Comment: Occasional    Drug use: Not Currently     Types: Marijuana     Comment: long time ago      E-Cigarette/Vaping    E-Cigarette Use Never User       E-Cigarette/Vaping Substances    Nicotine No     THC No     CBD No     Flavoring No     Other No     Unknown No       I have reviewed and agree with the history as documented.       Foreign Body in Throat      Review of Systems        Objective       ED Triage Vitals [03/24/25 0913]   Temperature Pulse Blood Pressure Respirations SpO2 Patient Position - Orthostatic VS   98.3 °F (36.8 °C) 100 139/64 22 97 % Sitting      Temp Source Heart Rate Source BP Location FiO2 (%) Pain Score    Temporal Monitor Left arm -- --      Vitals      Date and Time Temp Pulse SpO2 Resp BP Pain Score FACES Pain Rating User   03/24/25 1253 100 °F (37.8 °C) 104 95 % 26 136/69 -- -- SA   03/24/25 1115 -- 108 94 % 20 -- -- -- SB   03/24/25 0913 98.3 °F (36.8 °C) 100 97 % 22 139/64 -- -- GP            Physical Exam  Vitals and nursing note reviewed.   Constitutional:       General: He is not in acute distress.     Appearance: Normal appearance.   HENT:      Head: Normocephalic and atraumatic.      Right Ear: External ear normal.      Left Ear: External ear normal.      Nose: Nose normal.   Cardiovascular:      Rate and Rhythm: Regular rhythm. Tachycardia present.   Pulmonary:      Effort: Pulmonary effort is normal. No respiratory distress.      Breath sounds: Normal breath sounds. No  stridor. No wheezing.   Abdominal:      General: There is no distension.      Palpations: Abdomen is soft.      Tenderness: There is no abdominal tenderness.   Musculoskeletal:      Right lower leg: No edema.      Left lower leg: No edema.   Skin:     General: Skin is warm and dry.   Neurological:      General: No focal deficit present.      Mental Status: He is alert and oriented to person, place, and time. Mental status is at baseline.   Psychiatric:         Behavior: Behavior normal.         Results Reviewed       Procedure Component Value Units Date/Time    Urine Microscopic [[id number]]  (Abnormal) Collected: 03/24/25 1307    Lab Status: Final result Specimen: Urine, Clean Catch Updated: 03/24/25 1324     RBC, UA 1-2 /hpf      WBC, UA 1-2 /hpf      Epithelial Cells Occasional /hpf      Bacteria, UA None Seen /hpf      MUCUS THREADS Moderate    UA (URINE) with reflex to Scope [[id number]]  (Abnormal) Collected: 03/24/25 1307    Lab Status: Final result Specimen: Urine, Clean Catch Updated: 03/24/25 1320     Color, UA Yellow     Clarity, UA Clear     Specific Gravity, UA 1.032     pH, UA 5.5     Leukocytes, UA Negative     Nitrite, UA Negative     Protein, UA Trace mg/dl      Glucose, UA Negative mg/dl      Ketones, UA Negative mg/dl      Urobilinogen, UA <2.0 mg/dl      Bilirubin, UA Negative     Occult Blood, UA Negative    FLU/COVID Rapid Antigen (30 min. TAT) - Preferred screening test in ED [[id number]]  (Normal) Collected: 03/24/25 1039    Lab Status: Final result Specimen: Nares from Nose Updated: 03/24/25 1100     SARS COV Rapid Antigen Negative     Influenza A Rapid Antigen Negative     Influenza B Rapid Antigen Negative    Narrative:      This test has been performed using the Quidel Clarissa 2 FLU+SARS Antigen test under the Emergency Use Authorization (EUA). This test has been validated by the  and verified by the performing laboratory. The Clarissa uses lateral flow immunofluorescent sandwich  assay to detect SARS-COV, Influenza A and Influenza B Antigen.     The Quidel Clarissa 2 SARS Antigen test does not differentiate between SARS-CoV and SARS-CoV-2.     Negative results are presumptive and may be confirmed with a molecular assay, if necessary, for patient management. Negative results do not rule out SARS-CoV-2 or influenza infection and should not be used as the sole basis for treatment or patient management decisions. A negative test result may occur if the level of antigen in a sample is below the limit of detection of this test.     Positive results are indicative of the presence of viral antigens, but do not rule out bacterial infection or co-infection with other viruses.     All test results should be used as an adjunct to clinical observations and other information available to the provider.    FOR PEDIATRIC PATIENTS - copy/paste COVID Guidelines URL to browser: [email or web address]    Comprehensive metabolic panel [[id number]]  (Abnormal) Collected: 03/24/25 1039    Lab Status: Final result Specimen: Blood from Arm, Right Updated: 03/24/25 1059     Sodium 132 mmol/L      Potassium 3.9 mmol/L      Chloride 101 mmol/L      CO2 23 mmol/L      ANION GAP 8 mmol/L      BUN 27 mg/dL      Creatinine 1.00 mg/dL      Glucose 108 mg/dL      Calcium 8.5 mg/dL      AST 21 U/L      ALT 20 U/L      Alkaline Phosphatase 89 U/L      Total Protein 7.4 g/dL      Albumin 3.7 g/dL      Total Bilirubin 0.75 mg/dL      eGFR 76 ml/min/1.73sq m     Narrative:      National Kidney Disease Foundation guidelines for Chronic Kidney Disease (CKD):     Stage 1 with normal or high GFR (GFR > 90 mL/min/1.73 square meters)    Stage 2 Mild CKD (GFR = 60-89 mL/min/1.73 square meters)    Stage 3A Moderate CKD (GFR = 45-59 mL/min/1.73 square meters)    Stage 3B Moderate CKD (GFR = 30-44 mL/min/1.73 square meters)    Stage 4 Severe CKD (GFR = 15-29 mL/min/1.73 square meters)    Stage  5 End Stage CKD (GFR <15 mL/min/1.73 square meters)  Note: GFR calculation is accurate only with a steady state creatinine    CBC and differential [[id number]]  (Abnormal) Collected: 03/24/25 1039    Lab Status: Final result Specimen: Blood from Arm, Right Updated: 03/24/25 1045     WBC 15.64 Thousand/uL      RBC 5.82 Million/uL      Hemoglobin 10.5 g/dL      Hematocrit 35.1 %      MCV 60 fL      MCH 18.0 pg      MCHC 29.9 g/dL      RDW 23.6 %      Platelets 428 Thousands/uL      nRBC 0 /100 WBCs      Segmented % 86 %      Immature Grans % 0 %      Lymphocytes % 5 %      Monocytes % 8 %      Eosinophils Relative 1 %      Basophils Relative 0 %      Absolute Neutrophils 13.40 Thousands/µL      Absolute Immature Grans 0.06 Thousand/uL      Absolute Lymphocytes 0.73 Thousands/µL      Absolute Monocytes 1.29 Thousand/µL      Eosinophils Absolute 0.14 Thousand/µL      Basophils Absolute 0.02 Thousands/µL             XR chest 1 view portable   Final Interpretation by Guicho Mendes MD (03/24 1102)      Mild congestive changes. No focal infiltrate seen            Workstation performed: [id number]             Procedures    ED Medication and Procedure Management   Prior to Admission Medications   Prescriptions Last Dose Informant Patient Reported? Taking?   Georgina 1 MG TBEC  Self No No   Sig: Take 1 tablet (1 mg total) by mouth in the morning   Tapinarof (Vtama) 1 % CREA  Self No No   Sig: Apply 1 Application topically in the morning   Trelegy Ellipta 100-62.5-25 MCG/ACT inhaler  Self No No   Sig: Inhale 1 puff daily   Ubrelvy 100 MG tablet  Self No No   Sig: Take 1 tablet (100 mg total) by mouth daily as needed (migraine)   albuterol (PROVENTIL HFA,VENTOLIN HFA) 90 mcg/act inhaler  Self No No   Sig: INHALE 1-2 PUFFS EVERY 4-6 HOURS AS NEEDED AND AS DIRECTED   aspirin 81 mg chewable tablet  Self No No   Sig: Chew 1 tablet (81 mg total) daily   atorvastatin (LIPITOR) 80 mg tablet  Self No No   Sig: Take 1 tablet (80  mg total) by mouth every evening   budesonide (PULMICORT) 0.5 mg/2 mL nebulizer solution  Self No No   Sig: TAKE 2 ML (0.5 MG TOTAL) BY NEBULIZATION TWICE A DAY RINSE MOUTH AFTER USE   famotidine (PEPCID) 20 mg tablet  Self No No   Sig: Take 1 tablet (20 mg total) by mouth 2 (two) times a day   ipratropium-albuterol (DUO-NEB) 0.5-2.5 mg/3 mL nebulizer solution  Self No No   Sig: TAKE THREE ML BY NEBULIZATION 4 (FOUR) TIMES A DAY STRENGTH: 0.5-2.5 (3) MG/3ML   methocarbamol (ROBAXIN) 500 mg tablet  Self No No   Sig: Take 1 tablet (500 mg total) by mouth daily at bedtime as needed for muscle spasms   naloxone (NARCAN) 4 mg/0.1 mL nasal spray  Self No No   Sig: Administer 1 spray into a nostril. If no response after 2-3 minutes, give another dose in the other nostril using a new spray.   nitroglycerin (NITROSTAT) 0.4 mg SL tablet  Self No No   Sig: Place 1 tablet (0.4 mg total) under the tongue every 5 (five) minutes as needed for chest pain   oxybutynin (DITROPAN XL) 15 MG 24 hr tablet  Self No No   Sig: Take 1 tablet (15 mg total) by mouth daily at bedtime   roflumilast (DALIRESP) 250 MCG tablet  Self No No   Sig: TAKE 1 TABLET BY MOUTH EVERY DAY   Patient not taking: Reported on 3/17/2025   tamsulosin (FLOMAX) 0.4 mg  Self No No   Sig: Take 1 capsule (0.4 mg total) by mouth daily with dinner      Facility-Administered Medications: None     Patient's Medications   Discharge Prescriptions    ONDANSETRON (ZOFRAN) 4 MG TABLET    Take 1 tablet (4 mg total) by mouth every 8 (eight) hours as needed for nausea or vomiting       Start Date: 3/24/2025 End Date: --       Order Dose: 4 mg       Quantity: 20 tablet    Refills: 0    SUCRALFATE (CARAFATE) 1 G/10 ML SUSPENSION    Take 10 mL (1 g total) by mouth 3 (three) times a day       Start Date: 3/24/2025 End Date: 4/23/2025       Order Dose: 1 g       Quantity: 900 mL    Refills: 0     No discharge procedures on file.  ED SEPSIS DOCUMENTATION   Time reflects when diagnosis  was documented in both MDM as applicable and the Disposition within this note       Time User Action Codes Description Comment    3/24/2025  2:39 PM Sharon Quiles [R11.2,  R19.7] Nausea vomiting and diarrhea     3/24/2025  2:39 PM Sharon Quiles [J02.9] Sore throat     3/24/2025  2:39 PM Sharon Quiles [K20.90] Esophagitis                  Sharon Quiles MD  03/24/25 1446       Sharon Quiles MD  03/24/25 0836

## 2025-03-27 DIAGNOSIS — L40.9 PSORIASIS: ICD-10-CM

## 2025-03-27 DIAGNOSIS — N32.89 BLADDER SPASM: ICD-10-CM

## 2025-03-28 RX ORDER — TAMSULOSIN HYDROCHLORIDE 0.4 MG/1
0.4 CAPSULE ORAL
Qty: 90 CAPSULE | Refills: 1 | Status: SHIPPED | OUTPATIENT
Start: 2025-03-28

## 2025-03-28 RX ORDER — TAPINAROF 10 MG/1000MG
1 CREAM TOPICAL DAILY
Qty: 60 G | Refills: 0 | Status: SHIPPED | OUTPATIENT
Start: 2025-03-28

## 2025-04-09 DIAGNOSIS — J44.1 COPD EXACERBATION (HCC): ICD-10-CM

## 2025-04-09 DIAGNOSIS — J44.9 COPD (CHRONIC OBSTRUCTIVE PULMONARY DISEASE) (HCC): ICD-10-CM

## 2025-04-09 RX ORDER — ALBUTEROL SULFATE 90 UG/1
1-2 INHALANT RESPIRATORY (INHALATION)
Qty: 36 G | Refills: 1 | Status: SHIPPED | OUTPATIENT
Start: 2025-04-09

## 2025-04-09 RX ORDER — FLUTICASONE FUROATE, UMECLIDINIUM BROMIDE AND VILANTEROL TRIFENATATE 100; 62.5; 25 UG/1; UG/1; UG/1
1 POWDER RESPIRATORY (INHALATION) DAILY
Qty: 60 BLISTER | Refills: 3 | Status: SHIPPED | OUTPATIENT
Start: 2025-04-09 | End: 2025-10-06

## 2025-05-07 DIAGNOSIS — G43.919 INTRACTABLE MIGRAINE WITHOUT STATUS MIGRAINOSUS, UNSPECIFIED MIGRAINE TYPE: ICD-10-CM

## 2025-05-07 DIAGNOSIS — J44.9 CHRONIC OBSTRUCTIVE PULMONARY DISEASE, UNSPECIFIED COPD TYPE (HCC): ICD-10-CM

## 2025-05-07 DIAGNOSIS — L40.9 PSORIASIS: ICD-10-CM

## 2025-05-07 DIAGNOSIS — N32.89 BLADDER SPASM: ICD-10-CM

## 2025-05-07 RX ORDER — TAPINAROF 10 MG/1000MG
1 CREAM TOPICAL DAILY
Qty: 60 G | Refills: 0 | Status: SHIPPED | OUTPATIENT
Start: 2025-05-07

## 2025-05-07 RX ORDER — UBROGEPANT 100 MG/1
100 TABLET ORAL DAILY PRN
Qty: 30 TABLET | Refills: 0 | Status: SHIPPED | OUTPATIENT
Start: 2025-05-07

## 2025-05-08 RX ORDER — IPRATROPIUM BROMIDE AND ALBUTEROL SULFATE 2.5; .5 MG/3ML; MG/3ML
SOLUTION RESPIRATORY (INHALATION)
Qty: 360 ML | Refills: 1 | Status: SHIPPED | OUTPATIENT
Start: 2025-05-08

## 2025-05-08 RX ORDER — TAMSULOSIN HYDROCHLORIDE 0.4 MG/1
0.4 CAPSULE ORAL
Qty: 90 CAPSULE | Refills: 1 | Status: SHIPPED | OUTPATIENT
Start: 2025-05-08

## 2025-05-09 DIAGNOSIS — G43.919 INTRACTABLE MIGRAINE WITHOUT STATUS MIGRAINOSUS, UNSPECIFIED MIGRAINE TYPE: ICD-10-CM

## 2025-05-09 DIAGNOSIS — L40.9 PSORIASIS: ICD-10-CM

## 2025-05-09 RX ORDER — TAPINAROF 10 MG/1000MG
1 CREAM TOPICAL DAILY
Qty: 60 G | Refills: 0 | OUTPATIENT
Start: 2025-05-09

## 2025-05-09 RX ORDER — UBROGEPANT 100 MG/1
100 TABLET ORAL DAILY PRN
Qty: 30 TABLET | Refills: 0 | OUTPATIENT
Start: 2025-05-09

## 2025-05-19 DIAGNOSIS — N32.81 OAB (OVERACTIVE BLADDER): ICD-10-CM

## 2025-05-20 RX ORDER — OXYBUTYNIN CHLORIDE 15 MG/1
15 TABLET, EXTENDED RELEASE ORAL
Qty: 90 TABLET | Refills: 0 | Status: SHIPPED | OUTPATIENT
Start: 2025-05-20

## 2025-05-27 ENCOUNTER — OFFICE VISIT (OUTPATIENT)
Dept: SURGERY | Facility: CLINIC | Age: 70
End: 2025-05-27
Payer: COMMERCIAL

## 2025-05-27 VITALS
BODY MASS INDEX: 31.6 KG/M2 | SYSTOLIC BLOOD PRESSURE: 118 MMHG | RESPIRATION RATE: 18 BRPM | OXYGEN SATURATION: 96 % | WEIGHT: 196.6 LBS | HEART RATE: 70 BPM | HEIGHT: 66 IN | TEMPERATURE: 98 F | DIASTOLIC BLOOD PRESSURE: 70 MMHG

## 2025-05-27 DIAGNOSIS — K40.21 BILATERAL RECURRENT INGUINAL HERNIA WITHOUT OBSTRUCTION OR GANGRENE: Primary | ICD-10-CM

## 2025-05-27 PROCEDURE — 99212 OFFICE O/P EST SF 10 MIN: CPT | Performed by: STUDENT IN AN ORGANIZED HEALTH CARE EDUCATION/TRAINING PROGRAM

## 2025-05-27 NOTE — ASSESSMENT & PLAN NOTE
69-year-old male with bilateral reducible inguinal hernias  -Patient denies any changes  -Notes continued bladder spasm  -On exam bilateral reducible inguinal hernias  -Patient to get CBC and BMP, already ordered  -Plan for laparoscopic bilateral inguinal hernia repair with mesh, possible open

## 2025-05-27 NOTE — PROGRESS NOTES
Name: Danie Liao      : 1955      MRN: [id number]  Encounter Provider: Evan Rolle DO  Encounter Date: 2025   Encounter department: [facility]  :  Assessment & Plan  Bilateral recurrent inguinal hernia without obstruction or gangrene  69-year-old male with bilateral reducible inguinal hernias  -Patient denies any changes  -Notes continued bladder spasm  -On exam bilateral reducible inguinal hernias  -Patient to get CBC and BMP, already ordered  -Plan for laparoscopic bilateral inguinal hernia repair with mesh, possible open               History of Present Illness   HPI  Danie Liao is a 70 y.o. male who presents prior to bilateral inguinal hernia repair.  He has his continued discomfort and bladder spasms.  He is tolerating a diet and having bowel function.  Otherwise no changes.  History obtained from: patient    Review of Systems   Constitutional:  Negative for chills, fatigue and fever.   HENT:  Negative for congestion, hearing loss, rhinorrhea and sore throat.    Eyes:  Negative for pain and discharge.   Respiratory:  Negative for cough, chest tightness and shortness of breath.    Cardiovascular:  Negative for chest pain and palpitations.   Gastrointestinal:  Negative for abdominal pain, constipation, diarrhea, nausea and vomiting.   Endocrine: Negative for cold intolerance and heat intolerance.   Genitourinary:  Negative for dysuria.        Positive bladder spasms   Musculoskeletal:  Negative for back pain and neck pain.   Skin:  Negative for color change and rash.   Allergic/Immunologic: Negative for environmental allergies and food allergies.   Neurological:  Negative for seizures and headaches.   Hematological:  Does not bruise/bleed easily.   Psychiatric/Behavioral:  Negative for confusion and hallucinations.      Medical History Reviewed by provider this encounter:  Tobacco  Allergies  Meds  Problems  Med Hx  Surg Hx  Fam Hx     .  Past  "Medical History   Past Medical History[1]  Past Surgical History[2]  Family History[3]   reports that he quit smoking about 15 years ago. His smoking use included cigarettes. He started smoking about 55 years ago. He has a 80 pack-year smoking history. He has been exposed to tobacco smoke. He has never used smokeless tobacco. He reports that he does not currently use alcohol. He reports that he does not currently use drugs after having used the following drugs: Marijuana.  Current Outpatient Medications   Medication Instructions    albuterol (PROVENTIL HFA,VENTOLIN HFA) 90 mcg/act inhaler 1-2 puffs, Inhalation, every 4 to 6 hours as needed and as directed    aspirin 81 mg, Oral, Daily    atorvastatin (LIPITOR) 80 mg, Oral, Every evening    famotidine (PEPCID) 20 mg, Oral, 2 times daily    ipratropium-albuterol (DUO-NEB) 0.5-2.5 mg/3 mL nebulizer solution TAKE THREE ML BY NEBULIZATION 4 (FOUR) TIMES A DAY Strength: 0.5-2.5 (3) MG/3ML    methocarbamol (ROBAXIN) 500 mg, Oral, Daily at bedtime PRN    naloxone (NARCAN) 4 mg/0.1 mL nasal spray Administer 1 spray into a nostril. If no response after 2-3 minutes, give another dose in the other nostril using a new spray.    nitroglycerin (NITROSTAT) 0.4 mg, Sublingual, Every 5 minutes PRN    oxybutynin (DITROPAN XL) 15 mg, Oral, Daily at bedtime    Georgina 1 mg, Oral, Daily    roflumilast (DALIRESP) 250 mcg, Oral, Daily    tamsulosin (FLOMAX) 0.4 mg, Oral, Daily with dinner    Tapinarof (Vtama) 1 % CREA 1 Application, Apply externally, Daily    Trelegy Ellipta 100-62.5-25 MCG/ACT inhaler 1 puff, Inhalation, Daily    Ubrelvy 100 mg, Oral, Daily PRN   Allergies[4]   Medications Ordered Prior to Encounter[5]   Social History[6]     Objective   /70 (BP Location: Left arm, Patient Position: Sitting, Cuff Size: Standard)   Pulse 70   Temp 98 °F (36.7 °C)   Resp 18   Ht 5' 6\" (1.676 m)   Wt 89.2 kg (196 lb 9.6 oz)   SpO2 96%   BMI 31.73 kg/m²      Physical " Exam  Constitutional:       Appearance: Normal appearance.   HENT:      Head: Normocephalic and atraumatic.      Nose: Nose normal.     Eyes:      General: No scleral icterus.     Conjunctiva/sclera: Conjunctivae normal.       Cardiovascular:      Rate and Rhythm: Normal rate and regular rhythm.      Heart sounds: Normal heart sounds.   Pulmonary:      Effort: Pulmonary effort is normal.      Breath sounds: Normal breath sounds.   Abdominal:      General: There is no distension.      Palpations: Abdomen is soft.      Tenderness: There is no abdominal tenderness.      Comments: Reducible bilateral inguinal hernias     Musculoskeletal:         General: No signs of injury.     Skin:     General: Skin is warm.      Coloration: Skin is not jaundiced.     Neurological:      General: No focal deficit present.      Mental Status: He is alert and oriented to person, place, and time.     Psychiatric:         Mood and Affect: Mood normal.         Behavior: Behavior normal.                  [1]   Past Medical History:  Diagnosis Date    Arthritis     Asthma     Chronic pain 01/30/2024    cervic and lumbar    Colon polyp     COPD (chronic obstructive pulmonary disease) (HCC)     Emphysema of lung (HCC)     GERD (gastroesophageal reflux disease) 01/30/2024    Headache(784.0)     Hyperlipidemia 01/30/2024    Migraine     Obesity     Sleep apnea    [2]   Past Surgical History:  Procedure Laterality Date    APPENDECTOMY      CARDIAC CATHETERIZATION  06/17/2024    Procedure: Cardiac catheterization;  Surgeon: Sarah Noland DO;  [facility];  Service: Cardiology    CARDIAC CATHETERIZATION N/A 06/17/2024    Procedure: Cardiac Coronary Angiogram;  Surgeon: Sarah Noland DO;  [facility];  Service: Cardiology    CARDIAC CATHETERIZATION N/A 06/17/2024    Procedure: Cardiac FFR/IFR;  Surgeon: Sarah Noland DO;  [facility];  Service: Cardiology    CARDIAC CATHETERIZATION  N/A 06/17/2024    Procedure: Cardiac pci;  Surgeon: Sarah Noland DO;  [facility];  Service: Cardiology    CARDIAC CATHETERIZATION N/A 06/17/2024    Procedure: Cardiac IVUS;  Surgeon: Sarah Noland DO;  [facility];  Service: Cardiology    COLONOSCOPY      COLONOSCOPY      HERNIA REPAIR      4 times    NEPHRECTOMY LIVING DONOR Left 10/2009    NE EXCISION HYDROCELE UNILATERAL Left 01/30/2024    Procedure: HYDROCELECTOMY;  Surgeon: Evan Salmeron MD;  Location: [facility];  Service: Urology   [3]   Family History  Problem Relation Name Age of Onset    Breast cancer Mother Jennifer Shinunas     Heart disease Father Aj Ibarras     Heart disease Brother      Stroke Brother      Lung disease Brother      Breast cancer Maternal Grandmother Brittany Wong     Heart disease Paternal Grandmother Jonny    [4] No Known Allergies  [5]   Current Outpatient Medications on File Prior to Visit   Medication Sig Dispense Refill    albuterol (PROVENTIL HFA,VENTOLIN HFA) 90 mcg/act inhaler INHALE 1-2 PUFFS EVERY 4-6 HOURS AS NEEDED AND AS DIRECTED 36 g 1    aspirin 81 mg chewable tablet Chew 1 tablet (81 mg total) daily 90 tablet 0    atorvastatin (LIPITOR) 80 mg tablet Take 1 tablet (80 mg total) by mouth every evening 90 tablet 0    famotidine (PEPCID) 20 mg tablet Take 1 tablet (20 mg total) by mouth 2 (two) times a day 180 tablet 1    ipratropium-albuterol (DUO-NEB) 0.5-2.5 mg/3 mL nebulizer solution TAKE THREE ML BY NEBULIZATION 4 (FOUR) TIMES A DAY Strength: 0.5-2.5 (3) MG/3ML 360 mL 1    methocarbamol (ROBAXIN) 500 mg tablet Take 1 tablet (500 mg total) by mouth daily at bedtime as needed for muscle spasms 20 tablet 0    naloxone (NARCAN) 4 mg/0.1 mL nasal spray Administer 1 spray into a nostril. If no response after 2-3 minutes, give another dose in the other nostril using a new spray. 1 each 1    nitroglycerin (NITROSTAT) 0.4 mg SL tablet Place 1 tablet (0.4 mg total) under the  tongue every 5 (five) minutes as needed for chest pain 30 tablet 3    oxybutynin (DITROPAN XL) 15 MG 24 hr tablet Take 1 tablet (15 mg total) by mouth daily at bedtime 90 tablet 0    Georgina 1 MG TBEC Take 1 tablet (1 mg total) by mouth in the morning 90 tablet 2    roflumilast (DALIRESP) 250 MCG tablet TAKE 1 TABLET BY MOUTH EVERY DAY 30 tablet 3    tamsulosin (FLOMAX) 0.4 mg Take 1 capsule (0.4 mg total) by mouth daily with dinner 90 capsule 1    Tapinarof (Vtama) 1 % CREA Apply 1 Application topically in the morning 60 g 0    Trelegy Ellipta 100-62.5-25 MCG/ACT inhaler INHALE 1 PUFF DAILY 60 blister 3    Ubrelvy 100 MG tablet Take 1 tablet (100 mg total) by mouth daily as needed (migraine) 30 tablet 0    [DISCONTINUED] budesonide (PULMICORT) 0.5 mg/2 mL nebulizer solution TAKE 2 ML (0.5 MG TOTAL) BY NEBULIZATION TWICE A DAY RINSE MOUTH AFTER  mL 1    [DISCONTINUED] ondansetron (ZOFRAN) 4 mg tablet Take 1 tablet (4 mg total) by mouth every 8 (eight) hours as needed for nausea or vomiting 20 tablet 0    [DISCONTINUED] sucralfate (CARAFATE) 1 g/10 mL suspension Take 10 mL (1 g total) by mouth 3 (three) times a day 900 mL 0     No current facility-administered medications on file prior to visit.   [6]   Social History  Tobacco Use    Smoking status: Former     Current packs/day: 0.00     Average packs/day: 2.0 packs/day for 40.0 years (80.0 ttl pk-yrs)     Types: Cigarettes     Start date: 1/1/1970     Quit date: 1/1/2010     Years since quitting: 15.4     Passive exposure: Past    Smokeless tobacco: Never   Vaping Use    Vaping status: Never Used   Substance and Sexual Activity    Alcohol use: Not Currently     Comment: Occasional    Drug use: Not Currently     Types: Marijuana     Comment: long time ago    Sexual activity: Yes     Partners: Female

## 2025-05-27 NOTE — H&P (VIEW-ONLY)
Name: Danie Liao      : 1955      MRN: [id number]  Encounter Provider: Evan Rolle DO  Encounter Date: 2025   Encounter department: [facility]  :  Assessment & Plan  Bilateral recurrent inguinal hernia without obstruction or gangrene  69-year-old male with bilateral reducible inguinal hernias  -Patient denies any changes  -Notes continued bladder spasm  -On exam bilateral reducible inguinal hernias  -Patient to get CBC and BMP, already ordered  -Plan for laparoscopic bilateral inguinal hernia repair with mesh, possible open               History of Present Illness   HPI  Danie Liao is a 70 y.o. male who presents prior to bilateral inguinal hernia repair.  He has his continued discomfort and bladder spasms.  He is tolerating a diet and having bowel function.  Otherwise no changes.  History obtained from: patient    Review of Systems   Constitutional:  Negative for chills, fatigue and fever.   HENT:  Negative for congestion, hearing loss, rhinorrhea and sore throat.    Eyes:  Negative for pain and discharge.   Respiratory:  Negative for cough, chest tightness and shortness of breath.    Cardiovascular:  Negative for chest pain and palpitations.   Gastrointestinal:  Negative for abdominal pain, constipation, diarrhea, nausea and vomiting.   Endocrine: Negative for cold intolerance and heat intolerance.   Genitourinary:  Negative for dysuria.        Positive bladder spasms   Musculoskeletal:  Negative for back pain and neck pain.   Skin:  Negative for color change and rash.   Allergic/Immunologic: Negative for environmental allergies and food allergies.   Neurological:  Negative for seizures and headaches.   Hematological:  Does not bruise/bleed easily.   Psychiatric/Behavioral:  Negative for confusion and hallucinations.      Medical History Reviewed by provider this encounter:  Tobacco  Allergies  Meds  Problems  Med Hx  Surg Hx  Fam Hx     .  Past  "Medical History   Past Medical History[1]  Past Surgical History[2]  Family History[3]   reports that he quit smoking about 15 years ago. His smoking use included cigarettes. He started smoking about 55 years ago. He has a 80 pack-year smoking history. He has been exposed to tobacco smoke. He has never used smokeless tobacco. He reports that he does not currently use alcohol. He reports that he does not currently use drugs after having used the following drugs: Marijuana.  Current Outpatient Medications   Medication Instructions    albuterol (PROVENTIL HFA,VENTOLIN HFA) 90 mcg/act inhaler 1-2 puffs, Inhalation, every 4 to 6 hours as needed and as directed    aspirin 81 mg, Oral, Daily    atorvastatin (LIPITOR) 80 mg, Oral, Every evening    famotidine (PEPCID) 20 mg, Oral, 2 times daily    ipratropium-albuterol (DUO-NEB) 0.5-2.5 mg/3 mL nebulizer solution TAKE THREE ML BY NEBULIZATION 4 (FOUR) TIMES A DAY Strength: 0.5-2.5 (3) MG/3ML    methocarbamol (ROBAXIN) 500 mg, Oral, Daily at bedtime PRN    naloxone (NARCAN) 4 mg/0.1 mL nasal spray Administer 1 spray into a nostril. If no response after 2-3 minutes, give another dose in the other nostril using a new spray.    nitroglycerin (NITROSTAT) 0.4 mg, Sublingual, Every 5 minutes PRN    oxybutynin (DITROPAN XL) 15 mg, Oral, Daily at bedtime    Georgina 1 mg, Oral, Daily    roflumilast (DALIRESP) 250 mcg, Oral, Daily    tamsulosin (FLOMAX) 0.4 mg, Oral, Daily with dinner    Tapinarof (Vtama) 1 % CREA 1 Application, Apply externally, Daily    Trelegy Ellipta 100-62.5-25 MCG/ACT inhaler 1 puff, Inhalation, Daily    Ubrelvy 100 mg, Oral, Daily PRN   Allergies[4]   Medications Ordered Prior to Encounter[5]   Social History[6]     Objective   /70 (BP Location: Left arm, Patient Position: Sitting, Cuff Size: Standard)   Pulse 70   Temp 98 °F (36.7 °C)   Resp 18   Ht 5' 6\" (1.676 m)   Wt 89.2 kg (196 lb 9.6 oz)   SpO2 96%   BMI 31.73 kg/m²      Physical " Exam  Constitutional:       Appearance: Normal appearance.   HENT:      Head: Normocephalic and atraumatic.      Nose: Nose normal.     Eyes:      General: No scleral icterus.     Conjunctiva/sclera: Conjunctivae normal.       Cardiovascular:      Rate and Rhythm: Normal rate and regular rhythm.      Heart sounds: Normal heart sounds.   Pulmonary:      Effort: Pulmonary effort is normal.      Breath sounds: Normal breath sounds.   Abdominal:      General: There is no distension.      Palpations: Abdomen is soft.      Tenderness: There is no abdominal tenderness.      Comments: Reducible bilateral inguinal hernias     Musculoskeletal:         General: No signs of injury.     Skin:     General: Skin is warm.      Coloration: Skin is not jaundiced.     Neurological:      General: No focal deficit present.      Mental Status: He is alert and oriented to person, place, and time.     Psychiatric:         Mood and Affect: Mood normal.         Behavior: Behavior normal.                  [1]   Past Medical History:  Diagnosis Date    Arthritis     Asthma     Chronic pain 01/30/2024    cervic and lumbar    Colon polyp     COPD (chronic obstructive pulmonary disease) (HCC)     Emphysema of lung (HCC)     GERD (gastroesophageal reflux disease) 01/30/2024    Headache(784.0)     Hyperlipidemia 01/30/2024    Migraine     Obesity     Sleep apnea    [2]   Past Surgical History:  Procedure Laterality Date    APPENDECTOMY      CARDIAC CATHETERIZATION  06/17/2024    Procedure: Cardiac catheterization;  Surgeon: Sarah Noland DO;  [facility];  Service: Cardiology    CARDIAC CATHETERIZATION N/A 06/17/2024    Procedure: Cardiac Coronary Angiogram;  Surgeon: Sarah Noland DO;  [facility];  Service: Cardiology    CARDIAC CATHETERIZATION N/A 06/17/2024    Procedure: Cardiac FFR/IFR;  Surgeon: Sarah Noland DO;  [facility];  Service: Cardiology    CARDIAC CATHETERIZATION  N/A 06/17/2024    Procedure: Cardiac pci;  Surgeon: Sarah Noland DO;  [facility];  Service: Cardiology    CARDIAC CATHETERIZATION N/A 06/17/2024    Procedure: Cardiac IVUS;  Surgeon: Sarah Noland DO;  [facility];  Service: Cardiology    COLONOSCOPY      COLONOSCOPY      HERNIA REPAIR      4 times    NEPHRECTOMY LIVING DONOR Left 10/2009    ID EXCISION HYDROCELE UNILATERAL Left 01/30/2024    Procedure: HYDROCELECTOMY;  Surgeon: Evan Salmeron MD;  Location: [facility];  Service: Urology   [3]   Family History  Problem Relation Name Age of Onset    Breast cancer Mother Jennifer Shinunas     Heart disease Father Aj Ibarras     Heart disease Brother      Stroke Brother      Lung disease Brother      Breast cancer Maternal Grandmother Brittany Wong     Heart disease Paternal Grandmother Jonny    [4] No Known Allergies  [5]   Current Outpatient Medications on File Prior to Visit   Medication Sig Dispense Refill    albuterol (PROVENTIL HFA,VENTOLIN HFA) 90 mcg/act inhaler INHALE 1-2 PUFFS EVERY 4-6 HOURS AS NEEDED AND AS DIRECTED 36 g 1    aspirin 81 mg chewable tablet Chew 1 tablet (81 mg total) daily 90 tablet 0    atorvastatin (LIPITOR) 80 mg tablet Take 1 tablet (80 mg total) by mouth every evening 90 tablet 0    famotidine (PEPCID) 20 mg tablet Take 1 tablet (20 mg total) by mouth 2 (two) times a day 180 tablet 1    ipratropium-albuterol (DUO-NEB) 0.5-2.5 mg/3 mL nebulizer solution TAKE THREE ML BY NEBULIZATION 4 (FOUR) TIMES A DAY Strength: 0.5-2.5 (3) MG/3ML 360 mL 1    methocarbamol (ROBAXIN) 500 mg tablet Take 1 tablet (500 mg total) by mouth daily at bedtime as needed for muscle spasms 20 tablet 0    naloxone (NARCAN) 4 mg/0.1 mL nasal spray Administer 1 spray into a nostril. If no response after 2-3 minutes, give another dose in the other nostril using a new spray. 1 each 1    nitroglycerin (NITROSTAT) 0.4 mg SL tablet Place 1 tablet (0.4 mg total) under the  tongue every 5 (five) minutes as needed for chest pain 30 tablet 3    oxybutynin (DITROPAN XL) 15 MG 24 hr tablet Take 1 tablet (15 mg total) by mouth daily at bedtime 90 tablet 0    Georgina 1 MG TBEC Take 1 tablet (1 mg total) by mouth in the morning 90 tablet 2    roflumilast (DALIRESP) 250 MCG tablet TAKE 1 TABLET BY MOUTH EVERY DAY 30 tablet 3    tamsulosin (FLOMAX) 0.4 mg Take 1 capsule (0.4 mg total) by mouth daily with dinner 90 capsule 1    Tapinarof (Vtama) 1 % CREA Apply 1 Application topically in the morning 60 g 0    Trelegy Ellipta 100-62.5-25 MCG/ACT inhaler INHALE 1 PUFF DAILY 60 blister 3    Ubrelvy 100 MG tablet Take 1 tablet (100 mg total) by mouth daily as needed (migraine) 30 tablet 0    [DISCONTINUED] budesonide (PULMICORT) 0.5 mg/2 mL nebulizer solution TAKE 2 ML (0.5 MG TOTAL) BY NEBULIZATION TWICE A DAY RINSE MOUTH AFTER  mL 1    [DISCONTINUED] ondansetron (ZOFRAN) 4 mg tablet Take 1 tablet (4 mg total) by mouth every 8 (eight) hours as needed for nausea or vomiting 20 tablet 0    [DISCONTINUED] sucralfate (CARAFATE) 1 g/10 mL suspension Take 10 mL (1 g total) by mouth 3 (three) times a day 900 mL 0     No current facility-administered medications on file prior to visit.   [6]   Social History  Tobacco Use    Smoking status: Former     Current packs/day: 0.00     Average packs/day: 2.0 packs/day for 40.0 years (80.0 ttl pk-yrs)     Types: Cigarettes     Start date: 1/1/1970     Quit date: 1/1/2010     Years since quitting: 15.4     Passive exposure: Past    Smokeless tobacco: Never   Vaping Use    Vaping status: Never Used   Substance and Sexual Activity    Alcohol use: Not Currently     Comment: Occasional    Drug use: Not Currently     Types: Marijuana     Comment: long time ago    Sexual activity: Yes     Partners: Female

## 2025-05-28 ENCOUNTER — APPOINTMENT (OUTPATIENT)
Age: 70
End: 2025-05-28
Payer: COMMERCIAL

## 2025-05-28 DIAGNOSIS — E61.1 IRON DEFICIENCY: Primary | ICD-10-CM

## 2025-05-28 DIAGNOSIS — K40.21 BILATERAL RECURRENT INGUINAL HERNIA WITHOUT OBSTRUCTION OR GANGRENE: ICD-10-CM

## 2025-05-28 LAB
ANION GAP SERPL CALCULATED.3IONS-SCNC: 4 MMOL/L (ref 4–13)
BASOPHILS # BLD AUTO: 0.08 THOUSANDS/ÂΜL (ref 0–0.1)
BASOPHILS NFR BLD AUTO: 1 % (ref 0–1)
BUN SERPL-MCNC: 22 MG/DL (ref 5–25)
CALCIUM SERPL-MCNC: 9.4 MG/DL (ref 8.4–10.2)
CHLORIDE SERPL-SCNC: 104 MMOL/L (ref 96–108)
CHOLEST SERPL-MCNC: 172 MG/DL (ref ?–200)
CO2 SERPL-SCNC: 31 MMOL/L (ref 21–32)
CREAT SERPL-MCNC: 0.84 MG/DL (ref 0.6–1.3)
EOSINOPHIL # BLD AUTO: 0.34 THOUSAND/ÂΜL (ref 0–0.61)
EOSINOPHIL NFR BLD AUTO: 2 % (ref 0–6)
ERYTHROCYTE [DISTWIDTH] IN BLOOD BY AUTOMATED COUNT: 25.2 % (ref 11.6–15.1)
GFR SERPL CREATININE-BSD FRML MDRD: 89 ML/MIN/1.73SQ M
GLUCOSE P FAST SERPL-MCNC: 90 MG/DL (ref 65–99)
HCT VFR BLD AUTO: 34.3 % (ref 36.5–49.3)
HDLC SERPL-MCNC: 58 MG/DL
HGB BLD-MCNC: 9.8 G/DL (ref 12–17)
IMM GRANULOCYTES # BLD AUTO: 0.07 THOUSAND/UL (ref 0–0.2)
IMM GRANULOCYTES NFR BLD AUTO: 0 % (ref 0–2)
LDLC SERPL CALC-MCNC: 94 MG/DL (ref 0–100)
LYMPHOCYTES # BLD AUTO: 1.87 THOUSANDS/ÂΜL (ref 0.6–4.47)
LYMPHOCYTES NFR BLD AUTO: 12 % (ref 14–44)
MCH RBC QN AUTO: 17.8 PG (ref 26.8–34.3)
MCHC RBC AUTO-ENTMCNC: 28.6 G/DL (ref 31.4–37.4)
MCV RBC AUTO: 62 FL (ref 82–98)
MONOCYTES # BLD AUTO: 1.04 THOUSAND/ÂΜL (ref 0.17–1.22)
MONOCYTES NFR BLD AUTO: 7 % (ref 4–12)
NEUTROPHILS # BLD AUTO: 12.53 THOUSANDS/ÂΜL (ref 1.85–7.62)
NEUTS SEG NFR BLD AUTO: 78 % (ref 43–75)
NRBC BLD AUTO-RTO: 0 /100 WBCS
PLATELET # BLD AUTO: 508 THOUSANDS/UL (ref 149–390)
POTASSIUM SERPL-SCNC: 4.1 MMOL/L (ref 3.5–5.3)
RBC # BLD AUTO: 5.52 MILLION/UL (ref 3.88–5.62)
SODIUM SERPL-SCNC: 139 MMOL/L (ref 135–147)
TRIGL SERPL-MCNC: 99 MG/DL (ref ?–150)
WBC # BLD AUTO: 15.93 THOUSAND/UL (ref 4.31–10.16)

## 2025-05-28 PROCEDURE — 80048 BASIC METABOLIC PNL TOTAL CA: CPT

## 2025-05-28 PROCEDURE — 36415 COLL VENOUS BLD VENIPUNCTURE: CPT

## 2025-05-28 PROCEDURE — 83550 IRON BINDING TEST: CPT

## 2025-05-28 PROCEDURE — 83540 ASSAY OF IRON: CPT

## 2025-05-28 PROCEDURE — 82728 ASSAY OF FERRITIN: CPT

## 2025-05-29 ENCOUNTER — TELEPHONE (OUTPATIENT)
Dept: HEMATOLOGY ONCOLOGY | Facility: CLINIC | Age: 70
End: 2025-05-29

## 2025-05-29 ENCOUNTER — RESULTS FOLLOW-UP (OUTPATIENT)
Dept: HEMATOLOGY ONCOLOGY | Facility: CLINIC | Age: 70
End: 2025-05-29

## 2025-05-29 DIAGNOSIS — E61.1 IRON DEFICIENCY: Primary | ICD-10-CM

## 2025-05-29 LAB
FERRITIN SERPL-MCNC: 5 NG/ML (ref 30–336)
IRON SATN MFR SERPL: 4 % (ref 15–50)
IRON SERPL-MCNC: 19 UG/DL (ref 50–212)
TIBC SERPL-MCNC: 485.8 UG/DL (ref 250–450)
TRANSFERRIN SERPL-MCNC: 347 MG/DL (ref 203–362)
UIBC SERPL-MCNC: 467 UG/DL (ref 155–355)

## 2025-05-29 NOTE — TELEPHONE ENCOUNTER
----- Message from Megan Dick PA-C sent at 5/28/2025  9:37 PM EDT -----  Can you please notify patient his anemia looks worse than when I saw him 6m ago. Hgb is 9.8,=. I would like to add on an iron panel to todays and see him in short follow up if patient is in   agreement. Looks like he may need iV iron   ----- Message -----  From: Lab, Background User  Sent: 5/28/2025   6:59 PM EDT  To: Megan Dick PA-C

## 2025-05-29 NOTE — TELEPHONE ENCOUNTER
Called patient to schedule f/u appt . Patient stated that I have to call him in about an hour so he can look at his appt book

## 2025-05-30 ENCOUNTER — RESULTS FOLLOW-UP (OUTPATIENT)
Dept: NON INVASIVE DIAGNOSTICS | Facility: HOSPITAL | Age: 70
End: 2025-05-30

## 2025-06-05 DIAGNOSIS — L40.9 PSORIASIS: ICD-10-CM

## 2025-06-06 RX ORDER — TAPINAROF 10 MG/1000MG
1 CREAM TOPICAL DAILY
Qty: 60 G | Refills: 0 | Status: SHIPPED | OUTPATIENT
Start: 2025-06-06

## 2025-06-10 ENCOUNTER — OFFICE VISIT (OUTPATIENT)
Age: 70
End: 2025-06-10
Payer: COMMERCIAL

## 2025-06-10 VITALS
HEART RATE: 71 BPM | BODY MASS INDEX: 32.14 KG/M2 | TEMPERATURE: 98.2 F | OXYGEN SATURATION: 94 % | HEIGHT: 66 IN | DIASTOLIC BLOOD PRESSURE: 70 MMHG | WEIGHT: 200 LBS | SYSTOLIC BLOOD PRESSURE: 120 MMHG

## 2025-06-10 DIAGNOSIS — J44.9 COPD (CHRONIC OBSTRUCTIVE PULMONARY DISEASE) (HCC): ICD-10-CM

## 2025-06-10 DIAGNOSIS — G47.33 OSA (OBSTRUCTIVE SLEEP APNEA): ICD-10-CM

## 2025-06-10 DIAGNOSIS — G43.919 INTRACTABLE MIGRAINE WITHOUT STATUS MIGRAINOSUS, UNSPECIFIED MIGRAINE TYPE: ICD-10-CM

## 2025-06-10 DIAGNOSIS — J43.2 CENTRILOBULAR EMPHYSEMA (HCC): Primary | ICD-10-CM

## 2025-06-10 DIAGNOSIS — J44.1 COPD EXACERBATION (HCC): ICD-10-CM

## 2025-06-10 DIAGNOSIS — Z77.090 ASBESTOS EXPOSURE: ICD-10-CM

## 2025-06-10 DIAGNOSIS — Z87.891 FORMER SMOKER: ICD-10-CM

## 2025-06-10 DIAGNOSIS — K21.9 LARYNGOPHARYNGEAL REFLUX (LPR): ICD-10-CM

## 2025-06-10 PROCEDURE — 99214 OFFICE O/P EST MOD 30 MIN: CPT

## 2025-06-10 RX ORDER — UBROGEPANT 100 MG/1
100 TABLET ORAL DAILY PRN
Qty: 90 TABLET | Refills: 1 | Status: SHIPPED | OUTPATIENT
Start: 2025-06-10

## 2025-06-10 RX ORDER — FAMOTIDINE 20 MG/1
20 TABLET, FILM COATED ORAL 2 TIMES DAILY
Qty: 180 TABLET | Refills: 1 | Status: SHIPPED | OUTPATIENT
Start: 2025-06-10

## 2025-06-10 RX ORDER — ALBUTEROL SULFATE 90 UG/1
1-2 INHALANT RESPIRATORY (INHALATION) EVERY 6 HOURS PRN
Qty: 36 G | Refills: 3 | Status: SHIPPED | OUTPATIENT
Start: 2025-06-10

## 2025-06-10 RX ORDER — FLUTICASONE FUROATE, UMECLIDINIUM BROMIDE AND VILANTEROL TRIFENATATE 200; 62.5; 25 UG/1; UG/1; UG/1
1 POWDER RESPIRATORY (INHALATION) DAILY
Qty: 60 BLISTER | Refills: 1 | Status: ON HOLD | OUTPATIENT
Start: 2025-06-10 | End: 2025-06-16

## 2025-06-10 NOTE — ASSESSMENT & PLAN NOTE
-Prior PFTs in 2023 with moderately severe obstruction.  Has moderate upper lobe predominant emphysema.  More recent PFT in April 2024 showed restriction, no significant air trapping or hyperinflation  -History of elevated peripheral eosinophils, highest 460 cells  - Previously unable to tolerate azithromycin or Daliresp due to GI side effects.  Was on Georgina 1 mg daily chronically, however was recently discontinued by the manufacture  -Patient has noticed some increase in symptoms since stopping Georgina 1 month ago.  Has shortness of breath, wheezing, and cough.  Using his rescue inhaler/nebs four times per day    Plan:  -Given increase in symptoms, no longer with access to Georgina, and with elevated eosinophils suggesting type II inflammation, may benefit from Dupixent injections.  Reviewed this with the patient.  He is eager to start.  Paperwork completed and will send to our specialty medications team and wait for approval.  -Given increase symptoms, will escalate to high-dose Trelegy while we are waiting for Dupixent approval.  Once symptoms improve, we will attempt to de-escalate.  Continue albuterol HFA/nebs every 6 hours as needed  - Follow-up in 2 months or sooner if needed.

## 2025-06-10 NOTE — ASSESSMENT & PLAN NOTE
- Previously diagnosed with severe sleep apnea, unable to tolerate CPAP.  He is not interested in resuming at this time.  He is aware of the risks of untreated LEATHA

## 2025-06-10 NOTE — TELEPHONE ENCOUNTER
Dupixent enrollment forms faxed and scanned in patient's chart  Message sent to Sherrie to start the prior authorization.

## 2025-06-10 NOTE — PROGRESS NOTES
Follow-up  Visit - Pulmonary Medicine   Name: Danie Liao      : 1955      MRN: [id number]  Encounter Provider: DEEPTI Salazar  Encounter Date: 6/10/2025   Encounter department: [facility]  :  Assessment & Plan  Centrilobular emphysema (HCC)  -Prior PFTs in  with moderately severe obstruction.  Has moderate upper lobe predominant emphysema.  More recent PFT in 2024 showed restriction, no significant air trapping or hyperinflation  -History of elevated peripheral eosinophils, highest 460 cells  - Previously unable to tolerate azithromycin or Daliresp due to GI side effects.  Was on Georgina 1 mg daily chronically, however was recently discontinued by the manufacture  -Patient has noticed some increase in symptoms since stopping Georgina 1 month ago.  Has shortness of breath, wheezing, and cough.  Using his rescue inhaler/nebs four times per day    Plan:  -Given increase in symptoms, no longer with access to Georgina, and with elevated eosinophils suggesting type II inflammation, may benefit from Dupixent injections.  Reviewed this with the patient.  He is eager to start.  Paperwork completed and will send to our specialty medications team and wait for approval.  -Given increase symptoms, will escalate to high-dose Trelegy while we are waiting for Dupixent approval.  Once symptoms improve, we will attempt to de-escalate.  Continue albuterol HFA/nebs every 6 hours as needed  - Follow-up in 2 months or sooner if needed.    Former smoker  - Approximately 78-pack-year history quit in   -CT chest in 2023 with no suspicious nodules.  Did not complete CT lung cancer screening in , this was ordered for him 1/15/2025.  Reminded patient to schedule.       LEATHA (obstructive sleep apnea)  - Previously diagnosed with severe sleep apnea, unable to tolerate CPAP.  He is not interested in resuming at this time.  He is aware of the risks of untreated LEATHA    "    Asbestos exposure  -Will reevaluate pleural plaques on CT lung screening         Return in about 2 months (around 8/10/2025).    History of Present Illness   Danie Liao is a 70 y.o. male with a past medical history of COPD, LEATHA, GERD, kidney donor, CAD, inguinal hernias, and CAD who is here today for a follow-up visit.  Last seen in the office in January.  Maintained on Trelegy 100, albuterol as needed, and Georgina 1 mg daily.  Recently, Georgina was discontinued by the manufacture, has been without this for the past month.  He has noticed an increase in his shortness of breath, wheezing, and cough.  Using his rescue inhaler medications 4 times per day.  He has a friend that was started on Dupixent for his COPD with great results and he is interested in starting on this as well.     Review of Systems    Aside from what is mentioned in the HPI, ROS is otherwise negative         Medical History Reviewed by provider this encounter:  Tobacco  Allergies  Meds  Problems  Med Hx  Surg Hx  Fam Hx     .    Objective   /70   Pulse 71   Temp 98.2 °F (36.8 °C)   Ht 5' 6\" (1.676 m)   Wt 90.7 kg (200 lb)   SpO2 94%   BMI 32.28 kg/m²     Physical Exam  Vitals and nursing note reviewed.   Constitutional:       General: He is not in acute distress.     Appearance: Normal appearance. He is well-developed.     Cardiovascular:      Rate and Rhythm: Normal rate and regular rhythm.      Heart sounds: Normal heart sounds. No murmur heard.  Pulmonary:      Effort: Pulmonary effort is normal. No respiratory distress.      Breath sounds: Normal breath sounds. No decreased breath sounds, wheezing, rhonchi or rales.     Musculoskeletal:         General: No swelling.      Right lower leg: No edema.      Left lower leg: No edema.     Psychiatric:         Mood and Affect: Mood and affect normal.         Behavior: Behavior normal. Behavior is cooperative.           Diagnostic Data:  Labs: I personally reviewed the most " recent laboratory data pertinent to today's visit.      Radiology results:  Radiology Results Review: I have reviewed radiology reports from 9/29/2023 including: CT chest.      PFT/spirometry results: Reviewed study from 9/29/2023 and 4/25/2024      Oximetry testing:      Other studies:      DEEPTI Salazar

## 2025-06-12 ENCOUNTER — TELEMEDICINE (OUTPATIENT)
Dept: HEMATOLOGY ONCOLOGY | Facility: CLINIC | Age: 70
End: 2025-06-12
Payer: COMMERCIAL

## 2025-06-12 DIAGNOSIS — D75.839 THROMBOCYTOSIS: ICD-10-CM

## 2025-06-12 DIAGNOSIS — D56.8 OTHER THALASSEMIA (HCC): ICD-10-CM

## 2025-06-12 DIAGNOSIS — D50.9 MICROCYTIC ANEMIA: Primary | ICD-10-CM

## 2025-06-12 PROCEDURE — 99213 OFFICE O/P EST LOW 20 MIN: CPT | Performed by: PHYSICIAN ASSISTANT

## 2025-06-12 NOTE — PROGRESS NOTES
Name: Danie Liao      : 1955      MRN: [id number]  Encounter Provider: Megan Dick PA-C  Encounter Date: 2025   Encounter department: [facility] [place]  :  Assessment & Plan  Microcytic anemia  EGD  small hiatal hernia, mild erythema in antrum   colonoscopy 2024 showed multiple polyps without any bleeding.   Hgb 9.6, Ferritin 5  He was told his hgb was 13 when he donated however lab has persistently been showing hgb 9-10.   Advised against blood transfusions   Declined IV iron. Continue oral iron   Orders:    CBC and differential; Future    CBC and differential; Future    Other thalassemia (HCC)  Hgb electrophoresis and alpha DNA test revealed beta- thalassemia minor AND alpha thalassemia trait   Prior hgb 13-14g/dL   He has one child who is not having any children        Thrombocytosis  Suspect due to iron deficiency   Given presence of leukocytosis will check   Check BCRABL FISH   Orders:    BCR/ABL, Fish Manual; Future      Assessment & Plan    Administrative Statements   Encounter provider Megan Dick PA-C    The Patient is located at Home and in the following [facility] in which I hold an active license [place].    The patient was identified by name and date of birth. Danie Liao was informed that this is a telemedicine visit and that the visit is being conducted through Telephone.  My office door was closed. No one else was in the room.  He acknowledged consent and understanding of privacy and security of the video platform. The patient has agreed to participate and understands they can discontinue the visit at any time.    I have spent a total time of 10 minutes in caring for this patient on the day of the visit/encounter including Documenting in the medical record and Obtaining or reviewing history  , not including the time spent for establishing the audio/video connection.        No follow-ups on file.    History of  Present Illness   No chief complaint on file.    History of Present Illness   69-year-old male who was referred by his bariatric provider for evaluation of iron deficiency anemia.     On chart review, the patient has had a microcytosis at least since 2018 with normal hemoglobin.  His twin brother has history of thalassemia.  The patient himself has never been tested for thalassemia.  He has no history of anemia in the past and has never had a blood transfusion previously.  He was recently found to have microcytic anemia with hemoglobin around 10 g/dL on June 18/2024.  His prior labs from June 11 showed hemoglobin 13.5.    Last iron panel was from 05/2024: Iron 95, sat 23%, TIBC normal, ferritin 12. On further discussion with the patient, he underwent cardiac catheterization and had 2 stents placed on June 17.  Postoperatively he remembers having significant ecchymosis of bilateral lower extremities.     Patient denies any hematochezia, melena or hematuria.  No shortness of breath, chest pain, fatigue or pica.  He has no known history of any malabsorption conditions.  Currently not on any oral iron.  He reports regular diet.  He underwent colonoscopy 04/2024 that showed 8 polyps s/p polypectomy which showed tubular adenomas and hyperplastic polyp. Negative for high-grade dysplasia.     Pertinent Medical History     06/27/25: he donated blood Saturday 06/07. Reportedly his hgb was 13.4 a this time. He is not having any hematochezia, melena or hematuria. He is on 65mg iron daily. Consumes iron rich diet.      Review of Systems   Constitutional:  Negative for fatigue, fever and unexpected weight change.   Respiratory:  Negative for shortness of breath.    Cardiovascular:  Negative for chest pain.   Gastrointestinal:  Negative for blood in stool.   Genitourinary:  Negative for hematuria.   Skin:  Negative for rash.   Neurological:  Positive for light-headedness. Negative for dizziness.   All other systems reviewed and  are negative.          Objective   There were no vitals taken for this visit.    Physical Exam  Physical Exam      Results    Labs: I have reviewed the following labs:  Results for orders placed or performed in visit on 05/28/25   Basic metabolic panel   Result Value Ref Range    Sodium 139 135 - 147 mmol/L    Potassium 4.1 3.5 - 5.3 mmol/L    Chloride 104 96 - 108 mmol/L    CO2 31 21 - 32 mmol/L    ANION GAP 4 4 - 13 mmol/L    BUN 22 5 - 25 mg/dL    Creatinine 0.84 0.60 - 1.30 mg/dL    Glucose, Fasting 90 65 - 99 mg/dL    Calcium 9.4 8.4 - 10.2 mg/dL    eGFR 89 ml/min/1.73sq m   TIBC Panel (incl. Iron, TIBC, % Iron Saturation)   Result Value Ref Range    Iron Saturation 4 (L) 15 - 50 %    TIBC 485.8 (H) 250 - 450 ug/dL    Iron 19 (L) 50 - 212 ug/dL    Transferrin 347 203 - 362 mg/dL    UIBC 467 (H) 155 - 355 ug/dL   Result Value Ref Range    Ferritin 5 (L) 30 - 336 ng/mL

## 2025-06-13 ENCOUNTER — ANESTHESIA EVENT (OUTPATIENT)
Dept: PERIOP | Facility: HOSPITAL | Age: 70
End: 2025-06-13
Payer: COMMERCIAL

## 2025-06-13 ENCOUNTER — APPOINTMENT (OUTPATIENT)
Age: 70
End: 2025-06-13
Payer: COMMERCIAL

## 2025-06-13 DIAGNOSIS — I25.118 CORONARY ARTERY DISEASE OF NATIVE ARTERY OF NATIVE HEART WITH STABLE ANGINA PECTORIS (HCC): ICD-10-CM

## 2025-06-13 DIAGNOSIS — D50.9 MICROCYTIC ANEMIA: ICD-10-CM

## 2025-06-13 DIAGNOSIS — Z95.5 S/P DRUG ELUTING CORONARY STENT PLACEMENT: ICD-10-CM

## 2025-06-13 DIAGNOSIS — K91.840 COLONOSCOPY CAUSING POST-PROCEDURAL BLEEDING: ICD-10-CM

## 2025-06-13 LAB
BASOPHILS # BLD AUTO: 0.05 THOUSANDS/ÂΜL (ref 0–0.1)
BASOPHILS NFR BLD AUTO: 0 % (ref 0–1)
CHOLEST SERPL-MCNC: 163 MG/DL (ref ?–200)
EOSINOPHIL # BLD AUTO: 0.38 THOUSAND/ÂΜL (ref 0–0.61)
EOSINOPHIL NFR BLD AUTO: 3 % (ref 0–6)
ERYTHROCYTE [DISTWIDTH] IN BLOOD BY AUTOMATED COUNT: 28.4 % (ref 11.6–15.1)
HCT VFR BLD AUTO: 32.5 % (ref 36.5–49.3)
HDLC SERPL-MCNC: 50 MG/DL
HGB BLD-MCNC: 9.6 G/DL (ref 12–17)
IMM GRANULOCYTES # BLD AUTO: 0.06 THOUSAND/UL (ref 0–0.2)
IMM GRANULOCYTES NFR BLD AUTO: 1 % (ref 0–2)
LDLC SERPL CALC-MCNC: 101 MG/DL (ref 0–100)
LYMPHOCYTES # BLD AUTO: 1.56 THOUSANDS/ÂΜL (ref 0.6–4.47)
LYMPHOCYTES NFR BLD AUTO: 13 % (ref 14–44)
MCH RBC QN AUTO: 19 PG (ref 26.8–34.3)
MCHC RBC AUTO-ENTMCNC: 29.5 G/DL (ref 31.4–37.4)
MCV RBC AUTO: 65 FL (ref 82–98)
MONOCYTES # BLD AUTO: 0.85 THOUSAND/ÂΜL (ref 0.17–1.22)
MONOCYTES NFR BLD AUTO: 7 % (ref 4–12)
NEUTROPHILS # BLD AUTO: 9.03 THOUSANDS/ÂΜL (ref 1.85–7.62)
NEUTS SEG NFR BLD AUTO: 76 % (ref 43–75)
NRBC BLD AUTO-RTO: 0 /100 WBCS
PLATELET # BLD AUTO: 549 THOUSANDS/UL (ref 149–390)
RBC # BLD AUTO: 5.04 MILLION/UL (ref 3.88–5.62)
TRIGL SERPL-MCNC: 62 MG/DL (ref ?–150)
WBC # BLD AUTO: 11.93 THOUSAND/UL (ref 4.31–10.16)

## 2025-06-13 PROCEDURE — 80061 LIPID PANEL: CPT

## 2025-06-13 PROCEDURE — 36415 COLL VENOUS BLD VENIPUNCTURE: CPT

## 2025-06-13 PROCEDURE — 85025 COMPLETE CBC W/AUTO DIFF WBC: CPT

## 2025-06-16 ENCOUNTER — ANESTHESIA (OUTPATIENT)
Dept: PERIOP | Facility: HOSPITAL | Age: 70
End: 2025-06-16
Payer: COMMERCIAL

## 2025-06-16 ENCOUNTER — HOSPITAL ENCOUNTER (OUTPATIENT)
Facility: HOSPITAL | Age: 70
Setting detail: OUTPATIENT SURGERY
Discharge: HOME | End: 2025-06-16
Attending: STUDENT IN AN ORGANIZED HEALTH CARE EDUCATION/TRAINING PROGRAM | Admitting: STUDENT IN AN ORGANIZED HEALTH CARE EDUCATION/TRAINING PROGRAM
Payer: COMMERCIAL

## 2025-06-16 VITALS
DIASTOLIC BLOOD PRESSURE: 68 MMHG | HEIGHT: 66 IN | TEMPERATURE: 98 F | OXYGEN SATURATION: 96 % | BODY MASS INDEX: 31.46 KG/M2 | WEIGHT: 195.77 LBS | HEART RATE: 84 BPM | RESPIRATION RATE: 16 BRPM | SYSTOLIC BLOOD PRESSURE: 130 MMHG

## 2025-06-16 DIAGNOSIS — K40.21 BILATERAL RECURRENT INGUINAL HERNIA WITHOUT OBSTRUCTION OR GANGRENE: Primary | ICD-10-CM

## 2025-06-16 PROCEDURE — 49650 LAP ING HERNIA REPAIR INIT: CPT | Performed by: STUDENT IN AN ORGANIZED HEALTH CARE EDUCATION/TRAINING PROGRAM

## 2025-06-16 PROCEDURE — 49650 LAP ING HERNIA REPAIR INIT: CPT | Performed by: PHYSICIAN ASSISTANT

## 2025-06-16 PROCEDURE — C1781 MESH (IMPLANTABLE): HCPCS | Performed by: STUDENT IN AN ORGANIZED HEALTH CARE EDUCATION/TRAINING PROGRAM

## 2025-06-16 DEVICE — LAPAROSCOPIC SELF-FIXATING MESH POLYESTER WITH POLYLACTIC ACID GRIPS AND COLLAGEN FILM
Type: IMPLANTABLE DEVICE | Site: INGUINAL | Status: FUNCTIONAL
Brand: PROGRIP

## 2025-06-16 RX ORDER — CEFAZOLIN SODIUM 2 G/50ML
2000 SOLUTION INTRAVENOUS ONCE
Status: COMPLETED | OUTPATIENT
Start: 2025-06-16 | End: 2025-06-16

## 2025-06-16 RX ORDER — SODIUM CHLORIDE, SODIUM LACTATE, POTASSIUM CHLORIDE, CALCIUM CHLORIDE 600; 310; 30; 20 MG/100ML; MG/100ML; MG/100ML; MG/100ML
INJECTION, SOLUTION INTRAVENOUS CONTINUOUS PRN
Status: DISCONTINUED | OUTPATIENT
Start: 2025-06-16 | End: 2025-06-16

## 2025-06-16 RX ORDER — MIDAZOLAM HYDROCHLORIDE 2 MG/2ML
INJECTION, SOLUTION INTRAMUSCULAR; INTRAVENOUS AS NEEDED
Status: DISCONTINUED | OUTPATIENT
Start: 2025-06-16 | End: 2025-06-16

## 2025-06-16 RX ORDER — FENTANYL CITRATE 50 UG/ML
INJECTION, SOLUTION INTRAMUSCULAR; INTRAVENOUS AS NEEDED
Status: DISCONTINUED | OUTPATIENT
Start: 2025-06-16 | End: 2025-06-16

## 2025-06-16 RX ORDER — ALBUTEROL SULFATE 90 UG/1
INHALANT RESPIRATORY (INHALATION) AS NEEDED
Status: DISCONTINUED | OUTPATIENT
Start: 2025-06-16 | End: 2025-06-16

## 2025-06-16 RX ORDER — OXYCODONE HYDROCHLORIDE 5 MG/1
5 TABLET ORAL EVERY 6 HOURS PRN
Refills: 0 | Status: DISCONTINUED | OUTPATIENT
Start: 2025-06-16 | End: 2025-06-16 | Stop reason: HOSPADM

## 2025-06-16 RX ORDER — ROCURONIUM BROMIDE 10 MG/ML
INJECTION, SOLUTION INTRAVENOUS AS NEEDED
Status: DISCONTINUED | OUTPATIENT
Start: 2025-06-16 | End: 2025-06-16

## 2025-06-16 RX ORDER — PROMETHAZINE HYDROCHLORIDE 25 MG/ML
12.5 INJECTION, SOLUTION INTRAMUSCULAR; INTRAVENOUS
Status: DISCONTINUED | OUTPATIENT
Start: 2025-06-16 | End: 2025-06-16 | Stop reason: HOSPADM

## 2025-06-16 RX ORDER — LIDOCAINE HYDROCHLORIDE 20 MG/ML
INJECTION, SOLUTION EPIDURAL; INFILTRATION; INTRACAUDAL; PERINEURAL AS NEEDED
Status: DISCONTINUED | OUTPATIENT
Start: 2025-06-16 | End: 2025-06-16

## 2025-06-16 RX ORDER — CHLORHEXIDINE GLUCONATE 40 MG/ML
SOLUTION TOPICAL DAILY PRN
Status: DISCONTINUED | OUTPATIENT
Start: 2025-06-16 | End: 2025-06-16 | Stop reason: HOSPADM

## 2025-06-16 RX ORDER — FLUTICASONE FUROATE, UMECLIDINIUM BROMIDE AND VILANTEROL TRIFENATATE 100; 62.5; 25 UG/1; UG/1; UG/1
1 POWDER RESPIRATORY (INHALATION) DAILY
Qty: 60 BLISTER | Refills: 0 | Status: SHIPPED | OUTPATIENT
Start: 2025-06-16 | End: 2025-12-13

## 2025-06-16 RX ORDER — HEPARIN SODIUM 5000 [USP'U]/ML
5000 INJECTION, SOLUTION INTRAVENOUS; SUBCUTANEOUS ONCE
Status: COMPLETED | OUTPATIENT
Start: 2025-06-16 | End: 2025-06-16

## 2025-06-16 RX ORDER — PROPOFOL 10 MG/ML
INJECTION, EMULSION INTRAVENOUS AS NEEDED
Status: DISCONTINUED | OUTPATIENT
Start: 2025-06-16 | End: 2025-06-16

## 2025-06-16 RX ORDER — SODIUM CHLORIDE, SODIUM LACTATE, POTASSIUM CHLORIDE, CALCIUM CHLORIDE 600; 310; 30; 20 MG/100ML; MG/100ML; MG/100ML; MG/100ML
100 INJECTION, SOLUTION INTRAVENOUS CONTINUOUS
Status: DISCONTINUED | OUTPATIENT
Start: 2025-06-16 | End: 2025-06-16 | Stop reason: HOSPADM

## 2025-06-16 RX ORDER — BUPIVACAINE HYDROCHLORIDE 2.5 MG/ML
INJECTION, SOLUTION EPIDURAL; INFILTRATION; INTRACAUDAL; PERINEURAL AS NEEDED
Status: DISCONTINUED | OUTPATIENT
Start: 2025-06-16 | End: 2025-06-16 | Stop reason: HOSPADM

## 2025-06-16 RX ORDER — MAGNESIUM HYDROXIDE 1200 MG/15ML
LIQUID ORAL AS NEEDED
Status: DISCONTINUED | OUTPATIENT
Start: 2025-06-16 | End: 2025-06-16 | Stop reason: HOSPADM

## 2025-06-16 RX ORDER — ACETAMINOPHEN 325 MG/1
650 TABLET ORAL EVERY 6 HOURS PRN
Status: DISCONTINUED | OUTPATIENT
Start: 2025-06-16 | End: 2025-06-16 | Stop reason: HOSPADM

## 2025-06-16 RX ORDER — HYDROMORPHONE HYDROCHLORIDE 2 MG/ML
INJECTION, SOLUTION INTRAMUSCULAR; INTRAVENOUS; SUBCUTANEOUS AS NEEDED
Status: DISCONTINUED | OUTPATIENT
Start: 2025-06-16 | End: 2025-06-16

## 2025-06-16 RX ORDER — HYDROMORPHONE HCL/PF 1 MG/ML
0.5 SYRINGE (ML) INJECTION
Refills: 0 | Status: DISCONTINUED | OUTPATIENT
Start: 2025-06-16 | End: 2025-06-16 | Stop reason: HOSPADM

## 2025-06-16 RX ORDER — FENTANYL CITRATE/PF 50 MCG/ML
50 SYRINGE (ML) INJECTION
Refills: 0 | Status: COMPLETED | OUTPATIENT
Start: 2025-06-16 | End: 2025-06-16

## 2025-06-16 RX ORDER — ONDANSETRON 2 MG/ML
4 INJECTION INTRAMUSCULAR; INTRAVENOUS ONCE AS NEEDED
Status: DISCONTINUED | OUTPATIENT
Start: 2025-06-16 | End: 2025-06-16 | Stop reason: HOSPADM

## 2025-06-16 RX ORDER — DOCUSATE SODIUM 100 MG/1
100 CAPSULE, LIQUID FILLED ORAL 3 TIMES DAILY PRN
Qty: 30 CAPSULE | Refills: 0 | Status: SHIPPED | OUTPATIENT
Start: 2025-06-16 | End: 2025-06-26

## 2025-06-16 RX ORDER — ONDANSETRON 2 MG/ML
INJECTION INTRAMUSCULAR; INTRAVENOUS AS NEEDED
Status: DISCONTINUED | OUTPATIENT
Start: 2025-06-16 | End: 2025-06-16

## 2025-06-16 RX ORDER — OXYCODONE HYDROCHLORIDE 5 MG/1
5 TABLET ORAL EVERY 6 HOURS PRN
Qty: 12 TABLET | Refills: 0 | Status: SHIPPED | OUTPATIENT
Start: 2025-06-16 | End: 2025-06-20

## 2025-06-16 RX ADMIN — MIDAZOLAM HYDROCHLORIDE 2 MG: 1 INJECTION, SOLUTION INTRAMUSCULAR; INTRAVENOUS at 08:24

## 2025-06-16 RX ADMIN — ONDANSETRON 4 MG: 2 INJECTION INTRAMUSCULAR; INTRAVENOUS at 11:05

## 2025-06-16 RX ADMIN — SODIUM CHLORIDE, SODIUM LACTATE, POTASSIUM CHLORIDE, AND CALCIUM CHLORIDE: .6; .31; .03; .02 INJECTION, SOLUTION INTRAVENOUS at 08:17

## 2025-06-16 RX ADMIN — ALBUTEROL SULFATE 6 PUFF: 90 AEROSOL, METERED RESPIRATORY (INHALATION) at 11:04

## 2025-06-16 RX ADMIN — ROCURONIUM 10 MG: 50 INJECTION, SOLUTION INTRAVENOUS at 08:43

## 2025-06-16 RX ADMIN — OXYCODONE HYDROCHLORIDE 5 MG: 5 TABLET ORAL at 12:23

## 2025-06-16 RX ADMIN — LIDOCAINE HYDROCHLORIDE 50 MG: 20 INJECTION, SOLUTION EPIDURAL; INFILTRATION; INTRACAUDAL; PERINEURAL at 08:28

## 2025-06-16 RX ADMIN — FENTANYL CITRATE 50 MCG: 50 INJECTION INTRAMUSCULAR; INTRAVENOUS at 12:00

## 2025-06-16 RX ADMIN — ACETAMINOPHEN 650 MG: 325 TABLET ORAL at 12:47

## 2025-06-16 RX ADMIN — SUGAMMADEX 200 MG: 100 INJECTION, SOLUTION INTRAVENOUS at 11:19

## 2025-06-16 RX ADMIN — ROCURONIUM 40 MG: 50 INJECTION, SOLUTION INTRAVENOUS at 10:27

## 2025-06-16 RX ADMIN — SODIUM CHLORIDE, SODIUM LACTATE, POTASSIUM CHLORIDE, AND CALCIUM CHLORIDE: .6; .31; .03; .02 INJECTION, SOLUTION INTRAVENOUS at 10:45

## 2025-06-16 RX ADMIN — HYDROMORPHONE HYDROCHLORIDE 0.5 MG: 2 INJECTION, SOLUTION INTRAMUSCULAR; INTRAVENOUS; SUBCUTANEOUS at 10:27

## 2025-06-16 RX ADMIN — HEPARIN SODIUM 5000 UNITS: 5000 INJECTION INTRAVENOUS; SUBCUTANEOUS at 08:31

## 2025-06-16 RX ADMIN — FENTANYL CITRATE 50 MCG: 50 INJECTION INTRAMUSCULAR; INTRAVENOUS at 11:54

## 2025-06-16 RX ADMIN — CEFAZOLIN SODIUM 2000 MG: 2 SOLUTION INTRAVENOUS at 08:31

## 2025-06-16 RX ADMIN — ROCURONIUM 10 MG: 50 INJECTION, SOLUTION INTRAVENOUS at 09:22

## 2025-06-16 RX ADMIN — FENTANYL CITRATE 50 MCG: 50 INJECTION, SOLUTION INTRAMUSCULAR; INTRAVENOUS at 08:56

## 2025-06-16 RX ADMIN — PROPOFOL 200 MG: 10 INJECTION, EMULSION INTRAVENOUS at 08:28

## 2025-06-16 RX ADMIN — SODIUM CHLORIDE, SODIUM LACTATE, POTASSIUM CHLORIDE, AND CALCIUM CHLORIDE 100 ML/HR: .6; .31; .03; .02 INJECTION, SOLUTION INTRAVENOUS at 12:30

## 2025-06-16 RX ADMIN — FENTANYL CITRATE 50 MCG: 50 INJECTION, SOLUTION INTRAMUSCULAR; INTRAVENOUS at 08:28

## 2025-06-16 RX ADMIN — ROCURONIUM 40 MG: 50 INJECTION, SOLUTION INTRAVENOUS at 08:28

## 2025-06-16 NOTE — INTERVAL H&P NOTE
H&P reviewed. After examining the patient I find no changes in the patients condition since the H&P had been written.    Vitals:    06/16/25 0730   BP: 118/74   Pulse: 83   Resp: (!) 30   Temp: (!) 97 °F (36.1 °C)   SpO2: 95%

## 2025-06-16 NOTE — OP NOTE
OPERATIVE REPORT  PATIENT NAME: Danie Liao    :  1955  MRN: [id number]  Pt Location: [facility] OR ROOM 03    SURGERY DATE: 2025    Surgeons and Role:     * Evan Rolle, DO - Primary     * Caitlin Chandler PA-C    Preop Diagnosis:  Bilateral recurrent inguinal hernia without obstruction or gangrene [K40.21]    Post-Op Diagnosis Codes:     * Bilateral recurrent inguinal hernia without obstruction or gangrene [K40.21]    Procedure(s):  Bilateral - REPAIR HERNIA INGUINAL. LAPAROSCOPIC.    Specimen(s):  * No specimens in log *    Estimated Blood Loss:   Minimal    Drains:  Closed/Suction Drain Left Groin Bulb 19 Fr. (Active)   Number of days: 503       Anesthesia Type:   General    Operative Indications:  Bilateral recurrent inguinal hernia without obstruction or gangrene [K40.21]    Operative Findings:  Moderate-sized right direct inguinal hernia  Small left indirect inguinal hernia containing cord lipoma    Complications:   None    Procedure and Technique:  The patient was seen again in Preoperative Holding.  All the risks, benefits, complications, treatment options, and expected outcomes were discussed with the patient and family at length. All questions were answered to satisfaction. There was concurrence with the proposed plan and informed consent was obtained. The site of surgery was properly noted/marked. The patient was taken to Operating Room, identified, and the procedure verified as Bilateral laparoscopic inguinal hernia repair with mesh, possible open.    The patient was placed in the supine position on the operating room table.  The patient had received preoperative antibiotics and SCDs placed on the bilateral lower extremities.  Anesthesia was then induced and the patient was intubated.  The patient's arms were tucked bilaterally.    The abdomen was then prepped and draped in the usual sterile fashion using ChloraPrep.  A Time-Out was then performed with all involved present confirming the  correct patient, procedure, antibiotics, and any additional concerns. A 1.5 centimeter supraumbilical incision was made in a transverse fashion using a #15 blade and the umbilical stalk was grasped and elevated.  The patient was noted to have a small umbilical hernia and the umbilicus was elevated off the fascia.  The fascial edges were grasped with Kocher clamps.  Using a large blunt Judith clamp the peritoneum was entered under direct visualization.  A 11 mm port was then placed in the fascial opening and pneumoperitoneum was established.  Initial pressure upon establishing pneumoperitoneum was noted to be low.  Two additional 5 mm ports were placed under direct visualization; one on the right side of the abdomen just above the umbilicus in the midclavicular line and one on the left side of the abdomen just above the umbilicus in the midclavicular line.  There was a moderate-sized right direct inguinal hernia and a small left indirect inguinal hernia. The peritoneum overlying the Right groin was incised and reflected. The hernia sac was identified and carefully dissected from the cord structures without injuring them. The preperitoneal space was carefully expanded using blunt dissection in preparation for mesh placement.  Prior stitches from open hernia repair were noted.  A 15 cm by 10 cm ProGrip mesh was marked for orientation and introduced into the abdomen via the 11 mm port. The mesh was introduced into the preperitoneal space and and spread evenly making sure to cover the hernia defect. The peritoneum was closed with running suture of 0 absorbable V-Loc with the Endo Stitch device. Hemostasis was noted. The peritoneum overlying the Left groin was incised and reflected. The hernia sac was identified and carefully dissected from the cord structures without injuring them. The preperitoneal space was carefully expanded using blunt dissection in preparation for mesh placement.  Stitches from prior inguinal hernia  repair were seen and also a piece of mesh in the direct space.  A 15 cm by 10 cm ProGrip mesh was marked for orientation and introduced into the abdomen via the 11 mm port. The mesh was introduced into the preperitoneal space and and spread evenly making sure to cover the hernia defect. The peritoneum was closed with running suture of 0 absorbable V-Loc with the Endo Stitch device. Hemostasis was noted.The abdomen was desufflated and the umbilical fascial defect was closed with 0 Vicryl in an interrupted fashion using 5 stitches.  The abdomen was then reinsufflated and the fascial incision was inspected and noted to be hemostatic without any insufflation leakage.  The umbilicus was reattached to the fascia using interrupted 0 Vicryl.  The remaining 5 mm ports were removed and the abdomen was desufflated.  Local anesthesia infiltrated in the port sites using 0.25% Marcaine. The port sites were then closed using 4-0 Monocryl.  The abdomen was then cleansed and dried and Steri-Strips, 2 x 2, Tegaderm were used to cover the sites.    All instrument, sponge, and needle counts were noted to be correct at the conclusion of the case.     I was present for the entire procedure., A qualified resident physician was not available., and A physician assistant was required during the procedure for retraction, tissue handling, dissection and suturing.    Patient Disposition:  PACU  and extubated and stable    SIGNATURE: Evan Rolle DO  DATE: June 16, 2025  TIME: 11:06 AM

## 2025-06-16 NOTE — ANESTHESIA PREPROCEDURE EVALUATION
Procedure:  REPAIR HERNIA INGUINAL, LAPAROSCOPIC, Possible Open (Bilateral: Groin)    Relevant Problems   CARDIO   (+) Coronary artery disease of native heart with stable angina pectoris (HCC)   (+) Hyperlipidemia   (+) Intractable migraine without status migrainosus      GI/HEPATIC   (+) GERD (gastroesophageal reflux disease)      /RENAL   (+) Single kidney      MUSCULOSKELETAL   (+) Primary osteoarthritis of right knee      NEURO/PSYCH   (+) Intractable migraine without status migrainosus      PULMONARY   (+) Chronic obstructive pulmonary disease (HCC)   (+) LEATHA (obstructive sleep apnea)      Asthma    Hyperlipidemia    Chronic pain cervic and lumbar   COPD (chronic obstructive pulmonary disease) (HCC)    Migraine    Emphysema of lung (HCC)    Sleep apnea    GERD (gastroesophageal reflux disease)    Arthritis    Headache(784.0)    Obesity    Colon polyp      Prior PFTs in 2023 with moderately severe obstruction     Physical Exam    Airway     Mallampati score: II  TM Distance: >3 FB  Neck ROM: full      Cardiovascular  Cardiovascular exam normal    Dental       Pulmonary  Pulmonary exam normal     Neurological      Other Findings    LCX with two intermediate lesions that were DFR Negative on Spot Check and by Pullback  ·  S/P Successful IVUS Guided PCI with SHELL x 1 to the DFR Positive 65% DONALD lesion with Post Dilation up to 16 park at 3.07 mm  ·  RCA is a nondominant vessel with a conus larger than the RCA proper  ·  Exceptionally tortuous innominate and very short root necessitating transition from RRA to RFA  ·  1st Diag lesion is 65% stenosed.     Stress Function: Left ventricular function post-stress is normal. Stress ejection fraction is 68%.   Anesthesia Plan  ASA Score- 3     Anesthesia Type- general with ASA Monitors.         Additional Monitors:     Airway Plan: Oral ETT.           Plan Factors-Exercise tolerance (METS): >4 METS.    Chart reviewed. EKG reviewed. Imaging results reviewed. Existing labs  reviewed. Patient summary reviewed.                  Induction- intravenous.    Postoperative Plan- Plan for postoperative opioid use.   Monitoring Plan -   Post Operative Pain Plan - plan for postoperative opioid use        Informed Consent- Anesthetic plan and risks discussed with patient.  I personally reviewed this patient with the CRNA. Discussed and agreed on the Anesthesia Plan with the CRNA..      NPO Status:  No vitals data found for the desired time range.

## 2025-06-16 NOTE — DISCHARGE INSTR - AVS FIRST PAGE
Your incisions are covered with Steri-Strips, 4 x 4 gauze, Tegaderm.  In 2 days you may remove your dressing, the Steri-Strips will remain on your skin but the 4 x 4 gauze and Tegaderm can be removed.  The Steri-Strips will fall off on their own.  After your dressings are removed you may shower.  Do not soak your incisions including tub baths or swimming.  You may shower and let water and soap wash over incisions. Do not scrub your incisions.  Your scrotum may become swollen or bruised, this is normal and should resolve on its own.  No heavy lifting greater than 15 lb for 4 weeks or until cleared by your Surgeon.  You may resume your normal diet as tolerated.  Do not make any important decisions and do not drive while taking narcotic pain medication.  You are prescribed Oxycodone for severe pain. Take only as needed.  Take Colace to soften your stool while taking narcotic pain medication.  You may take Tylenol over-the-counter as needed for pain, follow instructions on the bottle.  You may take Ibuprofen over-the-counter as needed for pain, follow instructions on the bottle.  You may alternate Tylenol and Ibuprofen if needed, but do not take at the same time.  Follow-up with your Surgeon in 2 weeks, call the office for an appointment.  You will receive a survey via Email in regards to your same day surgery experience. Please fill out the survey to let us know how we did with your care.

## 2025-06-16 NOTE — ANESTHESIA POSTPROCEDURE EVALUATION
"   11/21/23 1400   Appointment Info   Signing Clinician's Name / Credentials (PT) Karime Gordillo PT, DPT   Rehab Comments (PT) LLE WBAT per previous admission   Living Environment   People in Home spouse   Current Living Arrangements house   Home Accessibility no concerns   Transportation Anticipated family or friend will provide   Living Environment Comments Pt lives in one level home with level entry   Self-Care   Usual Activity Tolerance good   Current Activity Tolerance moderate   Regular Exercise No   Equipment Currently Used at Home none;wheelchair, manual  (pt reports not using AD with mobility, unsure if manual w/c in room is his)   Fall history within last six months yes   Number of times patient has fallen within last six months 10   Activity/Exercise/Self-Care Comment Unsure accuracy of information provided by pt 2/2 cognition, unsure if manual w/c in room is pt's   General Information   Onset of Illness/Injury or Date of Surgery 11/17/23   Referring Physician Elijah Blevins,    Patient/Family Therapy Goals Statement (PT) wants to return home   Pertinent History of Current Problem (include personal factors and/or comorbidities that impact the POC) Per pt's chart, \"Darian Engle is a 73 year old male admitted on 11/17/2023 with recurrent left foot cellulitis. On Vancomycin and Cefepime. Vascular surgery to evaluate if foot is salvageable; pending RHONDA. Pt recently admitted 2/2  left foot gangrene s/p transmetatarsal amputation (10/27/23) c/b staph aureus bacteremia.\"   Existing Precautions/Restrictions fall   Weight-Bearing Status - LLE weight-bearing as tolerated   Weight-Bearing Status - RLE full weight-bearing   General Observations activity, up ad kesha   Cognition   Affect/Mental Status (Cognition) confused   Cognitive Status Comments baseline dementia   Pain Assessment   Patient Currently in Pain Yes, see Vital Sign flowsheet   Integumentary/Edema   Integumentary/Edema Comments LLE wrapped " Post-Op Assessment Note            No anethesia notable event occurred.            Last Filed PACU Vitals:  Vitals Value Taken Time   Temp 97 °F (36.1 °C) 06/16/25 12:10   Pulse 84 06/16/25 12:11   /72 06/16/25 12:10   Resp 13 06/16/25 12:11   SpO2 92 % 06/16/25 12:11   Vitals shown include unfiled device data.    Modified Sushila:     Vitals Value Taken Time   Activity 2 06/16/25 12:10   Respiration 2 06/16/25 12:10   Circulation 2 06/16/25 12:10   Consciousness 2 06/16/25 12:10   Oxygen Saturation 1 06/16/25 12:10     Modified Sushila Score: 9             in dressing, appears intact   Posture    Posture Forward head position;Protracted shoulders   Range of Motion (ROM)   Range of Motion ROM is WFL   Strength (Manual Muscle Testing)   Strength (Manual Muscle Testing) strength is WFL   Bed Mobility   Comment, (Bed Mobility) supine<>sit SBA   Transfers   Comment, (Transfers) STS with CGAx2   Gait/Stairs (Locomotion)   Comment, (Gait/Stairs) did not assess on this date   Balance   Balance Comments good EOB seated balance   Sensory Examination   Sensory Perception patient reports no sensory changes   Clinical Impression   Criteria for Skilled Therapeutic Intervention Yes, treatment indicated   PT Diagnosis (PT) impaired functional mobility   Influenced by the following impairments decreased activity tolerance, increased pain   Functional limitations due to impairments transfers, gait   Clinical Presentation (PT Evaluation Complexity) evolving   Clinical Presentation Rationale clinical reasoning   Clinical Decision Making (Complexity) moderate complexity   Planned Therapy Interventions (PT) balance training;bed mobility training;gait training;home exercise program;motor coordination training;neuromuscular re-education;patient/family education;postural re-education;ROM (range of motion);stair training;strengthening;stretching;transfer training;progressive activity/exercise;risk factor education;home program guidelines   Risk & Benefits of therapy have been explained evaluation/treatment results reviewed;care plan/treatment goals reviewed;risks/benefits reviewed;current/potential barriers reviewed;participants voiced agreement with care plan;participants included;patient   Clinical Impression Comments Due to pt's cognition, unsure of exact baseline but based off of previous admission notes, pt is below functional mobility baseline and is primarily limited by decreased activity tolerance and increased pain. Pt will benefit from skilled PT during LOS to improve impairments and  progress back to PLOF.   PT Total Evaluation Time   PT Eval, Moderate Complexity Minutes (41682) 10   Physical Therapy Goals   PT Frequency 5x/week   PT Predicted Duration/Target Date for Goal Attainment 12/29/23   PT Goals Transfers;Bed Mobility;Gait;PT Goal 1   PT: Bed Mobility Independent;Supine to/from sit;Rolling;Bridging   PT: Transfers Independent;Sit to/from stand;Bed to/from chair   PT: Gait Independent;100 feet;Assistive device   PT: Goal 1 Pt will demo IND with BLE proximal strengthening HEP.   Therapeutic Activity   Therapeutic Activities: dynamic activities to improve functional performance Minutes (83603) 14   Symptoms Noted During/After Treatment Fatigue   Treatment Detail/Skilled Intervention Pt greeted seated EOB, agreeable to session. Completed STS from moderate and low bed heights with CGAx2 and no AD, PT placed pt's L foot on top of PT's to assess WB while performing STS, no weight placed on LLE. Gave pt BLE prox strengthening handout and encouraged pt to complete exercise on own, declined performing any and demo of exercises on this date. Session ended supine, all needs met, call light within reach.   PT Discharge Planning   PT Plan clarify WB orders for LLE, FWW for STS/SPT to recliner, initiate gait if appropriate   PT Discharge Recommendation (DC Rec) Transitional Care Facility   PT Rationale for DC Rec Pt is below functional mobility baseline and is primarily limited by decreased activity tolerance and increased pain. If pt were to d/c today, would recommend TCU to improve impairments and progress back to PLOF.   PT Brief overview of current status CGA with STS, SBA bed mobility   Total Session Time   Timed Code Treatment Minutes 14   Total Session Time (sum of timed and untimed services) 24

## 2025-06-17 ENCOUNTER — NURSE TRIAGE (OUTPATIENT)
Age: 70
End: 2025-06-17

## 2025-06-17 NOTE — TELEPHONE ENCOUNTER
"REASON FOR CONVERSATION: Chest Pain    SYMPTOMS: Pt called in to cancel his appointment for tomorrow because he just had hernia surgery yesterday and he is unable to drive. Pt proceeds to states that he has had chest pain that comes and goes for awhile now. He states that it also radiated into his left arm but unsure if it the pain is related to herniated discs in his neck. He has chronic SOB related to his COPD. He reports elevated hearts into the 130s when going from room to room. He states that his heart rate does come down with rest.     OTHER HEALTH INFORMATION: Recent hernia surgery on 6/16    PROTOCOL DISPOSITION: See Today or Tomorrow in Office (overriding See Today in Office)    CARE ADVICE PROVIDED: Advised to continue with appointment tomorrow and go to to ED if chest pain persists or gets worse from now until his appointment tomorrow     PRACTICE FOLLOW-UP: Please advise with any other recommendations     Reason for Disposition   Chest pain(s) lasting a few seconds persists > 3 days    Answer Assessment - Initial Assessment Questions  1. LOCATION: \"Where does it hurt?\"        Chest pain   2. RADIATION: \"Does the pain go anywhere else?\" (e.g., into neck, jaw, arms, back)      Radiates to Left arm   3. ONSET: \"When did the chest pain begin?\" (Minutes, hours or days)       Few hours ago   4. PATTERN: \"Does the pain come and go, or has it been constant since it started?\"  \"Does it get worse with exertion?\"       Comes and goes   5. DURATION: \"How long does it last\" (e.g., seconds, minutes, hours)      Minutes   6. SEVERITY: \"How bad is the pain?\"  (e.g., Scale 1-10; mild, moderate, or severe)      5 pain level   7. CARDIAC RISK FACTORS: \"Do you have any history of heart problems or risk factors for heart disease?\" (e.g., angina, prior heart attack; diabetes, high blood pressure, high cholesterol, smoker, or strong family history of heart disease)      high cholesterol, CAD  8. PULMONARY RISK FACTORS: \"Do you " "have any history of lung disease?\"  (e.g., blood clots in lung, asthma, emphysema, birth control pills)      COPD  9. CAUSE: \"What do you think is causing the chest pain?\"      Unsure   10. OTHER SYMPTOMS: \"Do you have any other symptoms?\" (e.g., dizziness, nausea, vomiting, sweating, fever, difficulty breathing, cough)        Dizziness, SOB related COPD    Protocols used: Chest Pain-Adult-[place]    "

## 2025-06-18 ENCOUNTER — OFFICE VISIT (OUTPATIENT)
Dept: CARDIOLOGY CLINIC | Facility: MEDICAL CENTER | Age: 70
End: 2025-06-18
Payer: COMMERCIAL

## 2025-06-18 VITALS
SYSTOLIC BLOOD PRESSURE: 126 MMHG | BODY MASS INDEX: 31.82 KG/M2 | OXYGEN SATURATION: 90 % | DIASTOLIC BLOOD PRESSURE: 72 MMHG | WEIGHT: 198 LBS | HEIGHT: 66 IN | HEART RATE: 98 BPM

## 2025-06-18 DIAGNOSIS — G47.33 OSA (OBSTRUCTIVE SLEEP APNEA): ICD-10-CM

## 2025-06-18 DIAGNOSIS — I25.118 CORONARY ARTERY DISEASE OF NATIVE ARTERY OF NATIVE HEART WITH STABLE ANGINA PECTORIS (HCC): ICD-10-CM

## 2025-06-18 DIAGNOSIS — Z95.5 S/P DRUG ELUTING CORONARY STENT PLACEMENT: Primary | ICD-10-CM

## 2025-06-18 DIAGNOSIS — J44.9 CHRONIC OBSTRUCTIVE PULMONARY DISEASE, UNSPECIFIED COPD TYPE (HCC): ICD-10-CM

## 2025-06-18 DIAGNOSIS — E66.9 OBESITY (BMI 30-39.9): ICD-10-CM

## 2025-06-18 DIAGNOSIS — E78.5 HYPERLIPIDEMIA, UNSPECIFIED HYPERLIPIDEMIA TYPE: ICD-10-CM

## 2025-06-18 DIAGNOSIS — R73.03 PREDIABETES: ICD-10-CM

## 2025-06-18 PROCEDURE — 99214 OFFICE O/P EST MOD 30 MIN: CPT | Performed by: INTERNAL MEDICINE

## 2025-06-18 NOTE — PROGRESS NOTES
[facility]   [facility] [place] [id number] [facility] [address] [place] [address]  [place] [address] [place] [address]  p: [phone] p: [phone]  f: [phone] f: [phone]         Cardiology Follow Up Visit    [place]  Patient Identification:  Danie Liao  1955     [id number] Care Team:  Gosia Mcgill MD (PCP)  No ref. provider found (Referring Provider)         ASSESSMENT & PLAN     Assessment & Plan  Coronary artery disease of native artery of native heart with stable angina pectoris (HCC)  S/P PCI with SHELL x 1 to DONALD on 06/17/24 with Moderate DFR CAD noted in the LCX.  Asymptomatic of anginal cp at this time. Tolerating GDMT on asa and statin.  Slight up-trend in LDL with goal being < 70.   on diet/lifestyle modification.  Plan to start cardiac rehab as well.       S/P drug eluting coronary stent placement  Unable to start cardiac rehab previously. Patient ready to start at this time.  Orders:  •  Ambulatory  Referral to Cardiac Rehabilitation; Future    Hyperlipidemia, unspecified hyperlipidemia type   on 06/13/25, On Atorvastatin 80, Goal LDL is 70, Patient to start cardiac rehab soon with focus on diet/lifestyle modification, If f/u FLP is not at goal will discuss adding ezetimibe at that time  Orders:  •  Lipid Panel with Direct LDL reflex; Future    Prediabetes  A1c 6.0 on 05/18/24.  Repeat A1c prior to follow up.  on diet/lifestyle modification.  Orders:  •  Hemoglobin A1C    Chronic obstructive pulmonary disease, unspecified COPD type (HCC)  Followed closely by Pulm, No recent exacerbations, Plan as above with cardiac rehab to start       LEATHA (obstructive sleep apnea)  Prior sleep stlyd with Severe LEATHA noted, Previously unable to tolerate NIPPV, Will need to follow up       Obesity (BMI 30-39.9)   on diet/lifestyle modification           Discussion & Summary: Mr. Danie Liao  was seen and examined today for routine follow up of the conditions noted below. Precautions and reasons to call our office or proceed to ER were discussed in detail. Patient expressed understanding and questions were answered.     Follow Up & Next Steps: Plan on follow up in-clinic in 6 months.       SUBJECTIVE   Subjective   Chief Complaint: Mr. Liao is a 70 y.o. male and former smoker with a PMH significant for but not limited to CAD, HLD, Prediabetes, JANEE, GERD, Bladder Hernia, OA and Obesity.  He presents to clinic for Hospital Follow Up.    HPI / Interval Hx: He was originally seen in clinic on 08/21/24 for Hospital Follow Up. 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  He'd been seen initially in clinic by Dr. Villegas for preoperative evaluation for bariatric surgery.  For is Anginal Chest Pain, he completed a NM MPI was positive for infarct and transient ischemic dilation.  He was scheduled for LHC at [facility] but the procedure was cancelled due to his discomfort with conscious sedation. At [facility] his procedure was completed with Anesthesia.  He was found to have a DFR positive DONALD lesion that was treated with SHELL x 1.  His intermediate LCX lesion was DFR negative and his nondominant RCA was smaller in caliber than his conus. His case was complicated by post-anesthetic agitation and combativeness, requiring several staff members to restrain his arms and legs. During his stay in PACU, it was difficult for him to maintain bedrest and his uop was diminshed requiring an order for straight cath.  He was discharged to home the following day with notable drop in Hgb and his post discharge course was complicated by urinary retention and abdominal pain.    We followed up by phone calls, text exchanges, and by [employer] messaging upon discharge.  He was  seen in the ED twice for Abdominal Pain with notable ecchymosis on exam and without gross evidence of pseudoaneurysm or active bleeding on imaging.  He was seen by Vascular as well with follow up imaging that was negative for active psudoaneruysm or av fistula. His PCP was able to help him with pain management, and he was also able to encourage him to be compliant with DAPT after the patient had stopped taking clopidogrel.  He was evaluated by Urology for ongoing urinary retention, bladder spasm and pain.  His PVR volume was normal by bladder scan, but he was found to have a bladder herniation down his right inguinal canal.  He was treated medically for bladder spasm.  For his anemia, he was seen by Hematology as well.  His chronic microcytosis was presumptively attributed to thalassemia trait given his family history and prior diagnosis of JANEE.  His post-procedural anemia had been trending back to his baseline since June.  He was seen for cardiology follow up by Dr. Peralta with no new changes in management.    Prior to our visit in August, he'd been sen by Urology on 08/12/24, and was referred to General Surgery for Bladder Hernia repair.  Mr. Liao and his wife are hoping to have the surgery completed sooner than later.  We discussed DAPT for PCI in [facility] with recommendations for 6 months of DAPT, however given the patient's ongoing abdominal pain, I offered clearance for 3 months if the procedure could be completed on ASA SAPT.    During our 12/18/24 follow up visit, he'd been by Dr. Greene with a desire to perform the procedure after 6 months of therapy, once DAPT could be discontinued without need for ASA SAPT.  He was being evalauted for surgical intervention in January, but he was reconsidering the procedure since his symtpoms had improved with his ongoing weight loss. Also of note, he had been undergoing Dental Implant treatments with ongoing complications.    Since our last visit, he's been recovering well  from his 06/16/25 Inginal Hernia Repair. He notes ongoing SOB associated with his COPD, but not anginal CP.  He states that he never completed Cardiac Rehab after his PCI, but was willing to start.  He also noted occasional bouts of HRs in the 130s that are fleeting.  He notes a sensation with the higher heart rates but noting persistent that warranted medical mgmt.    He currently denies any anginal cp or associated diaphoresis, n/v, near syncope, syncope, progressive orthopnea, pnd, or ble edema. He notes improved control of his diet and exercise habits. He reports a diet that is somewhat balanced but has room for improvement and no dedicated exercise.  He is otherwise compliant with medications but does not maintain a detailed BP log.  Of note, the patient's cardiac risk factor(s) include: advanced age, hypertension, dyslipidemia, obesity, sedentary lifestyle, and tobacco exposure.    Review of Systems: This was a comprehensive review of symptoms with problem focused details as note above.  Review of Systems   Constitutional: Positive for weight loss (now down 20 lbs). Negative for chills, fever and malaise/fatigue.   HENT:  Positive for sore throat. Negative for congestion.    Eyes:  Negative for blurred vision and double vision.   Cardiovascular:  Positive for chest pain (Improved with injection in the RUE now improved, An fleeting sensation with HRs in the 130s), dyspnea on exertion, orthopnea (stable on three pillows) and palpitations (only when exerting himself). Negative for claudication, leg swelling, near-syncope, paroxysmal nocturnal dyspnea and syncope.   Respiratory:  Positive for cough, shortness of breath (ongoing copd, improved with nebulizer), snoring (unable to tolerate CPAP in the past) and wheezing (COPD being medically managed). Negative for sleep disturbances due to breathing.    Hematologic/Lymphatic: Negative for bleeding problem. Bruises/bleeds easily.   Skin:  Positive for rash (bilateral  toes). Negative for itching.   Musculoskeletal:  Positive for arthritis, back pain and joint pain. Negative for joint swelling.   Gastrointestinal:  Positive for abdominal pain. Negative for change in bowel habit, constipation, diarrhea, nausea and vomiting.   Genitourinary:  Positive for nocturia. Negative for dysuria.   Neurological:  Negative for numbness and paresthesias.   Psychiatric/Behavioral:  Negative for depression. The patient is nervous/anxious.         Past Hx: The following portions of the patient's history were reviewed and updated as appropriate: past medical history, past social history, past family history, past surgical history, current medications, allergies, past surgical history and problem list.  Social History     Socioeconomic History   • Marital status: /Civil Union     Spouse name: Not on file   • Number of children: Not on file   • Years of education: Not on file   • Highest education level: Not on file   Occupational History   • Not on file   Tobacco Use   • Smoking status: Former     Current packs/day: 0.00     Average packs/day: 2.0 packs/day for 40.0 years (80.0 ttl pk-yrs)     Types: Cigarettes     Start date: 1/1/1970     Quit date: 1/1/2010     Years since quitting: 15.5     Passive exposure: Past   • Smokeless tobacco: Never   Vaping Use   • Vaping status: Never Used   Substance and Sexual Activity   • Alcohol use: Not Currently     Comment: Occasional   • Drug use: Not Currently     Types: Marijuana     Comment: long time ago   • Sexual activity: Yes     Partners: Female   Other Topics Concern   • Not on file   Social History Narrative   • Not on file     Social Drivers of Health     Financial Resource Strain: Low Risk  (8/24/2023)    Overall Financial Resource Strain (CARDIA)    • Difficulty of Paying Living Expenses: Not hard at all   Food Insecurity: No Food Insecurity (6/16/2025)    Nursing - Inadequate Food Risk Classification    • Worried About Running Out of Food  in the Last Year: Never true    • Ran Out of Food in the Last Year: Never true    • Ran Out of Food in the Last Year: Never true   Transportation Needs: No Transportation Needs (6/16/2025)    Nursing - Transportation Risk Classification    • Lack of Transportation: Not on file    • Lack of Transportation: No   Physical Activity: Not on file   Stress: Not on file   Social Connections: Not on file   Intimate Partner Violence: Unknown (6/16/2025)    Nursing IPS    • Feels Physically and Emotionally Safe: Not on file    • Physically Hurt by Someone: Not on file    • Humiliated or Emotionally Abused by Someone: Not on file    • Physically Hurt by Someone: No    • Hurt or Threatened by Someone: No   Housing Stability: Unknown (6/16/2025)    Nursing: Inadequate Housing Risk Classification    • Has Housing: Not on file    • Worried About Losing Housing: Not on file    • Unable to Get Utilities: Not on file    • Unable to Pay for Housing in the Last Year: No    • Has Housing: No        Family History   Problem Relation Name Age of Onset   • Breast cancer Mother Jennifer Liao    • Heart disease Father Aj Liao    • Heart disease Brother Father    • Stroke Brother     • Lung disease Brother     • Breast cancer Maternal Grandmother Brittany Wong    • Heart disease Paternal Grandmother Jonny         Patient Active Problem List   Diagnosis   • Chronic obstructive pulmonary disease (HCC)   • Localized osteoporosis without current pathological fracture   • Intractable migraine without status migrainosus   • Kidney donor   • Psoriasis   • Polyp of colon   • Morbid (severe) obesity due to excess calories (HCC)   • Prediabetes   • LEATHA (obstructive sleep apnea)   • Hyperlipidemia   • GERD (gastroesophageal reflux disease)   • Encysted hydrocele   • Single kidney   • Iron deficiency   • Coronary artery disease of native heart with stable angina pectoris (HCC)   • Preoperative cardiovascular examination   • Primary  osteoarthritis of right knee   • Stress fracture of right tibia   • Stress fracture of right tibia with routine healing, subsequent encounter   • Bilateral recurrent inguinal hernia without obstruction or gangrene        Past Surgical History:   Procedure Laterality Date   • APPENDECTOMY     • CARDIAC CATHETERIZATION  06/17/2024    Procedure: Cardiac catheterization;  Surgeon: Sarah Noland DO;  Location: [facility];  Service: Cardiology   • CARDIAC CATHETERIZATION N/A 06/17/2024    Procedure: Cardiac Coronary Angiogram;  Surgeon: Sarah Noland DO;  [facility]: [facility];  Service: Cardiology   • CARDIAC CATHETERIZATION N/A 06/17/2024    Procedure: Cardiac FFR/IFR;  Surgeon: Sarah Noland DO;  [facility];  Service: Cardiology   • CARDIAC CATHETERIZATION N/A 06/17/2024    Procedure: Cardiac pci;  Surgeon: Sarah Noland DO;  [facility]: [facility];  Service: Cardiology   • CARDIAC CATHETERIZATION N/A 06/17/2024    Procedure: Cardiac IVUS;  Surgeon: Sarah Noland DO;  Location: [facility];  Service: Cardiology   • COLONOSCOPY     • COLONOSCOPY     • HERNIA REPAIR      4 times   • NEPHRECTOMY LIVING DONOR Left 10/2009   • PA EXCISION HYDROCELE UNILATERAL Left 01/30/2024    Procedure: HYDROCELECTOMY;  Surgeon: Evan Salmeron MD;  Location: [facility];  Service: Urology   • PA LAPAROSCOPY SURG RPR INITIAL INGUINAL HERNIA Bilateral 6/16/2025    Procedure: REPAIR HERNIA INGUINAL, LAPAROSCOPIC,;  Surgeon: Evan Rolle DO;  Location: [facility];  Service: General        Current Outpatient Medications   Medication Instructions   • albuterol (PROVENTIL HFA,VENTOLIN HFA) 90 mcg/act inhaler 1-2 puffs, Inhalation, Every 6 hours PRN, every 4 to 6 hours as needed and as directed   • aspirin 81 mg, Oral, Daily   • atorvastatin (LIPITOR) 80 mg, Oral, Every evening   • Dupixent 300 mg, Subcutaneous, Every 14 days   • EPINEPHrine (EPIPEN) 0.3 mg,  Intramuscular, Once   • famotidine (PEPCID) 20 mg, Oral, 2 times daily   • ipratropium-albuterol (DUO-NEB) 0.5-2.5 mg/3 mL nebulizer solution TAKE THREE ML BY NEBULIZATION 4 (FOUR) TIMES A DAY Strength: 0.5-2.5 (3) MG/3ML   • naloxone (NARCAN) 4 mg/0.1 mL nasal spray Administer 1 spray into a nostril. If no response after 2-3 minutes, give another dose in the other nostril using a new spray.   • nitroglycerin (NITROSTAT) 0.4 mg, Sublingual, Every 5 minutes PRN   • oxybutynin (DITROPAN XL) 15 mg, Oral, Daily at bedtime   • tamsulosin (FLOMAX) 0.4 mg, Oral, Daily with dinner   • Tapinarof (Vtama) 1 % CREA 1 Application, Apply externally, Daily   • Trelegy Ellipta 100-62.5-25 MCG/ACT inhaler 1 puff, Inhalation, Daily   • Ubrelvy 100 mg, Oral, Daily PRN        No Known Allergies        OBJECTIVE   Objective     Physical Exam: Patient seen and examined today, accompanied by spouse, Anabell.  VITALS   Vitals:    06/18/25 1521   BP: 126/72   Pulse: 98   SpO2: 90%       BMI   Body mass index is 31.96 kg/m².  Physical Exam  Constitutional:       General: He is not in acute distress.     Appearance: He is obese. He is not toxic-appearing.   HENT:      Head: Normocephalic and atraumatic.      Right Ear: External ear normal.      Left Ear: External ear normal.      Nose: Nose normal.     Eyes:      General: No scleral icterus.        Right eye: No discharge.         Left eye: No discharge.     Neck:      Vascular: No carotid bruit.     Cardiovascular:      Rate and Rhythm: Normal rate and regular rhythm.      Pulses: Normal pulses.      Heart sounds: No murmur heard.     No gallop.   Pulmonary:      Effort: Pulmonary effort is normal. No respiratory distress.      Breath sounds: No wheezing or rales.     Musculoskeletal:      Cervical back: Neck supple.      Right lower leg: No edema.      Left lower leg: No edema.     Skin:     General: Skin is warm.      Capillary Refill: Capillary refill takes less than 2 seconds.       Findings: Erythema (recent hernia repair, healing routinely) present.     Neurological:      Mental Status: He is alert and oriented to person, place, and time. Mental status is at baseline.     Psychiatric:         Mood and Affect: Mood normal.         Behavior: Behavior normal.          Recent CV Testin25  EKG  NSR, Sinus Arrhythmia, LAD  07/15/24  ZIO  PACs (5.2%), SVT x 2 asymptomatic  24  EKG  NSR, LAD, IRBB, similar to prior  24  PCI  SHELL to DFR positive DONALD, DFR negative LCX lesion  24  NM MPI  Inf infarct, Positive TID 1.57    For historical clinical findings, see the Supplemental Documentation section.     CLOSING   Administrative Statements     Coordination of Care: During our visit, I spent 20 minutes with the patient, and greater than 60% of this time was spent on counseling and coordination of care, including addressing diagnostic results, prognosis, risks and benefits of treatment options, instructions for management, patient/family education, importance of treatment compliance, along with risk factor reductions. During today's visit, the patient was accompanied by his spouse, and there was no need for interpretive services.     Dictation Disclaimer: This note was completed in part utilizing direct voice recognition software. Grammatical errors, random word insertion, spelling mistakes, and incomplete sentences may be an occasional consequence of the system secondary to software limitations, ambient noise and hardware issues. At the time of dictation, efforts were made to edit, clarify and /or correct errors.  Please read the chart carefully and recognize, using context, where substitutions have occurred.  If you have any questions or concerns about the context, text or information contained within the body of this dictation, please contact me, the provider, for further clarification.     Sarah Noland,  25                    SUPPLEMENTAL DOCUMENTATION      Thoracic History   ======================  PRIOR VISIT INTERVALS  ======================  Date: 06/  Date: 08/21/24, Consultation Visit  Date: 12/17/24, Follow Up Visit    =====================  PRIOR DIAGNOSTIC TESTS  =====================  IMAGING   I have personally reviewed pertinent reports.  XR knee 3 vw right non injury    Result Date: 8/5/2024  Narrative: RIGHT KNEE INDICATION:   Pain in right knee. Other chronic pain.   COMPARISON:  None. VIEWS:  XR KNEE 3 VW RIGHT NON INJURY FINDINGS: There is no acute fracture or dislocation. There is no joint effusion. No significant degenerative changes. No lytic or blastic osseous lesion. Soft tissues are unremarkable.     Impression: No acute osseous abnormality. Electronically signed: 08/05/2024 12:42 PM Kendall Kwong MD      EKG   Date: 03/24/25, sinus rhythm with sinus arrhythmia, left axis deviation, prwp  Date: 06/21/24, sinus rhythm with sinus arrhythmia, irbbb  Date: 06/19/24, sinus rhythm with sinus arrhythmia, RSR' pattern in V1 suggestive of right ventricular conduction delay  Date: 06/17/24, normal sinus rhythm, prwp, irbbb  Date: 06/07/24, sinus rhythm with sinus arrhythmia, irbbb  Date: 01/15/24, normal sinus rhythm, irbbb  Date: 12/12/23, sinus rhythm with sinus arrhythmia, RSR' pattern in V1 suggestive of right ventricular conduction delay  Date: 11/30/23, sinus rhythm with sinus arrhythmia, irbbb  Date: 03/29/23, normal sinus rhythm, sinus rhythm with sinus arrhythmia, left axis deviation, irbbb  Date: 06/12/18, normal sinus rhythm, left axis deviation, possible inf infarct age undetermined    TELE   No results found for this or any recent visit.     HOLTER   Extended Holter Study Date: 07/15/24  IMPRESSION:  Patient had a min HR of 56 bpm, max HR of 174 bpm, and avg HR of 82 bpm. Predominant underlying rhythm was Sinus Rhythm.  2 Supraventricular Tachycardia runs occurred, the run with the fastest interval lasting 11.7 secs with a max rate of 174  bpm (avg 155 bpm); the run with the fastest interval was also the longest.  Isolated SVEs were frequent (5.2%, 8814), SVE Couplets were rare (<1.0%, 12), and no SVE Triplets were present. Isolated VEs were rare (<1.0%), and no VE Couplets or VE Triplets were present. Ventricular Trigeminy was present.    DEVICE   No results found for this or any previous visit.    EP   No results found for this or any previous visit.    CT   Results for orders placed during the hospital encounter of 09/29/23  CT chest without contrast  Narrative  FINDINGS:  LUNGS: Moderate centrilobular emphysema. Bandlike opacities in the lingula and right lower lobe. No tracheal or endobronchial lesion.  Few ill-defined opacities in the previously described area of the heterogeneous nodule.  PLEURA:  Unremarkable.  HEART/GREAT VESSELS: Mild to moderate coronary vascular calcifications. No thoracic aortic aneurysm.  MEDIASTINUM AND RUBI:  Unremarkable.  CHEST WALL AND LOWER NECK:  Unremarkable.  VISUALIZED STRUCTURES IN THE UPPER ABDOMEN: Left kidney is surgically absent.  OSSEOUS STRUCTURES:  No acute fracture or destructive osseous lesion.  Impression  1. The previously described nodule in the right upper lobe is not seen on today's exam however there are few ill-defined opacities in this region which may be due to scarring.  2. Moderate centrilobular emphysema.    CATH   Results for testing completed on the encounter of 06/07/24  Study: LHC / PCI  Interpreted Summary  •  LCX with two intermediate lesions that were DFR Negative on Spot Check and by Pullback  •  S/P Successful IVUS Guided PCI with SHELL x 1 to the DFR Positive 65% DONALD lesion with Post Dilation up to 16 park at 3.07 mm  •  RCA is a nondominant vessel with a conus larger than the RCA proper  •  Exceptionally tortuous innominate and very short root necessitating transition from RRA to RFA  •  1st Diag lesion is 65% stenosed.    Results for testing completed on the encounter of  06/07/24  Study: Left Heart Catheterization  Interpreted Summary  Case aborted on patient request for Anesthesia at a facility with surgery back up    STRESS   Results for orders placed during the hospital encounter of 04/05/24  NM myocardial perfusion spect (rx stress and/or rest)  Interpretation Summary  •  Resting ECG: The ECG shows normal sinus rhythm.  •  Stress ECG: A pharmacological stress test was performed using dobutamine. The patient had a maximal HR of 150 bpm (98% of MPHR) . The patient achieved the target heart rate. The patient reported dyspnea and chest pain during the stress test. Symptoms began during stress and ended during recovery.  •  Stress ECG: Arrhythmias during stress: occasional PVCs. The ECG was negative for ischemia. The stress ECG is negative for ischemia after pharmacologic stress, with reproduction of symptoms.  •  Perfusion: There is a left ventricular perfusion defect that is small in size present in the inferior location(s) that is fixed.  •  Perfusion Defect Conclusion: The stress/rest perfusion ratio is 1.57. There is evidence of transient ischemic dilation (TID). TID was appreciated visually and quantitatively.  •  Stress Function: Left ventricular function post-stress is normal. Stress ejection fraction is 68%. There is a defect in the basal inferior location(s). The defect has mildly reduced function.  The stress ECG revealed no diagnostic evidence of ischemia.  Occasional PVCs were noted.  He had chest discomfort following dobutamine infusion.  There was a fixed inferior defect suggestive of infarct.  There was also evidence of transient ischemic dilation.  TID ratio was 1.57. Balanced ischemia could not be excluded.  Clinical correlation advised.    TTE   No results found for this or any previous visit.      AZAM   No results found for this or any previous visit.      CMR   No results found for this or any previous visit.      LABS     Lab Results   Component Value Date    K  4.1 05/28/2025    K 3.9 03/24/2025    K 4.3 09/18/2024     05/28/2025     03/24/2025     09/18/2024    CO2 31 05/28/2025    CO2 23 03/24/2025    CO2 25 09/18/2024    BUN 22 05/28/2025    BUN 27 (H) 03/24/2025    BUN 17 09/18/2024    CREATININE 0.84 05/28/2025    CREATININE 1.00 03/24/2025    CREATININE 0.98 09/18/2024    EGFR 89 05/28/2025    EGFR 76 03/24/2025    EGFR 79 09/18/2024    GLUC 108 03/24/2025    GLUC 81 09/18/2024    GLUC 107 06/21/2024    AST 21 03/24/2025    AST 25 06/21/2024    AST 21 06/19/2024    ALT 20 03/24/2025    ALT 23 06/21/2024    ALT 21 06/19/2024    TBILI 0.75 03/24/2025    TBILI 1.02 (H) 06/21/2024    TBILI 0.62 06/19/2024    ALB 3.7 03/24/2025    ALB 3.7 06/21/2024    ALB 3.7 06/19/2024    MG 2.2 11/30/2023    CALCIUM 9.4 05/28/2025    CALCIUM 8.5 03/24/2025    CALCIUM 9.2 09/18/2024      Lab Results   Component Value Date    WBC 10.36 (H) 07/07/2025    WBC 11.93 (H) 06/13/2025    WBC 15.93 (H) 05/28/2025    HGB 10.1 (L) 07/07/2025    HGB 9.6 (L) 06/13/2025    HGB 9.8 (L) 05/28/2025    HCT 34.6 (L) 07/07/2025    HCT 32.5 (L) 06/13/2025    HCT 34.3 (L) 05/28/2025     (H) 07/07/2025     (H) 06/13/2025     (H) 05/28/2025    INR 0.91 06/21/2024    INR 0.88 06/19/2024    INR 1.02 06/11/2024    IRON 19 (L) 05/28/2025    IRON 112 07/15/2024    IRON 95 05/21/2024    FERRITIN 5 (L) 05/28/2025    FERRITIN 40 07/15/2024    FERRITIN 12 (L) 05/21/2024    TIBC 485.8 (H) 05/28/2025    TIBC 364 07/15/2024    TIBC 413 05/21/2024    TRANSFERRIN 347 05/28/2025      Lab Results   Component Value Date    CHOLESTEROL 163 06/13/2025    CHOLESTEROL 172 05/28/2025    CHOLESTEROL 190 05/18/2024    TRIG 62 06/13/2025    TRIG 99 05/28/2025    TRIG 94 05/18/2024    HDL 50 06/13/2025    HDL 58 05/28/2025    HDL 64 05/18/2024    LDLCALC 101 (H) 06/13/2025    LDLCALC 94 05/28/2025    LDLCALC 107 (H) 05/18/2024     Lab Results   Component Value Date    HGBA1C 6.0 (H) 05/18/2024     HGBA1C 6.0 (H) 09/23/2023    GLUF 90 05/28/2025    GLUF 134 (H) 06/18/2024    GLUF 109 (H) 05/18/2024     Lab Results   Component Value Date    GFT5NXLEBFKT 1.275 05/18/2024    AYY2BAGKIPLH 1.538 09/23/2023     Lab Results   Component Value Date    HSTNI0 19 06/21/2024    HSTNI0 23 06/19/2024    HSTNI0 4 01/15/2024    HSTNI2 16 06/21/2024    HSTNI2 25 06/19/2024    HSTNI2 2 01/15/2024    TROPONINI <0.02 07/14/2021    TROPONINI <0.02 07/12/2018     Lab Results   Component Value Date    BNP 86 06/21/2024    BNP 49 06/19/2024    BNP 17 11/30/2023

## 2025-06-19 ENCOUNTER — PATIENT MESSAGE (OUTPATIENT)
Age: 70
End: 2025-06-19

## 2025-06-19 ENCOUNTER — TELEPHONE (OUTPATIENT)
Dept: PULMONOLOGY | Facility: CLINIC | Age: 70
End: 2025-06-19

## 2025-06-19 DIAGNOSIS — J44.9 COPD (CHRONIC OBSTRUCTIVE PULMONARY DISEASE) (HCC): Primary | ICD-10-CM

## 2025-06-20 RX ORDER — DUPILUMAB 300 MG/2ML
300 INJECTION, SOLUTION SUBCUTANEOUS
Qty: 4 ML | Refills: 11 | Status: SHIPPED | OUTPATIENT
Start: 2025-06-20

## 2025-06-20 RX ORDER — EPINEPHRINE 0.3 MG/.3ML
0.3 INJECTION SUBCUTANEOUS ONCE
Qty: 0.3 ML | Refills: 0 | Status: SHIPPED | OUTPATIENT
Start: 2025-06-20 | End: 2025-06-20

## 2025-06-20 NOTE — TELEPHONE ENCOUNTER
Spoke with patient. He stated that he does not want to have medications coming from all over the [place] and would like to have all his medications at once pharmacy. He said he spoke to his local pharmacy [facility] and they stated they will carry the dupixent. Advised we will send dupixent and epipen script in for him and he is to call me if he has any issues.

## 2025-06-23 ENCOUNTER — NURSE TRIAGE (OUTPATIENT)
Dept: SURGERY | Facility: CLINIC | Age: 70
End: 2025-06-23

## 2025-06-23 DIAGNOSIS — K40.21 BILATERAL RECURRENT INGUINAL HERNIA WITHOUT OBSTRUCTION OR GANGRENE: Primary | ICD-10-CM

## 2025-06-23 RX ORDER — TRAMADOL HYDROCHLORIDE 50 MG/1
50 TABLET ORAL EVERY 6 HOURS PRN
Qty: 16 TABLET | Refills: 0 | Status: SHIPPED | OUTPATIENT
Start: 2025-06-23 | End: 2025-07-03

## 2025-06-23 NOTE — PATIENT COMMUNICATION
Called pt to discuss symptoms.     Pt states his pain is internal on right side where hernia was. Pt rates pain 7 or 8/10 with movement or coughing. Pt has COPD and coughs very frequently and has coughing fits which have been very painful.   Denies any s/s infection. Denies complication with bowel or bladder, just pain.     Follow up scheduled 7/1.    Pt taking ibuprofen, tylenol, and applying ice for pain without relief.     Pt asking for pain medication to be sent to   [facility]/PHARMACY #[id number] - [facility] [address] - [address] [[id number]]

## 2025-06-26 ENCOUNTER — OFFICE VISIT (OUTPATIENT)
Dept: FAMILY MEDICINE CLINIC | Facility: CLINIC | Age: 70
End: 2025-06-26
Payer: COMMERCIAL

## 2025-06-26 VITALS
HEIGHT: 66 IN | DIASTOLIC BLOOD PRESSURE: 70 MMHG | SYSTOLIC BLOOD PRESSURE: 128 MMHG | HEART RATE: 88 BPM | WEIGHT: 194.6 LBS | RESPIRATION RATE: 12 BRPM | BODY MASS INDEX: 31.27 KG/M2 | OXYGEN SATURATION: 98 %

## 2025-06-26 DIAGNOSIS — I72.9 PSEUDOANEURYSM (HCC): Primary | ICD-10-CM

## 2025-06-26 DIAGNOSIS — I25.118 CORONARY ARTERY DISEASE OF NATIVE ARTERY OF NATIVE HEART WITH STABLE ANGINA PECTORIS (HCC): ICD-10-CM

## 2025-06-26 DIAGNOSIS — G89.18 POST-OP PAIN: ICD-10-CM

## 2025-06-26 DIAGNOSIS — J44.9 CHRONIC OBSTRUCTIVE PULMONARY DISEASE, UNSPECIFIED COPD TYPE (HCC): ICD-10-CM

## 2025-06-26 PROCEDURE — 99214 OFFICE O/P EST MOD 30 MIN: CPT | Performed by: STUDENT IN AN ORGANIZED HEALTH CARE EDUCATION/TRAINING PROGRAM

## 2025-06-26 PROCEDURE — G2211 COMPLEX E/M VISIT ADD ON: HCPCS | Performed by: STUDENT IN AN ORGANIZED HEALTH CARE EDUCATION/TRAINING PROGRAM

## 2025-06-26 NOTE — PROGRESS NOTES
"Name: Danie Liao      : 1955      MRN: [id number]  Encounter Provider: Gosia Mcgill MD  Encounter Date: 2025   Encounter department: [facility] [address]  :  Assessment & Plan  Pseudoaneurysm (HCC)  Seen on a CT scan last , will repeat  Orders:  •  CT abdomen pelvis w contrast; Future    Post-op pain  Medical as needed       Chronic obstructive pulmonary disease, unspecified COPD type (HCC)  Which will add dividends to his symptom control       Coronary artery disease of native artery of native heart with stable angina pectoris (HCC)  Reviewed cardiology note, continue to work on diet and exercise to help achieve weight loss.  Been losing weight but slow down due to the recent hernia surgery              History of Present Illness   HPI    Patient coming for follow-up.  Reports some postop pain.  Had hernia surgery 7 to 10 days ago.  Cardiologist.  Is will start Dupixent for his COPD    Review of Systems   Constitutional:  Negative for chills, fatigue and fever.   HENT:  Negative for rhinorrhea and sore throat.    Eyes:  Negative for visual disturbance.   Respiratory:  Negative for cough and shortness of breath.    Cardiovascular:  Negative for chest pain and palpitations.   Gastrointestinal:  Positive for abdominal pain. Negative for constipation, diarrhea, nausea and vomiting.   Genitourinary:  Negative for difficulty urinating, dysuria and frequency.   Musculoskeletal:  Negative for arthralgias and myalgias.   Skin:  Negative for color change and rash.   Neurological:  Negative for weakness and headaches.       Objective   /70 (BP Location: Left arm, Cuff Size: Standard)   Pulse 88   Resp 12   Ht 5' 6\" (1.676 m)   Wt 88.3 kg (194 lb 9.6 oz)   SpO2 98%   BMI 31.41 kg/m²      Physical Exam  Constitutional:       General: He is not in acute distress.     Appearance: Normal appearance. He is not ill-appearing.   HENT:      Head: " Normocephalic and atraumatic.      Right Ear: Tympanic membrane, ear canal and external ear normal.      Left Ear: Tympanic membrane, ear canal and external ear normal.      Nose: Nose normal.      Mouth/Throat:      Mouth: Mucous membranes are moist.      Pharynx: Oropharynx is clear. No oropharyngeal exudate or posterior oropharyngeal erythema.     Eyes:      General: No scleral icterus.        Right eye: No discharge.         Left eye: No discharge.      Extraocular Movements: Extraocular movements intact.      Conjunctiva/sclera: Conjunctivae normal.      Pupils: Pupils are equal, round, and reactive to light.       Cardiovascular:      Rate and Rhythm: Normal rate and regular rhythm.      Pulses: Normal pulses.      Heart sounds: Normal heart sounds. No murmur heard.  Pulmonary:      Effort: Pulmonary effort is normal. No respiratory distress.      Breath sounds: Normal breath sounds.   Abdominal:      General: A surgical scar is present. Bowel sounds are normal. There is no distension or abdominal bruit. There are no signs of injury.      Palpations: Abdomen is soft.      Tenderness: There is no abdominal tenderness.      Comments: Clean dry and intact     Musculoskeletal:         General: Normal range of motion.      Cervical back: Normal range of motion and neck supple.   Lymphadenopathy:      Cervical: No cervical adenopathy.     Skin:     General: Skin is warm and dry.      Capillary Refill: Capillary refill takes less than 2 seconds.     Neurological:      General: No focal deficit present.      Mental Status: He is alert and oriented to person, place, and time. Mental status is at baseline.      Cranial Nerves: No cranial nerve deficit.     Psychiatric:         Mood and Affect: Mood normal.

## 2025-06-26 NOTE — ASSESSMENT & PLAN NOTE
Reviewed cardiology note, continue to work on diet and exercise to help achieve weight loss.  Been losing weight but slow down due to the recent hernia surgery

## 2025-07-01 ENCOUNTER — OFFICE VISIT (OUTPATIENT)
Dept: SURGERY | Facility: CLINIC | Age: 70
End: 2025-07-01

## 2025-07-01 VITALS
WEIGHT: 195 LBS | OXYGEN SATURATION: 95 % | BODY MASS INDEX: 31.34 KG/M2 | DIASTOLIC BLOOD PRESSURE: 78 MMHG | SYSTOLIC BLOOD PRESSURE: 132 MMHG | TEMPERATURE: 97.4 F | HEART RATE: 86 BPM | HEIGHT: 66 IN

## 2025-07-01 DIAGNOSIS — K40.21 BILATERAL RECURRENT INGUINAL HERNIA WITHOUT OBSTRUCTION OR GANGRENE: Primary | ICD-10-CM

## 2025-07-01 PROCEDURE — 99024 POSTOP FOLLOW-UP VISIT: CPT | Performed by: STUDENT IN AN ORGANIZED HEALTH CARE EDUCATION/TRAINING PROGRAM

## 2025-07-01 NOTE — ASSESSMENT & PLAN NOTE
70-year-old male status post laparoscopic bilateral inguinal hernia repair with mesh on 6/16/2025  -Patient is tolerating a diet and having bowel function  - Notes some continued soreness at the incisions and in the pelvic region  -He states he is still having some bladder spasms but it is reduced  - Laparoscopic incisions healing well without erythema induration or drainage, no signs of hernia recurrence in the bilateral groin  -Patient notes a left hydrocele that he has had for some time, previous hydrocelectomy, he has an upcoming appointment with Urology  - Patient is okay to return to work with restrictions of no heavy lifting greater than 15 to 20 pounds  -Recommend no heavy lifting greater than 15 to 20 pounds for total of 4 weeks after surgery  - Follow-up in office as needed

## 2025-07-01 NOTE — PROGRESS NOTES
Name: Danie Liao      : 1955      MRN: [id number]  Encounter Provider: Evan Rolle DO  Encounter Date: 2025   Encounter department: [facility]  :  Assessment & Plan  Bilateral recurrent inguinal hernia without obstruction or gangrene  70-year-old male status post laparoscopic bilateral inguinal hernia repair with mesh on 2025  -Patient is tolerating a diet and having bowel function  - Notes some continued soreness at the incisions and in the pelvic region  -He states he is still having some bladder spasms but it is reduced  - Laparoscopic incisions healing well without erythema induration or drainage, no signs of hernia recurrence in the bilateral groin  -Patient notes a left hydrocele that he has had for some time, previous hydrocelectomy, he has an upcoming appointment with Urology  - Patient is okay to return to work with restrictions of no heavy lifting greater than 15 to 20 pounds  -Recommend no heavy lifting greater than 15 to 20 pounds for total of 4 weeks after surgery  - Follow-up in office as needed           History of Present Illness   HPI  Danie Liao is a 70 y.o. male who presents for evaluation status post laparoscopic bilateral inguinal hernia repair with mesh.  He is tolerating a diet and having bowel function.  He states his first bowel movement was hard to have but after that it has been fine.  He notes some continued discomfort in the pelvic and suprapubic region.  He has been having some bladder spasms but this is improved.  History obtained from: patient    Review of Systems   Constitutional:  Negative for chills, fatigue and fever.   HENT:  Negative for congestion, hearing loss, rhinorrhea and sore throat.    Eyes:  Negative for pain and discharge.   Respiratory:  Negative for cough, chest tightness and shortness of breath.    Cardiovascular:  Negative for chest pain and palpitations.   Gastrointestinal:  Positive for abdominal  pain. Negative for constipation, diarrhea, nausea and vomiting.   Endocrine: Negative for cold intolerance and heat intolerance.   Genitourinary:  Positive for scrotal swelling. Negative for difficulty urinating.        Positive left hydrocele   Musculoskeletal:  Positive for back pain.   Skin:  Negative for color change and rash.   Allergic/Immunologic: Negative for environmental allergies and food allergies.   Neurological:  Negative for seizures and headaches.   Hematological:  Does not bruise/bleed easily.   Psychiatric/Behavioral:  Negative for confusion and hallucinations.      Medical History Reviewed by provider this encounter:  Tobacco  Allergies  Meds  Problems  Med Hx  Surg Hx  Fam Hx     .  Past Medical History   Past Medical History[1]  Past Surgical History[2]  Family History[3]   reports that he quit smoking about 15 years ago. His smoking use included cigarettes. He started smoking about 55 years ago. He has a 80 pack-year smoking history. He has been exposed to tobacco smoke. He has never used smokeless tobacco. He reports that he does not currently use alcohol. He reports that he does not currently use drugs after having used the following drugs: Marijuana.  Current Outpatient Medications   Medication Instructions    albuterol (PROVENTIL HFA,VENTOLIN HFA) 90 mcg/act inhaler 1-2 puffs, Inhalation, Every 6 hours PRN, every 4 to 6 hours as needed and as directed    aspirin 81 mg, Oral, Daily    atorvastatin (LIPITOR) 80 mg, Oral, Every evening    Dupixent 300 mg, Subcutaneous, Every 14 days    EPINEPHrine (EPIPEN) 0.3 mg, Intramuscular, Once    famotidine (PEPCID) 20 mg, Oral, 2 times daily    ipratropium-albuterol (DUO-NEB) 0.5-2.5 mg/3 mL nebulizer solution TAKE THREE ML BY NEBULIZATION 4 (FOUR) TIMES A DAY Strength: 0.5-2.5 (3) MG/3ML    naloxone (NARCAN) 4 mg/0.1 mL nasal spray Administer 1 spray into a nostril. If no response after 2-3 minutes, give another dose in the other nostril using  "a new spray.    nitroglycerin (NITROSTAT) 0.4 mg, Sublingual, Every 5 minutes PRN    oxybutynin (DITROPAN XL) 15 mg, Oral, Daily at bedtime    tamsulosin (FLOMAX) 0.4 mg, Oral, Daily with dinner    traMADol (ULTRAM) 50 mg, Oral, Every 6 hours PRN    Trelegy Ellipta 100-62.5-25 MCG/ACT inhaler 1 puff, Inhalation, Daily    Ubrelvy 100 mg, Oral, Daily PRN    Vtama 1 % CREA 1 Application, Apply externally, Daily   Allergies[4]   Medications Ordered Prior to Encounter[5]   Social History[6]     Objective   /78 (BP Location: Left arm, Patient Position: Sitting, Cuff Size: Standard)   Pulse 86   Temp (!) 97.4 °F (36.3 °C)   Ht 5' 6\" (1.676 m)   Wt 88.5 kg (195 lb)   SpO2 95%   BMI 31.47 kg/m²      Physical Exam  Constitutional:       Appearance: Normal appearance.   HENT:      Head: Normocephalic and atraumatic.      Nose: Nose normal.     Eyes:      General: No scleral icterus.     Conjunctiva/sclera: Conjunctivae normal.       Cardiovascular:      Rate and Rhythm: Normal rate.   Pulmonary:      Effort: Pulmonary effort is normal.   Abdominal:      General: There is no distension.      Palpations: Abdomen is soft.      Tenderness: There is no abdominal tenderness.      Comments: Laparoscopic incisions healing well without erythema induration or drainage, no signs of hernia recurrence in the bilateral groin     Musculoskeletal:         General: No signs of injury.     Skin:     General: Skin is warm.      Coloration: Skin is not jaundiced.     Neurological:      General: No focal deficit present.      Mental Status: He is alert and oriented to person, place, and time.     Psychiatric:         Mood and Affect: Mood normal.         Behavior: Behavior normal.                [1]   Past Medical History:  Diagnosis Date    Arthritis     Asthma     Chronic pain 01/30/2024    cervic and lumbar    Colon polyp     COPD (chronic obstructive pulmonary disease) (HCC)     Emphysema of lung (HCC)     GERD (gastroesophageal " reflux disease) 01/30/2024    Headache(784.0)     Hyperlipidemia 01/30/2024    Migraine 2 years ago    none    Obesity     Sleep apnea    [2]   Past Surgical History:  Procedure Laterality Date    APPENDECTOMY      CARDIAC CATHETERIZATION  06/17/2024    Procedure: Cardiac catheterization;  Surgeon: Sarah Noland DO;  Location: [facility];  Service: Cardiology    CARDIAC CATHETERIZATION N/A 06/17/2024    Procedure: Cardiac Coronary Angiogram;  Surgeon: Sarah Noland DO;  Location: [facility];  Service: Cardiology    CARDIAC CATHETERIZATION N/A 06/17/2024    Procedure: Cardiac FFR/IFR;  Surgeon: Sarah Noland DO;  Location: [facility];  Service: Cardiology    CARDIAC CATHETERIZATION N/A 06/17/2024    Procedure: Cardiac pci;  Surgeon: Sarah Noland DO;  Location: [facility];  Service: Cardiology    CARDIAC CATHETERIZATION N/A 06/17/2024    Procedure: Cardiac IVUS;  Surgeon: Sarah Noland DO;  Location: [facility];  Service: Cardiology    COLONOSCOPY      COLONOSCOPY      HERNIA REPAIR      4 times    NEPHRECTOMY LIVING DONOR Left 10/2009    CO EXCISION HYDROCELE UNILATERAL Left 01/30/2024    Procedure: HYDROCELECTOMY;  Surgeon: Evan Salmeron MD;  Location: [facility];  Service: Urology    CO LAPAROSCOPY SURG RPR INITIAL INGUINAL HERNIA Bilateral 6/16/2025    Procedure: REPAIR HERNIA INGUINAL, LAPAROSCOPIC,;  Surgeon: Evan Rolle DO;  Location: [facility];  Service: General   [3]   Family History  Problem Relation Name Age of Onset    Breast cancer Mother Jennifer Shinunas     Heart disease Father Aj Shinunas     Heart disease Brother Father     Stroke Brother      Lung disease Brother      Breast cancer Maternal Grandmother Brittany Fernandezvou     Heart disease Paternal Grandmother Jonny    [4] No Known Allergies  [5]   Current Outpatient Medications on File Prior to Visit   Medication Sig Dispense Refill    albuterol (PROVENTIL HFA,VENTOLIN  HFA) 90 mcg/act inhaler Inhale 1-2 puffs every 6 (six) hours as needed for wheezing every 4 to 6 hours as needed and as directed 36 g 3    aspirin 81 mg chewable tablet Chew 1 tablet (81 mg total) daily 90 tablet 0    atorvastatin (LIPITOR) 80 mg tablet Take 1 tablet (80 mg total) by mouth every evening 90 tablet 0    Dupilumab (Dupixent) 300 MG/2ML SOAJ Inject 300 mg under the skin every 14 (fourteen) days 4 mL 11    famotidine (PEPCID) 20 mg tablet Take 1 tablet (20 mg total) by mouth 2 (two) times a day 180 tablet 1    ipratropium-albuterol (DUO-NEB) 0.5-2.5 mg/3 mL nebulizer solution TAKE THREE ML BY NEBULIZATION 4 (FOUR) TIMES A DAY Strength: 0.5-2.5 (3) MG/3ML 360 mL 1    naloxone (NARCAN) 4 mg/0.1 mL nasal spray Administer 1 spray into a nostril. If no response after 2-3 minutes, give another dose in the other nostril using a new spray. 1 each 1    nitroglycerin (NITROSTAT) 0.4 mg SL tablet Place 1 tablet (0.4 mg total) under the tongue every 5 (five) minutes as needed for chest pain 30 tablet 3    oxybutynin (DITROPAN XL) 15 MG 24 hr tablet Take 1 tablet (15 mg total) by mouth daily at bedtime 90 tablet 0    tamsulosin (FLOMAX) 0.4 mg Take 1 capsule (0.4 mg total) by mouth daily with dinner 90 capsule 1    traMADol (ULTRAM) 50 mg tablet Take 1 tablet (50 mg total) by mouth every 6 (six) hours as needed for severe pain for up to 10 days 16 tablet 0    Trelegy Ellipta 100-62.5-25 MCG/ACT inhaler Inhale 1 puff daily 60 blister 0    Ubrelvy 100 MG tablet Take 1 tablet (100 mg total) by mouth daily as needed (migraine) 90 tablet 1    Vtama 1 % CREA APPLY 1 APPLICATION TOPICALLY IN THE MORNING 60 g 0    EPINEPHrine (EPIPEN) 0.3 mg/0.3 mL SOAJ Inject 0.3 mL (0.3 mg total) into a muscle once for 1 dose 0.3 mL 0     No current facility-administered medications on file prior to visit.   [6]   Social History  Tobacco Use    Smoking status: Former     Current packs/day: 0.00     Average packs/day: 2.0 packs/day for 40.0  years (80.0 ttl pk-yrs)     Types: Cigarettes     Start date: 1/1/1970     Quit date: 1/1/2010     Years since quitting: 15.5     Passive exposure: Past    Smokeless tobacco: Never   Vaping Use    Vaping status: Never Used   Substance and Sexual Activity    Alcohol use: Not Currently     Comment: Occasional    Drug use: Not Currently     Types: Marijuana     Comment: long time ago    Sexual activity: Yes     Partners: Female

## 2025-07-07 ENCOUNTER — APPOINTMENT (OUTPATIENT)
Age: 70
End: 2025-07-07
Payer: COMMERCIAL

## 2025-07-07 DIAGNOSIS — J44.9 CHRONIC OBSTRUCTIVE PULMONARY DISEASE, UNSPECIFIED COPD TYPE (HCC): ICD-10-CM

## 2025-07-07 DIAGNOSIS — L40.9 PSORIASIS: ICD-10-CM

## 2025-07-07 DIAGNOSIS — D75.839 THROMBOCYTOSIS: ICD-10-CM

## 2025-07-07 DIAGNOSIS — D50.9 MICROCYTIC ANEMIA: ICD-10-CM

## 2025-07-07 LAB
BASOPHILS # BLD AUTO: 0.07 THOUSANDS/ÂΜL (ref 0–0.1)
BASOPHILS NFR BLD AUTO: 1 % (ref 0–1)
EOSINOPHIL # BLD AUTO: 0.43 THOUSAND/ÂΜL (ref 0–0.61)
EOSINOPHIL NFR BLD AUTO: 4 % (ref 0–6)
ERYTHROCYTE [DISTWIDTH] IN BLOOD BY AUTOMATED COUNT: 27.1 % (ref 11.6–15.1)
HCT VFR BLD AUTO: 34.6 % (ref 36.5–49.3)
HGB BLD-MCNC: 10.1 G/DL (ref 12–17)
IMM GRANULOCYTES # BLD AUTO: 0.05 THOUSAND/UL (ref 0–0.2)
IMM GRANULOCYTES NFR BLD AUTO: 1 % (ref 0–2)
LYMPHOCYTES # BLD AUTO: 1.42 THOUSANDS/ÂΜL (ref 0.6–4.47)
LYMPHOCYTES NFR BLD AUTO: 14 % (ref 14–44)
MCH RBC QN AUTO: 19.1 PG (ref 26.8–34.3)
MCHC RBC AUTO-ENTMCNC: 29.2 G/DL (ref 31.4–37.4)
MCV RBC AUTO: 65 FL (ref 82–98)
MONOCYTES # BLD AUTO: 0.76 THOUSAND/ÂΜL (ref 0.17–1.22)
MONOCYTES NFR BLD AUTO: 7 % (ref 4–12)
NEUTROPHILS # BLD AUTO: 7.63 THOUSANDS/ÂΜL (ref 1.85–7.62)
NEUTS SEG NFR BLD AUTO: 73 % (ref 43–75)
NRBC BLD AUTO-RTO: 0 /100 WBCS
PLATELET # BLD AUTO: 596 THOUSANDS/UL (ref 149–390)
RBC # BLD AUTO: 5.29 MILLION/UL (ref 3.88–5.62)
WBC # BLD AUTO: 10.36 THOUSAND/UL (ref 4.31–10.16)

## 2025-07-07 PROCEDURE — 88374 M/PHMTRC ALYS ISHQUANT/SEMIQ: CPT

## 2025-07-07 PROCEDURE — 85025 COMPLETE CBC W/AUTO DIFF WBC: CPT

## 2025-07-07 PROCEDURE — 36415 COLL VENOUS BLD VENIPUNCTURE: CPT

## 2025-07-07 RX ORDER — IPRATROPIUM BROMIDE AND ALBUTEROL SULFATE 2.5; .5 MG/3ML; MG/3ML
SOLUTION RESPIRATORY (INHALATION)
Qty: 360 ML | Refills: 1 | Status: SHIPPED | OUTPATIENT
Start: 2025-07-07

## 2025-07-08 ENCOUNTER — CLINICAL SUPPORT (OUTPATIENT)
Facility: HOSPITAL | Age: 70
End: 2025-07-08
Attending: INTERNAL MEDICINE
Payer: COMMERCIAL

## 2025-07-08 DIAGNOSIS — L40.9 PSORIASIS: ICD-10-CM

## 2025-07-08 DIAGNOSIS — Z95.5 S/P DRUG ELUTING CORONARY STENT PLACEMENT: Primary | ICD-10-CM

## 2025-07-08 PROCEDURE — 93798 PHYS/QHP OP CAR RHAB W/ECG: CPT

## 2025-07-08 NOTE — PROGRESS NOTES
CARDIAC REHABILITATION   ASSESSMENT AND INDIVIDUALIZED TREATMENT PLAN  INITIAL           Today's date: 2025   # of Exercise Sessions Completed:   Patient name: Danie Liao      : 1955  Age: 70 y.o.       MRN: [id number]  Referring Physician: Sarah Noland MD  Cardiologist: Sarah Noland MD  Provider: Peter  Clinician: Efrem Hull, MS, CEP, CCRP        Treatment is tailored to this patient's individual needs.  The ITP was reviewed with the patient and all questions were answered to their satisfaction.  Additional ITP documentation can be found electronically including daily and monthly exercise summaries, daily session notes with ECG summaries, education notes, daily medication reconciliation, and daily physician supervision.      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    S/P drug eluting coronary stent placement        Description of Diagnosis: S/P Successful IVUS Guided PCI with SHELL x 1 to the DFR Positive 65%   Date of onset: 2024      Medical History:   Past Medical History[1]    Family History:  Family History[2]    Allergies:   Patient has no known allergies.    Current Medications:   Current Medications[3]    Medication compliance: yes  Pt reports to be compliant with medications    Risk Stratification: Low    Physical Limitations: herniated discs, R knee pain    Fall Risk: Low   Comments: Ambulates with a steady gait with no assist device and Reports a fall in the past  6 months    CARDIAC RISK FACTOR MODIFICATION:  Cardiac risk factor modification, including education, counseling, and   behavioral intervention will be provided tailored to the patients' needs as   outlined in the Individual Treatment Plan.      EXERCISE ASSESSMENT AND GOALS    Initial  Fitness Assessment:    Submaximal TM ETT:  Resting:  BP: 104/60  HR: 75  SpO2: 92  Dyspnea: 0  O2 Use: RA l/min  Exercise:  BP: 124/60  HR: 110  SpO2: 93%  Dyspnea: 7  O2 use: RA l/min  METs:  2.2  ECG Summary: NSR, rare PVCs  Symptoms: [place], leg fatigue  Test terminated at:  RPE 6    Functional Capacity Screening Tool:  [facility] Activity Status Index:  4.7/9.89 METs    (The higher the score, the higher the functional status)    Work Status:   part time job /    Recreational Activities/Hobbies:  Hunting, fishing       SMART Exercise Goals:   improvement of 0.5 to 1.0 MET in the fitness assessment  improved DASI score by 10%  increased peak METs tolerated in rehab exercise session  attend rehab regularly  maintain > 150 minutes per week of moderate intensity exercise  reduced dyspnea (RPD) with exercise    Patient Specific EXERCISE GOALS:       Return to exercise    EXERCISE PLAN AND PROGRESSION    Progress toward Exercise goals:   Reviewed Pt goals and determined plan of care, Will continue to educate and progress as tolerated.    Exercise Plan:    patient will attend rehab 2-3 times per week to complete 36 exercise sessions  progress workloads as tolerated to maintain RPE 4-6/10  patient will add home exercise 30-45 mins 1-2 days to supplement cardiac rehab  introduce resistance training in rehab session  class: Risk Factors for Heart Disease  Patient education handout:   Exercise After Cardiac Rehabilitation  After discharge patient will continue to follow a regular exercise regimen with the goal of a minimum of 150 minutes per week of moderate intensity exercise and progress as tolerated.     Exercise Education:  "  benefit of exercise for CAD risk factors  home exercise guidelines  AHA guidelines to achieve >150 mins/wk of moderate exercise  RPE scale       PHYSICIAN PRESCRIBED EXERCISE    Current Aerobic Exercise Prescription:      Frequency: 3 days/week   Supplement with home exercise 2+ days/wk as tolerated       Minutes: 20 - 40         METS: 2.0-3.5            HR: Intensity based on RPE 4-6    RPE: 4-6/10         Modalities: Treadmill, UBE, NuStep, and Recumbent bike     Exercise workloads will be progressed gradually as tolerated, within limits of patient's ability, and according to the patient's   response to the exercise program.    Aerobic Exercise Prescription Plan for Progression   Frequency: 3 days/week of cardiac rehab       Supplement with home exercise 2+ days/wk as tolerated    Minutes: 40       >150 mins/wk of moderate intensity exercise   METS: 2.5-3.8   HR: Intensity based on RPE 4-6      RPE: 4-6/10   Modalities: Treadmill, NuStep, and Recumbent bike    Strength trainin-3 days / week  12-15 repetitions  1-2 sets per modality   Modalities: Leg Press, Chest Press, Pull Downs, and Arm Curl       NUTRITION ASSESSMENT AND GOALS    Initial Weight:  194.4  Current Weight: 194.4    Height:   Ht Readings from Last 1 Encounters:   25 5' 6\" (1.676 m)       Rate Your Plate Score: 56/81    Dietary habits reported at initial evaluation:  No fast food  Salt on his salad  Loves steak   Red  meat 1-2 times/wk  Pork sausage on the grill  A good cheese from [place] - 1lb per month  Healthy Choice \"Steamers\"  Trying not to snack in the middle of the night  Wakes up d/t back pain      SMART Nutrition Goals:   reduced BMI to < 25, LDL <100, Improved Rate Your Plate score  >64, weight loss 0.5 - 1 ppw,  goal of 20 lbs., eat 5 or more servings of fruits and vegetables a day, eat 2 or more servings of low fat milk or yogurt a day, eat no more than 6oz of meat per day, eat red meat once a week or less, choose lean beef or " rarely eat beef, rarely eat processed meats or eat low fat processed meats, eat poultry without the skin, eat meatless meals twice a week or more, choose 1% or skim milk, rarely eat cheese or choose reduced fat or skim, rarely eat frozen desserts or choose fruit or fat-free sweets, Do not cook with oil, butter or margarine, choose healthy snacks such as fruit, pretzels, and low fat crackers, choose healthy desserts and sweets such as indira food cake or  fruit, never add salt to food when cooking or at the table, choose low sodium canned, frozen/packaged foods or rarely/never eat, rarely/never eat salty snacks, and choose low sugar desserts and sweets    Patient Specific NUTRITION GOALS:    20lb wt loss  Eliminate night time snacking    Drug/Alcohol Use:   Yes, very rarely - 1 drink monthly      NUTRITION PLAN AND PROGRESSION    Patient's progress toward Nutrition goals:    Reviewed Pt goals and determined plan of care, Will continue to educate and progress as tolerated.    Nutrition Plan:   Increase hydration  Increase intake of fruits and vegetables  reduce red meat intake  reduce/eliminate processed meats  improve portion control  patient weight measured at least once per week at rehab session  patient will be referred to medical nutrition therapy    Nutrition Education:   Reviewed patient's Rate your Plate.   Discussed key elements of plant based eating and the mediterranean diet.  Reviewed patient goals for dietary modifications and their clinical implications.  Reviewed most recent lipid profile.     Additional Nutrition Education: weight loss strategies  low sodium diet  maintaining hydration  portion control      PSYCHOSOCIAL ASSESSMENT AND GOALS    Date of last Assessment:  7/8/25  Depression screening:  PHQ-9 = 4    Interpretation:  1-4 = Minimal Depression  Anxiety screening:  TONY-7 = 6    Interpretation: 5-9 = Mild anxiety    Psychosocial Assessment as it relates to rehabilitation:   Patient denies issues  with his/her family or home life that may affect their rehabilitation efforts.     Pt self-report of depression and anxiety   Patient reports they are coping well with good social support and denies depression or anxiety  Sometimes feels a little anxiety over people    Social Support:   patient lives with spouse  Patient reports support from:  spouse    Self-reported stress level:  3   Stressors: personal issues  Stress Management Tools: fishing, hunting, keeps busy with  jobs    SMART Psychosocial Goals:     manage / reduce stress  improved [facility] QOL < 27  PHQ-9 - reduced severity by one level  Physical Fitness in [facility] Score < 3  Daily Activity in [facility] Score < 3  Pain in [facility] Score < 3  Overall Health in [facility] Score < 3  Change in Health in [facility] Score < 3   improved sleeping habits  feel less tired with more energy  reduced time feeling nervous or on edge  take time to relax  reduced feelings of restlessness    Patient Specific PSYCHOCOSOCIAL GOALS:    Visit mom in [facility]  Buy a house  Return to hunting    Quality of Life Screen:  (Higher score indicates disease impact on QOL)  [facility] COOP score: 27/45     Psychosocial Assessment as it relates to rehabilitation:   Patient denies issues with his/her family or home life that may affect their rehabilitation efforts.     PSYCHOSOCIAL PLAN AND PROGRESSION    Patient's progress toward Psychosocial goals:    Reviewed Pt goals and determined plan of care, Patient will be encouraged to focus on lifestyle modifications following discharge.    Psychosocial Plan:   Class: Stress and Your Health, Class: Relaxation, enjoy a hobby such as hunting and fishing, enjoy family, and work part time    Psychosocial Education:   benefits of a positive support system  stress management techniques      OTHER CORE COMPONENT ASSESSMENT AND GOALS    Hyperlipidemia:   Yes   Discussed diet and lipid management and Last lipid profile 6/13/25  Chol 163   TRG 62  HDL 50      Hypertension: No   Blood Pressure will be monitored throughout the program and cardiologist will be notified of elevated trends.      Diabetes:  pre-diabetes  A1c: 6.0    last measured: 5/18/2024    Tobacco use: Former user:  quit 2010 then vaped for many years - quit 7 years ago      Anginal Symptoms:  None   NTG use: Compliant with carrying NTG, Understands proper use, Reviewed Proper use, and Pt has not used NTG since event    Systolic Heart Failure:  no   Ejection Fraction (Date: 4/5/24 ): 68%    SMART Goals:   LDL < 70  Consistent, controlled resting BP < 130/80  Medication compliance  reduced angina    Patient Specific CORE COMPONENT GOALS:    Avoid being diagnosed with diabetes  Carry NTG      OTHER CORE COMPONENTS PLAN AND PROGRESSION    Progress toward Core Component goals:   Reviewed Pt goals and determined plan of care, Will continue to educate and progress as tolerated.    Other Core Components plan:   medication compliance  check labels for sodium content  eliminate salt shaker at the table  use salt substitutes  avoid processed foods  engage in regular exercise for BP control  class: Understanding Heart Disease  class: Common Heart Medications    Other Core Components Education:    definition of  hypertension, components of blood pressure management, maintaining hydration for blood pressure control, proper use of sublingual NTG , impact of exercise on glycemic control, review of recent lipid profile and implications for disease risk , and plant based eating for lipid management         [1]   Past Medical History:  Diagnosis Date    Arthritis     Asthma     Chronic pain 01/30/2024    cervic and lumbar    Colon polyp     COPD (chronic obstructive pulmonary disease) (HCC)     Emphysema of lung (HCC)     GERD (gastroesophageal reflux disease) 01/30/2024    Headache(784.0)     Hyperlipidemia 01/30/2024    Migraine 2 years ago    none    Obesity     Sleep apnea    [2]   Family  History  Problem Relation Name Age of Onset    Breast cancer Mother Jennifer Liao     Heart disease Father Aj Ibarras     Heart disease Brother Father     Stroke Brother      Lung disease Brother      Breast cancer Maternal Grandmother Brittany Wong     Heart disease Paternal Grandmother Jonny    [3]   Current Outpatient Medications   Medication Sig Dispense Refill    albuterol (PROVENTIL HFA,VENTOLIN HFA) 90 mcg/act inhaler Inhale 1-2 puffs every 6 (six) hours as needed for wheezing every 4 to 6 hours as needed and as directed 36 g 3    aspirin 81 mg chewable tablet Chew 1 tablet (81 mg total) daily 90 tablet 0    atorvastatin (LIPITOR) 80 mg tablet Take 1 tablet (80 mg total) by mouth every evening 90 tablet 0    Dupilumab (Dupixent) 300 MG/2ML SOAJ Inject 300 mg under the skin every 14 (fourteen) days 4 mL 11    EPINEPHrine (EPIPEN) 0.3 mg/0.3 mL SOAJ Inject 0.3 mL (0.3 mg total) into a muscle once for 1 dose 0.3 mL 0    famotidine (PEPCID) 20 mg tablet Take 1 tablet (20 mg total) by mouth 2 (two) times a day 180 tablet 1    ipratropium-albuterol (DUO-NEB) 0.5-2.5 mg/3 mL nebulizer solution TAKE THREE ML BY NEBULIZATION 4 (FOUR) TIMES A DAY Strength: 0.5-2.5 (3) MG/3ML 360 mL 1    naloxone (NARCAN) 4 mg/0.1 mL nasal spray Administer 1 spray into a nostril. If no response after 2-3 minutes, give another dose in the other nostril using a new spray. 1 each 1    nitroglycerin (NITROSTAT) 0.4 mg SL tablet Place 1 tablet (0.4 mg total) under the tongue every 5 (five) minutes as needed for chest pain 30 tablet 3    oxybutynin (DITROPAN XL) 15 MG 24 hr tablet Take 1 tablet (15 mg total) by mouth daily at bedtime 90 tablet 0    tamsulosin (FLOMAX) 0.4 mg Take 1 capsule (0.4 mg total) by mouth daily with dinner 90 capsule 1    Trelegy Ellipta 100-62.5-25 MCG/ACT inhaler Inhale 1 puff daily 60 blister 0    Ubrelvy 100 MG tablet Take 1 tablet (100 mg total) by mouth daily as needed (migraine) 90 tablet 1    Vtama 1 %  CREA APPLY 1 APPLICATION TOPICALLY IN THE MORNING 60 g 0     No current facility-administered medications for this visit.

## 2025-07-09 RX ORDER — TAPINAROF 10 MG/1000MG
1 CREAM TOPICAL DAILY
Qty: 60 G | Refills: 0 | Status: SHIPPED | OUTPATIENT
Start: 2025-07-09

## 2025-07-09 RX ORDER — TAPINAROF 10 MG/1000MG
1 CREAM TOPICAL DAILY
Qty: 60 G | Refills: 0 | OUTPATIENT
Start: 2025-07-09

## 2025-07-10 DIAGNOSIS — K21.9 LARYNGOPHARYNGEAL REFLUX (LPR): ICD-10-CM

## 2025-07-11 ENCOUNTER — CLINICAL SUPPORT (OUTPATIENT)
Facility: HOSPITAL | Age: 70
End: 2025-07-11
Payer: COMMERCIAL

## 2025-07-11 DIAGNOSIS — Z95.5 S/P DRUG ELUTING CORONARY STENT PLACEMENT: Primary | ICD-10-CM

## 2025-07-11 PROCEDURE — 93798 PHYS/QHP OP CAR RHAB W/ECG: CPT

## 2025-07-11 RX ORDER — FAMOTIDINE 20 MG/1
20 TABLET, FILM COATED ORAL 2 TIMES DAILY
Qty: 180 TABLET | Refills: 1 | Status: SHIPPED | OUTPATIENT
Start: 2025-07-11

## 2025-07-13 DIAGNOSIS — D70.8 OTHER NEUTROPENIA (HCC): Primary | ICD-10-CM

## 2025-07-14 ENCOUNTER — OFFICE VISIT (OUTPATIENT)
Age: 70
End: 2025-07-14
Payer: COMMERCIAL

## 2025-07-14 ENCOUNTER — APPOINTMENT (OUTPATIENT)
Facility: HOSPITAL | Age: 70
End: 2025-07-14
Payer: COMMERCIAL

## 2025-07-14 VITALS
RESPIRATION RATE: 16 BRPM | WEIGHT: 196 LBS | HEIGHT: 66 IN | OXYGEN SATURATION: 96 % | TEMPERATURE: 98.4 F | SYSTOLIC BLOOD PRESSURE: 134 MMHG | DIASTOLIC BLOOD PRESSURE: 80 MMHG | BODY MASS INDEX: 31.5 KG/M2 | HEART RATE: 94 BPM

## 2025-07-14 DIAGNOSIS — J43.2 CENTRILOBULAR EMPHYSEMA (HCC): Primary | ICD-10-CM

## 2025-07-14 DIAGNOSIS — Z77.090 ASBESTOS EXPOSURE: ICD-10-CM

## 2025-07-14 DIAGNOSIS — Z87.891 FORMER SMOKER: ICD-10-CM

## 2025-07-14 DIAGNOSIS — G47.33 OSA (OBSTRUCTIVE SLEEP APNEA): ICD-10-CM

## 2025-07-14 LAB — SCAN RESULT: NORMAL

## 2025-07-14 PROCEDURE — 99214 OFFICE O/P EST MOD 30 MIN: CPT

## 2025-07-14 NOTE — ASSESSMENT & PLAN NOTE
- Moderate upper lobe predominant emphysema on CT imaging.  PFTs in 2023 with moderately severe airflow obstruction.  Most recent PFTs in April 2024 showed restriction, no significant air trapping or hyperinflation  -History of elevated eosinophils as high as 460  -Previously on Georgina 1 mg daily, however discontinued by . Recently ordered and approved for Dupixent, he starts this the end of this week  -Symptoms currently stable, some days better than others, heat/humidity worsen symptoms    Plan:  -Start Dupixent injections.  Patient does not wish to come to the office for his first injection, he feels comfortable doing it on his own.  Reviewed proper administration technique and to have EpiPen on hand   - Continue Trelegy 200 daily and albuterol HFA/nebs every 6 hours as needed  - Follow-up in 3 months

## 2025-07-14 NOTE — ASSESSMENT & PLAN NOTE
S/P PCI with SHELL x 1 to DONALD on 06/17/24 with Moderate DFR CAD noted in the LCX.  Asymptomatic of anginal cp at this time. Tolerating GDMT on asa and statin.  Slight up-trend in LDL with goal being < 70.   on diet/lifestyle modification.  Plan to start cardiac rehab as well.

## 2025-07-14 NOTE — PROGRESS NOTES
Follow-up  Visit - Pulmonary Medicine   Name: Danie Liao      : 1955      MRN: [id number]  Encounter Provider: DEEPTI Salazar  Encounter Date: 2025   Encounter department: [facility] PULMON[facility]  :  Assessment & Plan  Centrilobular emphysema (HCC)  - Moderate upper lobe predominant emphysema on CT imaging.  PFTs in  with moderately severe airflow obstruction.  Most recent PFTs in 2024 showed restriction, no significant air trapping or hyperinflation  -History of elevated eosinophils as high as 460  -Previously on Georgina 1 mg daily, however discontinued by . Recently ordered and approved for Dupixent, he starts this the end of this week  -Symptoms currently stable, some days better than others, heat/humidity worsen symptoms    Plan:  -Start Dupixent injections.  Patient does not wish to come to the office for his first injection, he feels comfortable doing it on his own.  Reviewed proper administration technique and to have EpiPen on hand   - Continue Trelegy 200 daily and albuterol HFA/nebs every 6 hours as needed  - Follow-up in 3 months         Former smoker  -Approximately 78 PYH, quit   -Overdue for CT lung screening, ordered previously. Encouraged patient to schedule       Asbestos exposure  - Will reevaluate pleural plaques on his CT lung cancer screening       LEATHA (obstructive sleep apnea)  - Previously diagnosed with severe LEATHA, unable to tolerate CPAP.  Not interested in resuming at this time         No follow-ups on file.    History of Present Illness   Danie Liao is a 70 y.o. male with a past medical history of COPD, LEATHA, GERD, kidney donor, CAD, inguinal hernias, and CAD who is here today for a follow-up visit. Last seen in the office 4 weeks ago.  Was previously on Georgina, but discontinued from manufacture.  He was having increase in symptoms and his Trelegy inhaler was escalated to high dose while awaiting approval  "for Dupixent.  His Dupixent was recently approved and he states will pick it up at the end of this week.  His symptoms have been stable/unchanged since his last visit.  Symptoms do worsen in the heat/immunity.  Using his rescue inhaler/nebs 2-3 times per day in addition to high-dose Trelegy.      Review of Systems    Aside from what is mentioned in the HPI, ROS is otherwise negative         Medical History Reviewed by provider this encounter:     .    Objective   There were no vitals taken for this visit.    Physical Exam  Vitals and nursing note reviewed.   Constitutional:       General: He is not in acute distress.     Appearance: Normal appearance. He is well-developed.     Cardiovascular:      Rate and Rhythm: Normal rate and regular rhythm.      Heart sounds: Normal heart sounds. No murmur heard.  Pulmonary:      Effort: Pulmonary effort is normal. No respiratory distress.      Breath sounds: Normal breath sounds. No decreased breath sounds, wheezing, rhonchi or rales.     Musculoskeletal:         General: No swelling.      Right lower leg: No edema.      Left lower leg: No edema.     Psychiatric:         Mood and Affect: Mood and affect normal.         Behavior: Behavior normal. Behavior is cooperative.           Diagnostic Data:  Labs: I personally reviewed the most recent laboratory data pertinent to today's visit.      Radiology results:  Radiology Results Review: I have reviewed radiology reports from 3/24/2025, 9/29/2023 including: chest xray and CT chest.      PFT/spirometry results: Reviewed studies from 4/2024 and 9/2023  No results found for: \"FEV1\", \"FVC\", \"MOJ1OJW\", \"TLC\", \"DLCO\"       Oximetry testing:      Other studies:      DEEPTI Salazar      "

## 2025-07-14 NOTE — ASSESSMENT & PLAN NOTE
A1c 6.0 on 05/18/24.  Repeat A1c prior to follow up.  on diet/lifestyle modification.  Orders:  •  Hemoglobin A1C

## 2025-07-14 NOTE — ASSESSMENT & PLAN NOTE
- Previously diagnosed with severe LEATHA, unable to tolerate CPAP.  Not interested in resuming at this time

## 2025-07-14 NOTE — ASSESSMENT & PLAN NOTE
Prior sleep stlyd with Severe LEATHA noted, Previously unable to tolerate NIPPV, Will need to follow up

## 2025-07-14 NOTE — ASSESSMENT & PLAN NOTE
on 06/13/25, On Atorvastatin 80, Goal LDL is 70, Patient to start cardiac rehab soon with focus on diet/lifestyle modification, If f/u FLP is not at goal will discuss adding ezetimibe at that time  Orders:  •  Lipid Panel with Direct LDL reflex; Future

## 2025-07-16 ENCOUNTER — HOSPITAL ENCOUNTER (OUTPATIENT)
Dept: CT IMAGING | Facility: HOSPITAL | Age: 70
Discharge: HOME | End: 2025-07-16
Attending: STUDENT IN AN ORGANIZED HEALTH CARE EDUCATION/TRAINING PROGRAM
Payer: COMMERCIAL

## 2025-07-16 ENCOUNTER — CLINICAL SUPPORT (OUTPATIENT)
Facility: HOSPITAL | Age: 70
End: 2025-07-16
Payer: COMMERCIAL

## 2025-07-16 DIAGNOSIS — I72.9 PSEUDOANEURYSM (HCC): ICD-10-CM

## 2025-07-16 DIAGNOSIS — Z95.5 S/P DRUG ELUTING CORONARY STENT PLACEMENT: Primary | ICD-10-CM

## 2025-07-16 PROCEDURE — 93798 PHYS/QHP OP CAR RHAB W/ECG: CPT

## 2025-07-16 PROCEDURE — 74177 CT ABD & PELVIS W/CONTRAST: CPT

## 2025-07-16 RX ADMIN — IOHEXOL 100 ML: 350 INJECTION, SOLUTION INTRAVENOUS at 13:28

## 2025-07-18 ENCOUNTER — APPOINTMENT (OUTPATIENT)
Facility: HOSPITAL | Age: 70
End: 2025-07-18
Payer: COMMERCIAL

## 2025-07-21 ENCOUNTER — APPOINTMENT (OUTPATIENT)
Facility: HOSPITAL | Age: 70
End: 2025-07-21
Payer: COMMERCIAL

## 2025-07-22 ENCOUNTER — OFFICE VISIT (OUTPATIENT)
Dept: FAMILY MEDICINE CLINIC | Facility: CLINIC | Age: 70
End: 2025-07-22
Payer: COMMERCIAL

## 2025-07-22 VITALS
HEART RATE: 97 BPM | BODY MASS INDEX: 31.57 KG/M2 | HEIGHT: 66 IN | DIASTOLIC BLOOD PRESSURE: 70 MMHG | OXYGEN SATURATION: 96 % | SYSTOLIC BLOOD PRESSURE: 112 MMHG | WEIGHT: 196.4 LBS

## 2025-07-22 DIAGNOSIS — R05.1 ACUTE COUGH: ICD-10-CM

## 2025-07-22 DIAGNOSIS — B34.9 VIRAL ILLNESS: Primary | ICD-10-CM

## 2025-07-22 DIAGNOSIS — J44.9 CHRONIC OBSTRUCTIVE PULMONARY DISEASE, UNSPECIFIED COPD TYPE (HCC): ICD-10-CM

## 2025-07-22 DIAGNOSIS — R06.2 WHEEZING: ICD-10-CM

## 2025-07-22 DIAGNOSIS — J04.0 LARYNGITIS: ICD-10-CM

## 2025-07-22 LAB
SARS-COV-2 AG UPPER RESP QL IA: NEGATIVE
VALID CONTROL: NORMAL

## 2025-07-22 PROCEDURE — 87811 SARS-COV-2 COVID19 W/OPTIC: CPT | Performed by: STUDENT IN AN ORGANIZED HEALTH CARE EDUCATION/TRAINING PROGRAM

## 2025-07-22 PROCEDURE — 99214 OFFICE O/P EST MOD 30 MIN: CPT | Performed by: STUDENT IN AN ORGANIZED HEALTH CARE EDUCATION/TRAINING PROGRAM

## 2025-07-22 PROCEDURE — G2211 COMPLEX E/M VISIT ADD ON: HCPCS | Performed by: STUDENT IN AN ORGANIZED HEALTH CARE EDUCATION/TRAINING PROGRAM

## 2025-07-22 RX ORDER — AZITHROMYCIN 250 MG/1
TABLET, FILM COATED ORAL
Qty: 6 TABLET | Refills: 0 | Status: SHIPPED | OUTPATIENT
Start: 2025-07-22 | End: 2025-07-26

## 2025-07-22 RX ORDER — PREDNISONE 20 MG/1
40 TABLET ORAL DAILY
Qty: 10 TABLET | Refills: 0 | Status: SHIPPED | OUTPATIENT
Start: 2025-07-22 | End: 2025-07-27

## 2025-07-22 RX ORDER — BENZONATATE 200 MG/1
200 CAPSULE ORAL 3 TIMES DAILY PRN
Qty: 20 CAPSULE | Refills: 0 | Status: SHIPPED | OUTPATIENT
Start: 2025-07-22

## 2025-07-22 NOTE — PROGRESS NOTES
"Name: Danie Liao      : 1955      MRN: [id number]  Encounter Provider: Gosia Mcgill MD  Encounter Date: 2025   Encounter department: [facility] [address]  :  Assessment & Plan  Viral illness    Orders:  •  POCT Rapid Covid Ag    Chronic obstructive pulmonary disease, unspecified COPD type (HCC)    Orders:  •  predniSONE 20 mg tablet; Take 2 tablets (40 mg total) by mouth daily for 5 days  •  azithromycin (ZITHROMAX) 250 mg tablet; Take 2 tablets today then 1 tablet daily x 4 days    Wheezing    Orders:  •  predniSONE 20 mg tablet; Take 2 tablets (40 mg total) by mouth daily for 5 days  •  azithromycin (ZITHROMAX) 250 mg tablet; Take 2 tablets today then 1 tablet daily x 4 days    Laryngitis    Orders:  •  predniSONE 20 mg tablet; Take 2 tablets (40 mg total) by mouth daily for 5 days  •  azithromycin (ZITHROMAX) 250 mg tablet; Take 2 tablets today then 1 tablet daily x 4 days  •  benzonatate (TESSALON) 200 MG capsule; Take 1 capsule (200 mg total) by mouth 3 (three) times a day as needed for cough    Acute cough    Orders:  •  benzonatate (TESSALON) 200 MG capsule; Take 1 capsule (200 mg total) by mouth 3 (three) times a day as needed for cough           History of Present Illness   Cough  Pertinent negatives include no fever.     Coughing 4-5 days now    Coughing so much that he will have vision changes and neck pain    Review of Systems   Constitutional:  Positive for fatigue. Negative for fever.   HENT:  Positive for congestion. Negative for dental problem.    Respiratory:  Positive for cough.        Objective   /70   Pulse 97   Ht 5' 6\" (1.676 m)   Wt 89.1 kg (196 lb 6.4 oz)   SpO2 96%   BMI 31.70 kg/m²      Physical Exam  Constitutional:       General: He is not in acute distress.     Appearance: Normal appearance. He is not ill-appearing, toxic-appearing or diaphoretic.   HENT:      Head: Normocephalic and atraumatic.      Right Ear: " Tympanic membrane, ear canal and external ear normal. There is no impacted cerumen.      Left Ear: Tympanic membrane, ear canal and external ear normal. There is no impacted cerumen.      Nose: Congestion and rhinorrhea present.      Mouth/Throat:      Mouth: Mucous membranes are moist.   Pulmonary:      Breath sounds: Transmitted upper airway sounds present. Examination of the right-middle field reveals wheezing. Examination of the left-middle field reveals wheezing. Examination of the right-lower field reveals wheezing. Examination of the left-lower field reveals wheezing. Wheezing present.     Neurological:      Mental Status: He is alert.

## 2025-07-22 NOTE — ASSESSMENT & PLAN NOTE
Orders:  •  predniSONE 20 mg tablet; Take 2 tablets (40 mg total) by mouth daily for 5 days  •  azithromycin (ZITHROMAX) 250 mg tablet; Take 2 tablets today then 1 tablet daily x 4 days

## 2025-07-28 ENCOUNTER — APPOINTMENT (OUTPATIENT)
Facility: HOSPITAL | Age: 70
End: 2025-07-28
Payer: COMMERCIAL

## 2025-08-18 DIAGNOSIS — N32.81 OAB (OVERACTIVE BLADDER): ICD-10-CM

## 2025-08-18 RX ORDER — OXYBUTYNIN CHLORIDE 15 MG/1
15 TABLET, EXTENDED RELEASE ORAL
Qty: 90 TABLET | Refills: 1 | Status: SHIPPED | OUTPATIENT
Start: 2025-08-18

## (undated) DEVICE — BALLOON NC EUPHORA 2.5 X 15MM

## (undated) DEVICE — COTTON TIP APPLICTOR 2 PK

## (undated) DEVICE — TROCAR: Brand: KII® SLEEVE

## (undated) DEVICE — CATH DIAG 5FR IMPULSE 100CM FL3.5

## (undated) DEVICE — STAPLER VLOC 180  0 8IN  VLOCA008L

## (undated) DEVICE — CATH GUIDE LAUNCHER 6FR EBU 3.5

## (undated) DEVICE — RADIFOCUS OPTITORQUE ANGIOGRAPHIC CATHETER: Brand: OPTITORQUE

## (undated) DEVICE — SUT MONOCRYL 4-0 SH 27 IN Y315H

## (undated) DEVICE — CATH DIAG 5FR IMPULSE 100CM FL4

## (undated) DEVICE — 2, DISPOSABLE SUCTION/IRRIGATOR WITHOUT DISPOSABLE TIP: Brand: STRYKEFLOW

## (undated) DEVICE — INTENDED FOR TISSUE SEPARATION, AND OTHER PROCEDURES THAT REQUIRE A SHARP SURGICAL BLADE TO PUNCTURE OR CUT.: Brand: BARD-PARKER SAFETY BLADES SIZE 15, STERILE

## (undated) DEVICE — NEEDLE 25G X 1 1/2

## (undated) DEVICE — DRAPE EQUIPMENT RF WAND

## (undated) DEVICE — TUBING SUCTION 5MM X 12 FT

## (undated) DEVICE — GUIDEWIRE FFR PRESSUREWIRE X W/AGILE TIP 175CM

## (undated) DEVICE — ALLENTOWN LAP CHOLE APP PACK: Brand: CARDINAL HEALTH

## (undated) DEVICE — TROCAR: Brand: KII FIOS FIRST ENTRY

## (undated) DEVICE — NC TREK NEO™ CORONARY DILATATION CATHETER 3.00 MM X 15 MM / RAPID-EXCHANGE: Brand: NC TREK NEO™

## (undated) DEVICE — ELECTRODE NEEDLE MEGAFINE 2IN E-Z CLEAN MEGADYNE -0118

## (undated) DEVICE — GLOVE INDICATOR PI UNDERGLOVE SZ 7 BLUE

## (undated) DEVICE — CATH DIAG 5FR IMPULSE 100CM FR4

## (undated) DEVICE — GAUZE SPONGES,16 PLY: Brand: CURITY

## (undated) DEVICE — TOWEL SURG XR DETECT GREEN STRL RFD

## (undated) DEVICE — 3M™ TEGADERM™ TRANSPARENT FILM DRESSING FRAME STYLE, 1624W, 2-3/8 IN X 2-3/4 IN (6 CM X 7 CM), 100/CT 4CT/CASE: Brand: 3M™ TEGADERM™

## (undated) DEVICE — DGW .035 FC J3MM 150CM TEF: Brand: EMERALD

## (undated) DEVICE — SUT VICRYL 0 UR-6 27 IN J603H

## (undated) DEVICE — MICROPUNCTURE SET 4FR 10CM .018 NITINOL TRANSITIONLESS TIP

## (undated) DEVICE — SUT CHROMIC 3-0 SH 27 IN G122H

## (undated) DEVICE — TR BAND RADIAL ARTERY COMPRESSION DEVICE: Brand: TR BAND

## (undated) DEVICE — MINI TREK CORONARY DILATATION CATHETER 2.0 MM X 15 MM / RAPID-EXCHANGE: Brand: MINI TREK

## (undated) DEVICE — KENDALL SCD SEQUENTIAL COMPRESSION COMFORT SLEEVES, KNEE LENGTH, MEDIUM: Brand: KENDALL SCD

## (undated) DEVICE — DGW .035 FC J3MM 260CM TEF: Brand: EMERALD

## (undated) DEVICE — CORONARY IMAGING CATHETER: Brand: OPTICROSS™ 6 HD

## (undated) DEVICE — GAUZE SPONGES,8 PLY: Brand: CURITY

## (undated) DEVICE — HI-TORQUE BALANCE MIDDLEWEIGHT GUIDE WIRE W/HYDROCOAT .014 J TIP 3.0 CM X 190 CM: Brand: HI-TORQUE BALANCE MIDDLEWEIGHT

## (undated) DEVICE — PINNACLE INTRODUCER SHEATH: Brand: PINNACLE

## (undated) DEVICE — PLUMEPEN PRO 10FT

## (undated) DEVICE — SUT MONOCRYL 4-0 PS-2 18 IN Y496G

## (undated) DEVICE — CHLORHEXIDINE 4PCT 4 OZ

## (undated) DEVICE — CATH GUIDE LAUNCHER 5FR EBU 3.5

## (undated) DEVICE — CATH GUIDE LAUNCHER 5FR EBU 3.0

## (undated) DEVICE — NC TREK NEO™ CORONARY DILATATION CATHETER 3.00 MM X 8 MM / RAPID-EXCHANGE: Brand: NC TREK NEO™

## (undated) DEVICE — GLOVE INDICATOR PI UNDERGLOVE SZ 8 BLUE

## (undated) DEVICE — CATH GUIDE RUNWAY 6FR CLS3.5

## (undated) DEVICE — NEPTUNE E-SEP SMOKE EVACUATION PENCIL, COATED, 70MM BLADE, PUSH BUTTON SWITCH: Brand: NEPTUNE E-SEP

## (undated) DEVICE — DRAIN SPONGES,6 PLY: Brand: EXCILON

## (undated) DEVICE — REM POLYHESIVE ADULT PATIENT RETURN ELECTRODE: Brand: VALLEYLAB

## (undated) DEVICE — TUBING SMOKE EVAC W/FILTRATION DEVICE PLUMEPORT ACTIV

## (undated) DEVICE — GUIDEWIRE WHOLEY .035 145 CM FLOP STR TIP

## (undated) DEVICE — SPECIMEN CONTAINER STERILE PEEL PACK

## (undated) DEVICE — MEDI-VAC YANK SUCT HNDL W/TPRD BULBOUS TIP: Brand: CARDINAL HEALTH

## (undated) DEVICE — GLOVE SRG BIOGEL ECLIPSE 7.5

## (undated) DEVICE — 4-PORT MANIFOLD: Brand: NEPTUNE 2

## (undated) DEVICE — GLIDESHEATH BASIC HYDROPHILIC COATED INTRODUCER SHEATH: Brand: GLIDESHEATH

## (undated) DEVICE — SUTURING DEVICE: Brand: ENDO STITCH

## (undated) DEVICE — SUT VICRYL 3-0 SH 27 IN J416H

## (undated) DEVICE — GLOVE SRG BIOGEL 7

## (undated) DEVICE — BETHLEHEM UNIVERSAL MINOR GEN: Brand: CARDINAL HEALTH

## (undated) DEVICE — INTENDED FOR TISSUE SEPARATION, AND OTHER PROCEDURES THAT REQUIRE A SHARP SURGICAL BLADE TO PUNCTURE OR CUT.: Brand: BARD-PARKER SAFETY BLADES SIZE 10, STERILE

## (undated) DEVICE — LAPAROSCOPIC WIRE L-HOOK ELECTRODE COATED: Brand: CLEANCOAT

## (undated) DEVICE — RUNTHROUGH NS EXTRA FLOPPY PTCA GUIDEWIRE: Brand: RUNTHROUGH

## (undated) DEVICE — CHLORAPREP HI-LITE 26ML ORANGE

## (undated) DEVICE — ADHESIVE SKIN HIGH VISCOSITY EXOFIN 1ML

## (undated) DEVICE — 3M™ STERI-STRIP™ REINFORCED ADHESIVE SKIN CLOSURES, R1546, 1/4 IN X 4 IN (6 MM X 100 MM), 10 STRIPS/ENVELOPE: Brand: 3M™ STERI-STRIP™

## (undated) DEVICE — JP CHAN DRN SIL HUBLESS 19FR W/TRO: Brand: CARDINAL HEALTH

## (undated) DEVICE — RADIFOCUS GLIDEWIRE: Brand: GLIDEWIRE